# Patient Record
Sex: MALE | Race: WHITE | NOT HISPANIC OR LATINO | Employment: OTHER | URBAN - METROPOLITAN AREA
[De-identification: names, ages, dates, MRNs, and addresses within clinical notes are randomized per-mention and may not be internally consistent; named-entity substitution may affect disease eponyms.]

---

## 2019-12-16 ENCOUNTER — HOSPITAL ENCOUNTER (INPATIENT)
Facility: HOSPITAL | Age: 79
LOS: 25 days | Discharge: NURSING, REHAB OR LONG-TERM CARE | DRG: 003 | End: 2020-01-10
Attending: EMERGENCY MEDICINE | Admitting: PSYCHIATRY & NEUROLOGY
Payer: COMMERCIAL

## 2019-12-16 DIAGNOSIS — E11.49 TYPE 2 DIABETES MELLITUS WITH OTHER NEUROLOGIC COMPLICATION, WITHOUT LONG-TERM CURRENT USE OF INSULIN: ICD-10-CM

## 2019-12-16 DIAGNOSIS — R53.1 DECREASED STRENGTH, ENDURANCE, AND MOBILITY: ICD-10-CM

## 2019-12-16 DIAGNOSIS — R68.89 DECREASED STRENGTH, ENDURANCE, AND MOBILITY: ICD-10-CM

## 2019-12-16 DIAGNOSIS — I61.9: ICD-10-CM

## 2019-12-16 DIAGNOSIS — R90.89 MIDLINE SHIFT OF BRAIN: ICD-10-CM

## 2019-12-16 DIAGNOSIS — R13.12 OROPHARYNGEAL DYSPHAGIA: ICD-10-CM

## 2019-12-16 DIAGNOSIS — E78.2 MIXED HYPERLIPIDEMIA: ICD-10-CM

## 2019-12-16 DIAGNOSIS — I63.511 ACUTE ISCHEMIC RIGHT MCA STROKE: ICD-10-CM

## 2019-12-16 DIAGNOSIS — Z51.5 PALLIATIVE CARE ENCOUNTER: ICD-10-CM

## 2019-12-16 DIAGNOSIS — Z71.89 ADVANCED CARE PLANNING/COUNSELING DISCUSSION: ICD-10-CM

## 2019-12-16 DIAGNOSIS — I63.9 CEREBROVASCULAR ACCIDENT (CVA), UNSPECIFIED MECHANISM: Primary | ICD-10-CM

## 2019-12-16 DIAGNOSIS — D68.9: ICD-10-CM

## 2019-12-16 DIAGNOSIS — Z71.89 GOALS OF CARE, COUNSELING/DISCUSSION: ICD-10-CM

## 2019-12-16 DIAGNOSIS — G93.6 CYTOTOXIC BRAIN EDEMA: ICD-10-CM

## 2019-12-16 DIAGNOSIS — I10 ESSENTIAL HYPERTENSION: ICD-10-CM

## 2019-12-16 DIAGNOSIS — J96.01 ACUTE RESPIRATORY FAILURE WITH HYPOXIA: ICD-10-CM

## 2019-12-16 DIAGNOSIS — I63.411 EMBOLIC STROKE INVOLVING RIGHT MIDDLE CEREBRAL ARTERY: ICD-10-CM

## 2019-12-16 DIAGNOSIS — Z74.09 DECREASED STRENGTH, ENDURANCE, AND MOBILITY: ICD-10-CM

## 2019-12-16 DIAGNOSIS — G93.6 VASOGENIC BRAIN EDEMA: ICD-10-CM

## 2019-12-16 PROBLEM — E11.9 TYPE 2 DIABETES MELLITUS: Status: ACTIVE | Noted: 2019-12-16

## 2019-12-16 LAB
ANION GAP SERPL CALC-SCNC: 7 MMOL/L (ref 8–16)
BUN SERPL-MCNC: 24 MG/DL (ref 8–23)
CALCIUM SERPL-MCNC: 8.9 MG/DL (ref 8.7–10.5)
CHLORIDE SERPL-SCNC: 104 MMOL/L (ref 95–110)
CO2 SERPL-SCNC: 26 MMOL/L (ref 23–29)
CREAT SERPL-MCNC: 1 MG/DL (ref 0.5–1.4)
EST. GFR  (AFRICAN AMERICAN): >60 ML/MIN/1.73 M^2
EST. GFR  (NON AFRICAN AMERICAN): >60 ML/MIN/1.73 M^2
GLUCOSE SERPL-MCNC: 250 MG/DL (ref 70–110)
MAGNESIUM SERPL-MCNC: 1.8 MG/DL (ref 1.6–2.6)
PHOSPHATE SERPL-MCNC: 3.2 MG/DL (ref 2.7–4.5)
POCT GLUCOSE: 206 MG/DL (ref 70–110)
POCT GLUCOSE: 221 MG/DL (ref 70–110)
POCT GLUCOSE: 238 MG/DL (ref 70–110)
POTASSIUM SERPL-SCNC: 4.2 MMOL/L (ref 3.5–5.1)
SODIUM SERPL-SCNC: 137 MMOL/L (ref 136–145)

## 2019-12-16 PROCEDURE — 99223 PR INITIAL HOSPITAL CARE,LEVL III: ICD-10-PCS | Mod: ,,, | Performed by: PSYCHIATRY & NEUROLOGY

## 2019-12-16 PROCEDURE — 20000000 HC ICU ROOM

## 2019-12-16 PROCEDURE — 82962 GLUCOSE BLOOD TEST: CPT

## 2019-12-16 PROCEDURE — 83735 ASSAY OF MAGNESIUM: CPT

## 2019-12-16 PROCEDURE — 94761 N-INVAS EAR/PLS OXIMETRY MLT: CPT

## 2019-12-16 PROCEDURE — 27000221 HC OXYGEN, UP TO 24 HOURS

## 2019-12-16 PROCEDURE — 25500020 PHARM REV CODE 255: Performed by: EMERGENCY MEDICINE

## 2019-12-16 PROCEDURE — 80048 BASIC METABOLIC PNL TOTAL CA: CPT

## 2019-12-16 PROCEDURE — 96376 TX/PRO/DX INJ SAME DRUG ADON: CPT

## 2019-12-16 PROCEDURE — 85025 COMPLETE CBC W/AUTO DIFF WBC: CPT

## 2019-12-16 PROCEDURE — 99291 CRITICAL CARE FIRST HOUR: CPT | Mod: ,,, | Performed by: INTERNAL MEDICINE

## 2019-12-16 PROCEDURE — 96374 THER/PROPH/DIAG INJ IV PUSH: CPT

## 2019-12-16 PROCEDURE — 99285 EMERGENCY DEPT VISIT HI MDM: CPT | Mod: ,,, | Performed by: EMERGENCY MEDICINE

## 2019-12-16 PROCEDURE — 99900035 HC TECH TIME PER 15 MIN (STAT)

## 2019-12-16 PROCEDURE — 25000242 PHARM REV CODE 250 ALT 637 W/ HCPCS: Performed by: INTERNAL MEDICINE

## 2019-12-16 PROCEDURE — 63600175 PHARM REV CODE 636 W HCPCS: Performed by: RADIOLOGY

## 2019-12-16 PROCEDURE — 99223 1ST HOSP IP/OBS HIGH 75: CPT | Mod: ,,, | Performed by: PSYCHIATRY & NEUROLOGY

## 2019-12-16 PROCEDURE — 84100 ASSAY OF PHOSPHORUS: CPT

## 2019-12-16 PROCEDURE — 94640 AIRWAY INHALATION TREATMENT: CPT

## 2019-12-16 PROCEDURE — 96375 TX/PRO/DX INJ NEW DRUG ADDON: CPT

## 2019-12-16 PROCEDURE — 99285 PR EMERGENCY DEPT VISIT,LEVEL V: ICD-10-PCS | Mod: ,,, | Performed by: EMERGENCY MEDICINE

## 2019-12-16 PROCEDURE — 25000003 PHARM REV CODE 250: Performed by: RADIOLOGY

## 2019-12-16 PROCEDURE — 99291 PR CRITICAL CARE, E/M 30-74 MINUTES: ICD-10-PCS | Mod: ,,, | Performed by: INTERNAL MEDICINE

## 2019-12-16 PROCEDURE — 99285 EMERGENCY DEPT VISIT HI MDM: CPT | Mod: 25

## 2019-12-16 RX ORDER — SODIUM CHLORIDE 0.9 % (FLUSH) 0.9 %
10 SYRINGE (ML) INJECTION
Status: DISCONTINUED | OUTPATIENT
Start: 2019-12-16 | End: 2020-01-10 | Stop reason: HOSPADM

## 2019-12-16 RX ORDER — IODIXANOL 320 MG/ML
200 INJECTION, SOLUTION INTRAVASCULAR
Status: COMPLETED | OUTPATIENT
Start: 2019-12-16 | End: 2019-12-16

## 2019-12-16 RX ORDER — ASPIRIN 81 MG/1
81 TABLET ORAL DAILY
Status: DISCONTINUED | OUTPATIENT
Start: 2019-12-17 | End: 2019-12-17

## 2019-12-16 RX ORDER — MIDAZOLAM HYDROCHLORIDE 1 MG/ML
INJECTION INTRAMUSCULAR; INTRAVENOUS CODE/TRAUMA/SEDATION MEDICATION
Status: COMPLETED | OUTPATIENT
Start: 2019-12-16 | End: 2019-12-16

## 2019-12-16 RX ORDER — NICARDIPINE HYDROCHLORIDE 0.2 MG/ML
1 INJECTION INTRAVENOUS CONTINUOUS
Status: DISCONTINUED | OUTPATIENT
Start: 2019-12-16 | End: 2019-12-21

## 2019-12-16 RX ORDER — CLOPIDOGREL BISULFATE 75 MG/1
75 TABLET ORAL DAILY
Status: DISCONTINUED | OUTPATIENT
Start: 2019-12-17 | End: 2019-12-17

## 2019-12-16 RX ORDER — IPRATROPIUM BROMIDE AND ALBUTEROL SULFATE 2.5; .5 MG/3ML; MG/3ML
3 SOLUTION RESPIRATORY (INHALATION) EVERY 4 HOURS
Status: DISCONTINUED | OUTPATIENT
Start: 2019-12-16 | End: 2020-01-10

## 2019-12-16 RX ORDER — FENTANYL CITRATE 50 UG/ML
INJECTION, SOLUTION INTRAMUSCULAR; INTRAVENOUS CODE/TRAUMA/SEDATION MEDICATION
Status: COMPLETED | OUTPATIENT
Start: 2019-12-16 | End: 2019-12-16

## 2019-12-16 RX ORDER — ASPIRIN 300 MG/1
300 SUPPOSITORY RECTAL ONCE
Status: COMPLETED | OUTPATIENT
Start: 2019-12-16 | End: 2019-12-16

## 2019-12-16 RX ORDER — LABETALOL HCL 20 MG/4 ML
10 SYRINGE (ML) INTRAVENOUS EVERY 4 HOURS PRN
Status: DISCONTINUED | OUTPATIENT
Start: 2019-12-16 | End: 2019-12-17

## 2019-12-16 RX ORDER — SODIUM CHLORIDE 0.9 % (FLUSH) 0.9 %
10 SYRINGE (ML) INJECTION
Status: DISCONTINUED | OUTPATIENT
Start: 2019-12-16 | End: 2019-12-17

## 2019-12-16 RX ADMIN — ASPIRIN 300 MG: 300 SUPPOSITORY RECTAL at 07:12

## 2019-12-16 RX ADMIN — MIDAZOLAM HYDROCHLORIDE 1 MG: 1 INJECTION, SOLUTION INTRAMUSCULAR; INTRAVENOUS at 06:12

## 2019-12-16 RX ADMIN — FENTANYL CITRATE 50 MCG: 50 INJECTION, SOLUTION INTRAMUSCULAR; INTRAVENOUS at 06:12

## 2019-12-16 RX ADMIN — NICARDIPINE HYDROCHLORIDE 1 MG/HR: 0.2 INJECTION, SOLUTION INTRAVENOUS at 07:12

## 2019-12-16 RX ADMIN — IPRATROPIUM BROMIDE AND ALBUTEROL SULFATE 3 ML: .5; 3 SOLUTION RESPIRATORY (INHALATION) at 10:12

## 2019-12-16 RX ADMIN — IODIXANOL 80 ML: 320 INJECTION, SOLUTION INTRAVASCULAR at 07:12

## 2019-12-16 NOTE — H&P
Inpatient Radiology Pre-procedure Note    History of Present Illness:  Jerry Linder is a 79 y.o. male who presents as a transfer from OSH with a right MCA stroke syndrome.  Pt. Has outside CT reported as having ABBY stenosis at bifurcation with 70% stenosis and right MCA occlusion.  Admission H&P reviewed.  No past medical history on file.  No past surgical history on file.    Review of Systems:   As documented in primary team H&P    Home Meds:   Prior to Admission medications    Not on File     Scheduled Meds:   Continuous Infusions:   PRN Meds:  Anticoagulants/Antiplatelets: no anticoagulation    Allergies: Review of patient's allergies indicates:  Allergies not on file  Sedation Hx: have been multiple systemic reactions    Labs:  No results for input(s): INR in the last 168 hours.    Invalid input(s):  PT,  PTT  No results for input(s): WBC, HGB, HCT, MCV, PLT in the last 168 hours. No results for input(s): GLU, NA, K, CL, CO2, BUN, CREATININE, CALCIUM, MG, ALT, AST, ALBUMIN, BILITOT, BILIDIR in the last 168 hours.      Vitals:  Temp: 98.2 °F (36.8 °C) (12/16/19 1723)  Pulse: 110 (12/16/19 1723)  Resp: 20 (12/16/19 1723)  BP: (!) 140/72 (12/16/19 1723)  SpO2: 97 % (12/16/19 1723)     Physical Exam:  ASA: 2  Mallampati: 3    General: no acute distress  Mental Status: alert and oriented to person, place and time  HEENT: normocephalic, atraumatic  Chest: unlabored breathing  Heart: regular heart rate  Abdomen: nondistended  Neuro:  left hemiparesis, right gaze preference    Plan: Cerebral angiogram and possible stroke intervention in the right MCA, possibly the right carotid bifurcation  Sedation Plan: devorah Martínez MD  Attending Radiologist  Interventional Neuroradiology

## 2019-12-16 NOTE — ASSESSMENT & PLAN NOTE
79 y.o. yo male with unclear PMHx (HTN, DM, HLD?) who presented to [facility] with R MCA syndrome. LKN 1000. Telephone (no video) consult with Dr. Mosher; tPA not given, as outside treatment window. [place] with report of R ICA stenosis at bifurcation with 70% stenosis and right MCA occlusion on CTA; [facility]. Dr. Martínez reviewed stat CT Head images as acquired. Positive for LVO. Patient to IR for cerebral angio for possible thrombectomy. Admitted to Neuro ICU for close monitoring/higher level of care.       Antithrombotics for secondary stroke prevention: Antiplatelets: Aspirin: 81 mg daily  Statins for secondary stroke prevention and hyperlipidemia, if present:   Statins: Atorvastatin- 40 mg daily  Aggressive risk factor modification: Diet, Exercise  Rehab efforts: The patient has been evaluated by a stroke team provider and the therapy needs have been fully considered based off the presenting complaints and exam findings. The following therapy evaluations are needed: PT evaluate and treat, OT evaluate and treat, SLP evaluate and treat, PM&R evaluate for appropriate placement  Diagnostics ordered/pending: HgbA1C to assess blood glucose levels, Lipid Profile to assess cholesterol levels, MRI head without contrast to assess brain parenchyma, TTE to assess cardiac function/status , TSH to assess thyroid function  VTE prophylaxis: Heparin 5000 units SQ every 8 hours  place [facility]  BP parameters: Infarct: Post successful thrombectomy, SBP <140  Post unsuccessful thrombectomy, SBP <220

## 2019-12-16 NOTE — PLAN OF CARE
Patient transferred to IR room 190 for cerebral angiogram with thrombectomy. See sedation documentation.
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
## 2019-12-17 PROBLEM — Z74.09 DECREASED STRENGTH, ENDURANCE, AND MOBILITY: Status: ACTIVE | Noted: 2019-12-17

## 2019-12-17 PROBLEM — R68.89 DECREASED STRENGTH, ENDURANCE, AND MOBILITY: Status: ACTIVE | Noted: 2019-12-17

## 2019-12-17 PROBLEM — R53.1 DECREASED STRENGTH, ENDURANCE, AND MOBILITY: Status: ACTIVE | Noted: 2019-12-17

## 2019-12-17 LAB
ABO + RH BLD: NORMAL
ABO + RH BLD: NORMAL
ALBUMIN SERPL BCP-MCNC: 3.3 G/DL (ref 3.5–5.2)
ALLENS TEST: ABNORMAL
ALP SERPL-CCNC: 55 U/L (ref 55–135)
ALT SERPL W/O P-5'-P-CCNC: 45 U/L (ref 10–44)
ANION GAP SERPL CALC-SCNC: 9 MMOL/L (ref 8–16)
APTT BLDCRRT: 24 SEC (ref 21–32)
ASCENDING AORTA: 3.35 CM
AST SERPL-CCNC: 25 U/L (ref 10–40)
BASOPHILS # BLD AUTO: 0.01 K/UL (ref 0–0.2)
BASOPHILS # BLD AUTO: 0.04 K/UL (ref 0–0.2)
BASOPHILS NFR BLD: 0.1 % (ref 0–1.9)
BASOPHILS NFR BLD: 0.4 % (ref 0–1.9)
BILIRUB SERPL-MCNC: 0.7 MG/DL (ref 0.1–1)
BLD GP AB SCN CELLS X3 SERPL QL: NORMAL
BLD GP AB SCN CELLS X3 SERPL QL: NORMAL
BLOOD GROUP ANTIBODIES SERPL: NORMAL
BSA FOR ECHO PROCEDURE: 2.52 M2
BUN SERPL-MCNC: 25 MG/DL (ref 8–23)
CALCIUM SERPL-MCNC: 8.6 MG/DL (ref 8.7–10.5)
CHLORIDE SERPL-SCNC: 107 MMOL/L (ref 95–110)
CHOLEST SERPL-MCNC: 136 MG/DL (ref 120–199)
CHOLEST/HDLC SERPL: 4.1 {RATIO} (ref 2–5)
CO2 SERPL-SCNC: 23 MMOL/L (ref 23–29)
CREAT SERPL-MCNC: 0.9 MG/DL (ref 0.5–1.4)
CV ECHO LV RWT: 0.34 CM
DELSYS: ABNORMAL
DIFFERENTIAL METHOD: ABNORMAL
DIFFERENTIAL METHOD: ABNORMAL
DOP CALC LVOT AREA: 4.4 CM2
DOP CALC LVOT DIAMETER: 2.38 CM
DOP CALC LVOT PEAK VEL: 0.85 M/S
DOP CALC LVOT STROKE VOLUME: 68.92 CM3
DOP CALCLVOT PEAK VEL VTI: 15.5 CM
E WAVE DECELERATION TIME: 108.8 MSEC
E/A RATIO: 0.74
ECHO LV POSTERIOR WALL: 1.04 CM (ref 0.6–1.1)
EOSINOPHIL # BLD AUTO: 0.1 K/UL (ref 0–0.5)
EOSINOPHIL # BLD AUTO: 0.1 K/UL (ref 0–0.5)
EOSINOPHIL NFR BLD: 0.6 % (ref 0–8)
EOSINOPHIL NFR BLD: 0.6 % (ref 0–8)
ERYTHROCYTE [DISTWIDTH] IN BLOOD BY AUTOMATED COUNT: 13 % (ref 11.5–14.5)
ERYTHROCYTE [DISTWIDTH] IN BLOOD BY AUTOMATED COUNT: 13.2 % (ref 11.5–14.5)
ERYTHROCYTE [SEDIMENTATION RATE] IN BLOOD BY WESTERGREN METHOD: 26 MM/H
EST. GFR  (AFRICAN AMERICAN): >60 ML/MIN/1.73 M^2
EST. GFR  (NON AFRICAN AMERICAN): >60 ML/MIN/1.73 M^2
ESTIMATED AVG GLUCOSE: 174 MG/DL (ref 68–131)
FIO2: 36
FLOW: 4
FRACTIONAL SHORTENING: 46 % (ref 28–44)
GLUCOSE SERPL-MCNC: 220 MG/DL (ref 70–110)
HBA1C MFR BLD HPLC: 7.7 % (ref 4–5.6)
HCO3 UR-SCNC: 24 MMOL/L (ref 24–28)
HCT VFR BLD AUTO: 41.3 % (ref 40–54)
HCT VFR BLD AUTO: 41.4 % (ref 40–54)
HDLC SERPL-MCNC: 33 MG/DL (ref 40–75)
HDLC SERPL: 24.3 % (ref 20–50)
HGB BLD-MCNC: 13.8 G/DL (ref 14–18)
HGB BLD-MCNC: 14.1 G/DL (ref 14–18)
IMM GRANULOCYTES # BLD AUTO: 0.03 K/UL (ref 0–0.04)
IMM GRANULOCYTES # BLD AUTO: 0.05 K/UL (ref 0–0.04)
IMM GRANULOCYTES NFR BLD AUTO: 0.3 % (ref 0–0.5)
IMM GRANULOCYTES NFR BLD AUTO: 0.5 % (ref 0–0.5)
INR PPP: 1 (ref 0.8–1.2)
INTERVENTRICULAR SEPTUM: 0.97 CM (ref 0.6–1.1)
IVRT: 0.07 MSEC
LDLC SERPL CALC-MCNC: 67.6 MG/DL (ref 63–159)
LEFT ATRIUM SIZE: 5.07 CM
LEFT INTERNAL DIMENSION IN SYSTOLE: 3.27 CM (ref 2.1–4)
LEFT VENTRICLE DIASTOLIC VOLUME INDEX: 74.25 ML/M2
LEFT VENTRICLE DIASTOLIC VOLUME: 182.07 ML
LEFT VENTRICLE MASS INDEX: 102 G/M2
LEFT VENTRICLE SYSTOLIC VOLUME INDEX: 17.6 ML/M2
LEFT VENTRICLE SYSTOLIC VOLUME: 43.09 ML
LEFT VENTRICULAR INTERNAL DIMENSION IN DIASTOLE: 6.03 CM (ref 3.5–6)
LEFT VENTRICULAR MASS: 250.58 G
LYMPHOCYTES # BLD AUTO: 1.8 K/UL (ref 1–4.8)
LYMPHOCYTES # BLD AUTO: 1.9 K/UL (ref 1–4.8)
LYMPHOCYTES NFR BLD: 19.1 % (ref 18–48)
LYMPHOCYTES NFR BLD: 20.6 % (ref 18–48)
MAGNESIUM SERPL-MCNC: 1.9 MG/DL (ref 1.6–2.6)
MCH RBC QN AUTO: 32.7 PG (ref 27–31)
MCH RBC QN AUTO: 32.8 PG (ref 27–31)
MCHC RBC AUTO-ENTMCNC: 33.4 G/DL (ref 32–36)
MCHC RBC AUTO-ENTMCNC: 34.1 G/DL (ref 32–36)
MCV RBC AUTO: 96 FL (ref 82–98)
MCV RBC AUTO: 98 FL (ref 82–98)
MODE: ABNORMAL
MONOCYTES # BLD AUTO: 0.8 K/UL (ref 0.3–1)
MONOCYTES # BLD AUTO: 0.9 K/UL (ref 0.3–1)
MONOCYTES NFR BLD: 10.2 % (ref 4–15)
MONOCYTES NFR BLD: 8.1 % (ref 4–15)
MV PEAK A VEL: 1.16 M/S
MV PEAK E VEL: 0.86 M/S
NEUTROPHILS # BLD AUTO: 6.1 K/UL (ref 1.8–7.7)
NEUTROPHILS # BLD AUTO: 7 K/UL (ref 1.8–7.7)
NEUTROPHILS NFR BLD: 68.2 % (ref 38–73)
NEUTROPHILS NFR BLD: 71.3 % (ref 38–73)
NONHDLC SERPL-MCNC: 103 MG/DL
NRBC BLD-RTO: 0 /100 WBC
NRBC BLD-RTO: 0 /100 WBC
PCO2 BLDA: 34.7 MMHG (ref 35–45)
PH SMN: 7.45 [PH] (ref 7.35–7.45)
PHOSPHATE SERPL-MCNC: 4 MG/DL (ref 2.7–4.5)
PISA TR MAX VEL: 1.49 M/S
PLATELET # BLD AUTO: 187 K/UL (ref 150–350)
PLATELET # BLD AUTO: 198 K/UL (ref 150–350)
PMV BLD AUTO: 11 FL (ref 9.2–12.9)
PMV BLD AUTO: 11.4 FL (ref 9.2–12.9)
PO2 BLDA: 78 MMHG (ref 80–100)
POC BE: 0 MMOL/L
POC SATURATED O2: 96 % (ref 95–100)
POC TCO2: 25 MMOL/L (ref 23–27)
POCT GLUCOSE: 180 MG/DL (ref 70–110)
POCT GLUCOSE: 196 MG/DL (ref 70–110)
POCT GLUCOSE: 228 MG/DL (ref 70–110)
POTASSIUM SERPL-SCNC: 3.9 MMOL/L (ref 3.5–5.1)
PROT SERPL-MCNC: 6.4 G/DL (ref 6–8.4)
PROTHROMBIN TIME: 10.1 SEC (ref 9–12.5)
RBC # BLD AUTO: 4.22 M/UL (ref 4.6–6.2)
RBC # BLD AUTO: 4.3 M/UL (ref 4.6–6.2)
RIGHT VENTRICULAR END-DIASTOLIC DIMENSION: 3.16 CM
SAMPLE: ABNORMAL
SINUS: 3.33 CM
SITE: ABNORMAL
SODIUM SERPL-SCNC: 138 MMOL/L (ref 136–145)
SODIUM SERPL-SCNC: 139 MMOL/L (ref 136–145)
SP02: 97
STJ: 2.64 CM
TR MAX PG: 9 MMHG
TRICUSPID ANNULAR PLANE SYSTOLIC EXCURSION: 3.13 CM
TRIGL SERPL-MCNC: 177 MG/DL (ref 30–150)
WBC # BLD AUTO: 8.93 K/UL (ref 3.9–12.7)
WBC # BLD AUTO: 9.78 K/UL (ref 3.9–12.7)

## 2019-12-17 PROCEDURE — 27200188 HC TRANSDUCER, ART ADULT/PEDS

## 2019-12-17 PROCEDURE — 99233 SBSQ HOSP IP/OBS HIGH 50: CPT | Mod: ,,, | Performed by: PSYCHIATRY & NEUROLOGY

## 2019-12-17 PROCEDURE — 36620 ARTERIAL LINE: ICD-10-PCS | Mod: ,,, | Performed by: NURSE PRACTITIONER

## 2019-12-17 PROCEDURE — 63600175 PHARM REV CODE 636 W HCPCS: Performed by: INTERNAL MEDICINE

## 2019-12-17 PROCEDURE — 83036 HEMOGLOBIN GLYCOSYLATED A1C: CPT

## 2019-12-17 PROCEDURE — 36620 INSERTION CATHETER ARTERY: CPT

## 2019-12-17 PROCEDURE — 86870 RBC ANTIBODY IDENTIFICATION: CPT

## 2019-12-17 PROCEDURE — 36415 COLL VENOUS BLD VENIPUNCTURE: CPT

## 2019-12-17 PROCEDURE — 83735 ASSAY OF MAGNESIUM: CPT

## 2019-12-17 PROCEDURE — 99291 PR CRITICAL CARE, E/M 30-74 MINUTES: ICD-10-PCS | Mod: 25,,, | Performed by: NURSE PRACTITIONER

## 2019-12-17 PROCEDURE — 36600 WITHDRAWAL OF ARTERIAL BLOOD: CPT

## 2019-12-17 PROCEDURE — 27000221 HC OXYGEN, UP TO 24 HOURS

## 2019-12-17 PROCEDURE — 99291 CRITICAL CARE FIRST HOUR: CPT | Mod: 25,,, | Performed by: NURSE PRACTITIONER

## 2019-12-17 PROCEDURE — 92610 EVALUATE SWALLOWING FUNCTION: CPT

## 2019-12-17 PROCEDURE — 36620 INSERTION CATHETER ARTERY: CPT | Mod: ,,, | Performed by: NURSE PRACTITIONER

## 2019-12-17 PROCEDURE — 63600175 PHARM REV CODE 636 W HCPCS: Performed by: STUDENT IN AN ORGANIZED HEALTH CARE EDUCATION/TRAINING PROGRAM

## 2019-12-17 PROCEDURE — 43752 NASAL/OROGASTRIC W/TUBE PLMT: CPT

## 2019-12-17 PROCEDURE — 25500020 PHARM REV CODE 255: Performed by: ANESTHESIOLOGY

## 2019-12-17 PROCEDURE — 25000242 PHARM REV CODE 250 ALT 637 W/ HCPCS: Performed by: INTERNAL MEDICINE

## 2019-12-17 PROCEDURE — 85730 THROMBOPLASTIN TIME PARTIAL: CPT

## 2019-12-17 PROCEDURE — 94761 N-INVAS EAR/PLS OXIMETRY MLT: CPT

## 2019-12-17 PROCEDURE — 99900035 HC TECH TIME PER 15 MIN (STAT)

## 2019-12-17 PROCEDURE — 94640 AIRWAY INHALATION TREATMENT: CPT

## 2019-12-17 PROCEDURE — 92523 SPEECH SOUND LANG COMPREHEN: CPT

## 2019-12-17 PROCEDURE — 25000003 PHARM REV CODE 250: Performed by: STUDENT IN AN ORGANIZED HEALTH CARE EDUCATION/TRAINING PROGRAM

## 2019-12-17 PROCEDURE — 25000003 PHARM REV CODE 250: Performed by: RADIOLOGY

## 2019-12-17 PROCEDURE — 86901 BLOOD TYPING SEROLOGIC RH(D): CPT

## 2019-12-17 PROCEDURE — 86901 BLOOD TYPING SEROLOGIC RH(D): CPT | Mod: 91

## 2019-12-17 PROCEDURE — 80053 COMPREHEN METABOLIC PANEL: CPT

## 2019-12-17 PROCEDURE — 99900026 HC AIRWAY MAINTENANCE (STAT)

## 2019-12-17 PROCEDURE — A4217 STERILE WATER/SALINE, 500 ML: HCPCS | Performed by: NURSE PRACTITIONER

## 2019-12-17 PROCEDURE — 85025 COMPLETE CBC W/AUTO DIFF WBC: CPT

## 2019-12-17 PROCEDURE — 20000000 HC ICU ROOM

## 2019-12-17 PROCEDURE — 63600175 PHARM REV CODE 636 W HCPCS: Performed by: NURSE PRACTITIONER

## 2019-12-17 PROCEDURE — 80061 LIPID PANEL: CPT

## 2019-12-17 PROCEDURE — 84295 ASSAY OF SERUM SODIUM: CPT

## 2019-12-17 PROCEDURE — 25000003 PHARM REV CODE 250: Performed by: INTERNAL MEDICINE

## 2019-12-17 PROCEDURE — 99233 PR SUBSEQUENT HOSPITAL CARE,LEVL III: ICD-10-PCS | Mod: ,,, | Performed by: PSYCHIATRY & NEUROLOGY

## 2019-12-17 PROCEDURE — 85610 PROTHROMBIN TIME: CPT

## 2019-12-17 PROCEDURE — 63600175 PHARM REV CODE 636 W HCPCS: Performed by: ANESTHESIOLOGY

## 2019-12-17 PROCEDURE — 25000003 PHARM REV CODE 250: Performed by: NURSE PRACTITIONER

## 2019-12-17 PROCEDURE — 84100 ASSAY OF PHOSPHORUS: CPT

## 2019-12-17 PROCEDURE — A9585 GADOBUTROL INJECTION: HCPCS | Performed by: ANESTHESIOLOGY

## 2019-12-17 PROCEDURE — 82803 BLOOD GASES ANY COMBINATION: CPT

## 2019-12-17 RX ORDER — CLOPIDOGREL BISULFATE 75 MG/1
75 TABLET ORAL DAILY
Status: DISCONTINUED | OUTPATIENT
Start: 2019-12-18 | End: 2019-12-17

## 2019-12-17 RX ORDER — SODIUM CHLORIDE 9 MG/ML
INJECTION, SOLUTION INTRAVENOUS CONTINUOUS
Status: DISCONTINUED | OUTPATIENT
Start: 2019-12-17 | End: 2019-12-17

## 2019-12-17 RX ORDER — SODIUM,POTASSIUM PHOSPHATES 280-250MG
2 POWDER IN PACKET (EA) ORAL
Status: DISCONTINUED | OUTPATIENT
Start: 2019-12-17 | End: 2020-01-09

## 2019-12-17 RX ORDER — LANOLIN ALCOHOL/MO/W.PET/CERES
800 CREAM (GRAM) TOPICAL
Status: DISCONTINUED | OUTPATIENT
Start: 2019-12-17 | End: 2020-01-09

## 2019-12-17 RX ORDER — LABETALOL HCL 20 MG/4 ML
10 SYRINGE (ML) INTRAVENOUS EVERY 4 HOURS PRN
Status: DISCONTINUED | OUTPATIENT
Start: 2019-12-17 | End: 2019-12-26

## 2019-12-17 RX ORDER — GADOBUTROL 604.72 MG/ML
10 INJECTION INTRAVENOUS
Status: COMPLETED | OUTPATIENT
Start: 2019-12-17 | End: 2019-12-17

## 2019-12-17 RX ORDER — POTASSIUM CHLORIDE 20 MEQ/15ML
40 SOLUTION ORAL
Status: DISCONTINUED | OUTPATIENT
Start: 2019-12-17 | End: 2020-01-09

## 2019-12-17 RX ORDER — POTASSIUM CHLORIDE 20 MEQ/15ML
60 SOLUTION ORAL
Status: DISCONTINUED | OUTPATIENT
Start: 2019-12-17 | End: 2020-01-09

## 2019-12-17 RX ORDER — ATORVASTATIN CALCIUM 20 MG/1
80 TABLET, FILM COATED ORAL NIGHTLY
Status: DISCONTINUED | OUTPATIENT
Start: 2019-12-17 | End: 2019-12-23

## 2019-12-17 RX ORDER — PHENYLEPHRINE HCL IN 0.9% NACL 1 MG/10 ML
SYRINGE (ML) INTRAVENOUS
Status: DISPENSED
Start: 2019-12-17 | End: 2019-12-18

## 2019-12-17 RX ORDER — FUROSEMIDE 10 MG/ML
20 INJECTION INTRAMUSCULAR; INTRAVENOUS ONCE
Status: COMPLETED | OUTPATIENT
Start: 2019-12-17 | End: 2019-12-17

## 2019-12-17 RX ORDER — INSULIN ASPART 100 [IU]/ML
1-10 INJECTION, SOLUTION INTRAVENOUS; SUBCUTANEOUS
Status: DISCONTINUED | OUTPATIENT
Start: 2019-12-17 | End: 2019-12-18

## 2019-12-17 RX ORDER — ATORVASTATIN CALCIUM 20 MG/1
80 TABLET, FILM COATED ORAL NIGHTLY
Status: DISCONTINUED | OUTPATIENT
Start: 2019-12-17 | End: 2019-12-17

## 2019-12-17 RX ORDER — NAPROXEN SODIUM 220 MG/1
81 TABLET, FILM COATED ORAL DAILY
Status: DISCONTINUED | OUTPATIENT
Start: 2019-12-17 | End: 2019-12-17

## 2019-12-17 RX ORDER — GLUCAGON 1 MG
1 KIT INJECTION
Status: DISCONTINUED | OUTPATIENT
Start: 2019-12-17 | End: 2019-12-18

## 2019-12-17 RX ADMIN — SODIUM CHLORIDE: 234 INJECTION INTRAMUSCULAR; INTRAVENOUS; SUBCUTANEOUS at 06:12

## 2019-12-17 RX ADMIN — INSULIN ASPART 4 UNITS: 100 INJECTION, SOLUTION INTRAVENOUS; SUBCUTANEOUS at 06:12

## 2019-12-17 RX ADMIN — ASPIRIN 81 MG: 81 TABLET, COATED ORAL at 09:12

## 2019-12-17 RX ADMIN — SODIUM CHLORIDE: 0.9 INJECTION, SOLUTION INTRAVENOUS at 04:12

## 2019-12-17 RX ADMIN — IPRATROPIUM BROMIDE AND ALBUTEROL SULFATE 3 ML: .5; 3 SOLUTION RESPIRATORY (INHALATION) at 08:12

## 2019-12-17 RX ADMIN — IPRATROPIUM BROMIDE AND ALBUTEROL SULFATE 3 ML: .5; 3 SOLUTION RESPIRATORY (INHALATION) at 01:12

## 2019-12-17 RX ADMIN — FUROSEMIDE 20 MG: 10 INJECTION, SOLUTION INTRAMUSCULAR; INTRAVENOUS at 02:12

## 2019-12-17 RX ADMIN — GADOBUTROL 10 ML: 604.72 INJECTION INTRAVENOUS at 04:12

## 2019-12-17 RX ADMIN — HUMAN ALBUMIN MICROSPHERES AND PERFLUTREN 0.66 MG: 10; .22 INJECTION, SOLUTION INTRAVENOUS at 10:12

## 2019-12-17 RX ADMIN — ATORVASTATIN CALCIUM 80 MG: 20 TABLET, FILM COATED ORAL at 09:12

## 2019-12-17 RX ADMIN — IPRATROPIUM BROMIDE AND ALBUTEROL SULFATE 3 ML: .5; 3 SOLUTION RESPIRATORY (INHALATION) at 05:12

## 2019-12-17 RX ADMIN — CLOPIDOGREL BISULFATE 75 MG: 75 TABLET ORAL at 09:12

## 2019-12-17 RX ADMIN — NICARDIPINE HYDROCHLORIDE 7.5 MG/HR: 0.2 INJECTION, SOLUTION INTRAVENOUS at 09:12

## 2019-12-17 RX ADMIN — LABETALOL HCL IV SOLN PREFILLED SYRINGE 20 MG/4ML (5 MG/ML) 10 MG: 20/4 SOLUTION PREFILLED SYRINGE at 02:12

## 2019-12-17 RX ADMIN — IPRATROPIUM BROMIDE AND ALBUTEROL SULFATE 3 ML: .5; 3 SOLUTION RESPIRATORY (INHALATION) at 03:12

## 2019-12-17 RX ADMIN — NICARDIPINE HYDROCHLORIDE 2.5 MG/HR: 0.2 INJECTION, SOLUTION INTRAVENOUS at 07:12

## 2019-12-17 RX ADMIN — INSULIN ASPART 2 UNITS: 100 INJECTION, SOLUTION INTRAVENOUS; SUBCUTANEOUS at 11:12

## 2019-12-17 NOTE — PROGRESS NOTES
Transferred pt from IR to PACU bed 10 on portable monitor. VSS. Q 15 min site checks/VS. Flat until 2100. Updated report given to ZEE Ayers.

## 2019-12-17 NOTE — PROGRESS NOTES
Patient transferred to the MRI bed,, tele placed , O2 sensor, and BP cuff applied,, placed in MRI, tolerating well,, WCTM

## 2019-12-17 NOTE — PT/OT/SLP EVAL
"Speech Language Pathology Evaluation  Cognitive/Bedside Swallow    Patient Name:  Jerry Linder   MRN:  [id number]  Admitting Diagnosis: Embolic stroke involving right middle cerebral artery    Recommendations:                  General Recommendations:  Dysphagia therapy and Speech/language therapy  Diet recommendations:  NPO(except for meds crushed in pureed bolus), NPO   Aspiration Precautions: Strict aspiration precautions   General Precautions: Standard, aspiration, fall  Communication strategies:  none    History:     History reviewed. No pertinent past medical history.    Past Surgical History:   Procedure Laterality Date    CEREBRAL ANGIOGRAM N/A 12/16/2019    Procedure: ANGIOGRAM-CEREBRAL;  Surgeon: Jairo Martínez MD;  Location: [facility] OR [address] [place] [address];  Service: Radiology;  Laterality: N/A;       Social History: Patient lives with spouse.    Prior Intubation HX:  na    Modified Barium Swallow: na        Prior diet: regular with thin.      Subjective     "I am tired' per pt  Patient goals: did not state    Pain/Comfort:  · Pain Rating 1: 0/10  · Pain Rating Post-Intervention 1: 0/10    Objective:     Cognitive Status:    Pt. Cooperative and responsive though eyes closed most of session despite cues.  Left neglect noted.  He was oriented to time and place with good recall of remote temporal and general information.  Pt. Recalled up to 5  digits  On immediate recall tasks.  Responses to hypothetical verbal problem solving tasks were accurate and complete.  He compared and contrasted objects and generated multiple solutions to problems though he did require cues to maintain alertness       Receptive Language:   Comprehension:   Pt. Followed one step command and responded to simple yes/no questions with 100% accuracy    Pragmatics:    Poor eye contact    Expressive Language:  Verbal:    Verbal language skills were wfl to communicate basic wants and needs      Motor Speech:  Moderate dysarthria with speech intelligible in " conversation with careful listening.    Voice:   Adequate inensity with slightly wet vocal quality     Visual-Spatial:  tba    Reading:   tba     Written Expression:   tba    Oral Musculature Evaluation  · Oral Musculature: facial asymmetry present, left weakness  · Dentition: scattered dentition  · Secretion Management: left corner drooling  · Mucosal Quality: adequate  · Mandibular Strength and Mobility: WFL  · Oral Labial Strength and Mobility: impaired retraction  · Buccal Strength and Mobility: impaired  · Volitional Cough: wet, strong  · Volitional Swallow: delayed  · Voice Prior to PO Intake: adequate intensity, slightly wet    Bedside Swallow Eval:   Consistencies Assessed:  · Thin liquids 1 teaspoon  · Nectar thick liquids 3 teaspoons then 2 bolus controlled sips via cup  · Puree 1 teaspoon     Oral Phase:   · WFL    Pharyngeal Phase:   · coughing/choking noted on thin liquids with no coughing noted on nectar thick liquid and pureed.  Pt. Did require cues to maintain alertness        Assessment:     Jerry Linder is a 79 y.o. male with an SLP diagnosis of Dysphagia, Dysarthria and Cognitive-Linguistic Impairment.  He presents with decreased level of alertness.    Goals:   Multidisciplinary Problems     SLP Goals        Problem: SLP Goal    Goal Priority Disciplines Outcome   SLP Goal     SLP Ongoing, Progressing   Description:  Goals due 12/24  1.  Pt. Will participate in ongoing assessment of swallow at bedside  2.  Assess reading, writing and visual spatial skills  3.  Recall speech strategies with min cues  4.  Assess higher level cognitive skills to determine need fo rtherapy                    Plan:     · Patient to be seen:  4 x/week   · Plan of Care expires:  01/15/20  · Plan of Care reviewed with:  patient, spouse   · SLP Follow-Up:  Yes       Discharge recommendations:  Discharge Facility/Level of Care Needs: rehabilitation facility   Barriers to Discharge:  Level of Skilled Assistance Needed 24  hour    Time Tracking:     SLP Treatment Date:   12/17/19  Speech Start Time:  0810  Speech Stop Time:  0830     Speech Total Time (min):  20 min    Billable Minutes: Eval 12  and Eval Swallow and Oral Function 8    Kat Garrido MA, CCC-SLP  12/17/2019
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
## 2019-12-17 NOTE — PLAN OF CARE
Recommend that pt. Be npo except for meds crushed in pureed bolus  Problem: SLP Goal  Goal: SLP Goal  Description  Goals due 12/24  1.  Pt. Will participate in ongoing assessment of swallow at bedside  2.  Assess reading, writing and visual spatial skills  3.  Recall speech strategies with min cues  4.  Assess higher level cognitive skills to determine need fo rtherapy   Outcome: Ongoing, Progressing

## 2019-12-17 NOTE — H&P
[facility]  Neurocritical Care  History & Physical    Admit Date: 12/16/2019  Service Date: 12/17/2019  Length of Stay: 1    Subjective:     Chief Complaint: Embolic stroke involving right middle cerebral artery    History of Present Illness: Jerry Linder is a 79 y.o. male who  was admitted as a transfer from [facility] for Right MCA stroke syndrome. Patient had an urgent thrombectomy s/p with a right MCA stroke syndrome. PMH significant for HTN, T2DM and HLD. Patient stated that around 2:30 pm on 12/16/19 he was driving and felt weak. He drove home and was taken to the ER where he was evaluated for a stroke. He had outside CTA which was reported to have ABBY stenosis at bifurcation with 70% stenosis and right MCA occlusion. Patient was transferred to [facility] for possible intervention. Patient had a right femoral sheath placed and his Angio  revealed 90% ABBY stenosis and R M1 occlusion. The ABBY was treated by thrombectomy and carotid stenting with TICI 3 reperfusion.    Patient was admitted to the [facility] for higher level of care post thrombectomy.         No past medical history on file.  No past surgical history on file.   No current facility-administered medications on file prior to encounter.      No current outpatient medications on file prior to encounter.      Allergies: Patient has no known allergies.    No family history on file.  Social History     Tobacco Use    Smoking status: Not on file   Substance Use Topics    Alcohol use: Not on file    Drug use: Not on file       Admit Date: 12/16/2019  LOS: 1    CC: Embolic stroke involving right middle cerebral artery    Code Status: Full Code     SUBJECTIVE:     Review of Symptoms:   Constitutional: Denies fevers or chills.  Pulmonary: Denies shortness of breath or cough.  Cardiology: Denies chest pain or palpitations.  GI: Denies abdominal pain or constipation.  Neurologic: Denies new weakness, headaches, or paresthesias.     Medications:  Continuous  "Infusions:   niCARdipine 10 mg/hr (12/16/19 2235)     Scheduled Meds:   albuterol-ipratropium  3 mL Nebulization Q4H    aspirin  81 mg Oral Daily    atorvastatin  80 mg Oral QHS    clopidogrel  75 mg Oral Daily     PRN Meds:.labetalol, sodium chloride 0.9%, sodium chloride 0.9%    OBJECTIVE:   Vital Signs (Most Recent):   Temp: 98.8 °F (37.1 °C) (12/16/19 2300)  Pulse: 99 (12/17/19 0030)  Resp: (!) 23 (12/17/19 0030)  BP: (!) 119/50 (12/17/19 0030)  SpO2: 96 % (12/17/19 0030)    Vital Signs (24h Range):   Temp:  [98 °F (36.7 °C)-99 °F (37.2 °C)] 98.8 °F (37.1 °C)  Pulse:  [] 99  Resp:  [14-28] 23  SpO2:  [93 %-99 %] 96 %  BP: (117-181)/(50-86) 119/50    ICP/CPP (Last 24h):        I & O (Last 24h):     Intake/Output Summary (Last 24 hours) at 12/17/2019 0106  Last data filed at 12/16/2019 2202  Gross per 24 hour   Intake 59.44 ml   Output 835 ml   Net -775.56 ml     Physical Exam:  GA: Alert, comfortable, no acute distress.   HEENT: No scleral icterus or JVD.   Pulmonary: Bilateral crackles,  Cardiac: RRR S1 & S2 w/o rubs/murmurs/gallops.   Abdominal: Bowel sounds present x 4. No appreciable hepatosplenomegaly.  Skin: No jaundice, rashes, or visible lesions.  Neuro:  --GCS: E4 V5 M5  --Mental Status:  Alert  --CN II-XII grossly intact.   --Pupils 3mm, PERRL.   --Corneal reflex, gag, cough intact.  --LUE strength: no movement  --RUE strength: 5/5  --LLE strength: no movement  --RLE strength: 5/5  Left sided sensory loss    Vent Data: N/A       Lines/Drains/Airway:              Urethral Catheter 12/16/19 1732 (Active)   Site Assessment Clean;Intact 12/16/2019  9:00 PM   Collection Container Urimeter 12/16/2019  9:00 PM   Securement Method secured to top of thigh w/ adhesive device 12/16/2019  9:00 PM   Reason for Continuing Urinary Catheterization Post operative 12/16/2019  9:00 PM   CAUTI Prevention Bundle StatLock in place w 1" slack;Intact seal between catheter & drainage tubing;Green sheeting clip in " use;Drainage bag/urimeter off the floor;No dependent loops or kinks;Drainage bag/urimeter not overfilled (<2/3 full);Drainage bag/urimeter below bladder 12/16/2019  7:33 PM   Output (mL) 60 mL 12/16/2019 10:02 PM     Nutrition/Tube Feeds (if NPO state why): SHAJI evaluation  Labs:  ABG: No results for input(s): PH, PO2, PCO2, HCO3, POCSATURATED, BE in the last 24 hours.  BMP:  Recent Labs   Lab 12/16/19  2257      K 4.2      CO2 26   BUN 24*   CREATININE 1.0   *   MG 1.8   PHOS 3.2     LFT: No results found for: AST, ALT, GGT, ALKPHOS, BILITOT, ALBUMIN, PROT  CBC:   Lab Results   Component Value Date    WBC 9.78 12/16/2019    HGB 14.1 12/16/2019    HCT 41.4 12/16/2019    MCV 96 12/16/2019     12/16/2019     Microbiology x 7d:   Microbiology Results (last 7 days)     ** No results found for the last 168 hours. **        Imaging: CT head without contrast 12/16/19  FINDINGS:  Intracranial compartment:    Scattered periventricular white matter hypoattenuation, most consistent with chronic microvascular ischemic change.  No evidence of acute major vascular distribution infarct or hemorrhage.    Generalized cerebral volume loss with compensatory enlargement of ventricles and sulci without evidence of hydrocephalus.    No extra-axial blood or fluid collections.    Skull/extracranial contents (limited evaluation): No fracture. Mastoid air cells and paranasal sinuses are essentially clear.      Impression       No acute major vascular distribution infarct or hemorrhage.    Chronic microvascular ischemic changes and generalized cerebral volume loss.       I personally reviewed the above image.    ASSESSMENT/PLAN:     Active Hospital Problems    Diagnosis    *Embolic stroke involving right middle cerebral artery    Essential hypertension    Type 2 diabetes mellitus    Mixed hyperlipidemia    Cerebrovascular accident (CVA)      Neuro: Right MCA stroke syndrome, ABBY stenosis s/p Thrombectomy and  stent , TICI 3 reperfusion. Patient is Alert, awake, follows commands on the right, left sided weakness, left facial droop.  CVA  Atorvastatin 80mg  Continue Aspirin 81mg and plavix 75mg daily      Pulmonary: on 4L of oxygen      Cardiac: BP <140  On cardene  Follow ECHO      Renal:  Monitor input and output closely      ID:  Afebrile, no leukocytosis      Hem/Onc: Hgb 14.1,        Endocrine:  Hemoglobin A1C 7.7  Accuchecks mainly AC and HS      Fluids/Electrolytes/Nutrition/GI: monitor input and replace electrolytes accordingly    Lines: None  Art  CVC  ETT  Andrade  NG  PEG    Proph:  DVT:  Constipation:  Last output:  PUP:  VAP:      Uninterrupted Critical Care/Counseling Time (not including procedures): 40 minutes    Assessment/Plan:     Neuro  * Embolic stroke involving right middle cerebral artery   - 78 y/o male admitted for R MCA stroke syndrome POD 0 s/p Thrombectomy TICI 3 reperfusion   - keep SBP <140   - Neurochecks q1hr    - Follow Echo   - Follow Hemoglobin A1C   - Aspirin daily   - Plavix 75mg daily   - on cardene   - restart home Lisinopril      Cerebrovascular accident (CVA)  See embolic stroke above    Cardiac/Vascular  Mixed hyperlipidemia   - Continue Atorvastatin 80mg HS    Essential hypertension   - Continue home dose of Lisinopril    Endocrine  Type 2 diabetes mellitus   - Accuchecks AC and HS   - hemoglobin A1C 7.7          The patient is being Prophylaxed for:  Venous Thromboembolism with: Mechanical  Stress Ulcer with: Not Applicable   Ventilator Pneumonia with: not applicable    Activity Orders          None        Full Code    Edy Blum MD  Neurocritical Care  [facility]

## 2019-12-17 NOTE — PLAN OF CARE
R MCA s/p thrombectomy TICI 3  MRI brain without contrast  Evaluate edema and ischemic burden  DC IVFs  Likely administer lasix 20 mg IV s/p echo  sbp 100-140  DAPT with stenting  Echo pending  kub  Statin  ISS  SQH after MRI  NPO  NGT

## 2019-12-17 NOTE — NURSING
[facility]>IR>PACU>[id number]  Type of stroke/diagnosis:  R MCA      Thrombectomy start and end time 8977-0009 TICI 3 with stent placement  Current symptoms: LSW, facial droop, dysarthria, numbness present LS    Skin assessment done: Yes  Wounds noted: R groin  SHAJI Armband Applied: yes    NCC notified: MD ellie    [facility]

## 2019-12-17 NOTE — ASSESSMENT & PLAN NOTE
Continuous Cardiac Monitoring  Vital Signs Q1 hour  Systolic (24hrs), Av , Min:117 , Max:181   Cardene to Maintain SBP < 140  CXR  Lungs hypoaerated.  Increase in the pulmonary vascular interstitial and alveolar markings Findings most consistent with congestive failure   ECHO 65%  EKG : Atrial fibrillation with rapid ventricular response with premature ventricular or aberrantly conducted complexes

## 2019-12-17 NOTE — PROGRESS NOTES
[facility]  Vascular Neurology  Comprehensive Stroke Center  Progress Note    Assessment/Plan:     * Embolic stroke involving right middle cerebral artery  79 y.o. yo male with unclear PMHx (HTN, DM, HLD?) who presented to [facility] with R MCA syndrome. LKN 1000. Telephone (no video) consult with Dr. Mosher; tPA not given, as outside treatment window. [place] with report of R ICA stenosis at bifurcation with 70% stenosis and right MCA occlusion on CTA; pt was transferred to [facility] for possible intervention. Dr. Martínez reviewed stat CT Head images as acquired. Positive for LVO. Patient to IR for cerebral angio for possible thrombectomy. Admitted to Neuro ICU for close monitoring/higher level of care.       Antithrombotics for secondary stroke prevention: Antiplatelets: Aspirin: 81 mg daily  Statins for secondary stroke prevention and hyperlipidemia, if present:   Statins: Atorvastatin- 40 mg daily  Aggressive risk factor modification: Diet, Exercise  Rehab efforts: The patient has been evaluated by a stroke team provider and the therapy needs have been fully considered based off the presenting complaints and exam findings. The following therapy evaluations are needed: PT evaluate and treat, OT evaluate and treat, SLP evaluate and treat, PM&R evaluate for appropriate placement  Diagnostics ordered/pending: HgbA1C to assess blood glucose levels, Lipid Profile to assess cholesterol levels, MRI head without contrast to assess brain parenchyma  VTE prophylaxis: Heparin 5000 units SQ every 8 hours  place [facility]  BP parameters: Infarct: Post successful thrombectomy, SBP <140  Post unsuccessful thrombectomy, SBP <220    Mixed hyperlipidemia  -stroke risk factor    LDL pending  If LDL >70 recommend atorvastatin 40 mg     Type 2 diabetes mellitus  Stroke risk factor   on arrival  Hemoglobin A1C pending  Recommend tight glycemic control -180    Essential hypertension  Stroke risk  factor  SBP < 140 vs 220 based on thrombectomy result  Per primary team       No notes on file    STROKE DOCUMENTATION   Acute Stroke Times   Last Known Normal Date: 12/16/19  Last Known Normal Time: 1000  Stroke Team Called Date: 12/16/19  Stroke Team Called Time: 1725  Stroke Team Arrival Date: 12/16/19  Stroke Team Arrival Time: 1730  CT Interpretation Time: 1745  Decision to Treat Time for Alteplase: (N/A)  Decision to Treat Time for IR: 1750    NIH Scale:  1a. Level of Consciousness: 1-->Not alert, but arousable by minor stimulation to obey, answer, or respond  1b. LOC Questions: 0-->Answers both questions correctly  1c. LOC Commands: 0-->Performs both tasks correctly  2. Best Gaze: 0-->Normal  3. Visual: 1-->Partial hemianopia  4. Facial Palsy: 2-->Partial paralysis (total or near-total paralysis of lower face)  5a. Motor Arm, Left: 4-->No movement  5b. Motor Arm, Right: 0-->No drift, limb holds 90 (or 45) degrees for full 10 secs  6a. Motor Leg, Left: 3-->No effort against gravity, leg falls to bed immediately  6b. Motor Leg, Right: 0-->No drift, leg holds 30 degree position for full 5 secs  7. Limb Ataxia: 0-->Absent  8. Sensory: 1-->Mild-to-moderate sensory loss, patient feels pinprick is less sharp or is dull on the affected side, or there is a loss of superficial pain with pinprick, but patient is aware of being touched  9. Best Language: 0-->No aphasia, normal  10. Dysarthria: 1-->Mild-to-moderate dysarthria, patient slurs at least some words and, at worst, can be understood with some difficulty  11. Extinction and Inattention (formerly Neglect): 1-->Visual, tactile, auditory, spatial, or personal inattention or extinction to bilateral simultaneous stimulation in one of the sensory modalities  Total (NIH Stroke Scale): 14       Modified Monona Score: 0(unknown)  Chan Coma Scale:    ABCD2 Score:    [id number] Score:   HAS -BLED Score:   ICH Score:   Hunt & Reyes Classification:      Hemorrhagic change  of an Ischemic Stroke: Does this patient have an ischemic stroke with hemorrhagic changes? No     Neurologic Chief Complaint: R MCA syndrome     Subjective:     Interval History: Patient is seen for follow-up neurological assessment and treatment recommendations:     MRI Brain pending.       HPI, Past Medical, Family, and Social History remains the same as documented in the initial encounter.     Review of Systems   Constitutional: Negative for fever.   Eyes: Positive for visual disturbance.   Respiratory: Negative for shortness of breath and wheezing.    Cardiovascular: Negative for chest pain and palpitations.   Gastrointestinal: Negative for nausea and vomiting.   Endocrine: Negative for polyuria.   Musculoskeletal: Positive for gait problem.   Allergic/Immunologic: Negative for immunocompromised state.   Neurological: Positive for facial asymmetry, speech difficulty and weakness.   Psychiatric/Behavioral: Negative for confusion.     Scheduled Meds:   albuterol-ipratropium  3 mL Nebulization Q4H    aspirin  81 mg Per NG tube Daily    atorvastatin  80 mg Per NG tube QHS    [START ON 12/18/2019] clopidogrel  75 mg Per NG tube Daily    phenylephrine HCl in 0.9% NaCl         Continuous Infusions:   niCARdipine Stopped (12/17/19 0702)     PRN Meds:Dextrose 10% Bolus, glucagon (human recombinant), insulin aspart U-100, labetalol, magnesium oxide, magnesium oxide, potassium chloride 10%, potassium chloride 10%, potassium chloride 10%, potassium, sodium phosphates, potassium, sodium phosphates, potassium, sodium phosphates, sodium chloride 0.9%    Objective:     Vital Signs (Most Recent):  Temp: 99 °F (37.2 °C) (12/17/19 1515)  Pulse: 96 (12/17/19 1618)  Resp: (!) 30 (12/17/19 1600)  BP: (!) 135/48 (12/17/19 1618)  SpO2: 96 % (12/17/19 1618)  BP Location: Left arm    Vital Signs Range (Last 24H):  Temp:  [98 °F (36.7 °C)-99 °F (37.2 °C)]   Pulse:  []   Resp:  [14-38]   BP: (117-181)/(48-86)   SpO2:  [93 %-99  %]   BP Location: Left arm    Physical Exam   Constitutional: He appears well-developed and well-nourished.   HENT:   Head: Normocephalic and atraumatic.   Eyes: Conjunctivae are normal.   Right gaze- able to cross midline   Neck: Normal range of motion and full passive range of motion without pain.   Cardiovascular: Tachycardia present.   Pulmonary/Chest: Effort normal.   Abdominal: Soft. Normal appearance.   Musculoskeletal:   Left  Side plegia   Neurological: A cranial nerve deficit is present. He exhibits abnormal muscle tone.   Skin: Skin is warm and dry.   Psychiatric: His speech is slurred.       Neurological Exam:   LOC: drowsy  Attention Span: Good   Language: No aphasia  Articulation: Dysarthria  Orientation: Person, Place, Time   Visual Fields: Hemianopsia left  EOM (CN III, IV, VI): Gaze preference  left  Pupils (CN II, III): PERRL  Facial Movement (CN VII): Lower facial weakness on the Left  Motor: Arm left  Plegia 0/5  Leg left  Plegia 0/5  Arm right  Normal 5/5  Leg right Normal 5/5  Sensation: Tactile extinction to bilateral simultaneous stimulation     Laboratory:  CMP:   Recent Labs   Lab 12/17/19  0421   CALCIUM 8.6*   ALBUMIN 3.3*   PROT 6.4      K 3.9   CO2 23      BUN 25*   CREATININE 0.9   ALKPHOS 55   ALT 45*   AST 25   BILITOT 0.7     CBC:   Recent Labs   Lab 12/17/19  0751   WBC 8.93   RBC 4.22*   HGB 13.8*   HCT 41.3      MCV 98   MCH 32.7*   MCHC 33.4     Lipid Panel:   Recent Labs   Lab 12/17/19  0013   CHOL 136   LDLCALC 67.6   HDL 33*   TRIG 177*     Hgb A1C:   Recent Labs   Lab 12/17/19  0013   HGBA1C 7.7*     TSH: No results for input(s): TSH in the last 168 hours.    Diagnostic Results     Brain Imaging   CT head w/o contrast 12/16/2019     No acute major vascular distribution infarct or hemorrhage.    Vessel Imaging   Outside imaging reported as having ABBY stenosis at bifurcation with 70% stenosis and right MCA occlusion.    Cardiac Imaging   IRIS  · Normal left  ventricular systolic function. The estimated ejection fraction is 65%  · Indeterminate left ventricular diastolic function.  · Mild left ventricular enlargement.  · Normal right ventricular systolic function.  · Technically difficult study, poor endocardial visualization, contrast used.  · No wall motion abnormalities.  · Indeterminate central venous pressure. Estimated PA pressure is at least 9 mmHg.      David Mckenzie MD  Comprehensive Stroke Center  Department of Vascular Neurology   [facility]

## 2019-12-17 NOTE — NURSING TRANSFER
Nursing Transfer Note      12/16/2019     Transfer To: [facility] from PACU    Transfer via stretcher    Transfer with cardiac monitoring    Transported by Rn    Medicines sent: cardene gtt    Chart send with patient: Yes    Notified: spouse    Patient reassessed at: 12/16/19 21:30

## 2019-12-17 NOTE — PLAN OF CARE
CM met with patient and wife at bedside (patient appears lethargic unable to participate) discussed discharge planning. Per wife, patient lives with wife in mobile home with 4 steps to enter. Wife states patient was independent and used no DME prior to admit. Wife states will have assistance to care for patient upon discharge. Discharge planning booklet given, all questions answered.       12/17/19 1502   Discharge Assessment   Assessment Type Discharge Planning Assessment   Confirmed/corrected address and phone number on facesheet? Yes   Assessment information obtained from? Caregiver   Expected Length of Stay (days) 4   Communicated expected length of stay with patient/caregiver yes   Prior to hospitilization cognitive status: Alert/Oriented   Prior to hospitalization functional status: Independent   Current cognitive status: Unable to Assess   Current Functional Status: Completely Dependent   Facility Arrived From: [facility]   Lives With spouse   Able to Return to Prior Arrangements yes   Is patient able to care for self after discharge? Unable to determine at this time (comments)   Who are your caregiver(s) and their phone number(s)? Carolina Linder [phone]   Patient's perception of discharge disposition other (comments)  (uba)   Readmission Within the Last 30 Days no previous admission in last 30 days   Patient currently being followed by outpatient case management? No   Patient currently receives any other outside agency services? No   Equipment Currently Used at Home none   Part D Coverage [employer]   Do you have any problems affording any of your prescribed medications? No   Is the patient taking medications as prescribed? yes   Does the patient have transportation home? Yes   Transportation Anticipated family or friend will provide   Does the patient receive services at the [facility]? No   Discharge Plan A Skilled Nursing Facility   Discharge Plan B Rehab   DME Needed Upon Discharge   other (see comments)  (tbd)   Patient/Family in Agreement with Plan yes     Jonnie Summers MD      [facility] - [place], [place] - [address]  [address]  [place] [address]  Phone: [phone] Fax: [phone]    Extended Emergency Contact Information  Primary Emergency Contact: LIEN WREN  [place] Phone: [phone]  Relation: Spouse  Secondary Emergency Contact: Charito Wren  [place] Phone: [phone]  Relation: Daughter    Payor: GENERIC MEDICARE ADVANTAGE / Plan: MEDICARE ADVANTAGE HMO GENERIC / Product Type: Medicare Advantage /     Brigida Julio RN  12/17/2019  3:10 PM

## 2019-12-17 NOTE — ASSESSMENT & PLAN NOTE
- 80 y/o male admitted for R MCA stroke syndrome POD 1 s/p Thrombectomy TICI 3 reperfusion   - Neurochecks q1hr    - Continue cardene to maintain SBP < 140  Discontinue ASA/Plavix d/t heme transformation  F/U CTH 2/17 stable

## 2019-12-17 NOTE — NURSING
Patient identified by 2 identifiers on armband.   Denies previous reactions to blood transfusions and allergies reviewed. Bubble study procedure & echo contrast explained to pts wife, understanding verbalized. 18 g IV in place to Rt FA.  Bubble performed X 2, without Valsalva.   3cc Optison administered, IV flushed w/ 10cc NS pre & post contrast administration.  Echo images obtained.  Pt tolerated both procedures well.

## 2019-12-17 NOTE — ASSESSMENT & PLAN NOTE
79 y.o. yo male with unclear PMHx (HTN, DM, HLD?) who presented to [facility] with R MCA syndrome. LKN 1000. Telephone (no video) consult with Dr. Mosher; tPA not given, as outside treatment window. [place] with report of R ICA stenosis at bifurcation with 70% stenosis and right MCA occlusion on CTA; pt was transferred to [facility] for possible intervention. Dr. Martínez reviewed stat CT Head images as acquired. Positive for LVO. Patient to IR for cerebral angio for possible thrombectomy. Admitted to Neuro ICU for close monitoring/higher level of care.       Antithrombotics for secondary stroke prevention: Antiplatelets: Aspirin: 81 mg daily  Statins for secondary stroke prevention and hyperlipidemia, if present:   Statins: Atorvastatin- 40 mg daily  Aggressive risk factor modification: Diet, Exercise  Rehab efforts: The patient has been evaluated by a stroke team provider and the therapy needs have been fully considered based off the presenting complaints and exam findings. The following therapy evaluations are needed: PT evaluate and treat, OT evaluate and treat, SLP evaluate and treat, PM&R evaluate for appropriate placement  Diagnostics ordered/pending: HgbA1C to assess blood glucose levels, Lipid Profile to assess cholesterol levels, MRI head without contrast to assess brain parenchyma  VTE prophylaxis: Heparin 5000 units SQ every 8 hours  place [facility]  BP parameters: Infarct: Post successful thrombectomy, SBP <140  Post unsuccessful thrombectomy, SBP <220

## 2019-12-17 NOTE — NURSING
Transported pt to MRI via bed with portable tele monitor and 4L O2 by RN. VSS. Pt tolerating well. Will continue to monitor very closely.

## 2019-12-17 NOTE — PLAN OF CARE
"POC reviewed with Mr. Edwards and wife at bedside at 1600. Pt's wife verbalized understanding. Questions and concerns addressed. Lethargic most of shift, continues to follow commands and answering appropriately however very dysarthric. MRI of brain done. Echo completed. Off gtts. SBP maintained 140-160. 3L NC, O2 sat ~94%, remains tachypneic and labored using abdominal muscles. Pt continues on "intubation watch." Andrade catheter remains in place, UO ~45ml/h, lasix given. BM x1, clear, mucous in appearance, KUB done. Pt progressing toward goals. Will continue to monitor. See flowsheets for full assessment and VS info.              Problem: Adult Inpatient Plan of Care  Goal: Patient-Specific Goal (Individualization)  Description  Admit Date: 12/16/19    Admit Dx: R MCA s/p Thrombectomy w/TICI 3    No past medical history on file.    No past surgical history on file.    Individualization:   1.  Please keep extra blankets in room     Restraints: N/A        Outcome: Ongoing, Progressing  Flowsheets (Taken 12/17/2019 1612)  Individualized Care Needs: needs NGT in place for meds and nutrition  Anxieties, Fears or Concerns: concerns with follow-up mri scan  Patient-Specific Goals (Include Timeframe): will have no post-op complications for 24 hrs after procedure     "

## 2019-12-17 NOTE — HOSPITAL COURSE
12/17/19: Patient admitted to [facility] for higher level of care s/p thrombectomy and right carotid artery stenting with TICI 3 reperfusion  12/18/2019Recent MRI with heme transformation abg Q8, repeat scan stable, mannitol  12/19/2019 intubation repeat CTH  12/20: febrile-broaden abx coverage, SBT, add hydralazine 50 q8h, KUB and mag citrate  12/21: wean FiO2, d/c hydralazine, wean fentanyl  12/22/2019: Patient stable overnight. Lasix 20mg x 1 given today. Enteral water flushes started for hypernatremia.    12/23: SBT trial, started Plavix today, initiated Hydralazine 25 TID, lasix 40 mg X1, decrease statin to 40 mg daily (transaminitis -trending down), add psyllium, Na BID -goal 145-150, initiated sq heparin  12/24/2019: SBT failed, wean fio2 as tolerated, CXR responded to lasix, scheduled BID  12/25/2019 continue lasix. SBT today. Adjust insulin regimen  12/26/2019: Continue diuresis, SBT again today, wean PEEP and FiO2, wean nicardipine, hold statin for transaminits   12/27/2019: No significant events   12/28 No significant events over night.. Tolerating spon. Breathing trial, increased peep. No cuff leak, started decadron 8mg q8x3 doses and reassess in am for possible extubation. Insulin gtt started while on steroids, as BG running high. CPT added to resp. Treatments. Start TF.  12/29: extubated to room air following SBT  12/30: IS q 6, d/c whaley, d/c lasix, titrate insulin gtt, d/c hydralazine, KUB  12/31: cxr, trial of modafinil,  q 6, transition insulin  1/1: increase FWF, modafinil, insulin  1/2: intubated, titrate insulin, family discussion  1/3: CT head in AM, cxr, kub, increase insulin, family updated  1/4: NAEO  1/5: NAEO  01/06/2020 NAEO. Transition from propofol to precedex  01/07/2020 NAEO  01/08/2020 plan for trach and PEG  01/09/2020 vent weaning attempt  1/10/2020 transfer to [facility]

## 2019-12-17 NOTE — PLAN OF CARE
POC reviewed with pt at 0500. Pt and spouse verbalized understanding. Questions and concerns addressed. Pt on Jo QTT and NS. Labetalol given x1. Pt still has numbness on left side, but is starting to move left side spontaneously. Pt is unable to fully open eyes during neuro exam, having difficulty with oral secretions, coughing frequently. Speech slurred and difficult to understand at times. Pt unable to smile, lift eye brows or provide facial movements when asked on examination. Pt placed on bedpad x2 without passing stool. Pt progressing toward goals. Will continue to monitor. See flowsheets for full assessment and VS info

## 2019-12-17 NOTE — PROGRESS NOTES
Patients wife and son at bedside updated family on plan of care, Patient NAD will continue to monitor.

## 2019-12-17 NOTE — SUBJECTIVE & OBJECTIVE
No past medical history on file.  No past surgical history on file.   No current facility-administered medications on file prior to encounter.      No current outpatient medications on file prior to encounter.      Allergies: Patient has no known allergies.    No family history on file.  Social History     Tobacco Use    Smoking status: Not on file   Substance Use Topics    Alcohol use: Not on file    Drug use: Not on file       Admit Date: 12/16/2019  LOS: 1    CC: Embolic stroke involving right middle cerebral artery    Code Status: Full Code     SUBJECTIVE:     Review of Symptoms:   Constitutional: Denies fevers or chills.  Pulmonary: Denies shortness of breath or cough.  Cardiology: Denies chest pain or palpitations.  GI: Denies abdominal pain or constipation.  Neurologic: Denies new weakness, headaches, or paresthesias.     Medications:  Continuous Infusions:   niCARdipine 10 mg/hr (12/16/19 2235)     Scheduled Meds:   albuterol-ipratropium  3 mL Nebulization Q4H    aspirin  81 mg Oral Daily    atorvastatin  80 mg Oral QHS    clopidogrel  75 mg Oral Daily     PRN Meds:.labetalol, sodium chloride 0.9%, sodium chloride 0.9%    OBJECTIVE:   Vital Signs (Most Recent):   Temp: 98.8 °F (37.1 °C) (12/16/19 2300)  Pulse: 99 (12/17/19 0030)  Resp: (!) 23 (12/17/19 0030)  BP: (!) 119/50 (12/17/19 0030)  SpO2: 96 % (12/17/19 0030)    Vital Signs (24h Range):   Temp:  [98 °F (36.7 °C)-99 °F (37.2 °C)] 98.8 °F (37.1 °C)  Pulse:  [] 99  Resp:  [14-28] 23  SpO2:  [93 %-99 %] 96 %  BP: (117-181)/(50-86) 119/50    ICP/CPP (Last 24h):        I & O (Last 24h):     Intake/Output Summary (Last 24 hours) at 12/17/2019 0106  Last data filed at 12/16/2019 2202  Gross per 24 hour   Intake 59.44 ml   Output 835 ml   Net -775.56 ml     Physical Exam:  GA: Alert, comfortable, no acute distress.   HEENT: No scleral icterus or JVD.   Pulmonary: Bilateral crackles,  Cardiac: RRR S1 & S2 w/o rubs/murmurs/gallops.   Abdominal:  "Bowel sounds present x 4. No appreciable hepatosplenomegaly.  Skin: No jaundice, rashes, or visible lesions.  Neuro:  --GCS: E4 V5 M5  --Mental Status:  Alert  --CN II-XII grossly intact.   --Pupils 3mm, PERRL.   --Corneal reflex, gag, cough intact.  --LUE strength: no movement  --RUE strength: 5/5  --LLE strength: no movement  --RLE strength: 5/5  Left sided sensory loss    Vent Data: N/A       Lines/Drains/Airway:              Urethral Catheter 12/16/19 1732 (Active)   Site Assessment Clean;Intact 12/16/2019  9:00 PM   Collection Container Urimeter 12/16/2019  9:00 PM   Securement Method secured to top of thigh w/ adhesive device 12/16/2019  9:00 PM   Reason for Continuing Urinary Catheterization Post operative 12/16/2019  9:00 PM   CAUTI Prevention Bundle StatLock in place w 1" slack;Intact seal between catheter & drainage tubing;Green sheeting clip in use;Drainage bag/urimeter off the floor;No dependent loops or kinks;Drainage bag/urimeter not overfilled (<2/3 full);Drainage bag/urimeter below bladder 12/16/2019  7:33 PM   Output (mL) 60 mL 12/16/2019 10:02 PM     Nutrition/Tube Feeds (if NPO state why): SHAJI evaluation  Labs:  ABG: No results for input(s): PH, PO2, PCO2, HCO3, POCSATURATED, BE in the last 24 hours.  BMP:  Recent Labs   Lab 12/16/19  2257      K 4.2      CO2 26   BUN 24*   CREATININE 1.0   *   MG 1.8   PHOS 3.2     LFT: No results found for: AST, ALT, GGT, ALKPHOS, BILITOT, ALBUMIN, PROT  CBC:   Lab Results   Component Value Date    WBC 9.78 12/16/2019    HGB 14.1 12/16/2019    HCT 41.4 12/16/2019    MCV 96 12/16/2019     12/16/2019     Microbiology x 7d:   Microbiology Results (last 7 days)     ** No results found for the last 168 hours. **        Imaging: CT head without contrast 12/16/19  FINDINGS:  Intracranial compartment:    Scattered periventricular white matter hypoattenuation, most consistent with chronic microvascular ischemic change.  No evidence of acute " major vascular distribution infarct or hemorrhage.    Generalized cerebral volume loss with compensatory enlargement of ventricles and sulci without evidence of hydrocephalus.    No extra-axial blood or fluid collections.    Skull/extracranial contents (limited evaluation): No fracture. Mastoid air cells and paranasal sinuses are essentially clear.      Impression       No acute major vascular distribution infarct or hemorrhage.    Chronic microvascular ischemic changes and generalized cerebral volume loss.       I personally reviewed the above image.    ASSESSMENT/PLAN:     Active Hospital Problems    Diagnosis    *Embolic stroke involving right middle cerebral artery    Essential hypertension    Type 2 diabetes mellitus    Mixed hyperlipidemia    Cerebrovascular accident (CVA)      Neuro: Right MCA stroke syndrome, ABBY stenosis s/p Thrombectomy and stent , TICI 3 reperfusion. Patient is Alert, awake, follows commands on the right, left sided weakness, left facial droop.  CVA  Atorvastatin 80mg  Continue Aspirin 81mg and plavix 75mg daily      Pulmonary: on 4L of oxygen      Cardiac: BP <140  On cardene  Follow ECHO      Renal:  Monitor input and output closely      ID:  Afebrile, no leukocytosis      Hem/Onc: Hgb 14.1,        Endocrine:  No A1C on file follow Hemoglobin A1C      Fluids/Electrolytes/Nutrition/GI: monitor input and replace electrolytes accordingly    Lines: None  Art  CVC  ETT  Andrade  NG  PEG    Proph:  DVT:  Constipation:  Last output:  PUP:  VAP:      Uninterrupted Critical Care/Counseling Time (not including procedures): 40 minutes

## 2019-12-17 NOTE — SUBJECTIVE & OBJECTIVE
Neurologic Chief Complaint: R MCA syndrome     Subjective:     Interval History: Patient is seen for follow-up neurological assessment and treatment recommendations:     MRI Brain pending.       HPI, Past Medical, Family, and Social History remains the same as documented in the initial encounter.     Review of Systems   Constitutional: Negative for fever.   Eyes: Positive for visual disturbance.   Respiratory: Negative for shortness of breath and wheezing.    Cardiovascular: Negative for chest pain and palpitations.   Gastrointestinal: Negative for nausea and vomiting.   Endocrine: Negative for polyuria.   Musculoskeletal: Positive for gait problem.   Allergic/Immunologic: Negative for immunocompromised state.   Neurological: Positive for facial asymmetry, speech difficulty and weakness.   Psychiatric/Behavioral: Negative for confusion.     Scheduled Meds:   albuterol-ipratropium  3 mL Nebulization Q4H    aspirin  81 mg Per NG tube Daily    atorvastatin  80 mg Per NG tube QHS    [START ON 12/18/2019] clopidogrel  75 mg Per NG tube Daily    phenylephrine HCl in 0.9% NaCl         Continuous Infusions:   niCARdipine Stopped (12/17/19 0702)     PRN Meds:Dextrose 10% Bolus, glucagon (human recombinant), insulin aspart U-100, labetalol, magnesium oxide, magnesium oxide, potassium chloride 10%, potassium chloride 10%, potassium chloride 10%, potassium, sodium phosphates, potassium, sodium phosphates, potassium, sodium phosphates, sodium chloride 0.9%    Objective:     Vital Signs (Most Recent):  Temp: 99 °F (37.2 °C) (12/17/19 1515)  Pulse: 96 (12/17/19 1618)  Resp: (!) 30 (12/17/19 1600)  BP: (!) 135/48 (12/17/19 1618)  SpO2: 96 % (12/17/19 1618)  BP Location: Left arm    Vital Signs Range (Last 24H):  Temp:  [98 °F (36.7 °C)-99 °F (37.2 °C)]   Pulse:  []   Resp:  [14-38]   BP: (117-181)/(48-86)   SpO2:  [93 %-99 %]   BP Location: Left arm    Physical Exam   Constitutional: He appears well-developed and  well-nourished.   HENT:   Head: Normocephalic and atraumatic.   Eyes: Conjunctivae are normal.   Right gaze- able to cross midline   Neck: Normal range of motion and full passive range of motion without pain.   Cardiovascular: Tachycardia present.   Pulmonary/Chest: Effort normal.   Abdominal: Soft. Normal appearance.   Musculoskeletal:   Left  Side plegia   Neurological: A cranial nerve deficit is present. He exhibits abnormal muscle tone.   Skin: Skin is warm and dry.   Psychiatric: His speech is slurred.       Neurological Exam:   LOC: drowsy  Attention Span: Good   Language: No aphasia  Articulation: Dysarthria  Orientation: Person, Place, Time   Visual Fields: Hemianopsia left  EOM (CN III, IV, VI): Gaze preference  left  Pupils (CN II, III): PERRL  Facial Movement (CN VII): Lower facial weakness on the Left  Motor: Arm left  Plegia 0/5  Leg left  Plegia 0/5  Arm right  Normal 5/5  Leg right Normal 5/5  Sensation: Tactile extinction to bilateral simultaneous stimulation     Laboratory:  CMP:   Recent Labs   Lab 12/17/19  0421   CALCIUM 8.6*   ALBUMIN 3.3*   PROT 6.4      K 3.9   CO2 23      BUN 25*   CREATININE 0.9   ALKPHOS 55   ALT 45*   AST 25   BILITOT 0.7     CBC:   Recent Labs   Lab 12/17/19  0751   WBC 8.93   RBC 4.22*   HGB 13.8*   HCT 41.3      MCV 98   MCH 32.7*   MCHC 33.4     Lipid Panel:   Recent Labs   Lab 12/17/19  0013   CHOL 136   LDLCALC 67.6   HDL 33*   TRIG 177*     Hgb A1C:   Recent Labs   Lab 12/17/19  0013   HGBA1C 7.7*     TSH: No results for input(s): TSH in the last 168 hours.    Diagnostic Results     Brain Imaging   CT head w/o contrast 12/16/2019     No acute major vascular distribution infarct or hemorrhage.    Vessel Imaging   Outside imaging reported as having ABBY stenosis at bifurcation with 70% stenosis and right MCA occlusion.    Cardiac Imaging   IRIS  · Normal left ventricular systolic function. The estimated ejection fraction is 65%  · Indeterminate left  ventricular diastolic function.  · Mild left ventricular enlargement.  · Normal right ventricular systolic function.  · Technically difficult study, poor endocardial visualization, contrast used.  · No wall motion abnormalities.  · Indeterminate central venous pressure. Estimated PA pressure is at least 9 mmHg.

## 2019-12-17 NOTE — NURSING
Transported pt back to room 9079 from MRI. Pt tolerated well. VSS. Will continue to monitor very closely.

## 2019-12-17 NOTE — PROGRESS NOTES
Hemostasis achieved via right groin with use of angioseal closure device. Patient to remain flat for 2 hours. HOB may remain flat until 2100.

## 2019-12-17 NOTE — SUBJECTIVE & OBJECTIVE
No past medical history on file.  No past surgical history on file.  No family history on file.  Social History     Tobacco Use    Smoking status: Not on file   Substance Use Topics    Alcohol use: Not on file    Drug use: Not on file     Review of patient's allergies indicates:  Not on File    Medications: I have reviewed the current medication administration record.      (Not in a hospital admission)    Review of Systems   Constitutional: Negative for fever.   Eyes: Positive for visual disturbance.   Respiratory: Negative for shortness of breath and wheezing.    Cardiovascular: Negative for chest pain and palpitations.   Gastrointestinal: Negative for nausea and vomiting.   Endocrine: Negative for polyuria.   Musculoskeletal: Positive for gait problem.   Allergic/Immunologic: Negative for immunocompromised state.   Neurological: Positive for facial asymmetry, speech difficulty and weakness.   Psychiatric/Behavioral: Negative for confusion.     Objective:     Vital Signs (Most Recent):  Temp: 98.2 °F (36.8 °C) (12/16/19 1723)  Pulse: 103 (12/16/19 1802)  Resp: 18 (12/16/19 1802)  BP: (!) 178/86 (12/16/19 1802)  SpO2: 95 % (12/16/19 1802)    Vital Signs Range (Last 24H):  Temp:  [98.2 °F (36.8 °C)-98.3 °F (36.8 °C)]   Pulse:  []   Resp:  [18-22]   BP: (140-178)/(64-86)   SpO2:  [95 %-97 %]     Physical Exam   Constitutional: He appears well-developed and well-nourished.   HENT:   Head: Normocephalic and atraumatic.   Eyes: Conjunctivae are normal.   Right gaze- able to cross midline   Neck: Normal range of motion and full passive range of motion without pain.   Cardiovascular: Tachycardia present.   Pulmonary/Chest: Effort normal.   Abdominal: Soft. Normal appearance.   Musculoskeletal:   Left  Side plegia   Neurological: A cranial nerve deficit is present. He exhibits abnormal muscle tone.   Skin: Skin is warm and dry.   Psychiatric: His speech is slurred.       Neurological Exam:   LOC:  alert  Attention Span: Good   Language: No aphasia  Articulation: Dysarthria  Orientation: Person, Place, Time   Visual Fields: Hemianopsia left  EOM (CN III, IV, VI): Gaze preference  right  Pupils (CN II, III): PERRL  Facial Sensation (CN V): Normal  Facial Movement (CN VII): Lower facial weakness on the Left  Motor: Arm left  Plegia 0/5  Leg left  Plegia 0/5  Arm right  Normal 5/5  Leg right Normal 5/5  Cebellar: No evidence of appendicular or axial ataxia  Sensation: Tactile extinction to bilateral simultaneous stimulation   Tone: Flaccid  LUE  and LLE      Laboratory:  CMP: No results for input(s): GLUCOSE, CALCIUM, ALBUMIN, PROT, NA, K, CO2, CL, BUN, CREATININE, ALKPHOS, ALT, AST, BILITOT in the last 24 hours.  CBC: No results for input(s): WBC, RBC, HGB, HCT, PLT, MCV, MCH, MCHC in the last 168 hours.  Lipid Panel: No results for input(s): CHOL, LDLCALC, HDL, TRIG in the last 168 hours.  Coagulation: No results for input(s): PT, INR, APTT in the last 168 hours.  Hgb A1C: No results for input(s): HGBA1C in the last 168 hours.  TSH: No results for input(s): TSH in the last 168 hours.    Diagnostic Results:      Brain imaging:    CT head w/o contrast 12/16/2019    No acute major vascular distribution infarct or hemorrhage.    Vessel Imaging:    Outside imaging reported as having ABBY stenosis at bifurcation with 70% stenosis and right MCA occlusion.    Cardiac Evaluation:
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
## 2019-12-17 NOTE — PLAN OF CARE
12/17/19 1512   Post-Acute Status   Post-Acute Authorization Placement   Post-Acute Placement Status Awaiting Internal Medical Clearance   Discharge Delays None known at this time     Brigida Julio RN  12/17/2019  3:13 PM

## 2019-12-18 ENCOUNTER — ANESTHESIA (OUTPATIENT)
Dept: NEUROLOGY | Facility: HOSPITAL | Age: 79
DRG: 003 | End: 2019-12-18
Payer: COMMERCIAL

## 2019-12-18 ENCOUNTER — ANESTHESIA EVENT (OUTPATIENT)
Dept: NEUROLOGY | Facility: HOSPITAL | Age: 79
DRG: 003 | End: 2019-12-18
Payer: COMMERCIAL

## 2019-12-18 PROBLEM — E11.49 TYPE 2 DIABETES MELLITUS WITH NEUROLOGIC COMPLICATION: Status: ACTIVE | Noted: 2019-12-16

## 2019-12-18 PROBLEM — B99.9 FEVER DUE TO INFECTION: Status: ACTIVE | Noted: 2019-12-18

## 2019-12-18 PROBLEM — R90.89 MIDLINE SHIFT OF BRAIN: Status: ACTIVE | Noted: 2019-12-18

## 2019-12-18 PROBLEM — G93.6 VASOGENIC BRAIN EDEMA: Status: ACTIVE | Noted: 2019-12-18

## 2019-12-18 PROBLEM — G93.6 CYTOTOXIC BRAIN EDEMA: Status: ACTIVE | Noted: 2019-12-18

## 2019-12-18 PROBLEM — I61.9 ACUTE SPONT INTRAPARENCHYMAL HEMORRHAGE ASSOC W/ COAGULOPATHY: Status: ACTIVE | Noted: 2019-12-18

## 2019-12-18 PROBLEM — D68.9 ACUTE SPONT INTRAPARENCHYMAL HEMORRHAGE ASSOC W/ COAGULOPATHY: Status: ACTIVE | Noted: 2019-12-18

## 2019-12-18 LAB
ALBUMIN SERPL BCP-MCNC: 3.2 G/DL (ref 3.5–5.2)
ALLENS TEST: ABNORMAL
ALP SERPL-CCNC: 53 U/L (ref 55–135)
ALT SERPL W/O P-5'-P-CCNC: 36 U/L (ref 10–44)
ANION GAP SERPL CALC-SCNC: 6 MMOL/L (ref 8–16)
ANION GAP SERPL CALC-SCNC: 7 MMOL/L (ref 8–16)
ANION GAP SERPL CALC-SCNC: 9 MMOL/L (ref 8–16)
AST SERPL-CCNC: 22 U/L (ref 10–40)
BACTERIA #/AREA URNS AUTO: ABNORMAL /HPF
BASOPHILS # BLD AUTO: 0.02 K/UL (ref 0–0.2)
BASOPHILS NFR BLD: 0.2 % (ref 0–1.9)
BILIRUB SERPL-MCNC: 0.9 MG/DL (ref 0.1–1)
BILIRUB UR QL STRIP: NEGATIVE
BUN SERPL-MCNC: 24 MG/DL (ref 8–23)
BUN SERPL-MCNC: 24 MG/DL (ref 8–23)
BUN SERPL-MCNC: 26 MG/DL (ref 8–23)
CALCIUM SERPL-MCNC: 8.1 MG/DL (ref 8.7–10.5)
CALCIUM SERPL-MCNC: 8.2 MG/DL (ref 8.7–10.5)
CALCIUM SERPL-MCNC: 8.3 MG/DL (ref 8.7–10.5)
CHLORIDE SERPL-SCNC: 107 MMOL/L (ref 95–110)
CHLORIDE SERPL-SCNC: 111 MMOL/L (ref 95–110)
CHLORIDE SERPL-SCNC: 112 MMOL/L (ref 95–110)
CLARITY UR REFRACT.AUTO: ABNORMAL
CO2 SERPL-SCNC: 24 MMOL/L (ref 23–29)
CO2 SERPL-SCNC: 26 MMOL/L (ref 23–29)
CO2 SERPL-SCNC: 27 MMOL/L (ref 23–29)
COLOR UR AUTO: YELLOW
CREAT SERPL-MCNC: 0.8 MG/DL (ref 0.5–1.4)
CREAT SERPL-MCNC: 0.9 MG/DL (ref 0.5–1.4)
CREAT SERPL-MCNC: 0.9 MG/DL (ref 0.5–1.4)
DELSYS: ABNORMAL
DIFFERENTIAL METHOD: ABNORMAL
EOSINOPHIL # BLD AUTO: 0 K/UL (ref 0–0.5)
EOSINOPHIL NFR BLD: 0.1 % (ref 0–8)
ERYTHROCYTE [DISTWIDTH] IN BLOOD BY AUTOMATED COUNT: 13.4 % (ref 11.5–14.5)
ERYTHROCYTE [SEDIMENTATION RATE] IN BLOOD BY WESTERGREN METHOD: 16 MM/H
ERYTHROCYTE [SEDIMENTATION RATE] IN BLOOD BY WESTERGREN METHOD: 25 MM/H
EST. GFR  (AFRICAN AMERICAN): >60 ML/MIN/1.73 M^2
EST. GFR  (NON AFRICAN AMERICAN): >60 ML/MIN/1.73 M^2
FIO2: 100
FIO2: 36
FIO2: 45
FLOW: 10
FLOW: 10
FLOW: 4
FLOW: 5
GLUCOSE SERPL-MCNC: 231 MG/DL (ref 70–110)
GLUCOSE SERPL-MCNC: 245 MG/DL (ref 70–110)
GLUCOSE SERPL-MCNC: 286 MG/DL (ref 70–110)
GLUCOSE UR QL STRIP: ABNORMAL
HCO3 UR-SCNC: 23.7 MMOL/L (ref 24–28)
HCO3 UR-SCNC: 23.9 MMOL/L (ref 24–28)
HCO3 UR-SCNC: 24.6 MMOL/L (ref 24–28)
HCO3 UR-SCNC: 25.6 MMOL/L (ref 24–28)
HCO3 UR-SCNC: 26.6 MMOL/L (ref 24–28)
HCT VFR BLD AUTO: 38.1 % (ref 40–54)
HGB BLD-MCNC: 12.9 G/DL (ref 14–18)
HGB UR QL STRIP: ABNORMAL
HYALINE CASTS UR QL AUTO: 0 /LPF
IMM GRANULOCYTES # BLD AUTO: 0.02 K/UL (ref 0–0.04)
IMM GRANULOCYTES NFR BLD AUTO: 0.2 % (ref 0–0.5)
KETONES UR QL STRIP: NEGATIVE
LEUKOCYTE ESTERASE UR QL STRIP: ABNORMAL
LYMPHOCYTES # BLD AUTO: 1.8 K/UL (ref 1–4.8)
LYMPHOCYTES NFR BLD: 20.7 % (ref 18–48)
MAGNESIUM SERPL-MCNC: 1.9 MG/DL (ref 1.6–2.6)
MCH RBC QN AUTO: 32.6 PG (ref 27–31)
MCHC RBC AUTO-ENTMCNC: 33.9 G/DL (ref 32–36)
MCV RBC AUTO: 96 FL (ref 82–98)
MICROSCOPIC COMMENT: ABNORMAL
MIN VOL: 15.4
MODE: ABNORMAL
MONOCYTES # BLD AUTO: 1 K/UL (ref 0.3–1)
MONOCYTES NFR BLD: 12 % (ref 4–15)
NEUTROPHILS # BLD AUTO: 5.7 K/UL (ref 1.8–7.7)
NEUTROPHILS NFR BLD: 66.8 % (ref 38–73)
NITRITE UR QL STRIP: NEGATIVE
NRBC BLD-RTO: 0 /100 WBC
OSMOLALITY SERPL: 320 MOSM/KG (ref 280–300)
OSMOLALITY SERPL: 320 MOSM/KG (ref 280–300)
PCO2 BLDA: 31.5 MMHG (ref 35–45)
PCO2 BLDA: 34.4 MMHG (ref 35–45)
PCO2 BLDA: 34.9 MMHG (ref 35–45)
PCO2 BLDA: 36.5 MMHG (ref 35–45)
PCO2 BLDA: 39.6 MMHG (ref 35–45)
PEEP: 5
PH SMN: 7.42 [PH] (ref 7.35–7.45)
PH SMN: 7.44 [PH] (ref 7.35–7.45)
PH SMN: 7.46 [PH] (ref 7.35–7.45)
PH SMN: 7.47 [PH] (ref 7.35–7.45)
PH SMN: 7.49 [PH] (ref 7.35–7.45)
PH UR STRIP: 5 [PH] (ref 5–8)
PHOSPHATE SERPL-MCNC: 3.5 MG/DL (ref 2.7–4.5)
PIP: 18
PLATELET # BLD AUTO: 173 K/UL (ref 150–350)
PMV BLD AUTO: 10.5 FL (ref 9.2–12.9)
PO2 BLDA: 68 MMHG (ref 80–100)
PO2 BLDA: 75 MMHG (ref 80–100)
PO2 BLDA: 75 MMHG (ref 80–100)
PO2 BLDA: 81 MMHG (ref 80–100)
PO2 BLDA: 82 MMHG (ref 80–100)
POC BE: -1 MMOL/L
POC BE: 1 MMOL/L
POC BE: 3 MMOL/L
POC SATURATED O2: 94 % (ref 95–100)
POC SATURATED O2: 95 % (ref 95–100)
POC SATURATED O2: 96 % (ref 95–100)
POC SATURATED O2: 96 % (ref 95–100)
POC SATURATED O2: 97 % (ref 95–100)
POC TCO2: 25 MMOL/L (ref 23–27)
POC TCO2: 25 MMOL/L (ref 23–27)
POC TCO2: 26 MMOL/L (ref 23–27)
POC TCO2: 27 MMOL/L (ref 23–27)
POC TCO2: 28 MMOL/L (ref 23–27)
POCT GLUCOSE: 192 MG/DL (ref 70–110)
POCT GLUCOSE: 206 MG/DL (ref 70–110)
POCT GLUCOSE: 213 MG/DL (ref 70–110)
POCT GLUCOSE: 228 MG/DL (ref 70–110)
POCT GLUCOSE: 245 MG/DL (ref 70–110)
POCT GLUCOSE: 254 MG/DL (ref 70–110)
POTASSIUM SERPL-SCNC: 3.7 MMOL/L (ref 3.5–5.1)
POTASSIUM SERPL-SCNC: 3.9 MMOL/L (ref 3.5–5.1)
POTASSIUM SERPL-SCNC: 4 MMOL/L (ref 3.5–5.1)
PROT SERPL-MCNC: 6.6 G/DL (ref 6–8.4)
PROT UR QL STRIP: ABNORMAL
RBC # BLD AUTO: 3.96 M/UL (ref 4.6–6.2)
RBC #/AREA URNS AUTO: >100 /HPF (ref 0–4)
SAMPLE: ABNORMAL
SITE: ABNORMAL
SODIUM SERPL-SCNC: 140 MMOL/L (ref 136–145)
SODIUM SERPL-SCNC: 144 MMOL/L (ref 136–145)
SODIUM SERPL-SCNC: 145 MMOL/L (ref 136–145)
SP GR UR STRIP: 1.02 (ref 1–1.03)
SP02: 94
SP02: 96
SP02: 96
SP02: 97
SQUAMOUS #/AREA URNS AUTO: 0 /HPF
URN SPEC COLLECT METH UR: ABNORMAL
VT: 500
WBC # BLD AUTO: 8.52 K/UL (ref 3.9–12.7)
WBC #/AREA URNS AUTO: 5 /HPF (ref 0–5)
YEAST UR QL AUTO: ABNORMAL

## 2019-12-18 PROCEDURE — 63600175 PHARM REV CODE 636 W HCPCS: Performed by: PSYCHIATRY & NEUROLOGY

## 2019-12-18 PROCEDURE — 20000000 HC ICU ROOM

## 2019-12-18 PROCEDURE — 94668 MNPJ CHEST WALL SBSQ: CPT

## 2019-12-18 PROCEDURE — A4217 STERILE WATER/SALINE, 500 ML: HCPCS | Performed by: NURSE PRACTITIONER

## 2019-12-18 PROCEDURE — 84295 ASSAY OF SERUM SODIUM: CPT

## 2019-12-18 PROCEDURE — 31500 AIRWAY MANAGEMENT: ICD-10-PCS | Mod: ,,, | Performed by: ANESTHESIOLOGY

## 2019-12-18 PROCEDURE — 27200966 HC CLOSED SUCTION SYSTEM

## 2019-12-18 PROCEDURE — 99900026 HC AIRWAY MAINTENANCE (STAT)

## 2019-12-18 PROCEDURE — 25000242 PHARM REV CODE 250 ALT 637 W/ HCPCS: Performed by: INTERNAL MEDICINE

## 2019-12-18 PROCEDURE — 99233 PR SUBSEQUENT HOSPITAL CARE,LEVL III: ICD-10-PCS | Mod: ,,, | Performed by: PSYCHIATRY & NEUROLOGY

## 2019-12-18 PROCEDURE — 92507 TX SP LANG VOICE COMM INDIV: CPT

## 2019-12-18 PROCEDURE — 82803 BLOOD GASES ANY COMBINATION: CPT

## 2019-12-18 PROCEDURE — 94761 N-INVAS EAR/PLS OXIMETRY MLT: CPT

## 2019-12-18 PROCEDURE — 94640 AIRWAY INHALATION TREATMENT: CPT

## 2019-12-18 PROCEDURE — 87205 SMEAR GRAM STAIN: CPT

## 2019-12-18 PROCEDURE — 25000003 PHARM REV CODE 250: Performed by: NURSE PRACTITIONER

## 2019-12-18 PROCEDURE — 82800 BLOOD PH: CPT

## 2019-12-18 PROCEDURE — 25000003 PHARM REV CODE 250: Performed by: PSYCHIATRY & NEUROLOGY

## 2019-12-18 PROCEDURE — 31500 INSERT EMERGENCY AIRWAY: CPT | Mod: ,,, | Performed by: ANESTHESIOLOGY

## 2019-12-18 PROCEDURE — 85025 COMPLETE CBC W/AUTO DIFF WBC: CPT

## 2019-12-18 PROCEDURE — 63600175 PHARM REV CODE 636 W HCPCS: Performed by: STUDENT IN AN ORGANIZED HEALTH CARE EDUCATION/TRAINING PROGRAM

## 2019-12-18 PROCEDURE — 87070 CULTURE OTHR SPECIMN AEROBIC: CPT

## 2019-12-18 PROCEDURE — 81001 URINALYSIS AUTO W/SCOPE: CPT

## 2019-12-18 PROCEDURE — 37799 UNLISTED PX VASCULAR SURGERY: CPT

## 2019-12-18 PROCEDURE — 63600175 PHARM REV CODE 636 W HCPCS

## 2019-12-18 PROCEDURE — 63600175 PHARM REV CODE 636 W HCPCS: Performed by: NURSE PRACTITIONER

## 2019-12-18 PROCEDURE — 25000003 PHARM REV CODE 250: Performed by: STUDENT IN AN ORGANIZED HEALTH CARE EDUCATION/TRAINING PROGRAM

## 2019-12-18 PROCEDURE — 94667 MNPJ CHEST WALL 1ST: CPT

## 2019-12-18 PROCEDURE — 36620 INSERTION CATHETER ARTERY: CPT

## 2019-12-18 PROCEDURE — 31720 CLEARANCE OF AIRWAYS: CPT

## 2019-12-18 PROCEDURE — 99900035 HC TECH TIME PER 15 MIN (STAT)

## 2019-12-18 PROCEDURE — 94002 VENT MGMT INPAT INIT DAY: CPT

## 2019-12-18 PROCEDURE — 99233 SBSQ HOSP IP/OBS HIGH 50: CPT | Mod: ,,, | Performed by: PSYCHIATRY & NEUROLOGY

## 2019-12-18 PROCEDURE — 83735 ASSAY OF MAGNESIUM: CPT

## 2019-12-18 PROCEDURE — 87040 BLOOD CULTURE FOR BACTERIA: CPT | Mod: 59

## 2019-12-18 PROCEDURE — 84295 ASSAY OF SERUM SODIUM: CPT | Mod: 91

## 2019-12-18 PROCEDURE — 83930 ASSAY OF BLOOD OSMOLALITY: CPT

## 2019-12-18 PROCEDURE — 80053 COMPREHEN METABOLIC PANEL: CPT

## 2019-12-18 PROCEDURE — 80048 BASIC METABOLIC PNL TOTAL CA: CPT | Mod: 91

## 2019-12-18 PROCEDURE — 99291 CRITICAL CARE FIRST HOUR: CPT | Mod: ,,, | Performed by: NURSE PRACTITIONER

## 2019-12-18 PROCEDURE — 99291 PR CRITICAL CARE, E/M 30-74 MINUTES: ICD-10-PCS | Mod: ,,, | Performed by: NURSE PRACTITIONER

## 2019-12-18 PROCEDURE — 84100 ASSAY OF PHOSPHORUS: CPT

## 2019-12-18 PROCEDURE — 27000221 HC OXYGEN, UP TO 24 HOURS

## 2019-12-18 RX ORDER — FENTANYL CITRATE 50 UG/ML
INJECTION, SOLUTION INTRAMUSCULAR; INTRAVENOUS
Status: COMPLETED
Start: 2019-12-18 | End: 2019-12-18

## 2019-12-18 RX ORDER — ROCURONIUM BROMIDE 10 MG/ML
100 INJECTION, SOLUTION INTRAVENOUS ONCE
Status: COMPLETED | OUTPATIENT
Start: 2019-12-18 | End: 2019-12-18

## 2019-12-18 RX ORDER — HYDRALAZINE HYDROCHLORIDE 20 MG/ML
10 INJECTION INTRAMUSCULAR; INTRAVENOUS EVERY 6 HOURS PRN
Status: DISCONTINUED | OUTPATIENT
Start: 2019-12-18 | End: 2019-12-26

## 2019-12-18 RX ORDER — LABETALOL HCL 20 MG/4 ML
20 SYRINGE (ML) INTRAVENOUS ONCE
Status: COMPLETED | OUTPATIENT
Start: 2019-12-18 | End: 2019-12-18

## 2019-12-18 RX ORDER — INSULIN ASPART 100 [IU]/ML
1-10 INJECTION, SOLUTION INTRAVENOUS; SUBCUTANEOUS EVERY 4 HOURS PRN
Status: DISCONTINUED | OUTPATIENT
Start: 2019-12-18 | End: 2019-12-28

## 2019-12-18 RX ORDER — PROPOFOL 10 MG/ML
INJECTION, EMULSION INTRAVENOUS
Status: DISPENSED
Start: 2019-12-18 | End: 2019-12-19

## 2019-12-18 RX ORDER — MANNITOL 20 G/100ML
25 INJECTION, SOLUTION INTRAVENOUS ONCE
Status: COMPLETED | OUTPATIENT
Start: 2019-12-18 | End: 2019-12-18

## 2019-12-18 RX ORDER — ETOMIDATE 2 MG/ML
INJECTION INTRAVENOUS
Status: DISPENSED
Start: 2019-12-18 | End: 2019-12-19

## 2019-12-18 RX ORDER — PROPOFOL 10 MG/ML
50 VIAL (ML) INTRAVENOUS ONCE
Status: COMPLETED | OUTPATIENT
Start: 2019-12-18 | End: 2019-12-18

## 2019-12-18 RX ORDER — SUCCINYLCHOLINE CHLORIDE 20 MG/ML
INJECTION INTRAMUSCULAR; INTRAVENOUS
Status: DISCONTINUED
Start: 2019-12-18 | End: 2019-12-18

## 2019-12-18 RX ORDER — PROPOFOL 10 MG/ML
5 INJECTION, EMULSION INTRAVENOUS CONTINUOUS
Status: DISCONTINUED | OUTPATIENT
Start: 2019-12-18 | End: 2019-12-19

## 2019-12-18 RX ORDER — HYDRALAZINE HYDROCHLORIDE 20 MG/ML
INJECTION INTRAMUSCULAR; INTRAVENOUS
Status: COMPLETED
Start: 2019-12-18 | End: 2019-12-18

## 2019-12-18 RX ORDER — AMOXICILLIN 250 MG
1 CAPSULE ORAL 2 TIMES DAILY
Status: DISCONTINUED | OUTPATIENT
Start: 2019-12-18 | End: 2019-12-21

## 2019-12-18 RX ORDER — GLUCAGON 1 MG
1 KIT INJECTION
Status: DISCONTINUED | OUTPATIENT
Start: 2019-12-18 | End: 2019-12-18

## 2019-12-18 RX ORDER — ROCURONIUM BROMIDE 10 MG/ML
INJECTION, SOLUTION INTRAVENOUS
Status: DISPENSED
Start: 2019-12-18 | End: 2019-12-19

## 2019-12-18 RX ORDER — FENTANYL CITRATE 50 UG/ML
50 INJECTION, SOLUTION INTRAMUSCULAR; INTRAVENOUS ONCE
Status: DISCONTINUED | OUTPATIENT
Start: 2019-12-19 | End: 2019-12-18

## 2019-12-18 RX ORDER — ACETAMINOPHEN 500 MG
1000 TABLET ORAL ONCE
Status: COMPLETED | OUTPATIENT
Start: 2019-12-18 | End: 2019-12-18

## 2019-12-18 RX ORDER — INSULIN ASPART 100 [IU]/ML
1-10 INJECTION, SOLUTION INTRAVENOUS; SUBCUTANEOUS EVERY 6 HOURS PRN
Status: DISCONTINUED | OUTPATIENT
Start: 2019-12-18 | End: 2019-12-18

## 2019-12-18 RX ORDER — LEVETIRACETAM 100 MG/ML
500 SOLUTION ORAL 2 TIMES DAILY
Status: COMPLETED | OUTPATIENT
Start: 2019-12-18 | End: 2019-12-24

## 2019-12-18 RX ORDER — FENTANYL CITRATE 50 UG/ML
50 INJECTION, SOLUTION INTRAMUSCULAR; INTRAVENOUS ONCE
Status: COMPLETED | OUTPATIENT
Start: 2019-12-18 | End: 2019-12-18

## 2019-12-18 RX ORDER — ROCURONIUM BROMIDE 10 MG/ML
20 INJECTION, SOLUTION INTRAVENOUS ONCE
Status: DISCONTINUED | OUTPATIENT
Start: 2019-12-18 | End: 2019-12-18

## 2019-12-18 RX ORDER — ACETAMINOPHEN 325 MG/1
650 TABLET ORAL EVERY 6 HOURS PRN
Status: DISCONTINUED | OUTPATIENT
Start: 2019-12-18 | End: 2019-12-26

## 2019-12-18 RX ORDER — GLUCAGON 1 MG
1 KIT INJECTION
Status: DISCONTINUED | OUTPATIENT
Start: 2019-12-18 | End: 2019-12-28

## 2019-12-18 RX ORDER — ONDANSETRON 2 MG/ML
4 INJECTION INTRAMUSCULAR; INTRAVENOUS EVERY 6 HOURS PRN
Status: DISCONTINUED | OUTPATIENT
Start: 2019-12-18 | End: 2020-01-10 | Stop reason: HOSPADM

## 2019-12-18 RX ORDER — POLYETHYLENE GLYCOL 3350 17 G/17G
17 POWDER, FOR SOLUTION ORAL 2 TIMES DAILY
Status: DISCONTINUED | OUTPATIENT
Start: 2019-12-18 | End: 2019-12-21

## 2019-12-18 RX ADMIN — SENNOSIDES AND DOCUSATE SODIUM 1 TABLET: 8.6; 5 TABLET ORAL at 12:12

## 2019-12-18 RX ADMIN — LABETALOL HCL IV SOLN PREFILLED SYRINGE 20 MG/4ML (5 MG/ML) 10 MG: 20/4 SOLUTION PREFILLED SYRINGE at 09:12

## 2019-12-18 RX ADMIN — HYDRALAZINE HYDROCHLORIDE 10 MG: 20 INJECTION INTRAMUSCULAR; INTRAVENOUS at 08:12

## 2019-12-18 RX ADMIN — FENTANYL CITRATE 50 MCG: 50 INJECTION, SOLUTION INTRAMUSCULAR; INTRAVENOUS at 11:12

## 2019-12-18 RX ADMIN — LEVETIRACETAM 500 MG: 100 SOLUTION ORAL at 11:12

## 2019-12-18 RX ADMIN — IPRATROPIUM BROMIDE AND ALBUTEROL SULFATE 3 ML: .5; 3 SOLUTION RESPIRATORY (INHALATION) at 04:12

## 2019-12-18 RX ADMIN — ATORVASTATIN CALCIUM 80 MG: 20 TABLET, FILM COATED ORAL at 08:12

## 2019-12-18 RX ADMIN — INSULIN ASPART 4 UNITS: 100 INJECTION, SOLUTION INTRAVENOUS; SUBCUTANEOUS at 04:12

## 2019-12-18 RX ADMIN — NICARDIPINE HYDROCHLORIDE 15 MG/HR: 0.2 INJECTION, SOLUTION INTRAVENOUS at 04:12

## 2019-12-18 RX ADMIN — ROCURONIUM BROMIDE 100 MG: 10 INJECTION, SOLUTION INTRAVENOUS at 07:12

## 2019-12-18 RX ADMIN — NICARDIPINE HYDROCHLORIDE 12.5 MG/HR: 0.2 INJECTION, SOLUTION INTRAVENOUS at 02:12

## 2019-12-18 RX ADMIN — HYDRALAZINE HYDROCHLORIDE 10 MG: 20 INJECTION INTRAMUSCULAR; INTRAVENOUS at 06:12

## 2019-12-18 RX ADMIN — ACETAMINOPHEN 650 MG: 325 TABLET ORAL at 11:12

## 2019-12-18 RX ADMIN — ACETAMINOPHEN 1000 MG: 500 TABLET ORAL at 06:12

## 2019-12-18 RX ADMIN — AZITHROMYCIN MONOHYDRATE 500 MG: 500 INJECTION, POWDER, LYOPHILIZED, FOR SOLUTION INTRAVENOUS at 02:12

## 2019-12-18 RX ADMIN — INSULIN ASPART 4 UNITS: 100 INJECTION, SOLUTION INTRAVENOUS; SUBCUTANEOUS at 05:12

## 2019-12-18 RX ADMIN — IPRATROPIUM BROMIDE AND ALBUTEROL SULFATE 3 ML: .5; 3 SOLUTION RESPIRATORY (INHALATION) at 12:12

## 2019-12-18 RX ADMIN — PROPOFOL 5 MCG/KG/MIN: 10 INJECTION, EMULSION INTRAVENOUS at 07:12

## 2019-12-18 RX ADMIN — LABETALOL HCL IV SOLN PREFILLED SYRINGE 20 MG/4ML (5 MG/ML) 10 MG: 20/4 SOLUTION PREFILLED SYRINGE at 04:12

## 2019-12-18 RX ADMIN — FENTANYL CITRATE 50 MCG: 50 INJECTION INTRAMUSCULAR; INTRAVENOUS at 11:12

## 2019-12-18 RX ADMIN — IPRATROPIUM BROMIDE AND ALBUTEROL SULFATE 3 ML: .5; 3 SOLUTION RESPIRATORY (INHALATION) at 09:12

## 2019-12-18 RX ADMIN — INSULIN ASPART 4 UNITS: 100 INJECTION, SOLUTION INTRAVENOUS; SUBCUTANEOUS at 11:12

## 2019-12-18 RX ADMIN — LEVETIRACETAM 500 MG: 100 SOLUTION ORAL at 08:12

## 2019-12-18 RX ADMIN — IPRATROPIUM BROMIDE AND ALBUTEROL SULFATE 3 ML: .5; 3 SOLUTION RESPIRATORY (INHALATION) at 11:12

## 2019-12-18 RX ADMIN — INSULIN ASPART 2 UNITS: 100 INJECTION, SOLUTION INTRAVENOUS; SUBCUTANEOUS at 12:12

## 2019-12-18 RX ADMIN — POLYETHYLENE GLYCOL 3350 17 G: 17 POWDER, FOR SOLUTION ORAL at 12:12

## 2019-12-18 RX ADMIN — POLYETHYLENE GLYCOL 3350 17 G: 17 POWDER, FOR SOLUTION ORAL at 08:12

## 2019-12-18 RX ADMIN — SENNOSIDES AND DOCUSATE SODIUM 1 TABLET: 8.6; 5 TABLET ORAL at 08:12

## 2019-12-18 RX ADMIN — IPRATROPIUM BROMIDE AND ALBUTEROL SULFATE 3 ML: .5; 3 SOLUTION RESPIRATORY (INHALATION) at 08:12

## 2019-12-18 RX ADMIN — HYDRALAZINE HYDROCHLORIDE 10 MG: 20 INJECTION INTRAMUSCULAR; INTRAVENOUS at 05:12

## 2019-12-18 RX ADMIN — CEFTRIAXONE 2 G: 2 INJECTION, SOLUTION INTRAVENOUS at 12:12

## 2019-12-18 RX ADMIN — LABETALOL HCL IV SOLN PREFILLED SYRINGE 20 MG/4ML (5 MG/ML) 20 MG: 20/4 SOLUTION PREFILLED SYRINGE at 08:12

## 2019-12-18 RX ADMIN — NICARDIPINE HYDROCHLORIDE 15 MG/HR: 0.2 INJECTION, SOLUTION INTRAVENOUS at 09:12

## 2019-12-18 RX ADMIN — SODIUM CHLORIDE: 234 INJECTION INTRAMUSCULAR; INTRAVENOUS; SUBCUTANEOUS at 06:12

## 2019-12-18 RX ADMIN — SODIUM CHLORIDE: 234 INJECTION INTRAMUSCULAR; INTRAVENOUS; SUBCUTANEOUS at 07:12

## 2019-12-18 RX ADMIN — INSULIN ASPART 2 UNITS: 100 INJECTION, SOLUTION INTRAVENOUS; SUBCUTANEOUS at 08:12

## 2019-12-18 RX ADMIN — SODIUM CHLORIDE: 234 INJECTION INTRAMUSCULAR; INTRAVENOUS; SUBCUTANEOUS at 04:12

## 2019-12-18 RX ADMIN — PROPOFOL 50 MG: 10 INJECTION, EMULSION INTRAVENOUS at 07:12

## 2019-12-18 RX ADMIN — MANNITOL 25 G: 20 INJECTION, SOLUTION INTRAVENOUS at 02:12
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
## 2019-12-18 NOTE — ASSESSMENT & PLAN NOTE
79 y.o. yo male with unclear PMHx (HTN, DM, HLD?) who presented to [facility] with R MCA syndrome. LKN 1000. Telephone (no video) consult with Dr. Mosher; tPA not given, as outside treatment window. [place] with report of R ICA stenosis at bifurcation with 70% stenosis and right MCA occlusion on CTA; pt was transferred to [facility] for possible intervention. Dr. Martínez reviewed stat CT Head images as acquired. Positive for LVO. Admitted to Neuro ICU for close monitoring/higher level of care.  Now s/p thrombectomy.     MRI Brain from 12/17 shows large right MCA territory acute infarction with hemorraghic transformation. Holding ASA and plavix at this time.    Antithrombotics for secondary stroke prevention:  Holding ASA and plavix at this time   Statins for secondary stroke prevention and hyperlipidemia, if present:   Statins: Atorvastatin- 40 mg daily  Aggressive risk factor modification: Diet, Exercise  Rehab efforts: The patient has been evaluated by a stroke team provider and the therapy needs have been fully considered based off the presenting complaints and exam findings. The following therapy evaluations are needed: PT evaluate and treat, OT evaluate and treat, SLP evaluate and treat, PM&R evaluate for appropriate placement  Diagnostics ordered/pending: None   VTE prophylaxis: Heparin 5000 units SQ every 8 hours  place [facility]  BP parameters: Infarct: Post successful thrombectomy, SBP <140  Post unsuccessful thrombectomy, SBP <220

## 2019-12-18 NOTE — PROGRESS NOTES
Pt taken to CT with 2 RNs, ambu bag, portable cardiac monitor, O2. CT tolerated well. Pt returned to room at 2025. VSS, neuro exam remains intact. Per JACQUES Christie, NCC notified of pts return from CT.

## 2019-12-18 NOTE — PROGRESS NOTES
NCC notified of Na 140. Buffered sodium 3% ordered, waiting for pharmacy to send. PICC line consulted. Per JACQUES Christie, ok to run 3% peripherally until PICC is placed.

## 2019-12-18 NOTE — CONSULTS
MARYS at bedside to place PICC, however patient spike a fever 102 while at the bedside.  Cultures ordered by MD team.  Unable to place PICC at this time until cultures clear for 48 hours.  Please reconsult once cultures are clear.

## 2019-12-18 NOTE — PLAN OF CARE
POC reviewed with pt at 0400. Pt verbalized understanding. Questions and concerns addressed with pt and family. Pt taken to CT overnight, tolerated well. 2% infusion increased to 120 ml/hr. Mannitol given per order. L radial arterial line placed. Strict SBP goal < 140. Cardene gtt titrated to maintain SBP <140. Andrade remains in place. Pt progressing toward goals. Will continue to monitor. See flowsheets for full assessment and VS info

## 2019-12-18 NOTE — CONSULTS
[facility]  Neurosurgery  Consult Note    Consults  Subjective:     Chief Complaint/Reason for Admission: hemorrhagic conversion of stroke    History of Present Illness: Jerry Linder is a 79 y.o. male with unknown PMH with hemorrhagic conversion of R MCA stroke s/p thrombectomy with R ICA stenting. Neurosurgery consulted for hemicraniectomy evaluation.    No medications prior to admission.       Review of patient's allergies indicates:  No Known Allergies    History reviewed. No pertinent past medical history.  Past Surgical History:   Procedure Laterality Date    CEREBRAL ANGIOGRAM N/A 12/16/2019    Procedure: ANGIOGRAM-CEREBRAL;  Surgeon: Jairo Martínez MD;  [facility]: [facility] OR [address] [place] [address];  Service: Radiology;  Laterality: N/A;     Family History     None        Tobacco Use    Smoking status: Former Smoker     Types: Cigarettes   Substance and Sexual Activity    Alcohol use: Not on file    Drug use: Never    Sexual activity: Not on file     Review of Systems   Reason unable to perform ROS: AMS.     Objective:     Weight: 123.4 kg (272 lb)  Body mass index is 35.89 kg/m².  Vital Signs (Most Recent):  Temp: 99 °F (37.2 °C) (12/17/19 1515)  Pulse: 94 (12/17/19 1805)  Resp: (!) 30 (12/17/19 1805)  BP: 131/60 (12/17/19 1800)  SpO2: 97 % (12/17/19 1805) Vital Signs (24h Range):  Temp:  [98 °F (36.7 °C)-99 °F (37.2 °C)] 99 °F (37.2 °C)  Pulse:  [] 94  Resp:  [18-38] 30  SpO2:  [93 %-99 %] 97 %  BP: (117-164)/(48-75) 131/60     Date 12/17/19 0700 - 12/18/19 0659   Shift 6430-0816 2284-7870 8021-2643 24 Hour Total   INTAKE   I.V.(mL/kg) 316.7(2.6)   316.7(2.6)   Shift Total(mL/kg) 316.7(2.6)   316.7(2.6)   OUTPUT   Urine(mL/kg/hr) 425(0.4) 750  1175   Shift Total(mL/kg) 425(3.4) 750(6.1)  1175(9.5)   Weight (kg) 123.4 123.4 123.4 123.4                        NG/OG Tube 12/17/19 1100 nasogastric 14 Fr. Left nostril (Active)   Placement Check placement verified by x-ray;placement verified by  "aspirate characteristics;placement verified by distal tube length measurement 12/17/2019  3:02 PM   Advancement advanced manually 12/17/2019  3:02 PM   Tolerance no signs/symptoms of discomfort 12/17/2019  3:02 PM   Securement secured to nostril center w/ adhesive device 12/17/2019  3:02 PM   Clamp Status/Tolerance clamped;no abdominal discomfort;no abdominal distention;no emesis;no nausea;no residual;no restlessness 12/17/2019  3:02 PM   Insertion Site Appearance no redness, warmth, tenderness, skin breakdown, drainage 12/17/2019  3:02 PM   Drainage None 12/17/2019  3:02 PM            Urethral Catheter 12/16/19 1732 (Active)   Site Assessment Bleeding 12/17/2019  3:02 PM   Collection Container Urimeter 12/17/2019  3:02 PM   Securement Method secured to top of thigh w/ adhesive device 12/17/2019  3:02 PM   Catheter Care Performed yes 12/17/2019  7:02 AM   Reason for Continuing Urinary Catheterization Critically ill in ICU requiring intensive monitoring 12/17/2019  3:02 PM   CAUTI Prevention Bundle StatLock in place w 1" slack;Intact seal between catheter & drainage tubing;No dependent loops or kinks;Drainage bag/urimeter below bladder;Drainage bag/urimeter not overfilled (<2/3 full);Green sheeting clip in use;Drainage bag/urimeter off the floor 12/17/2019  3:02 PM   Output (mL) 75 mL 12/17/2019  6:02 PM       Physical Exam:    Eyes: Pupils are equal, round, and reactive to light.     Cardiovascular: Normal rate, regular rhythm and normal pulses.     Abdominal: Soft.     Musculoskeletal:   Appropriate tone, no evidence of spasm      Neurological:   Right gaze, left side plegia, right side moving spontaneously antigravity  PERRL         Significant Labs:  Recent Labs   Lab 12/16/19  2257 12/17/19  0421 12/17/19  0751 12/17/19  1803   * 220*  --   --     139  --  138   K 4.2 3.9  --   --     107  --   --    CO2 26 23  --   --    BUN 24* 25*  --   --    CREATININE 1.0 0.9  --   --    CALCIUM 8.9 8.6*  " --   --    MG 1.8  --  1.9  --      Recent Labs   Lab 12/16/19  2257 12/17/19  0751   WBC 9.78 8.93   HGB 14.1 13.8*   HCT 41.4 41.3    187     Recent Labs   Lab 12/17/19  0013   INR 1.0   APTT 24.0     Microbiology Results (last 7 days)     ** No results found for the last 168 hours. **        All pertinent labs from the last 24 hours have been reviewed.    Significant Diagnostics:  I have reviewed all pertinent imaging results/findings within the past 24 hours.    Assessment/Plan:     * Embolic stroke involving right middle cerebral artery   79 y.o. male with unknown PMH with hemorrhagic conversion of R MCA stroke s/p thrombectomy with R ICA stenting. Neurosurgery consulted for hemicraniectomy evaluation.    - All imaging evaluated. Pt with no impending herniation or significant compression at this time. Neurologically unchanged per primary team since immediately post-thrombectomy.  - Recommend maximum medical management  - No acute neurosurgical intervention at this time. Thank you for this consult. Neurosurgery to sign off. Please contact us with any additional questions.         Thank you for your consult. I will sign off. Please contact us if you have any additional questions.    Christiano Chavez MD  Neurosurgery  [facility]

## 2019-12-18 NOTE — ASSESSMENT & PLAN NOTE
79 y.o. male with unknown PMH with hemorrhagic conversion of R MCA stroke s/p thrombectomy with R ICA stenting. Neurosurgery consulted for hemicraniectomy evaluation.    - All imaging evaluated. Pt with no impending herniation or significant compression at this time. Neurologically unchanged per primary team since immediately post-thrombectomy.  - Recommend maximum medical management  - No acute neurosurgical intervention at this time. Thank you for this consult. Neurosurgery to sign off. Please contact us with any additional questions.

## 2019-12-18 NOTE — HPI
Jerry Linder is a 79 y.o. male with unknown PMH with hemorrhagic conversion of R MCA stroke s/p thrombectomy with R ICA stenting. Neurosurgery consulted for hemicraniectomy evaluation.

## 2019-12-18 NOTE — ASSESSMENT & PLAN NOTE
12/16 Patient s/p thrombectomy with R ICA stent placement. ASA and Plavix initiated  12/17 MRI notes: intraparenchymal hemorrhage within the right frontal and temporal lobes with associated vasogenic edema resulting in mass effect on the right lateral ventricle and approximately 5 mm right-to-left midline shift.  12/17 ASA Plavix discontinued  Vascular Stroke following  NSGY consulted  12/16 2% initiated to maintain Na 145-155  Na Q6 (disc. Grouped into BMP)  Mannitol x1  12/17 changed 2% to 3%  Q6 Na changed to BMP Q6, Serum Os

## 2019-12-18 NOTE — ASSESSMENT & PLAN NOTE
Possible community acquired infection  Wet Raspy cough  T Max 102.0  Tylenol  Cooling [place]  Pan culture  Start Azithromycin and Rocephin

## 2019-12-18 NOTE — PLAN OF CARE
Monitor. Recommend npo -ng tube in place.  No po attempts made due to venti mask/respiratory status  Problem: SLP Goal  Goal: SLP Goal  Description  Goals due 12/24  1.  Pt. Will participate in ongoing assessment of swallow at bedside  2.  Assess reading, writing and visual spatial skills  3.  Recall speech strategies with min cues  4.  Assess higher level cognitive skills to determine need fo rtherapy   Outcome: Ongoing, Progressing

## 2019-12-18 NOTE — PROGRESS NOTES
[facility]  Neurocritical Care  Progress Note    Admit Date: 12/16/2019  Service Date: 12/18/2019  Length of Stay: 2    Subjective:     Chief Complaint: Embolic stroke involving right middle cerebral artery    History of Present Illness: The patient is a 79 y.o. male who  was admitted as a transfer from OSH for Right MCA stroke syndrome. Patient had an urgent thrombectomy s/p  right MCA stroke syndrome. PMH significant for HTN, T2DM and HLD. Patient stated that around 2:30 pm on 12/16/19 he was driving and felt weak. He drove home and was taken to the ER where he was evaluated for a stroke. He had outside CTA which was reported to have R ICA stenosis at bifurcation with 70% stenosis and right MCA occlusion. Patient was transferred to [facility] for possible intervention. Patient had a right femoral sheath placed and his Angio  revealed 90% R ICA stenosis and R M1 occlusion. The R ICA was treated by thrombectomy and carotid stenting with TICI 3 reperfusion.   Patient was admitted to the [facility] for higher level of care post thrombectomy.         Hospital Course: 12/17/19: Patient admitted to [facility] for higher level of care s/p thrombectomy and right carotid artery stenting with TICI 3 reperfusion  12/18/2019Recent MRI with heme transformation abg Q8, repeat scan stable, mannitol    Interval History:  Follow up CT stable overnight following initial heme transformation. Neurologically responsive to commands, oriented person, place, time. Visibly more sleepy than prior, arousable to voice or if sleeping, light touch/stimulation. Mannitol administered overnight to assist with deceasing cerebral edema. 2% discontinued and 3% initiated. 2% unable to adequately achieve sodium goals in specified time frame. PICC team consulted for line placed due erick initiation of HTS. Most recent Na 140. BMP and serum os monitoring Q6 hours to maintain Na 145-155. Start keppra BID for seizre prophylaxis. Initiate seizure precautions.  Weak wet cough, crackles bilateral. Lasix administered yesterday with + response. Continue to monitor CXR, inhalation, IPV and Q6 ABG. Continue with intubation watch. SBP maintained < 140. Cardene gtt infusing. Blood sugar consistently greater than 200. Will trend BS Q4 instead of Q6. May consider insulin gtt if unable to maintain BS < 200. NPO for now. Aspiration Precautions. Continue critical care monitoring. The patient requires critical care stay due to high probability of life threatening deterioration in their condition.     Review of Systems   Constitutional: Positive for activity change.   HENT: Positive for trouble swallowing and voice change.    Eyes: Negative.    Respiratory: Positive for cough.    Cardiovascular: Negative.    Gastrointestinal: Negative.    Endocrine: Negative.    Genitourinary: Negative.    Musculoskeletal: Negative.    Skin: Negative.    Allergic/Immunologic: Negative.    Neurological: Positive for weakness.   Hematological: Negative.    Psychiatric/Behavioral: Positive for decreased concentration.       Objective:     Vitals:  Temp: 98.3 °F (36.8 °C)  Pulse: (!) 115  Rhythm: sinus tachycardia  BP: (!) 144/65  MAP (mmHg): 93  Resp: (!) 25  SpO2: 96 %  Oxygen Concentration (%): 44  O2 Device (Oxygen Therapy): Simple Face Mask    Temp  Min: 98.3 °F (36.8 °C)  Max: 99.3 °F (37.4 °C)  Pulse  Min: 81  Max: 115  BP  Min: 127/61  Max: 163/73  MAP (mmHg)  Min: 75  Max: 105  Resp  Min: 23  Max: 38  SpO2  Min: 92 %  Max: 98 %  Oxygen Concentration (%)  Min: 36  Max: 44    12/17 0701 - 12/18 0700  In: 1602.7 [I.V.:1602.7]  Out: 2125 [Urine:2125]   Unmeasured Output  Stool Occurrence: 1       Physical Exam   Constitutional: He appears well-developed and well-nourished.   HENT:   Head: Normocephalic and atraumatic.   Eyes: Pupils are equal, round, and reactive to light. EOM are normal.   Neck: Normal range of motion. No JVD present. No tracheal deviation present.   Cardiovascular: Normal rate,  regular rhythm and normal heart sounds.   Pulmonary/Chest:   Weak cough, coarse breath sounds   Nursing note and vitals reviewed.    Unable to test judgment, fund of knowledge, shoulder shrug, tongue protrusion, coordination, gait due to level of consciousness.    Medications:  Continuous  niCARdipine Last Rate: 7.5 mg/hr (12/18/19 1002)   buffered 3% sodium acetate 130meq, sodium chloride 130meq, sterile water for inj IV soln Last Rate: 50 mL/hr at 12/18/19 1002   Scheduled  albuterol-ipratropium 3 mL Q4H   atorvastatin 80 mg QHS   polyethylene glycol 17 g BID   senna-docusate 8.6-50 mg 1 tablet BID   PRN  acetaminophen 650 mg Q6H PRN   glucagon (human recombinant) 1 mg PRN   insulin aspart U-100 1-10 Units Q6H PRN   labetalol 10 mg Q4H PRN   magnesium oxide 800 mg PRN   magnesium oxide 800 mg PRN   ondansetron 4 mg Q6H PRN   potassium chloride 10% 40 mEq PRN   potassium chloride 10% 40 mEq PRN   potassium chloride 10% 60 mEq PRN   potassium, sodium phosphates 2 packet PRN   potassium, sodium phosphates 2 packet PRN   potassium, sodium phosphates 2 packet PRN   sodium chloride 0.9% 10 mL PRN     Today I personally reviewed pertinent medications, lines/drains/airways, imaging, laboratory results, notably:    Diet  No diet orders on file  No diet orders on file        Assessment/Plan:     Neuro  * Embolic stroke involving right middle cerebral artery   - 80 y/o male admitted for R MCA stroke syndrome POD 2 s/p Thrombectomy TICI 3 reperfusion   - Neurochecks q1hr    - Continue cardene to maintain SBP < 140  2/17 MRI noted: Large right MCA territory acute infarction with hemorrhagic transformation-associated vasogenic edema resulting in mass effect on the right lateral ventricle and mild (5 mm) right to left midline shift.   Multiple additional punctate foci of acute infarctions throughout the right posterior frontal lobe.    Discontinue ASA/Plavix d/t heme transformation  F/U CTH 2/17 stable    Cytotoxic brain  edema  2/2 R MCA  Neuro checks Q 1 hr  Maintain eunatremia  Maintain Euglyemia    Midline shift of brain  2/2 IPH as noted on MRI imaging: mass effect on the right lateral ventricle and approximately 5 mm right-to-left midline shift  Continue Neuro checks Q1 hr    Vasogenic brain edema  2/2 IPH as noted on MRI: intraparenchymal hemorrhage within the right frontal and temporal lobes with associated vasogenic edema resulting in mass effect on the right lateral ventricle  Neuro checks Q1 hr      Acute spont intraparenchymal hemorrhage assoc w/ coagulopathy   Patient s/p thrombectomy with R ICA stent placement. ASA and Plavix initiated   MRI notes: intraparenchymal hemorrhage within the right frontal and temporal lobes with associated vasogenic edema resulting in mass effect on the right lateral ventricle and approximately 5 mm right-to-left midline shift.   ASA Plavix discontinued  Vascular Stroke following  NSGY consulted   2% initiated to maintain Na 145-155  Na Q6 (disc. Grouped into BMP)  Mannitol x1   changed 2% to 3%  Q6 Na changed to BMP Q6, Serum Os  Keppra for seizure prophylaxis  Seizure Precautions    Cardiac/Vascular  Mixed hyperlipidemia   - Continue Atorvastatin 80mg HS  Monitor Lipid Panel    Essential hypertension  Continuous Cardiac Monitoring  Vital Signs Q1 hour  Systolic (24hrs), Av , Min:117 , Max:181   Cardene to Maintain SBP < 140  CXR  Lungs hypoaerated.  Increase in the pulmonary vascular interstitial and alveolar markings Findings most consistent with congestive failure   ECHO 65%  EKG : Atrial fibrillation with rapid ventricular response with premature ventricular or aberrantly conducted complexes        Endocrine  Type 2 diabetes mellitus with neurologic complication, without long-term current use of insulin  A1c 7.7  RISS   POCT Q4  Nutritional Consult for dietary /caloric needs  Hold TF for now considering Aspiration Risk  -see management for R  MCA/IPH       Other  Fever due to infection  Possible community acquired infection  Wet Raspy cough  T Max 102.0  Tylenol  Cooling [place]  Pan culture  Start Azithromycin and Rocephin    Decreased strength, endurance, and mobility  2/2 LS Weakness R/T R MCA  PT/OT        The patient is being Prophylaxed for:  Venous Thromboembolism with: Mechanical  Stress Ulcer with: None  Ventilator Pneumonia with: none    Activity Orders          None        Full Code   Critical Care 40 min  Critical secondary to Patient has a condition that poses threat to life and bodily function:      Critical care was time spent personally by me on the following activities: development of treatment plan with patient or surrogate and bedside caregivers, discussions with consultants, evaluation of patient's response to treatment, examination of patient, ordering and performing treatments and interventions, ordering and review of laboratory studies, ordering and review of radiographic studies, pulse oximetry, re-evaluation of patient's condition. This critical care time did not overlap with that of any other provider or involve time for any procedures.      Samy Galindo NP  Neurocritical Care  [facility]

## 2019-12-18 NOTE — SUBJECTIVE & OBJECTIVE
Interval History:  Follow up CT stable overnight following initial heme transformation. Neurologically responsive to commands, oriented person, place, time. Visibly more sleepy than prior, arousable to voice or if sleeping, light touch/stimulation. Mannitol administered overnight to assist with deceasing cerebral edema. 2% discontinued and 3% initiated. 2% unable to adequately achieve sodium goals in specified time frame. PICC team consulted for line placed due erick initiation of HTS. Most recent Na 140. BMP and serum os monitoring Q6 hours to maintain Na 145-155. Start keppra BID for seizre prophylaxis. Initiate seizure precautions. Weak wet cough, crackles bilateral. Lasix administered yesterday with + response. Continue to monitor CXR, inhalation, IPV and Q6 ABG. Continue with intubation watch. SBP maintained < 140. Cardene gtt infusing. Blood sugar consistently greater than 200. Will trend BS Q4 instead of Q6. May consider insulin gtt if unable to maintain BS < 200. NPO for now. Aspiration Precautions. Continue critical care monitoring. The patient requires critical care stay due to high probability of life threatening deterioration in their condition.     Review of Systems   Constitutional: Positive for activity change.   HENT: Positive for trouble swallowing and voice change.    Eyes: Negative.    Respiratory: Positive for cough.    Cardiovascular: Negative.    Gastrointestinal: Negative.    Endocrine: Negative.    Genitourinary: Negative.    Musculoskeletal: Negative.    Skin: Negative.    Allergic/Immunologic: Negative.    Neurological: Positive for weakness.   Hematological: Negative.    Psychiatric/Behavioral: Positive for decreased concentration.       Objective:     Vitals:  Temp: 98.3 °F (36.8 °C)  Pulse: (!) 115  Rhythm: sinus tachycardia  BP: (!) 144/65  MAP (mmHg): 93  Resp: (!) 25  SpO2: 96 %  Oxygen Concentration (%): 44  O2 Device (Oxygen Therapy): Simple Face Mask    Temp  Min: 98.3 °F (36.8 °C)   Max: 99.3 °F (37.4 °C)  Pulse  Min: 81  Max: 115  BP  Min: 127/61  Max: 163/73  MAP (mmHg)  Min: 75  Max: 105  Resp  Min: 23  Max: 38  SpO2  Min: 92 %  Max: 98 %  Oxygen Concentration (%)  Min: 36  Max: 44    12/17 0701 - 12/18 0700  In: 1602.7 [I.V.:1602.7]  Out: 2125 [Urine:2125]   Unmeasured Output  Stool Occurrence: 1       Physical Exam   Constitutional: He appears well-developed and well-nourished.   HENT:   Head: Normocephalic and atraumatic.   Eyes: Pupils are equal, round, and reactive to light. EOM are normal.   Neck: Normal range of motion. No JVD present. No tracheal deviation present.   Cardiovascular: Normal rate, regular rhythm and normal heart sounds.   Pulmonary/Chest:   Weak cough, coarse breath sounds   Nursing note and vitals reviewed.    Unable to test judgment, fund of knowledge, shoulder shrug, tongue protrusion, coordination, gait due to level of consciousness.    Medications:  Continuous  niCARdipine Last Rate: 7.5 mg/hr (12/18/19 1002)   buffered 3% sodium acetate 130meq, sodium chloride 130meq, sterile water for inj IV soln Last Rate: 50 mL/hr at 12/18/19 1002   Scheduled  albuterol-ipratropium 3 mL Q4H   atorvastatin 80 mg QHS   polyethylene glycol 17 g BID   senna-docusate 8.6-50 mg 1 tablet BID   PRN  acetaminophen 650 mg Q6H PRN   glucagon (human recombinant) 1 mg PRN   insulin aspart U-100 1-10 Units Q6H PRN   labetalol 10 mg Q4H PRN   magnesium oxide 800 mg PRN   magnesium oxide 800 mg PRN   ondansetron 4 mg Q6H PRN   potassium chloride 10% 40 mEq PRN   potassium chloride 10% 40 mEq PRN   potassium chloride 10% 60 mEq PRN   potassium, sodium phosphates 2 packet PRN   potassium, sodium phosphates 2 packet PRN   potassium, sodium phosphates 2 packet PRN   sodium chloride 0.9% 10 mL PRN     Today I personally reviewed pertinent medications, lines/drains/airways, imaging, laboratory results, notably:    Diet  No diet orders on file  No diet orders on file

## 2019-12-18 NOTE — SUBJECTIVE & OBJECTIVE
Neurologic Chief Complaint: R MCA syndrome     Subjective:     Interval History: Patient is seen for follow-up neurological assessment and treatment recommendations:     MRI Brain from 12/17 shows large right MCA territory acute infarction with hemorraghic transformation and holding ASA and plavix at this time. On 2% saline.       HPI, Past Medical, Family, and Social History remains the same as documented in the initial encounter.     Review of Systems   Constitutional: Negative for fever.   Eyes: Positive for visual disturbance.   Respiratory: Negative for shortness of breath and wheezing.    Cardiovascular: Negative for chest pain and palpitations.   Gastrointestinal: Negative for nausea and vomiting.   Endocrine: Negative for polyuria.   Musculoskeletal: Positive for gait problem.   Allergic/Immunologic: Negative for immunocompromised state.   Neurological: Positive for facial asymmetry, speech difficulty and weakness.   Psychiatric/Behavioral: Negative for confusion.     Scheduled Meds:   albuterol-ipratropium  3 mL Nebulization Q4H    atorvastatin  80 mg Per NG tube QHS    azithromycin  500 mg Intravenous Q24H    cefTRIAXone (ROCEPHIN) IVPB  2 g Intravenous Q24H    levetiracetam oral soln  500 mg Per NG tube BID    polyethylene glycol  17 g Per NG tube BID    senna-docusate 8.6-50 mg  1 tablet Per NG tube BID     Continuous Infusions:   niCARdipine 15 mg/hr (12/18/19 1502)    buffered 3% sodium acetate 130meq, sodium chloride 130meq, sterile water for inj IV soln 50 mL/hr at 12/18/19 1502     PRN Meds:acetaminophen, glucagon (human recombinant), insulin aspart U-100, labetalol, magnesium oxide, magnesium oxide, ondansetron, potassium chloride 10%, potassium chloride 10%, potassium chloride 10%, potassium, sodium phosphates, potassium, sodium phosphates, potassium, sodium phosphates, sodium chloride 0.9%    Objective:     Vital Signs (Most Recent):  Temp: 99 °F (37.2 °C) (12/18/19 1502)  Pulse: (!) 116  (12/18/19 1502)  Resp: (!) 32 (12/18/19 1502)  BP: 138/79 (12/18/19 1502)  SpO2: 96 % (12/18/19 1502)  BP Location: Left arm    Vital Signs Range (Last 24H):  Temp:  [98.3 °F (36.8 °C)-102 °F (38.9 °C)]   Pulse:  []   Resp:  [23-36]   BP: (127-164)/()   SpO2:  [92 %-98 %]   Arterial Line BP: (127-148)/(40-78)   BP Location: Left arm    Physical Exam   Constitutional: He appears well-developed and well-nourished.   HENT:   Head: Normocephalic and atraumatic.   Eyes: Conjunctivae are normal.   Right gaze- able to cross midline   Neck: Normal range of motion and full passive range of motion without pain.   Cardiovascular: Tachycardia present.   Pulmonary/Chest: Effort normal.   Abdominal: Soft. Normal appearance.   Musculoskeletal:   Left  Side plegia   Neurological: A cranial nerve deficit is present. He exhibits abnormal muscle tone.   Skin: Skin is warm and dry.   Psychiatric: His speech is slurred.       Neurological Exam:   LOC: drowsy  Attention Span: Good   Language: No aphasia  Articulation: Dysarthria  Orientation: Person, Place, Time   Visual Fields: Hemianopsia left  EOM (CN III, IV, VI): Gaze preference  left  Pupils (CN II, III): PERRL  Facial Movement (CN VII): Lower facial weakness on the Left  Motor: Arm left  Plegia 0/5  Leg left  Plegia 0/5  Arm right  Normal 5/5  Leg right Normal 5/5  Sensation: Tactile extinction to bilateral simultaneous stimulation     Laboratory:  CMP:   Recent Labs   Lab 12/18/19  0129  12/18/19  1125   CALCIUM 8.3*  --  8.2*   ALBUMIN 3.2*  --   --    PROT 6.6  --   --       < > 144   K 4.0  --  3.9   CO2 24  --  26     --  111*   BUN 26*  --  24*   CREATININE 0.9  --  0.9   ALKPHOS 53*  --   --    ALT 36  --   --    AST 22  --   --    BILITOT 0.9  --   --     < > = values in this interval not displayed.     CBC:   Recent Labs   Lab 12/18/19  0129   WBC 8.52   RBC 3.96*   HGB 12.9*   HCT 38.1*      MCV 96   MCH 32.6*   MCHC 33.9     Lipid Panel:    Recent Labs   Lab 12/17/19  0013   CHOL 136   LDLCALC 67.6   HDL 33*   TRIG 177*     Hgb A1C:   Recent Labs   Lab 12/17/19  0013   HGBA1C 7.7*     TSH: No results for input(s): TSH in the last 168 hours.    Diagnostic Results     Brain Imaging   CT head w/o contrast 12/16/2019     No acute major vascular distribution infarct or hemorrhage.    Vessel Imaging   Outside imaging reported as having ABBY stenosis at bifurcation with 70% stenosis and right MCA occlusion.    Cardiac Imaging   IRIS  · Normal left ventricular systolic function. The estimated ejection fraction is 65%  · Indeterminate left ventricular diastolic function.  · Mild left ventricular enlargement.  · Normal right ventricular systolic function.  · Technically difficult study, poor endocardial visualization, contrast used.  · No wall motion abnormalities.  · Indeterminate central venous pressure. Estimated PA pressure is at least 9 mmHg.

## 2019-12-18 NOTE — ASSESSMENT & PLAN NOTE
2/2 IPH as noted on MRI: intraparenchymal hemorrhage within the right frontal and temporal lobes with associated vasogenic edema resulting in mass effect on the right lateral ventricle  Neuro checks Q1 hr

## 2019-12-18 NOTE — PLAN OF CARE
R MCA with hemorrhagic conversion  sbp 100 -140 strict  Continue 3% with goal 145-155  Osmolar gaps q6  PICC pending  Continue pulm toilet - aggressive  abg's q6  Start BR  No neurosurgical intervention at this time  F/u VN recs - holding asa/plavix in the setting of hemorrhagic conversion  Increase ISS frequency  Intubation watch

## 2019-12-18 NOTE — SUBJECTIVE & OBJECTIVE
No medications prior to admission.       Review of patient's allergies indicates:  No Known Allergies    History reviewed. No pertinent past medical history.  Past Surgical History:   Procedure Laterality Date    CEREBRAL ANGIOGRAM N/A 12/16/2019    Procedure: ANGIOGRAM-CEREBRAL;  Surgeon: Jairo Martínez MD;  Location: [facility] OR [address] [place] [address];  Service: Radiology;  Laterality: N/A;     Family History     None        Tobacco Use    Smoking status: Former Smoker     Types: Cigarettes   Substance and Sexual Activity    Alcohol use: Not on file    Drug use: Never    Sexual activity: Not on file     Review of Systems   Reason unable to perform ROS: AMS.     Objective:     Weight: 123.4 kg (272 lb)  Body mass index is 35.89 kg/m².  Vital Signs (Most Recent):  Temp: 99 °F (37.2 °C) (12/17/19 1515)  Pulse: 94 (12/17/19 1805)  Resp: (!) 30 (12/17/19 1805)  BP: 131/60 (12/17/19 1800)  SpO2: 97 % (12/17/19 1805) Vital Signs (24h Range):  Temp:  [98 °F (36.7 °C)-99 °F (37.2 °C)] 99 °F (37.2 °C)  Pulse:  [] 94  Resp:  [18-38] 30  SpO2:  [93 %-99 %] 97 %  BP: (117-164)/(48-75) 131/60     Date 12/17/19 0700 - 12/18/19 0659   Shift 7834-5985 4508-6961 3908-6147 24 Hour Total   INTAKE   I.V.(mL/kg) 316.7(2.6)   316.7(2.6)   Shift Total(mL/kg) 316.7(2.6)   316.7(2.6)   OUTPUT   Urine(mL/kg/hr) 425(0.4) 750  1175   Shift Total(mL/kg) 425(3.4) 750(6.1)  1175(9.5)   Weight (kg) 123.4 123.4 123.4 123.4                        NG/OG Tube 12/17/19 1100 nasogastric 14 Fr. Left nostril (Active)   Placement Check placement verified by x-ray;placement verified by aspirate characteristics;placement verified by distal tube length measurement 12/17/2019  3:02 PM   Advancement advanced manually 12/17/2019  3:02 PM   Tolerance no signs/symptoms of discomfort 12/17/2019  3:02 PM   Securement secured to nostril center w/ adhesive device 12/17/2019  3:02 PM   Clamp Status/Tolerance clamped;no abdominal discomfort;no abdominal distention;no  "emesis;no nausea;no residual;no restlessness 12/17/2019  3:02 PM   Insertion Site Appearance no redness, warmth, tenderness, skin breakdown, drainage 12/17/2019  3:02 PM   Drainage None 12/17/2019  3:02 PM            Urethral Catheter 12/16/19 1732 (Active)   Site Assessment Bleeding 12/17/2019  3:02 PM   Collection Container Urimeter 12/17/2019  3:02 PM   Securement Method secured to top of thigh w/ adhesive device 12/17/2019  3:02 PM   Catheter Care Performed yes 12/17/2019  7:02 AM   Reason for Continuing Urinary Catheterization Critically ill in ICU requiring intensive monitoring 12/17/2019  3:02 PM   CAUTI Prevention Bundle StatLock in place w 1" slack;Intact seal between catheter & drainage tubing;No dependent loops or kinks;Drainage bag/urimeter below bladder;Drainage bag/urimeter not overfilled (<2/3 full);Green sheeting clip in use;Drainage bag/urimeter off the floor 12/17/2019  3:02 PM   Output (mL) 75 mL 12/17/2019  6:02 PM       Physical Exam:    Eyes: Pupils are equal, round, and reactive to light.     Cardiovascular: Normal rate, regular rhythm and normal pulses.     Abdominal: Soft.     Musculoskeletal:   Appropriate tone, no evidence of spasm      Neurological:   Right gaze, left side plegia, right side moving spontaneously antigravity  PERRL         Significant Labs:  Recent Labs   Lab 12/16/19 2257 12/17/19  0421 12/17/19  0751 12/17/19  1803   * 220*  --   --     139  --  138   K 4.2 3.9  --   --     107  --   --    CO2 26 23  --   --    BUN 24* 25*  --   --    CREATININE 1.0 0.9  --   --    CALCIUM 8.9 8.6*  --   --    MG 1.8  --  1.9  --      Recent Labs   Lab 12/16/19 2257 12/17/19  0751   WBC 9.78 8.93   HGB 14.1 13.8*   HCT 41.4 41.3    187     Recent Labs   Lab 12/17/19  0013   INR 1.0   APTT 24.0     Microbiology Results (last 7 days)     ** No results found for the last 168 hours. **        All pertinent labs from the last 24 hours have been " reviewed.    Significant Diagnostics:  I have reviewed all pertinent imaging results/findings within the past 24 hours.

## 2019-12-18 NOTE — PROGRESS NOTES
[facility]  Vascular Neurology  Comprehensive Stroke Center  Progress Note    Assessment/Plan:     * Embolic stroke involving right middle cerebral artery  79 y.o. yo male with unclear PMHx (HTN, DM, HLD?) who presented to [facility] with R MCA syndrome. LKN 1000. Telephone (no video) consult with Dr. Mosher; tPA not given, as outside treatment window. [place] with report of R ICA stenosis at bifurcation with 70% stenosis and right MCA occlusion on CTA; pt was transferred to [facility] for possible intervention. Dr. Martínez reviewed stat CT Head images as acquired. Positive for LVO. Admitted to Neuro ICU for close monitoring/higher level of care.  Now s/p thrombectomy.     MRI Brain from 12/17 shows large right MCA territory acute infarction with hemorraghic transformation. Holding ASA and plavix at this time.    Antithrombotics for secondary stroke prevention:  Holding ASA and plavix at this time   Statins for secondary stroke prevention and hyperlipidemia, if present:   Statins: Atorvastatin- 40 mg daily  Aggressive risk factor modification: Diet, Exercise  Rehab efforts: The patient has been evaluated by a stroke team provider and the therapy needs have been fully considered based off the presenting complaints and exam findings. The following therapy evaluations are needed: PT evaluate and treat, OT evaluate and treat, SLP evaluate and treat, PM&R evaluate for appropriate placement  Diagnostics ordered/pending: None   VTE prophylaxis: Heparin 5000 units SQ every 8 hours  place [facility]  BP parameters: Infarct: Post successful thrombectomy, SBP <140  Post unsuccessful thrombectomy, SBP <220    Mixed hyperlipidemia  -stroke risk factor    LDL pending  If LDL >70 recommend atorvastatin 40 mg     Type 2 diabetes mellitus with neurologic complication, without long-term current use of insulin  Stroke risk factor   on arrival  Hemoglobin A1C pending  Recommend tight glycemic control BG  140-180    Essential hypertension  Stroke risk factor  SBP < 140 vs 220 based on thrombectomy result  Per primary team         No notes on file    STROKE DOCUMENTATION   Acute Stroke Times   Last Known Normal Date: 12/16/19  Last Known Normal Time: 1000  Stroke Team Called Date: 12/16/19  Stroke Team Called Time: 1725  Stroke Team Arrival Date: 12/16/19  Stroke Team Arrival Time: 1730  CT Interpretation Time: 1745  Decision to Treat Time for Alteplase: (N/A)  Decision to Treat Time for IR: 1750    NIH Scale:  1a. Level of Consciousness: 1-->Not alert, but arousable by minor stimulation to obey, answer, or respond  1b. LOC Questions: 0-->Answers both questions correctly  1c. LOC Commands: 0-->Performs both tasks correctly  2. Best Gaze: 0-->Normal  3. Visual: 0-->No visual loss  4. Facial Palsy: 2-->Partial paralysis (total or near-total paralysis of lower face)  5a. Motor Arm, Left: 3-->No effort against gravity, limb falls  5b. Motor Arm, Right: 0-->No drift, limb holds 90 (or 45) degrees for full 10 secs  6a. Motor Leg, Left: 3-->No effort against gravity, leg falls to bed immediately  6b. Motor Leg, Right: 2-->Some effort against gravity, leg falls to bed by 5 secs, but has some effort against gravity  7. Limb Ataxia: 0-->Absent  8. Sensory: 1-->Mild-to-moderate sensory loss, patient feels pinprick is less sharp or is dull on the affected side, or there is a loss of superficial pain with pinprick, but patient is aware of being touched  9. Best Language: 0-->No aphasia, normal  10. Dysarthria: 1-->Mild-to-moderate dysarthria, patient slurs at least some words and, at worst, can be understood with some difficulty  11. Extinction and Inattention (formerly Neglect): 0-->No abnormality  Total (NIH Stroke Scale): 13       Modified Bhavesh Score: 0(unknown)  Chan Coma Scale:    ABCD2 Score:    [id number] Score:   HAS -BLED Score:   ICH Score:   Hunt & Reyes Classification:      Hemorrhagic change of an Ischemic  Stroke: Does this patient have an ischemic stroke with hemorrhagic changes? No     Neurologic Chief Complaint: R MCA syndrome     Subjective:     Interval History: Patient is seen for follow-up neurological assessment and treatment recommendations:     MRI Brain from 12/17 shows large right MCA territory acute infarction with hemorraghic transformation and holding ASA and plavix at this time. On 2% saline.       HPI, Past Medical, Family, and Social History remains the same as documented in the initial encounter.     Review of Systems   Constitutional: Negative for fever.   Eyes: Positive for visual disturbance.   Respiratory: Negative for shortness of breath and wheezing.    Cardiovascular: Negative for chest pain and palpitations.   Gastrointestinal: Negative for nausea and vomiting.   Endocrine: Negative for polyuria.   Musculoskeletal: Positive for gait problem.   Allergic/Immunologic: Negative for immunocompromised state.   Neurological: Positive for facial asymmetry, speech difficulty and weakness.   Psychiatric/Behavioral: Negative for confusion.     Scheduled Meds:   albuterol-ipratropium  3 mL Nebulization Q4H    atorvastatin  80 mg Per NG tube QHS    azithromycin  500 mg Intravenous Q24H    cefTRIAXone (ROCEPHIN) IVPB  2 g Intravenous Q24H    levetiracetam oral soln  500 mg Per NG tube BID    polyethylene glycol  17 g Per NG tube BID    senna-docusate 8.6-50 mg  1 tablet Per NG tube BID     Continuous Infusions:   niCARdipine 15 mg/hr (12/18/19 1502)    buffered 3% sodium acetate 130meq, sodium chloride 130meq, sterile water for inj IV soln 50 mL/hr at 12/18/19 1502     PRN Meds:acetaminophen, glucagon (human recombinant), insulin aspart U-100, labetalol, magnesium oxide, magnesium oxide, ondansetron, potassium chloride 10%, potassium chloride 10%, potassium chloride 10%, potassium, sodium phosphates, potassium, sodium phosphates, potassium, sodium phosphates, sodium chloride 0.9%    Objective:      Vital Signs (Most Recent):  Temp: 99 °F (37.2 °C) (12/18/19 1502)  Pulse: (!) 116 (12/18/19 1502)  Resp: (!) 32 (12/18/19 1502)  BP: 138/79 (12/18/19 1502)  SpO2: 96 % (12/18/19 1502)  BP Location: Left arm    Vital Signs Range (Last 24H):  Temp:  [98.3 °F (36.8 °C)-102 °F (38.9 °C)]   Pulse:  []   Resp:  [23-36]   BP: (127-164)/()   SpO2:  [92 %-98 %]   Arterial Line BP: (127-148)/(40-78)   BP Location: Left arm    Physical Exam   Constitutional: He appears well-developed and well-nourished.   HENT:   Head: Normocephalic and atraumatic.   Eyes: Conjunctivae are normal.   Right gaze- able to cross midline   Neck: Normal range of motion and full passive range of motion without pain.   Cardiovascular: Tachycardia present.   Pulmonary/Chest: Effort normal.   Abdominal: Soft. Normal appearance.   Musculoskeletal:   Left  Side plegia   Neurological: A cranial nerve deficit is present. He exhibits abnormal muscle tone.   Skin: Skin is warm and dry.   Psychiatric: His speech is slurred.       Neurological Exam:   LOC: drowsy  Attention Span: Good   Language: No aphasia  Articulation: Dysarthria  Orientation: Person, Place, Time   Visual Fields: Hemianopsia left  EOM (CN III, IV, VI): Gaze preference  left  Pupils (CN II, III): PERRL  Facial Movement (CN VII): Lower facial weakness on the Left  Motor: Arm left  Plegia 0/5  Leg left  Plegia 0/5  Arm right  Normal 5/5  Leg right Normal 5/5  Sensation: Tactile extinction to bilateral simultaneous stimulation     Laboratory:  CMP:   Recent Labs   Lab 12/18/19  0129  12/18/19  1125   CALCIUM 8.3*  --  8.2*   ALBUMIN 3.2*  --   --    PROT 6.6  --   --       < > 144   K 4.0  --  3.9   CO2 24  --  26     --  111*   BUN 26*  --  24*   CREATININE 0.9  --  0.9   ALKPHOS 53*  --   --    ALT 36  --   --    AST 22  --   --    BILITOT 0.9  --   --     < > = values in this interval not displayed.     CBC:   Recent Labs   Lab 12/18/19  0129   WBC 8.52   RBC 3.96*    HGB 12.9*   HCT 38.1*      MCV 96   MCH 32.6*   MCHC 33.9     Lipid Panel:   Recent Labs   Lab 12/17/19  0013   CHOL 136   LDLCALC 67.6   HDL 33*   TRIG 177*     Hgb A1C:   Recent Labs   Lab 12/17/19  0013   HGBA1C 7.7*     TSH: No results for input(s): TSH in the last 168 hours.    Diagnostic Results     Brain Imaging   CT head w/o contrast 12/16/2019     No acute major vascular distribution infarct or hemorrhage.    Vessel Imaging   Outside imaging reported as having ABBY stenosis at bifurcation with 70% stenosis and right MCA occlusion.    Cardiac Imaging   IRIS  · Normal left ventricular systolic function. The estimated ejection fraction is 65%  · Indeterminate left ventricular diastolic function.  · Mild left ventricular enlargement.  · Normal right ventricular systolic function.  · Technically difficult study, poor endocardial visualization, contrast used.  · No wall motion abnormalities.  · Indeterminate central venous pressure. Estimated PA pressure is at least 9 mmHg.      David Mckenzie MD  Comprehensive Stroke Center  Department of Vascular Neurology   [facility]

## 2019-12-18 NOTE — PT/OT/SLP PROGRESS
Speech Language Pathology Treatment    Patient Name:  Jerry Linder   MRN:  [id number]  Admitting Diagnosis: Embolic stroke involving right middle cerebral artery    Recommendations:                 General Recommendations:  Dysphagia therapy, Speech/language therapy and Cognitive-linguistic therapy  Diet recommendations:  NPO, Liquid Diet Level: NPO   Aspiration Precautions: Strict aspiration precautions   General Precautions: Standard, aspiration, fall  Communication strategies:  none    Subjective     Family asleep in room  Patient goals: did not state     Pain/Comfort:  · Pain Rating 1: 0/10  · Pain Rating Post-Intervention 1: 0/10    Objective:     Has the patient been evaluated by SLP for swallowing?   Yes  Keep patient NPO? Yes   Current Respiratory Status: Venti mask    Pt. Seen at bedside with venti mask in place.  Eyes were closed for entire session.  Pt. Did rouse when stimulated, responding verbally to questions despite eyes closed.  Verbalizations elicited were intelligible with wet vocal quality.  He was oriented to time, situation and place with good recall of yesterdays' visitors to hospital.  Pt. Produced fair- weak cough on command.  NG tube in place.  No po attempts were made due to decline in respiratory status and ng placement.  Education provided to pt re need for npo at this time.  Assessment:     Jerry Linder is a 79 y.o. male with an SLP diagnosis of Dysphagia, Dysarthria and Cognitive-Linguistic Impairment.      Goals:   Multidisciplinary Problems     SLP Goals        Problem: SLP Goal    Goal Priority Disciplines Outcome   SLP Goal     SLP Ongoing, Progressing   Description:  Goals due 12/24  1.  Pt. Will participate in ongoing assessment of swallow at bedside  2.  Assess reading, writing and visual spatial skills  3.  Recall speech strategies with min cues  4.  Assess higher level cognitive skills to determine need fo rtherapy                    Plan:     · Patient to be seen:  4 x/week   · Plan  of Care expires:  01/15/20  · Plan of Care reviewed with:  patient   · SLP Follow-Up:  Yes       Discharge recommendations:  rehabilitation facility   Barriers to Discharge:  Level of Skilled Assistance Needed 24 hour    Time Tracking:     SLP Treatment Date:   12/18/19  Speech Start Time:  0825  Speech Stop Time:  0833     Speech Total Time (min):  8 min    Billable Minutes: Speech Therapy Individual 8    Kat Garrido MA, CCC-SLP  12/18/2019

## 2019-12-18 NOTE — SIGNIFICANT EVENT
Follow-up MRI notes: Large right MCA territory acute infarction with hemorrhagic transformation.  Given history of recent thrombectomy, this may represent sequela of reperfusion injury.  2. Associated vasogenic edema resulting in mass effect on the right lateral ventricle and mild (5 mm) right to left midline shift.    3. Multiple additional punctate foci of acute infarctions throughout the right posterior frontal lobe.    Patient reassessed, found to be more alert, requiring light stimulation to arouse. Once aroused able to respond appropriately. Oriented x 4. Respiratory needs continue. Family updated at bedside concerning progression of care, alterations and adjustments in treatments.      Plan discussed with Attending to discontinue ASA and Plavix  Notify Vascular Neurology of current imaging  Consult Neurosurgery  Start 2% @ 60 to Maintain Na 145-155  Possible need for Mannitol  Continue SBP < 140  Continue Strict Blood Pressure control  Arterial line Monitoring  Maintain NPO  Maintain Glucose stability  ABG now and Q8    Critical Care 45 Minutes   Critical secondary to Patient has a condition that poses threat to life and bodily function:      Critical care was time spent personally by me on the following activities: development of treatment plan with patient or surrogate and bedside caregivers, discussions with consultants, evaluation of patient's response to treatment, examination of patient, ordering and performing treatments and interventions, ordering and review of laboratory studies, ordering and review of radiographic studies, pulse oximetry, re-evaluation of patient's condition. This critical care time did not overlap with that of any other provider or involve time for any procedures.

## 2019-12-18 NOTE — ASSESSMENT & PLAN NOTE
A1c 7.7  RISS   POCT Q4  Nutritional Consult for dietary /caloric needs  -see management for R MCA/IPH

## 2019-12-18 NOTE — ASSESSMENT & PLAN NOTE
2/2 IPH as noted on MRI imaging: mass effect on the right lateral ventricle and approximately 5 mm right-to-left midline shift  Continue Neuro checks Q1 hr

## 2019-12-19 PROBLEM — I82.612: Status: ACTIVE | Noted: 2019-12-19

## 2019-12-19 PROBLEM — J96.01 ACUTE RESPIRATORY FAILURE WITH HYPOXIA: Status: ACTIVE | Noted: 2019-12-19

## 2019-12-19 LAB
ALBUMIN SERPL BCP-MCNC: 3 G/DL (ref 3.5–5.2)
ALLENS TEST: ABNORMAL
ALP SERPL-CCNC: 52 U/L (ref 55–135)
ALT SERPL W/O P-5'-P-CCNC: 32 U/L (ref 10–44)
AMYLASE SERPL-CCNC: 397 U/L (ref 20–110)
ANION GAP SERPL CALC-SCNC: 6 MMOL/L (ref 8–16)
ANION GAP SERPL CALC-SCNC: 7 MMOL/L (ref 8–16)
ANION GAP SERPL CALC-SCNC: 9 MMOL/L (ref 8–16)
AST SERPL-CCNC: 24 U/L (ref 10–40)
BASOPHILS # BLD AUTO: 0.02 K/UL (ref 0–0.2)
BASOPHILS NFR BLD: 0.2 % (ref 0–1.9)
BILIRUB SERPL-MCNC: 0.9 MG/DL (ref 0.1–1)
BUN SERPL-MCNC: 27 MG/DL (ref 8–23)
BUN SERPL-MCNC: 28 MG/DL (ref 8–23)
BUN SERPL-MCNC: 29 MG/DL (ref 8–23)
CALCIUM SERPL-MCNC: 7.9 MG/DL (ref 8.7–10.5)
CALCIUM SERPL-MCNC: 8 MG/DL (ref 8.7–10.5)
CALCIUM SERPL-MCNC: 8.1 MG/DL (ref 8.7–10.5)
CALCIUM SERPL-MCNC: 8.1 MG/DL (ref 8.7–10.5)
CALCIUM SERPL-MCNC: 8.2 MG/DL (ref 8.7–10.5)
CHLORIDE SERPL-SCNC: 113 MMOL/L (ref 95–110)
CHLORIDE SERPL-SCNC: 113 MMOL/L (ref 95–110)
CHLORIDE SERPL-SCNC: 116 MMOL/L (ref 95–110)
CHLORIDE SERPL-SCNC: 117 MMOL/L (ref 95–110)
CHLORIDE SERPL-SCNC: 118 MMOL/L (ref 95–110)
CO2 SERPL-SCNC: 26 MMOL/L (ref 23–29)
CO2 SERPL-SCNC: 27 MMOL/L (ref 23–29)
CO2 SERPL-SCNC: 27 MMOL/L (ref 23–29)
CO2 SERPL-SCNC: 30 MMOL/L (ref 23–29)
CO2 SERPL-SCNC: 32 MMOL/L (ref 23–29)
CREAT SERPL-MCNC: 0.8 MG/DL (ref 0.5–1.4)
CREAT SERPL-MCNC: 0.8 MG/DL (ref 0.5–1.4)
CREAT SERPL-MCNC: 0.9 MG/DL (ref 0.5–1.4)
DELSYS: ABNORMAL
DIFFERENTIAL METHOD: ABNORMAL
EOSINOPHIL # BLD AUTO: 0 K/UL (ref 0–0.5)
EOSINOPHIL NFR BLD: 0.1 % (ref 0–8)
ERYTHROCYTE [DISTWIDTH] IN BLOOD BY AUTOMATED COUNT: 13.6 % (ref 11.5–14.5)
ERYTHROCYTE [SEDIMENTATION RATE] IN BLOOD BY WESTERGREN METHOD: 16 MM/H
EST. GFR  (AFRICAN AMERICAN): >60 ML/MIN/1.73 M^2
EST. GFR  (NON AFRICAN AMERICAN): >60 ML/MIN/1.73 M^2
FIO2: 100
FIO2: 100
FIO2: 70
FIO2: 80
GLUCOSE SERPL-MCNC: 199 MG/DL (ref 70–110)
GLUCOSE SERPL-MCNC: 206 MG/DL (ref 70–110)
GLUCOSE SERPL-MCNC: 233 MG/DL (ref 70–110)
GLUCOSE SERPL-MCNC: 260 MG/DL (ref 70–110)
GLUCOSE SERPL-MCNC: 284 MG/DL (ref 70–110)
HCO3 UR-SCNC: 27.3 MMOL/L (ref 24–28)
HCO3 UR-SCNC: 28.9 MMOL/L (ref 24–28)
HCO3 UR-SCNC: 32.1 MMOL/L (ref 24–28)
HCO3 UR-SCNC: 36.1 MMOL/L (ref 24–28)
HCT VFR BLD AUTO: 38.7 % (ref 40–54)
HGB BLD-MCNC: 12.8 G/DL (ref 14–18)
IMM GRANULOCYTES # BLD AUTO: 0.06 K/UL (ref 0–0.04)
IMM GRANULOCYTES NFR BLD AUTO: 0.6 % (ref 0–0.5)
IP: 10
IP: 10
IT: 1.1
IT: 1.1
LIPASE SERPL-CCNC: 10 U/L (ref 4–60)
LYMPHOCYTES # BLD AUTO: 1.4 K/UL (ref 1–4.8)
LYMPHOCYTES NFR BLD: 15 % (ref 18–48)
MAGNESIUM SERPL-MCNC: 2.1 MG/DL (ref 1.6–2.6)
MAP: 10
MCH RBC QN AUTO: 32.6 PG (ref 27–31)
MCHC RBC AUTO-ENTMCNC: 33.1 G/DL (ref 32–36)
MCV RBC AUTO: 99 FL (ref 82–98)
MIN VOL: 13
MIN VOL: 14.8
MODE: ABNORMAL
MONOCYTES # BLD AUTO: 1 K/UL (ref 0.3–1)
MONOCYTES NFR BLD: 10.8 % (ref 4–15)
NEUTROPHILS # BLD AUTO: 7 K/UL (ref 1.8–7.7)
NEUTROPHILS NFR BLD: 73.3 % (ref 38–73)
NRBC BLD-RTO: 0 /100 WBC
OSMOLALITY SERPL: 323 MOSM/KG (ref 280–300)
OSMOLALITY SERPL: 327 MOSM/KG (ref 280–300)
OSMOLALITY SERPL: 331 MOSM/KG (ref 280–300)
OSMOLALITY SERPL: 338 MOSM/KG (ref 280–300)
PCO2 BLDA: 43.1 MMHG (ref 35–45)
PCO2 BLDA: 46 MMHG (ref 35–45)
PCO2 BLDA: 49.9 MMHG (ref 35–45)
PCO2 BLDA: 50 MMHG (ref 35–45)
PEEP: 7
PH SMN: 7.41 [PH] (ref 7.35–7.45)
PH SMN: 7.41 [PH] (ref 7.35–7.45)
PH SMN: 7.42 [PH] (ref 7.35–7.45)
PH SMN: 7.47 [PH] (ref 7.35–7.45)
PHOSPHATE SERPL-MCNC: 3 MG/DL (ref 2.7–4.5)
PIP: 17
PIP: 17
PIP: 18
PLATELET # BLD AUTO: 159 K/UL (ref 150–350)
PMV BLD AUTO: 10.5 FL (ref 9.2–12.9)
PO2 BLDA: 114 MMHG (ref 80–100)
PO2 BLDA: 120 MMHG (ref 80–100)
PO2 BLDA: 96 MMHG (ref 80–100)
PO2 BLDA: 96 MMHG (ref 80–100)
POC BE: 12 MMOL/L
POC BE: 3 MMOL/L
POC BE: 4 MMOL/L
POC BE: 7 MMOL/L
POC SATURATED O2: 98 % (ref 95–100)
POC SATURATED O2: 99 % (ref 95–100)
POC TCO2: 29 MMOL/L (ref 23–27)
POC TCO2: 30 MMOL/L (ref 23–27)
POC TCO2: 34 MMOL/L (ref 23–27)
POC TCO2: 38 MMOL/L (ref 23–27)
POCT GLUCOSE: 170 MG/DL (ref 70–110)
POCT GLUCOSE: 173 MG/DL (ref 70–110)
POCT GLUCOSE: 197 MG/DL (ref 70–110)
POCT GLUCOSE: 200 MG/DL (ref 70–110)
POCT GLUCOSE: 219 MG/DL (ref 70–110)
POCT GLUCOSE: 221 MG/DL (ref 70–110)
POTASSIUM SERPL-SCNC: 3.6 MMOL/L (ref 3.5–5.1)
POTASSIUM SERPL-SCNC: 3.6 MMOL/L (ref 3.5–5.1)
POTASSIUM SERPL-SCNC: 3.7 MMOL/L (ref 3.5–5.1)
POTASSIUM SERPL-SCNC: 3.8 MMOL/L (ref 3.5–5.1)
POTASSIUM SERPL-SCNC: 4 MMOL/L (ref 3.5–5.1)
PROCALCITONIN SERPL IA-MCNC: 0.14 NG/ML
PROT SERPL-MCNC: 6.3 G/DL (ref 6–8.4)
RBC # BLD AUTO: 3.93 M/UL (ref 4.6–6.2)
SAMPLE: ABNORMAL
SITE: ABNORMAL
SODIUM SERPL-SCNC: 148 MMOL/L (ref 136–145)
SODIUM SERPL-SCNC: 149 MMOL/L (ref 136–145)
SODIUM SERPL-SCNC: 152 MMOL/L (ref 136–145)
SODIUM SERPL-SCNC: 155 MMOL/L (ref 136–145)
SODIUM SERPL-SCNC: 155 MMOL/L (ref 136–145)
SP02: 96
SP02: 98
SP02: 99
VT: 500
VT: 600
VT: 600
WBC # BLD AUTO: 9.54 K/UL (ref 3.9–12.7)

## 2019-12-19 PROCEDURE — 25000003 PHARM REV CODE 250: Performed by: STUDENT IN AN ORGANIZED HEALTH CARE EDUCATION/TRAINING PROGRAM

## 2019-12-19 PROCEDURE — 63600175 PHARM REV CODE 636 W HCPCS: Performed by: NURSE PRACTITIONER

## 2019-12-19 PROCEDURE — 80048 BASIC METABOLIC PNL TOTAL CA: CPT | Mod: 91

## 2019-12-19 PROCEDURE — 99291 PR CRITICAL CARE, E/M 30-74 MINUTES: ICD-10-PCS | Mod: 25,,, | Performed by: NURSE PRACTITIONER

## 2019-12-19 PROCEDURE — A4217 STERILE WATER/SALINE, 500 ML: HCPCS | Performed by: NURSE PRACTITIONER

## 2019-12-19 PROCEDURE — 63600175 PHARM REV CODE 636 W HCPCS: Performed by: PSYCHIATRY & NEUROLOGY

## 2019-12-19 PROCEDURE — 84100 ASSAY OF PHOSPHORUS: CPT

## 2019-12-19 PROCEDURE — 80048 BASIC METABOLIC PNL TOTAL CA: CPT

## 2019-12-19 PROCEDURE — 83930 ASSAY OF BLOOD OSMOLALITY: CPT

## 2019-12-19 PROCEDURE — 20000000 HC ICU ROOM

## 2019-12-19 PROCEDURE — 36556 PR INSERT NON-TUNNEL CV CATH 5+ YRS OLD: ICD-10-PCS | Mod: GC,,, | Performed by: PSYCHIATRY & NEUROLOGY

## 2019-12-19 PROCEDURE — 25000003 PHARM REV CODE 250: Performed by: NURSE PRACTITIONER

## 2019-12-19 PROCEDURE — 37799 UNLISTED PX VASCULAR SURGERY: CPT

## 2019-12-19 PROCEDURE — 85025 COMPLETE CBC W/AUTO DIFF WBC: CPT

## 2019-12-19 PROCEDURE — 99291 CRITICAL CARE FIRST HOUR: CPT | Mod: 25,,, | Performed by: NURSE PRACTITIONER

## 2019-12-19 PROCEDURE — 94668 MNPJ CHEST WALL SBSQ: CPT

## 2019-12-19 PROCEDURE — 27200966 HC CLOSED SUCTION SYSTEM

## 2019-12-19 PROCEDURE — 94761 N-INVAS EAR/PLS OXIMETRY MLT: CPT

## 2019-12-19 PROCEDURE — 82150 ASSAY OF AMYLASE: CPT

## 2019-12-19 PROCEDURE — 83930 ASSAY OF BLOOD OSMOLALITY: CPT | Mod: 91

## 2019-12-19 PROCEDURE — 94640 AIRWAY INHALATION TREATMENT: CPT

## 2019-12-19 PROCEDURE — 99233 SBSQ HOSP IP/OBS HIGH 50: CPT | Mod: ,,, | Performed by: PSYCHIATRY & NEUROLOGY

## 2019-12-19 PROCEDURE — 84145 PROCALCITONIN (PCT): CPT

## 2019-12-19 PROCEDURE — 99233 PR SUBSEQUENT HOSPITAL CARE,LEVL III: ICD-10-PCS | Mod: ,,, | Performed by: PSYCHIATRY & NEUROLOGY

## 2019-12-19 PROCEDURE — 25000003 PHARM REV CODE 250: Performed by: PSYCHIATRY & NEUROLOGY

## 2019-12-19 PROCEDURE — 99900035 HC TECH TIME PER 15 MIN (STAT)

## 2019-12-19 PROCEDURE — 99900026 HC AIRWAY MAINTENANCE (STAT)

## 2019-12-19 PROCEDURE — 82803 BLOOD GASES ANY COMBINATION: CPT

## 2019-12-19 PROCEDURE — A4217 STERILE WATER/SALINE, 500 ML: HCPCS | Performed by: STUDENT IN AN ORGANIZED HEALTH CARE EDUCATION/TRAINING PROGRAM

## 2019-12-19 PROCEDURE — 80053 COMPREHEN METABOLIC PANEL: CPT

## 2019-12-19 PROCEDURE — 36556 INSERT NON-TUNNEL CV CATH: CPT | Mod: GC,,, | Performed by: PSYCHIATRY & NEUROLOGY

## 2019-12-19 PROCEDURE — S0028 INJECTION, FAMOTIDINE, 20 MG: HCPCS | Performed by: NURSE PRACTITIONER

## 2019-12-19 PROCEDURE — 83735 ASSAY OF MAGNESIUM: CPT

## 2019-12-19 PROCEDURE — 82800 BLOOD PH: CPT

## 2019-12-19 PROCEDURE — 27000221 HC OXYGEN, UP TO 24 HOURS

## 2019-12-19 PROCEDURE — 25000242 PHARM REV CODE 250 ALT 637 W/ HCPCS: Performed by: INTERNAL MEDICINE

## 2019-12-19 PROCEDURE — 63600175 PHARM REV CODE 636 W HCPCS: Performed by: STUDENT IN AN ORGANIZED HEALTH CARE EDUCATION/TRAINING PROGRAM

## 2019-12-19 PROCEDURE — 83690 ASSAY OF LIPASE: CPT

## 2019-12-19 PROCEDURE — 94003 VENT MGMT INPAT SUBQ DAY: CPT

## 2019-12-19 RX ORDER — CHLORHEXIDINE GLUCONATE ORAL RINSE 1.2 MG/ML
15 SOLUTION DENTAL 2 TIMES DAILY
Status: DISCONTINUED | OUTPATIENT
Start: 2019-12-19 | End: 2019-12-30

## 2019-12-19 RX ORDER — AMLODIPINE BESYLATE 10 MG/1
10 TABLET ORAL DAILY
Status: DISCONTINUED | OUTPATIENT
Start: 2019-12-19 | End: 2019-12-30

## 2019-12-19 RX ORDER — FENTANYL CITRATE 50 UG/ML
INJECTION, SOLUTION INTRAMUSCULAR; INTRAVENOUS
Status: DISPENSED
Start: 2019-12-19 | End: 2019-12-19

## 2019-12-19 RX ORDER — FENTANYL CITRATE 50 UG/ML
50 INJECTION, SOLUTION INTRAMUSCULAR; INTRAVENOUS ONCE
Status: COMPLETED | OUTPATIENT
Start: 2019-12-19 | End: 2019-12-19

## 2019-12-19 RX ORDER — FAMOTIDINE 10 MG/ML
20 INJECTION INTRAVENOUS 2 TIMES DAILY
Status: DISCONTINUED | OUTPATIENT
Start: 2019-12-19 | End: 2019-12-21

## 2019-12-19 RX ORDER — FENTANYL CITRATE-0.9 % NACL/PF 10 MCG/ML
PLASTIC BAG, INJECTION (ML) INTRAVENOUS CONTINUOUS
Status: DISCONTINUED | OUTPATIENT
Start: 2019-12-19 | End: 2019-12-25

## 2019-12-19 RX ORDER — FENTANYL CITRATE 50 UG/ML
50 INJECTION, SOLUTION INTRAMUSCULAR; INTRAVENOUS
Status: DISCONTINUED | OUTPATIENT
Start: 2019-12-19 | End: 2020-01-10 | Stop reason: HOSPADM

## 2019-12-19 RX ADMIN — SENNOSIDES AND DOCUSATE SODIUM 1 TABLET: 8.6; 5 TABLET ORAL at 08:12

## 2019-12-19 RX ADMIN — FENTANYL CITRATE 50 MCG: 50 INJECTION INTRAMUSCULAR; INTRAVENOUS at 12:12

## 2019-12-19 RX ADMIN — AZITHROMYCIN MONOHYDRATE 500 MG: 500 INJECTION, POWDER, LYOPHILIZED, FOR SOLUTION INTRAVENOUS at 12:12

## 2019-12-19 RX ADMIN — SODIUM CHLORIDE: 234 INJECTION INTRAMUSCULAR; INTRAVENOUS; SUBCUTANEOUS at 12:12

## 2019-12-19 RX ADMIN — POLYETHYLENE GLYCOL 3350 17 G: 17 POWDER, FOR SOLUTION ORAL at 08:12

## 2019-12-19 RX ADMIN — POTASSIUM CHLORIDE 40 MEQ: 20 SOLUTION ORAL at 12:12

## 2019-12-19 RX ADMIN — POLYETHYLENE GLYCOL 3350 17 G: 17 POWDER, FOR SOLUTION ORAL at 09:12

## 2019-12-19 RX ADMIN — LEVETIRACETAM 500 MG: 100 SOLUTION ORAL at 09:12

## 2019-12-19 RX ADMIN — CEFTRIAXONE 2 G: 2 INJECTION, SOLUTION INTRAVENOUS at 12:12

## 2019-12-19 RX ADMIN — FENTANYL CITRATE 50 MCG: 50 INJECTION INTRAMUSCULAR; INTRAVENOUS at 02:12

## 2019-12-19 RX ADMIN — IPRATROPIUM BROMIDE AND ALBUTEROL SULFATE 3 ML: .5; 3 SOLUTION RESPIRATORY (INHALATION) at 08:12

## 2019-12-19 RX ADMIN — Medication 25 MCG/HR: at 04:12

## 2019-12-19 RX ADMIN — ACETAMINOPHEN 650 MG: 325 TABLET ORAL at 01:12

## 2019-12-19 RX ADMIN — IPRATROPIUM BROMIDE AND ALBUTEROL SULFATE 3 ML: .5; 3 SOLUTION RESPIRATORY (INHALATION) at 07:12

## 2019-12-19 RX ADMIN — ATORVASTATIN CALCIUM 80 MG: 20 TABLET, FILM COATED ORAL at 08:12

## 2019-12-19 RX ADMIN — FAMOTIDINE 20 MG: 10 INJECTION, SOLUTION INTRAVENOUS at 08:12

## 2019-12-19 RX ADMIN — LEVETIRACETAM 500 MG: 100 SOLUTION ORAL at 08:12

## 2019-12-19 RX ADMIN — IPRATROPIUM BROMIDE AND ALBUTEROL SULFATE 3 ML: .5; 3 SOLUTION RESPIRATORY (INHALATION) at 03:12

## 2019-12-19 RX ADMIN — INSULIN ASPART 2 UNITS: 100 INJECTION, SOLUTION INTRAVENOUS; SUBCUTANEOUS at 04:12

## 2019-12-19 RX ADMIN — INSULIN ASPART 4 UNITS: 100 INJECTION, SOLUTION INTRAVENOUS; SUBCUTANEOUS at 12:12

## 2019-12-19 RX ADMIN — INSULIN ASPART 2 UNITS: 100 INJECTION, SOLUTION INTRAVENOUS; SUBCUTANEOUS at 08:12

## 2019-12-19 RX ADMIN — CHLORHEXIDINE GLUCONATE 0.12% ORAL RINSE 15 ML: 1.2 LIQUID ORAL at 08:12

## 2019-12-19 RX ADMIN — ACETAMINOPHEN 650 MG: 325 TABLET ORAL at 08:12

## 2019-12-19 RX ADMIN — ACETAMINOPHEN 650 MG: 325 TABLET ORAL at 06:12

## 2019-12-19 RX ADMIN — SENNOSIDES AND DOCUSATE SODIUM 1 TABLET: 8.6; 5 TABLET ORAL at 09:12

## 2019-12-19 RX ADMIN — PROPOFOL 50 MCG/KG/MIN: 10 INJECTION, EMULSION INTRAVENOUS at 02:12

## 2019-12-19 RX ADMIN — AMLODIPINE BESYLATE 10 MG: 10 TABLET ORAL at 12:12

## 2019-12-19 RX ADMIN — SODIUM CHLORIDE: 234 INJECTION INTRAMUSCULAR; INTRAVENOUS; SUBCUTANEOUS at 01:12

## 2019-12-19 RX ADMIN — ACETAMINOPHEN 650 MG: 325 TABLET ORAL at 12:12

## 2019-12-19 RX ADMIN — IPRATROPIUM BROMIDE AND ALBUTEROL SULFATE 3 ML: .5; 3 SOLUTION RESPIRATORY (INHALATION) at 11:12

## 2019-12-19 RX ADMIN — INSULIN ASPART 2 UNITS: 100 INJECTION, SOLUTION INTRAVENOUS; SUBCUTANEOUS at 12:12

## 2019-12-19 RX ADMIN — POTASSIUM CHLORIDE 40 MEQ: 20 SOLUTION ORAL at 06:12

## 2019-12-19 RX ADMIN — IPRATROPIUM BROMIDE AND ALBUTEROL SULFATE 3 ML: .5; 3 SOLUTION RESPIRATORY (INHALATION) at 12:12

## 2019-12-19 RX ADMIN — SODIUM CHLORIDE: 234 INJECTION INTRAMUSCULAR; INTRAVENOUS; SUBCUTANEOUS at 04:12

## 2019-12-19 NOTE — PROGRESS NOTES
[facility]  Vascular Neurology  Comprehensive Stroke Center  Progress Note    Assessment/Plan:     * Embolic stroke involving right middle cerebral artery  79 y.o. yo male with unclear PMHx (HTN, DM, HLD?) who presented to [facility] with R MCA syndrome. LKN 1000. Telephone (no video) consult with Dr. Mosher; tPA not given, as outside treatment window. [place] with report of R ICA stenosis at bifurcation with 70% stenosis and right MCA occlusion on CTA; [place] [facility] [place]. Dr. Martínez reviewed stat CT Head images as acquired. Positive for LVO. Admitted to Neuro ICU for close monitoring/higher level of care.  Now s/p thrombectomy.     MRI Brain from 12/17 shows large right MCA territory acute infarction with hemorraghic transformation. Holding ASA and plavix at this time.  CT head on 12/18 with continued evolution of large right MCA territory infarct status post hemorrhagic conversion with regional mass effect. Intubated overnight for worsening neuro exam and hypoxia.     Antithrombotics for secondary stroke prevention:  Holding ASA and plavix at this time   Statins for secondary stroke prevention and hyperlipidemia, if present:   Statins: Atorvastatin- 40 mg daily  Aggressive risk factor modification: Diet, Exercise  Rehab efforts: The patient has been evaluated by a stroke team provider and the therapy needs have been fully considered based off the presenting complaints and exam findings. The following therapy evaluations are needed: PT evaluate and treat, OT evaluate and treat, SLP evaluate and treat, PM&R evaluate for appropriate placement  Diagnostics ordered/pending: None   VTE prophylaxis: Heparin 5000 units SQ every 8 hours  place SCDs  BP parameters: Infarct: Post successful thrombectomy, SBP <140  Post unsuccessful thrombectomy, SBP <220    Mixed hyperlipidemia  -stroke risk factor    LDL pending  If LDL >70 recommend atorvastatin 40 mg      Type 2 diabetes mellitus with neurologic complication, without long-term current use of insulin  Stroke risk factor   on arrival  Hemoglobin A1C pending  Recommend tight glycemic control -180    Essential hypertension  Stroke risk factor  SBP < 140 vs 220 based on thrombectomy result  Per primary team         No notes on file    STROKE DOCUMENTATION   Acute Stroke Times   Last Known Normal Date: 12/16/19  Last Known Normal Time: 1000  Stroke Team Called Date: 12/16/19  Stroke Team Called Time: 1725  Stroke Team Arrival Date: 12/16/19  Stroke Team Arrival Time: 1730  CT Interpretation Time: 1745  Decision to Treat Time for Alteplase: (N/A)  Decision to Treat Time for IR: 1750    NIH Scale:  1a. Level of Consciousness: 2-->Not alert, requires repeated stimulation to attend, or is obtunded and requires strong or painful stimulation to make movements (not stereotyped)  1b. LOC Questions: 2-->Answers neither question correctly  1c. LOC Commands: 1-->Performs one task correctly  2. Best Gaze: 0-->Normal  3. Visual: 0-->No visual loss  4. Facial Palsy: 2-->Partial paralysis (total or near-total paralysis of lower face)  5a. Motor Arm, Left: 3-->No effort against gravity, limb falls  5b. Motor Arm, Right: 1-->Drift, limb holds 90 (or 45) degrees, but drifts down before full 10 secs, does not hit bed or other support  6a. Motor Leg, Left: 3-->No effort against gravity, leg falls to bed immediately  6b. Motor Leg, Right: 2-->Some effort against gravity, leg falls to bed by 5 secs, but has some effort against gravity  7. Limb Ataxia: 0-->Absent  8. Sensory: 1-->Mild-to-moderate sensory loss, patient feels pinprick is less sharp or is dull on the affected side, or there is a loss of superficial pain with pinprick, but patient is aware of being touched  9. Best Language: 3-->Mute, global aphasia, no usable speech or auditory comprehension  10. Dysarthria: (UN) Intubated or other physical barrier  11. Extinction  and Inattention (formerly Neglect): 0-->No abnormality  Total (NIH Stroke Scale): 20       Modified Alexandria Score: 0(unknown)  Chan Coma Scale:    ABCD2 Score:    [id number] Score:   HAS -BLED Score:   ICH Score:   Hunt & Reyes Classification:      Hemorrhagic change of an Ischemic Stroke: Does this patient have an ischemic stroke with hemorrhagic changes? No     Neurologic Chief Complaint: R MCA syndrome     Subjective:     Interval History: Patient is seen for follow-up neurological assessment and treatment recommendations:     MRI Brain from 12/17 shows large right MCA territory acute infarction with hemorraghic transformation and holding ASA and plavix at this time. Was on 2% saline and transitioned to 3%. Intubated overnight for worsening neuro exam and hypoxia.       HPI, Past Medical, Family, and Social History remains the same as documented in the initial encounter.     Review of Systems   Constitutional: Negative for fever.   Eyes: Positive for visual disturbance.   Respiratory: Negative for shortness of breath and wheezing.    Cardiovascular: Negative for chest pain and palpitations.   Gastrointestinal: Negative for nausea and vomiting.   Endocrine: Negative for polyuria.   Musculoskeletal: Positive for gait problem.   Allergic/Immunologic: Negative for immunocompromised state.   Neurological: Positive for facial asymmetry, speech difficulty and weakness.   Psychiatric/Behavioral: Negative for confusion.     Scheduled Meds:   albuterol-ipratropium  3 mL Nebulization Q4H    amLODIPine  10 mg Per OG tube Daily    atorvastatin  80 mg Per NG tube QHS    azithromycin  500 mg Intravenous Q24H    cefTRIAXone (ROCEPHIN) IVPB  2 g Intravenous Q24H    chlorhexidine  15 mL Mouth/Throat BID    famotidine (PF)  20 mg Intravenous BID    levetiracetam oral soln  500 mg Per NG tube BID    polyethylene glycol  17 g Per NG tube BID    senna-docusate 8.6-50 mg  1 tablet Per NG tube BID     Continuous  Infusions:   fentanyl 5 mL/hr at 12/19/19 1402    niCARdipine 10 mg/hr (12/19/19 1402)    buffered 3% sodium acetate 130meq, sodium chloride 130meq, sterile water for inj IV soln 50 mL/hr at 12/19/19 1231     PRN Meds:acetaminophen, Dextrose 10% Bolus, Dextrose 10% Bolus, fentaNYL, glucagon (human recombinant), hydrALAZINE, insulin aspart U-100, labetalol, magnesium oxide, magnesium oxide, ondansetron, potassium chloride 10%, potassium chloride 10%, potassium chloride 10%, potassium, sodium phosphates, potassium, sodium phosphates, potassium, sodium phosphates, sodium chloride 0.9%    Objective:     Vital Signs (Most Recent):  Temp: 100.1 °F (37.8 °C) (12/19/19 1232)  Pulse: 99 (12/19/19 1402)  Resp: 16 (12/19/19 1402)  BP: (!) 149/67 (12/19/19 1402)  SpO2: 97 % (12/19/19 1402)  BP Location: Left arm    Vital Signs Range (Last 24H):  Temp:  [98.5 °F (36.9 °C)-102.7 °F (39.3 °C)]   Pulse:  []   Resp:  [7-58]   BP: (118-172)/()   SpO2:  [91 %-100 %]   Arterial Line BP: (114-191)/(43-68)   BP Location: Left arm    Physical Exam   Constitutional: He appears well-developed and well-nourished. He is intubated.   HENT:   Head: Normocephalic and atraumatic.   Eyes: Conjunctivae are normal.   Right gaze- able to cross midline   Neck: Normal range of motion and full passive range of motion without pain.   Cardiovascular: Tachycardia present.   Pulmonary/Chest: Effort normal. He is intubated.   Abdominal: Soft. Normal appearance.   Musculoskeletal:   Left  Side plegia   Neurological: A cranial nerve deficit is present. He exhibits abnormal muscle tone.   Skin: Skin is warm and dry.       Neurological Exam:   LOC: drowsy  Attention Span: poor  Language: Intubated  Articulation: Untestable due to intubation  Orientation: Untestable due to intubation  Visual Fields: Hemianopsia left  EOM (CN III, IV, VI): Gaze preference  left  Pupils (CN II, III): PERRL  Facial Movement (CN VII): Lower facial weakness on the  Left  Motor: Arm left  Plegia 0/5  Leg left  Plegia 0/5  Arm right  Paresis: 4/5  Leg right Paresis: 4/5  Sensation: Tactile extinction to bilateral simultaneous stimulation     Laboratory:  CMP:   Recent Labs   Lab 12/19/19  0109  12/19/19  1220   CALCIUM 7.9*   < > 8.0*   ALBUMIN 3.0*  --   --    PROT 6.3  --   --    *   < > 155*   K 3.6   < > 3.8   CO2 26   < > 30*   *   < > 118*   BUN 28*   < > 29*   CREATININE 0.9   < > 0.8   ALKPHOS 52*  --   --    ALT 32  --   --    AST 24  --   --    BILITOT 0.9  --   --     < > = values in this interval not displayed.     CBC:   Recent Labs   Lab 12/19/19  0109   WBC 9.54   RBC 3.93*   HGB 12.8*   HCT 38.7*      MCV 99*   MCH 32.6*   MCHC 33.1     Lipid Panel:   Recent Labs   Lab 12/17/19  0013   CHOL 136   LDLCALC 67.6   HDL 33*   TRIG 177*     Hgb A1C:   Recent Labs   Lab 12/17/19  0013   HGBA1C 7.7*     TSH: No results for input(s): TSH in the last 168 hours.    Diagnostic Results     Brain Imaging   CT head w/o contrast 12/16/2019     No acute major vascular distribution infarct or hemorrhage.    Vessel Imaging   Outside imaging reported as having ABBY stenosis at bifurcation with 70% stenosis and right MCA occlusion.    Cardiac Imaging   IRIS  · Normal left ventricular systolic function. The estimated ejection fraction is 65%  · Indeterminate left ventricular diastolic function.  · Mild left ventricular enlargement.  · Normal right ventricular systolic function.  · Technically difficult study, poor endocardial visualization, contrast used.  · No wall motion abnormalities.  · Indeterminate central venous pressure. Estimated PA pressure is at least 9 mmHg.      David Mckenzie MD  Comprehensive Stroke Center  Department of Vascular Neurology   [facility] [address]

## 2019-12-19 NOTE — PROGRESS NOTES
"[facility]  Adult Nutrition  Progress Note    SUMMARY       Recommendations  1. Continue TF order Glucerna 1.5 @ goal rate 55mL/hr.   - Provides 1980kcals, 109g protein and 1002mL free water.     2. If able to extubate and advance diet, recommend Diabetic diet with texture per SLP recommendations.     RD to monitor.    Goals: Pt to tolerate >85% EEN and EPN by RD follow up  Nutrition Goal Status: new  Communication of RD Recs: reviewed with RN    Reason for Assessment    Reason For Assessment: new tube feeding  Diagnosis: stroke/CVA  Relevant Medical History: HTN, T2DM, HLD  Interdisciplinary Rounds: attended  General Information Comments: Pt intubated 12/18. TF to be started today. Prior to intubation, pt NPO per SLP recommendations. Family at bedside providing nutrition hx. Weight stable per wife approx 270-275lb "for years." Pt with adequate po intake and appetite. No indications of malnutrition at this time.   Nutrition Discharge Planning: unable to determine at this time    Nutrition Risk Screen    Nutrition Risk Screen: dysphagia or difficulty swallowing    Nutrition/Diet History    Spiritual, Cultural Beliefs, Lutheran Practices, Values that Affect Care: no  Factors Affecting Nutritional Intake: NPO, on mechanical ventilation    Anthropometrics    Temp: 100.1 °F (37.8 °C)  Height Method: Measured  Height: 6' 1" (185.4 cm)  Height (inches): 73 in  Weight Method: Bed Scale  Weight: 123.4 kg (272 lb)  Weight (lb): 272 lb  Ideal Body Weight (IBW), Male: 184 lb  % Ideal Body Weight, Male (lb): 147.83 %  BMI (Calculated): 35.9  BMI Grade: 35 - 39.9 - obesity - grade II       Lab/Procedures/Meds    Pertinent Labs Reviewed: reviewed  Pertinent Labs Comments: HgbA1c 7.7, POCT Glu 170-221  Pertinent Medications Reviewed: reviewed  Pertinent Medications Comments: fentanyl, cardene, IVF    Estimated/Assessed Needs    Weight Used For Calorie Calculations: 123.4 kg (272 lb 0.8 oz)  Energy Calorie " Requirements (kcal): 2203  Energy Need Method: [facility]  Protein Requirements: 126-168g(1.5-2.0g/kg)  Weight Used For Protein Calculations: 83.6 kg (184 lb 4.9 oz)  Fluid Requirements (mL): 1mL/kcal or per MD     RDA Method (mL): 2203  CHO requirements: 275g CHO daily         Nutrition Prescription Ordered    Current Diet Order: NPO  Current Nutrition Support Formula Ordered: Glucerna 1.5  Current Nutrition Support Rate Ordered: 55 (ml)  Current Nutrition Support Frequency Ordered: mL/hr    Evaluation of Received Nutrient/Fluid Intake    Enteral Calories (kcal): 1980  Enteral Protein (gm): 109  Enteral (Free Water) Fluid (mL): 1002    % Kcal Needs: 90  % Protein Needs: 87    IV Fluid (mL): 1200  I/O: +I/O, good UOP, LBM 12/17    Energy Calories Required: meeting needs  Protein Required: meeting needs  Fluid Required: other (see comments)(per MD)    % Intake of Estimated Energy Needs: 75 - 100 %  % Meal Intake: NPO    Nutrition Risk    Level of Risk/Frequency of Follow-up: high(f/u 2x/week)     Assessment and Plan        Nutrition Problem  Inadequate oral intake    Related to (etiology):   intubation    Signs and Symptoms (as evidenced by):   Pt requiring alternative means of nutrition to meet >85% EEN and EPN.     Interventions(treatment strategy):  Collaboration of care with providers    Nutrition Diagnosis Status:   New         Monitor and Evaluation    Food and Nutrient Intake: energy intake, food and beverage intake, enteral nutrition intake  Food and Nutrient Adminstration: diet order, enteral and parenteral nutrition administration  Anthropometric Measurements: weight, weight change, body mass index  Biochemical Data, Medical Tests and Procedures: electrolyte and renal panel, gastrointestinal profile, glucose/endocrine profile, inflammatory profile, lipid profile  Nutrition-Focused Physical Findings: overall appearance       Nutrition Follow-Up    RD Follow-up?: Yes

## 2019-12-19 NOTE — ASSESSMENT & PLAN NOTE
- 78 y/o male admitted for R MCA stroke syndrome POD 1 s/p Thrombectomy TICI 3 reperfusion   - Neurochecks q1hr    - Continue cardene to maintain SBP < 140  Discontinue ASA/Plavix d/t heme transformation  F/U CTH 2/17 stable  2/18 CTH stable

## 2019-12-19 NOTE — NURSING
Pt very lethargic, sternal rub required to get response. Remains oriented but can barely talk and still follows commands. JACQUES Nielsen with [facility] notified. Order obtained for STAT ABG. Will continue to monitor very closely.

## 2019-12-19 NOTE — ASSESSMENT & PLAN NOTE
Continuous Cardiac Monitoring  Vital Signs Q1 hour  Systolic (24hrs), Av , Min:118 , Max:172   Cardene to Maintain SBP < 140  CXR  Lungs hypoaerated.  Increase in the pulmonary vascular interstitial and alveolar markings Findings most consistent with congestive failure   ECHO 65%  EKG : Atrial fibrillation with rapid ventricular response with premature ventricular or aberrantly conducted complexes

## 2019-12-19 NOTE — PLAN OF CARE
Nutrition assessment completed. Please see RD note for details.    Recommendations  1. Continue TF order Glucerna 1.5 @ goal rate 55mL/hr.   - Provides 1980kcals, 109g protein and 1002mL free water.     2. If able to extubate and advance diet, recommend Diabetic diet with texture per SLP recommendations.     RD to monitor.    Goals: Pt to tolerate >85% EEN and EPN by RD follow up  Nutrition Goal Status: new  Communication of RD Recs: reviewed with RN  Problem: Aspiration (Enteral Nutrition)  Goal: Absence of Aspiration Signs/Symptoms  Outcome: Ongoing, Progressing

## 2019-12-19 NOTE — ASSESSMENT & PLAN NOTE
A1c 7.7  RISS   POCT Q4  Nutritional Consult for dietary /caloric needs  Start TF  -see management for R MCA/IPH

## 2019-12-19 NOTE — PT/OT/SLP PROGRESS
Speech Language Pathology  Discharge    Jerry Linder  MRN: [id number]    Patient not seen today secondary to pt intubated at this time. Please re-consult when medically appropriate. No further ST warranted at this time.       Sheela Bunch CCC-SLP  12/19/2019

## 2019-12-19 NOTE — ASSESSMENT & PLAN NOTE
Possible community acquired infection  Wet [place] cough  T Max 102.0  Tylenol  Cooling [place]  Pan culture  Continue Azithromycin and Rocephin  US Extremities/ Soft Tissue of Neck  Amylase/Lipase

## 2019-12-19 NOTE — PLAN OF CARE
Intubated overnight for hypoxia  Will need CVC today  Decrease HTS to 40 cc/hr - goal -155  Venous ultrasound extremities with soft tissue of neck  Repeat CTH tomorrow morning  Continue abx  Amylase/lipase  Start TF

## 2019-12-19 NOTE — NURSING
T 102.7F via core bladder probe, 101.8 oral, cooling blanket on but pt shivering.JACQUES Nielsen notified. Order obtained for 1g Tylenol. Order carried out. Will continue to monitor very closely.

## 2019-12-19 NOTE — ASSESSMENT & PLAN NOTE
79 y.o. yo male with unclear PMHx (HTN, DM, HLD?) who presented to [facility] with R MCA syndrome. LKN 1000. Telephone (no video) consult with Dr. Mosher; tPA not given, as outside treatment window. [place] with report of R ICA stenosis at bifurcation with 70% stenosis and right MCA occlusion on CTA; pt was transferred to [facility] for possible intervention. Dr. Martínez reviewed stat CT Head images as acquired. Positive for LVO. Admitted to Neuro ICU for close monitoring/higher level of care.  Now s/p thrombectomy.     MRI Brain from 12/17 shows large right MCA territory acute infarction with hemorraghic transformation. Holding ASA and plavix at this time.  CT head on 12/18 with continued evolution of large right MCA territory infarct status post hemorrhagic conversion with regional mass effect. Intubated overnight for worsening neuro exam and hypoxia.     Antithrombotics for secondary stroke prevention:  Holding ASA and plavix at this time   Statins for secondary stroke prevention and hyperlipidemia, if present:   Statins: Atorvastatin- 40 mg daily  Aggressive risk factor modification: Diet, Exercise  Rehab efforts: The patient has been evaluated by a stroke team provider and the therapy needs have been fully considered based off the presenting complaints and exam findings. The following therapy evaluations are needed: PT evaluate and treat, OT evaluate and treat, SLP evaluate and treat, PM&R evaluate for appropriate placement  Diagnostics ordered/pending: None   VTE prophylaxis: Heparin 5000 units SQ every 8 hours  place SCDs  BP parameters: Infarct: Post successful thrombectomy, SBP <140  Post unsuccessful thrombectomy, SBP <220

## 2019-12-19 NOTE — ANESTHESIA PROCEDURE NOTES
Ad Hoc Intubation  Performed by: Farzaneh Ibarra MD  Authorized by: Farzaneh Ibarra MD     Indications:  Airway protection and hypoxemia  Diagnosis:  Hemorhaggic stroke  Patient Location:  ICU  Timeout:  12/18/2019 7:30 PM  Procedure Start Time:  12/18/2019 7:37 PM  Staff:     Anesthesiologist Present: Yes    Intubation:     Induction:  Intravenous    Intubated:  Postinduction    Mask Ventilation:  Easy mask    Attempts:  1    Attempted By:  Resident anesthesiologist    Method of Intubation:  Video laryngoscopy    Blade:  Robert 3    Laryngeal View Grade: Grade I - full view of chords      Difficult Airway Encountered?: Yes      Complications:  None    Airway Device:  Oral endotracheal tube    Airway Device Size:  8.0    Style/Cuff Inflation:  Cuffed    Inflation Amount (mL):  8    Tube secured:  26    Secured at:  The lips    Placement Verified By:  Colorimetric ETCO2 device    Complicating Factors:  None    Findings Post-Intubation:  BS equal bilateral  Notes:      Induced with 80cc propofol and 100mg rocuronium  NGT in situ prior to intubation, placed to wall suction prior to induction

## 2019-12-19 NOTE — CARE UPDATE
Pt was intubated with a V: Vent with O2 sats 100%.  Placed on vent with the following settings:  RR 16, , Peep 5. FIO2 100, ETT size 8 @ 26 cm measured at Center Lip, Will continue to monitor.

## 2019-12-19 NOTE — PLAN OF CARE
Patient intubated on vent.  Not medically ready for discharge.        12/19/19 5083   Discharge Reassessment   Assessment Type Discharge Planning Reassessment   Provided patient/caregiver education on the expected discharge date and the discharge plan No   Do you have any problems affording any of your prescribed medications? No   Discharge Plan A Skilled Nursing Facility   Discharge Plan B Rehab   DME Needed Upon Discharge  other (see comments)  (tbd)   Patient choice form signed by patient/caregiver N/A   Anticipated Discharge Disposition SNF   Can the patient answer the patient profile reliably? No, cognitively impaired   How does the patient rate their overall health at the present time?   (preet)   Describe the patient's ability to walk at the present time. Does not walk or unable to take any steps at all   How often would a person be available to care for the patient? Often   Number of comorbid conditions (as recorded on the chart) Four   During the past month, has the patient often been bothered by feeling down, depressed or hopeless?   (preet)   During the past month, has the patient often been bothered by little interest or pleasure in doing things?   (preet)   Post-Acute Status   Post-Acute Authorization Placement   Post-Acute Placement Status Awaiting Internal Medical Clearance   Discharge Delays (!) Change in Medical Condition  (Pt intubated )       Leigha Walker RN, CCRN-K, [facility]  Neuro-Critical Care   [phone]

## 2019-12-19 NOTE — PT/OT/SLP PROGRESS
Physical Therapy      Patient Name:  Jerry Linder   MRN:  [id number]    Patient not seen today secondary to Other (Comment)(intubated). Will follow-up pending extubation as medically appropriate. PT orders acknowledged.    Marion Mar, PT

## 2019-12-19 NOTE — NURSING
Manometry for Central Line Procedure     Manometry Performed: yes    Manometry performed by: Dr. Cline

## 2019-12-19 NOTE — ASSESSMENT & PLAN NOTE
Encounter for intubation d/t increased somnolence.   CXR daily  ABG Daily  Famotidine  chlorhexidine

## 2019-12-19 NOTE — SUBJECTIVE & OBJECTIVE
Interval History: Intubated over night after episodes of increasing lethargy and difficulty arousing. CTH remained stable. Most recent Na 155. BMP and serum os monitoring Q6 hours to maintain Na 145-155. Remains mechanically ventilated. Continue ABG Q6 to titrate FIO2 down from 100%. Use caution with in-line suction as to not create unnecessary trauma to the airway. Fentanyl for vent sedation. SBP maintained < 140. Cardene gtt infusing. Amlodipine initiated for improvement of SBP considering gtt at max titration.  TF initiated. Will continue to trend BS Q4 and assess insulin needs once patient tolerates feeding at goal rate. T max 102 overnight. Cooling blanket and tylenol for tempeture management. Continue Abx regimen as cultures result. PICC team decided against placement due to elevated temp. Will assess for CVC this afternoon. Continue critical care monitoring. The patient requires critical care stay due to high probability of life threatening deterioration in their condition.     Review of Systems   Unable to perform ROS: Intubated            Objective:     Vitals:  Temp: 100.1 °F (37.8 °C)  Pulse: 95  Rhythm: normal sinus rhythm  BP: (!) 140/64  MAP (mmHg): 92  Resp: 13  SpO2: 99 %  Oxygen Concentration (%): 80  O2 Device (Oxygen Therapy): ventilator  Vent Mode: A/C  Set Rate: 16 bmp  Vt Set: 500 mL  PEEP/CPAP: 7 cmH20  Peak Airway Pressure: 18 cmH2O  Mean Airway Pressure: 10 cmH20  Plateau Pressure: 0 cmH20    Temp  Min: 98.5 °F (36.9 °C)  Max: 102.7 °F (39.3 °C)  Pulse  Min: 85  Max: 118  BP  Min: 118/82  Max: 172/74  MAP (mmHg)  Min: 84  Max: 122  Resp  Min: 7  Max: 58  SpO2  Min: 91 %  Max: 100 %  Oxygen Concentration (%)  Min: 80  Max: 100    12/18 0701 - 12/19 0700  In: 2996.9 [I.V.:2536.9]  Out: 1680 [Urine:1680]   Unmeasured Output  Stool Occurrence: 0       Physical Exam   Constitutional: He appears well-developed and well-nourished.   HENT:   Head: Normocephalic and atraumatic.   Eyes: Pupils are  equal, round, and reactive to light. EOM are normal.   Neck: Normal range of motion. No JVD present. No tracheal deviation present.   Cardiovascular: Normal rate and regular rhythm.   Pulmonary/Chest: Effort normal and breath sounds normal.   Abdominal: Soft. Bowel sounds are normal. He exhibits no distension.   Neurological:   Mental Status:  follows commands     Intubated   Pupils 3 mm, PERRL.   +Corneal reflex, +gag, +cough   WD on LS  Spontaneously movement on R       Nursing note and vitals reviewed.    Unable to test orientation, language, memory, judgment, insight, fund of knowledge, shoulder shrug, tongue protrusion, coordination, gait due to level of consciousness.    Medications:  Continuous  fentanyl Last Rate: 5 mL/hr at 12/19/19 1202   niCARdipine Last Rate: 10 mg/hr (12/19/19 1202)   buffered 3% sodium acetate 130meq, sodium chloride 130meq, sterile water for inj IV soln Last Rate: 50 mL/hr at 12/19/19 1231   Scheduled  albuterol-ipratropium 3 mL Q4H   amLODIPine 10 mg Daily   atorvastatin 80 mg QHS   azithromycin 500 mg Q24H   cefTRIAXone (ROCEPHIN) IVPB 2 g Q24H   chlorhexidine 15 mL BID   famotidine (PF) 20 mg BID   levetiracetam oral soln 500 mg BID   polyethylene glycol 17 g BID   senna-docusate 8.6-50 mg 1 tablet BID   PRN  acetaminophen 650 mg Q6H PRN   Dextrose 10% Bolus 12.5 g PRN   Dextrose 10% Bolus 25 g PRN   fentaNYL 50 mcg Q2H PRN   glucagon (human recombinant) 1 mg PRN   hydrALAZINE 10 mg Q6H PRN   insulin aspart U-100 1-10 Units Q4H PRN   labetalol 10 mg Q4H PRN   magnesium oxide 800 mg PRN   magnesium oxide 800 mg PRN   ondansetron 4 mg Q6H PRN   potassium chloride 10% 40 mEq PRN   potassium chloride 10% 40 mEq PRN   potassium chloride 10% 60 mEq PRN   potassium, sodium phosphates 2 packet PRN   potassium, sodium phosphates 2 packet PRN   potassium, sodium phosphates 2 packet PRN   sodium chloride 0.9% 10 mL PRN     Today I personally reviewed pertinent medications,  lines/drains/airways, imaging, laboratory results, notably:    Diet  No diet orders on file  No diet orders on file

## 2019-12-19 NOTE — PROGRESS NOTES
[facility]  Neurocritical Care  Progress Note    Admit Date: 12/16/2019  Service Date: 12/19/2019  Length of Stay: 3    Subjective:     Chief Complaint: Embolic stroke involving right middle cerebral artery    History of Present Illness: The patient is a 79 y.o. male who  was admitted as a transfer from OSH for Right MCA stroke syndrome. Patient had an urgent thrombectomy s/p  right MCA stroke syndrome. PMH significant for HTN, T2DM and HLD. Patient stated that around 2:30 pm on 12/16/19 he was driving and felt weak. He drove home and was taken to the ER where he was evaluated for a stroke. He had outside CTA which was reported to have R ICA stenosis at bifurcation with 70% stenosis and right MCA occlusion. Patient was transferred to [facility] for possible intervention. Patient had a right femoral sheath placed and his Angio  revealed 90% R ICA stenosis and R M1 occlusion. The R ICA was treated by thrombectomy and carotid stenting with TICI 3 reperfusion.   Patient was admitted to the [facility] for higher level of care post thrombectomy.         Hospital Course: 12/17/19: Patient admitted to [facility] for higher level of care s/p thrombectomy and right carotid artery stenting with TICI 3 reperfusion  12/18/2019Recent MRI with heme transformation abg Q8, repeat scan stable, mannitol  12/19/2019 intubation repeat CTH    Interval History: Intubated over night after episodes of increasing lethargy and difficulty arousing. CTH remained stable. Most recent Na 155. BMP and serum os monitoring Q6 hours to maintain Na 145-155. Remains mechanically ventilated. Continue ABG Q6 to titrate FIO2 down from 100%. Use caution with in-line suction as to not create unnecessary trauma to the airway. Fentanyl for vent sedation. SBP maintained < 140. Cardene gtt infusing. Amlodipine initiated for improvement of SBP considering gtt at max titration.  TF initiated. Will continue to trend BS Q4 and assess insulin needs once patient  tolerates feeding at goal rate. T max 102 overnight. Cooling blanket and tylenol for tempeture management. Continue Abx regimen as cultures result. PICC team decided against placement due to elevated temp. Will assess for CVC this afternoon. Continue critical care monitoring. The patient requires critical care stay due to high probability of life threatening deterioration in their condition.     Review of Systems   Unable to perform ROS: Intubated            Objective:     Vitals:  Temp: 100.1 °F (37.8 °C)  Pulse: 95  Rhythm: normal sinus rhythm  BP: (!) 140/64  MAP (mmHg): 92  Resp: 13  SpO2: 99 %  Oxygen Concentration (%): 80  O2 Device (Oxygen Therapy): ventilator  Vent Mode: A/C  Set Rate: 16 bmp  Vt Set: 500 mL  PEEP/CPAP: 7 cmH20  Peak Airway Pressure: 18 cmH2O  Mean Airway Pressure: 10 cmH20  Plateau Pressure: 0 cmH20    Temp  Min: 98.5 °F (36.9 °C)  Max: 102.7 °F (39.3 °C)  Pulse  Min: 85  Max: 118  BP  Min: 118/82  Max: 172/74  MAP (mmHg)  Min: 84  Max: 122  Resp  Min: 7  Max: 58  SpO2  Min: 91 %  Max: 100 %  Oxygen Concentration (%)  Min: 80  Max: 100    12/18 0701 - 12/19 0700  In: 2996.9 [I.V.:2536.9]  Out: 1680 [Urine:1680]   Unmeasured Output  Stool Occurrence: 0       Physical Exam   Constitutional: He appears well-developed and well-nourished.   HENT:   Head: Normocephalic and atraumatic.   Eyes: Pupils are equal, round, and reactive to light. EOM are normal.   Neck: Normal range of motion. No JVD present. No tracheal deviation present.   Cardiovascular: Normal rate and regular rhythm.   Pulmonary/Chest: Effort normal and breath sounds normal.   Abdominal: Soft. Bowel sounds are normal. He exhibits no distension.   Neurological:   Mental Status:  follows commands     Intubated   Pupils 3 mm, PERRL.   +Corneal reflex, +gag, +cough   WD on LS  Spontaneously movement on R       Nursing note and vitals reviewed.    Unable to test orientation, language, memory, judgment, insight, fund of knowledge,  shoulder shrug, tongue protrusion, coordination, gait due to level of consciousness.    Medications:  Continuous  fentanyl Last Rate: 5 mL/hr at 12/19/19 1202   niCARdipine Last Rate: 10 mg/hr (12/19/19 1202)   buffered 3% sodium acetate 130meq, sodium chloride 130meq, sterile water for inj IV soln Last Rate: 50 mL/hr at 12/19/19 1231   Scheduled  albuterol-ipratropium 3 mL Q4H   amLODIPine 10 mg Daily   atorvastatin 80 mg QHS   azithromycin 500 mg Q24H   cefTRIAXone (ROCEPHIN) IVPB 2 g Q24H   chlorhexidine 15 mL BID   famotidine (PF) 20 mg BID   levetiracetam oral soln 500 mg BID   polyethylene glycol 17 g BID   senna-docusate 8.6-50 mg 1 tablet BID   PRN  acetaminophen 650 mg Q6H PRN   Dextrose 10% Bolus 12.5 g PRN   Dextrose 10% Bolus 25 g PRN   fentaNYL 50 mcg Q2H PRN   glucagon (human recombinant) 1 mg PRN   hydrALAZINE 10 mg Q6H PRN   insulin aspart U-100 1-10 Units Q4H PRN   labetalol 10 mg Q4H PRN   magnesium oxide 800 mg PRN   magnesium oxide 800 mg PRN   ondansetron 4 mg Q6H PRN   potassium chloride 10% 40 mEq PRN   potassium chloride 10% 40 mEq PRN   potassium chloride 10% 60 mEq PRN   potassium, sodium phosphates 2 packet PRN   potassium, sodium phosphates 2 packet PRN   potassium, sodium phosphates 2 packet PRN   sodium chloride 0.9% 10 mL PRN     Today I personally reviewed pertinent medications, lines/drains/airways, imaging, laboratory results, notably:    Diet  No diet orders on file  No diet orders on file        Assessment/Plan:     Neuro  * Embolic stroke involving right middle cerebral artery   - 80 y/o male admitted for R MCA stroke syndrome POD 1 s/p Thrombectomy TICI 3 reperfusion   - Neurochecks q1hr    - Continue cardene to maintain SBP < 140  Discontinue ASA/Plavix d/t heme transformation  F/U CTH 2/17 stable  2/18 CTH stable           Cytotoxic brain edema  2/2 R MCA  Neuro checks Q 1 hr  Maintain eunatremia  Maintain Euglyemia    Midline shift of brain  2/2 IPH as noted on MRI imaging:  mass effect on the right lateral ventricle and approximately 5 mm right-to-left midline shift  Continue Neuro checks Q1 hr    Acute spont intraparenchymal hemorrhage assoc w/ coagulopathy   Patient s/p thrombectomy with R ICA stent placement. ASA and Plavix initiated   MRI notes: intraparenchymal hemorrhage within the right frontal and temporal lobes with associated vasogenic edema resulting in mass effect on the right lateral ventricle and approximately 5 mm right-to-left midline shift.   ASA Plavix discontinued  Vascular Stroke following  NSGY consulted   2% initiated to maintain Na 145-155  Na Q6 (disc. Grouped into BMP)  Mannitol x1   changed 2% to 3%  Q6 Na changed to BMP Q6, Serum Os    Pulmonary  Acute respiratory failure with hypoxia  Encounter for intubation d/t increased somnolence.   CXR daily  ABG Daily  Famotidine  chlorhexidine     Cardiac/Vascular  Mixed hyperlipidemia   - Continue Atorvastatin 80mg HS  Monitor Lipid Panel    Essential hypertension  Continuous Cardiac Monitoring  Vital Signs Q1 hour  Systolic (24hrs), Av , Min:118 , Max:172   Cardene to Maintain SBP < 140  CXR  Lungs hypoaerated.  Increase in the pulmonary vascular interstitial and alveolar markings Findings most consistent with congestive failure   ECHO 65%  EKG : Atrial fibrillation with rapid ventricular response with premature ventricular or aberrantly conducted complexes            Endocrine  Type 2 diabetes mellitus with neurologic complication, without long-term current use of insulin  A1c 7.7  RISS   POCT Q4  Nutritional Consult for dietary /caloric needs  Start TF  -see management for R MCA/IPH             Other  Fever due to infection  Possible community acquired infection  Wet Raspy cough  T Max 102.0  Tylenol  Cooling Bandera  Pan culture  Continue Azithromycin and Rocephin  US Extremities/ Soft Tissue of Neck  Amylase/Lipase    Decreased strength, endurance, and mobility  2/2 LS  Weakness R/T R MCA  PT/OT          The patient is being Prophylaxed for:  Venous Thromboembolism with: Mechanical  Stress Ulcer with: H2B  Ventilator Pneumonia with: chlorhexidine oral care    Activity Orders          None        Full Code   Critical Care 38 minutes    Critical secondary to Patient has a condition that poses threat to life and bodily function:      Critical care was time spent personally by me on the following activities: development of treatment plan with patient or surrogate and bedside caregivers, discussions with consultants, evaluation of patient's response to treatment, examination of patient, ordering and performing treatments and interventions, ordering and review of laboratory studies, ordering and review of radiographic studies, pulse oximetry, re-evaluation of patient's condition. This critical care time did not overlap with that of any other provider or involve time for any procedures.      Samy Galindo NP  Neurocritical Care  [facility]

## 2019-12-19 NOTE — PLAN OF CARE
POC reviewed with Mr Linder and family at 1700. Pt's family verbalized understanding. Questions and concerns addressed. Pt very lethargic and somnolent, still oriented x4 and following commands. Concerns with respiratory status all throughout shift. Intubation watch, ABG q6, pulmonary toileting. Bloody secretions after NT suctioning per RT. Spiked fever, Tmax 102.7F, pan cultured and abx started. Cooling blanket applied, when on cooling mode, pt shivers frequently despite measures to prevent shivering, Gia, NP aware and ordered Tylenol 1g via NG route. Cardene gtt to maintain SBP <140, prn meds needed in addition as well. Central access not obtained via PICC due to fever. CVC planned tonight by NCC, consents obtained. UO 40-60ml/h. No BM today, senna-doc and miralax given. Will continue to monitor. See flowsheets for full assessment and VS info.

## 2019-12-19 NOTE — PLAN OF CARE
POC reviewed with pt at 0400. Pt unable to verbalize understanding d/t intubated. Questions and concerns addressed with family. Pt intubated at shift changed d/t worsening neuro exam, pt more lethargic than previous. Pt started on propofol gtt for sedation. Pt tachypnic ~30s and breath stacking requiring max Propofol dose 50 mcg/kg/min and PRN fentanyl 50 mcg q2h for sedation. Pt tolerating Fentanyl. Currently being weaned from Propofol gtt to Fentanyl gtt. Buffered 3% continued at 50 ml/hr. Cardene gtt titrated to maintain SBP < 140. Central line placement attempted  by JACQUES Meyer unsuccessful. Pt taken to CT. Pt progressing toward goals. Will continue to monitor. See flowsheets for full assessment and VS info

## 2019-12-19 NOTE — SUBJECTIVE & OBJECTIVE
Neurologic Chief Complaint: R MCA syndrome     Subjective:     Interval History: Patient is seen for follow-up neurological assessment and treatment recommendations:     MRI Brain from 12/17 shows large right MCA territory acute infarction with hemorraghic transformation and holding ASA and plavix at this time. Was on 2% saline and transitioned to 3%. Intubated overnight for worsening neuro exam and hypoxia.       HPI, Past Medical, Family, and Social History remains the same as documented in the initial encounter.     Review of Systems   Constitutional: Negative for fever.   Eyes: Positive for visual disturbance.   Respiratory: Negative for shortness of breath and wheezing.    Cardiovascular: Negative for chest pain and palpitations.   Gastrointestinal: Negative for nausea and vomiting.   Endocrine: Negative for polyuria.   Musculoskeletal: Positive for gait problem.   Allergic/Immunologic: Negative for immunocompromised state.   Neurological: Positive for facial asymmetry, speech difficulty and weakness.   Psychiatric/Behavioral: Negative for confusion.     Scheduled Meds:   albuterol-ipratropium  3 mL Nebulization Q4H    amLODIPine  10 mg Per OG tube Daily    atorvastatin  80 mg Per NG tube QHS    azithromycin  500 mg Intravenous Q24H    cefTRIAXone (ROCEPHIN) IVPB  2 g Intravenous Q24H    chlorhexidine  15 mL Mouth/Throat BID    famotidine (PF)  20 mg Intravenous BID    levetiracetam oral soln  500 mg Per NG tube BID    polyethylene glycol  17 g Per NG tube BID    senna-docusate 8.6-50 mg  1 tablet Per NG tube BID     Continuous Infusions:   fentanyl 5 mL/hr at 12/19/19 1402    niCARdipine 10 mg/hr (12/19/19 1402)    buffered 3% sodium acetate 130meq, sodium chloride 130meq, sterile water for inj IV soln 50 mL/hr at 12/19/19 1231     PRN Meds:acetaminophen, Dextrose 10% Bolus, Dextrose 10% Bolus, fentaNYL, glucagon (human recombinant), hydrALAZINE, insulin aspart U-100, labetalol, magnesium oxide,  magnesium oxide, ondansetron, potassium chloride 10%, potassium chloride 10%, potassium chloride 10%, potassium, sodium phosphates, potassium, sodium phosphates, potassium, sodium phosphates, sodium chloride 0.9%    Objective:     Vital Signs (Most Recent):  Temp: 100.1 °F (37.8 °C) (12/19/19 1232)  Pulse: 99 (12/19/19 1402)  Resp: 16 (12/19/19 1402)  BP: (!) 149/67 (12/19/19 1402)  SpO2: 97 % (12/19/19 1402)  BP Location: Left arm    Vital Signs Range (Last 24H):  Temp:  [98.5 °F (36.9 °C)-102.7 °F (39.3 °C)]   Pulse:  []   Resp:  [7-58]   BP: (118-172)/()   SpO2:  [91 %-100 %]   Arterial Line BP: (114-191)/(43-68)   BP Location: Left arm    Physical Exam   Constitutional: He appears well-developed and well-nourished. He is intubated.   HENT:   Head: Normocephalic and atraumatic.   Eyes: Conjunctivae are normal.   Right gaze- able to cross midline   Neck: Normal range of motion and full passive range of motion without pain.   Cardiovascular: Tachycardia present.   Pulmonary/Chest: Effort normal. He is intubated.   Abdominal: Soft. Normal appearance.   Musculoskeletal:   Left  Side plegia   Neurological: A cranial nerve deficit is present. He exhibits abnormal muscle tone.   Skin: Skin is warm and dry.       Neurological Exam:   LOC: drowsy  Attention Span: poor  Language: Intubated  Articulation: Untestable due to intubation  Orientation: Untestable due to intubation  Visual Fields: Hemianopsia left  EOM (CN III, IV, VI): Gaze preference  left  Pupils (CN II, III): PERRL  Facial Movement (CN VII): Lower facial weakness on the Left  Motor: Arm left  Plegia 0/5  Leg left  Plegia 0/5  Arm right  Paresis: 4/5  Leg right Paresis: 4/5  Sensation: Tactile extinction to bilateral simultaneous stimulation     Laboratory:  CMP:   Recent Labs   Lab 12/19/19  0109  12/19/19  1220   CALCIUM 7.9*   < > 8.0*   ALBUMIN 3.0*  --   --    PROT 6.3  --   --    *   < > 155*   K 3.6   < > 3.8   CO2 26   < > 30*   CL  113*   < > 118*   BUN 28*   < > 29*   CREATININE 0.9   < > 0.8   ALKPHOS 52*  --   --    ALT 32  --   --    AST 24  --   --    BILITOT 0.9  --   --     < > = values in this interval not displayed.     CBC:   Recent Labs   Lab 12/19/19  0109   WBC 9.54   RBC 3.93*   HGB 12.8*   HCT 38.7*      MCV 99*   MCH 32.6*   MCHC 33.1     Lipid Panel:   Recent Labs   Lab 12/17/19  0013   CHOL 136   LDLCALC 67.6   HDL 33*   TRIG 177*     Hgb A1C:   Recent Labs   Lab 12/17/19  0013   HGBA1C 7.7*     TSH: No results for input(s): TSH in the last 168 hours.    Diagnostic Results     Brain Imaging   CT head w/o contrast 12/16/2019     No acute major vascular distribution infarct or hemorrhage.    Vessel Imaging   Outside imaging reported as having ABBY stenosis at bifurcation with 70% stenosis and right MCA occlusion.    Cardiac Imaging   IRIS  · Normal left ventricular systolic function. The estimated ejection fraction is 65%  · Indeterminate left ventricular diastolic function.  · Mild left ventricular enlargement.  · Normal right ventricular systolic function.  · Technically difficult study, poor endocardial visualization, contrast used.  · No wall motion abnormalities.  · Indeterminate central venous pressure. Estimated PA pressure is at least 9 mmHg.

## 2019-12-19 NOTE — PT/OT/SLP PROGRESS
Occupational Therapy      Patient Name:  Jerry Linder   MRN:  [id number]    Patient not seen today secondary to pt intubated overnight due to hypoxia; not appropriate for therapy evaluation and will require new orders when appropriate.    BRUCE Velazquez  12/19/2019

## 2019-12-20 PROBLEM — J18.9 CAP (COMMUNITY ACQUIRED PNEUMONIA): Status: ACTIVE | Noted: 2019-12-20

## 2019-12-20 PROBLEM — K59.00 CONSTIPATION: Status: ACTIVE | Noted: 2019-12-20

## 2019-12-20 LAB
ALBUMIN SERPL BCP-MCNC: 2.7 G/DL (ref 3.5–5.2)
ALLENS TEST: ABNORMAL
ALP SERPL-CCNC: 65 U/L (ref 55–135)
ALT SERPL W/O P-5'-P-CCNC: 34 U/L (ref 10–44)
ANION GAP SERPL CALC-SCNC: 6 MMOL/L (ref 8–16)
ANION GAP SERPL CALC-SCNC: 8 MMOL/L (ref 8–16)
ANION GAP SERPL CALC-SCNC: 8 MMOL/L (ref 8–16)
AST SERPL-CCNC: 42 U/L (ref 10–40)
BASOPHILS # BLD AUTO: 0.03 K/UL (ref 0–0.2)
BASOPHILS NFR BLD: 0.3 % (ref 0–1.9)
BILIRUB SERPL-MCNC: 0.7 MG/DL (ref 0.1–1)
BUN SERPL-MCNC: 29 MG/DL (ref 8–23)
BUN SERPL-MCNC: 29 MG/DL (ref 8–23)
BUN SERPL-MCNC: 31 MG/DL (ref 8–23)
CALCIUM SERPL-MCNC: 7.8 MG/DL (ref 8.7–10.5)
CALCIUM SERPL-MCNC: 8.1 MG/DL (ref 8.7–10.5)
CALCIUM SERPL-MCNC: 8.2 MG/DL (ref 8.7–10.5)
CHLORIDE SERPL-SCNC: 117 MMOL/L (ref 95–110)
CHLORIDE SERPL-SCNC: 117 MMOL/L (ref 95–110)
CHLORIDE SERPL-SCNC: 118 MMOL/L (ref 95–110)
CO2 SERPL-SCNC: 31 MMOL/L (ref 23–29)
CO2 SERPL-SCNC: 32 MMOL/L (ref 23–29)
CO2 SERPL-SCNC: 32 MMOL/L (ref 23–29)
CREAT SERPL-MCNC: 0.8 MG/DL (ref 0.5–1.4)
CREAT SERPL-MCNC: 0.8 MG/DL (ref 0.5–1.4)
CREAT SERPL-MCNC: 0.9 MG/DL (ref 0.5–1.4)
DELSYS: ABNORMAL
DIFFERENTIAL METHOD: ABNORMAL
EOSINOPHIL # BLD AUTO: 0.1 K/UL (ref 0–0.5)
EOSINOPHIL NFR BLD: 1.3 % (ref 0–8)
ERYTHROCYTE [DISTWIDTH] IN BLOOD BY AUTOMATED COUNT: 13.8 % (ref 11.5–14.5)
ERYTHROCYTE [SEDIMENTATION RATE] IN BLOOD BY WESTERGREN METHOD: 16 MM/H
EST. GFR  (AFRICAN AMERICAN): >60 ML/MIN/1.73 M^2
EST. GFR  (NON AFRICAN AMERICAN): >60 ML/MIN/1.73 M^2
FIO2: 70
GLUCOSE SERPL-MCNC: 192 MG/DL (ref 70–110)
GLUCOSE SERPL-MCNC: 195 MG/DL (ref 70–110)
GLUCOSE SERPL-MCNC: 230 MG/DL (ref 70–110)
HCO3 UR-SCNC: 28.1 MMOL/L (ref 24–28)
HCO3 UR-SCNC: 32.7 MMOL/L (ref 24–28)
HCO3 UR-SCNC: 32.8 MMOL/L (ref 24–28)
HCO3 UR-SCNC: 33.8 MMOL/L (ref 24–28)
HCT VFR BLD AUTO: 39.8 % (ref 40–54)
HGB BLD-MCNC: 12 G/DL (ref 14–18)
IMM GRANULOCYTES # BLD AUTO: 0.04 K/UL (ref 0–0.04)
IMM GRANULOCYTES NFR BLD AUTO: 0.5 % (ref 0–0.5)
IP: 10
IT: 1.1
LYMPHOCYTES # BLD AUTO: 2.4 K/UL (ref 1–4.8)
LYMPHOCYTES NFR BLD: 27.3 % (ref 18–48)
MAGNESIUM SERPL-MCNC: 2.5 MG/DL (ref 1.6–2.6)
MAP: 10
MCH RBC QN AUTO: 31 PG (ref 27–31)
MCHC RBC AUTO-ENTMCNC: 30.2 G/DL (ref 32–36)
MCV RBC AUTO: 103 FL (ref 82–98)
MODE: ABNORMAL
MONOCYTES # BLD AUTO: 1 K/UL (ref 0.3–1)
MONOCYTES NFR BLD: 11.7 % (ref 4–15)
NEUTROPHILS # BLD AUTO: 5.1 K/UL (ref 1.8–7.7)
NEUTROPHILS NFR BLD: 58.9 % (ref 38–73)
NRBC BLD-RTO: 0 /100 WBC
OSMOLALITY SERPL: 331 MOSM/KG (ref 280–300)
OSMOLALITY SERPL: 336 MOSM/KG (ref 280–300)
OSMOLALITY SERPL: 337 MOSM/KG (ref 280–300)
OSMOLALITY SERPL: 340 MOSM/KG (ref 280–300)
PCO2 BLDA: 44.2 MMHG (ref 35–45)
PCO2 BLDA: 50.5 MMHG (ref 35–45)
PCO2 BLDA: 51.2 MMHG (ref 35–45)
PCO2 BLDA: 51.4 MMHG (ref 35–45)
PEEP: 7
PH SMN: 7.41 [PH] (ref 7.35–7.45)
PH SMN: 7.41 [PH] (ref 7.35–7.45)
PH SMN: 7.42 [PH] (ref 7.35–7.45)
PH SMN: 7.43 [PH] (ref 7.35–7.45)
PHOSPHATE SERPL-MCNC: 2.5 MG/DL (ref 2.7–4.5)
PIP: 17
PLATELET # BLD AUTO: 158 K/UL (ref 150–350)
PMV BLD AUTO: 10.6 FL (ref 9.2–12.9)
PO2 BLDA: 106 MMHG (ref 80–100)
PO2 BLDA: 109 MMHG (ref 80–100)
PO2 BLDA: 118 MMHG (ref 80–100)
PO2 BLDA: 132 MMHG (ref 80–100)
POC BE: 10 MMOL/L
POC BE: 4 MMOL/L
POC BE: 8 MMOL/L
POC BE: 8 MMOL/L
POC SATURATED O2: 98 % (ref 95–100)
POC SATURATED O2: 98 % (ref 95–100)
POC SATURATED O2: 99 % (ref 95–100)
POC SATURATED O2: 99 % (ref 95–100)
POC TCO2: 29 MMOL/L (ref 23–27)
POC TCO2: 34 MMOL/L (ref 23–27)
POC TCO2: 34 MMOL/L (ref 23–27)
POC TCO2: 35 MMOL/L (ref 23–27)
POCT GLUCOSE: 155 MG/DL (ref 70–110)
POCT GLUCOSE: 161 MG/DL (ref 70–110)
POCT GLUCOSE: 183 MG/DL (ref 70–110)
POCT GLUCOSE: 189 MG/DL (ref 70–110)
POCT GLUCOSE: 215 MG/DL (ref 70–110)
POCT GLUCOSE: 234 MG/DL (ref 70–110)
POTASSIUM SERPL-SCNC: 3.6 MMOL/L (ref 3.5–5.1)
POTASSIUM SERPL-SCNC: 3.6 MMOL/L (ref 3.5–5.1)
POTASSIUM SERPL-SCNC: 3.7 MMOL/L (ref 3.5–5.1)
PROT SERPL-MCNC: 6.2 G/DL (ref 6–8.4)
RBC # BLD AUTO: 3.87 M/UL (ref 4.6–6.2)
SAMPLE: ABNORMAL
SITE: ABNORMAL
SODIUM SERPL-SCNC: 155 MMOL/L (ref 136–145)
SODIUM SERPL-SCNC: 155 MMOL/L (ref 136–145)
SODIUM SERPL-SCNC: 156 MMOL/L (ref 136–145)
SODIUM SERPL-SCNC: 156 MMOL/L (ref 136–145)
SODIUM SERPL-SCNC: 157 MMOL/L (ref 136–145)
SODIUM SERPL-SCNC: 157 MMOL/L (ref 136–145)
SP02: 98
SP02: 98
SP02: 99
VT: 498
VT: 520
VT: 837
WBC # BLD AUTO: 8.64 K/UL (ref 3.9–12.7)

## 2019-12-20 PROCEDURE — 63600175 PHARM REV CODE 636 W HCPCS: Performed by: PSYCHIATRY & NEUROLOGY

## 2019-12-20 PROCEDURE — 82803 BLOOD GASES ANY COMBINATION: CPT

## 2019-12-20 PROCEDURE — 27200966 HC CLOSED SUCTION SYSTEM

## 2019-12-20 PROCEDURE — 25000003 PHARM REV CODE 250: Performed by: PSYCHIATRY & NEUROLOGY

## 2019-12-20 PROCEDURE — 99291 CRITICAL CARE FIRST HOUR: CPT | Mod: ,,, | Performed by: NURSE PRACTITIONER

## 2019-12-20 PROCEDURE — 94003 VENT MGMT INPAT SUBQ DAY: CPT

## 2019-12-20 PROCEDURE — 25000003 PHARM REV CODE 250: Performed by: STUDENT IN AN ORGANIZED HEALTH CARE EDUCATION/TRAINING PROGRAM

## 2019-12-20 PROCEDURE — 25000003 PHARM REV CODE 250: Performed by: NURSE PRACTITIONER

## 2019-12-20 PROCEDURE — 83930 ASSAY OF BLOOD OSMOLALITY: CPT | Mod: 91

## 2019-12-20 PROCEDURE — 80048 BASIC METABOLIC PNL TOTAL CA: CPT | Mod: 91

## 2019-12-20 PROCEDURE — 99291 PR CRITICAL CARE, E/M 30-74 MINUTES: ICD-10-PCS | Mod: ,,, | Performed by: NURSE PRACTITIONER

## 2019-12-20 PROCEDURE — 80048 BASIC METABOLIC PNL TOTAL CA: CPT

## 2019-12-20 PROCEDURE — 94761 N-INVAS EAR/PLS OXIMETRY MLT: CPT

## 2019-12-20 PROCEDURE — 27000221 HC OXYGEN, UP TO 24 HOURS

## 2019-12-20 PROCEDURE — 63600175 PHARM REV CODE 636 W HCPCS: Performed by: NURSE PRACTITIONER

## 2019-12-20 PROCEDURE — 99900035 HC TECH TIME PER 15 MIN (STAT)

## 2019-12-20 PROCEDURE — 83735 ASSAY OF MAGNESIUM: CPT

## 2019-12-20 PROCEDURE — 84100 ASSAY OF PHOSPHORUS: CPT

## 2019-12-20 PROCEDURE — 25000242 PHARM REV CODE 250 ALT 637 W/ HCPCS: Performed by: INTERNAL MEDICINE

## 2019-12-20 PROCEDURE — 99233 SBSQ HOSP IP/OBS HIGH 50: CPT | Mod: GC,,, | Performed by: PSYCHIATRY & NEUROLOGY

## 2019-12-20 PROCEDURE — 99233 PR SUBSEQUENT HOSPITAL CARE,LEVL III: ICD-10-PCS | Mod: GC,,, | Performed by: PSYCHIATRY & NEUROLOGY

## 2019-12-20 PROCEDURE — 83930 ASSAY OF BLOOD OSMOLALITY: CPT

## 2019-12-20 PROCEDURE — 37799 UNLISTED PX VASCULAR SURGERY: CPT

## 2019-12-20 PROCEDURE — 99900026 HC AIRWAY MAINTENANCE (STAT)

## 2019-12-20 PROCEDURE — 82800 BLOOD PH: CPT

## 2019-12-20 PROCEDURE — 94668 MNPJ CHEST WALL SBSQ: CPT

## 2019-12-20 PROCEDURE — 20000000 HC ICU ROOM

## 2019-12-20 PROCEDURE — 80053 COMPREHEN METABOLIC PANEL: CPT

## 2019-12-20 PROCEDURE — S0028 INJECTION, FAMOTIDINE, 20 MG: HCPCS | Performed by: NURSE PRACTITIONER

## 2019-12-20 PROCEDURE — 85025 COMPLETE CBC W/AUTO DIFF WBC: CPT

## 2019-12-20 PROCEDURE — 84295 ASSAY OF SERUM SODIUM: CPT | Mod: 91

## 2019-12-20 PROCEDURE — 94640 AIRWAY INHALATION TREATMENT: CPT

## 2019-12-20 RX ORDER — HYDRALAZINE HYDROCHLORIDE 25 MG/1
25 TABLET, FILM COATED ORAL EVERY 8 HOURS
Status: DISCONTINUED | OUTPATIENT
Start: 2019-12-20 | End: 2019-12-21

## 2019-12-20 RX ORDER — VANCOMYCIN 2 GRAM/500 ML IN 0.9 % SODIUM CHLORIDE INTRAVENOUS
2000 ONCE
Status: COMPLETED | OUTPATIENT
Start: 2019-12-20 | End: 2019-12-20

## 2019-12-20 RX ORDER — SYRING-NEEDL,DISP,INSUL,0.3 ML 29 G X1/2"
296 SYRINGE, EMPTY DISPOSABLE MISCELLANEOUS ONCE
Status: COMPLETED | OUTPATIENT
Start: 2019-12-20 | End: 2019-12-20

## 2019-12-20 RX ADMIN — SENNOSIDES AND DOCUSATE SODIUM 1 TABLET: 8.6; 5 TABLET ORAL at 09:12

## 2019-12-20 RX ADMIN — IPRATROPIUM BROMIDE AND ALBUTEROL SULFATE 3 ML: .5; 3 SOLUTION RESPIRATORY (INHALATION) at 03:12

## 2019-12-20 RX ADMIN — INSULIN ASPART 1 UNITS: 100 INJECTION, SOLUTION INTRAVENOUS; SUBCUTANEOUS at 12:12

## 2019-12-20 RX ADMIN — INSULIN ASPART 1 UNITS: 100 INJECTION, SOLUTION INTRAVENOUS; SUBCUTANEOUS at 09:12

## 2019-12-20 RX ADMIN — ATORVASTATIN CALCIUM 80 MG: 20 TABLET, FILM COATED ORAL at 09:12

## 2019-12-20 RX ADMIN — IPRATROPIUM BROMIDE AND ALBUTEROL SULFATE 3 ML: .5; 3 SOLUTION RESPIRATORY (INHALATION) at 11:12

## 2019-12-20 RX ADMIN — HYDRALAZINE HYDROCHLORIDE 25 MG: 25 TABLET, FILM COATED ORAL at 01:12

## 2019-12-20 RX ADMIN — INSULIN ASPART 4 UNITS: 100 INJECTION, SOLUTION INTRAVENOUS; SUBCUTANEOUS at 05:12

## 2019-12-20 RX ADMIN — LEVETIRACETAM 500 MG: 100 SOLUTION ORAL at 08:12

## 2019-12-20 RX ADMIN — PIPERACILLIN AND TAZOBACTAM 4.5 G: 4; .5 INJECTION, POWDER, FOR SOLUTION INTRAVENOUS at 08:12

## 2019-12-20 RX ADMIN — IPRATROPIUM BROMIDE AND ALBUTEROL SULFATE 3 ML: .5; 3 SOLUTION RESPIRATORY (INHALATION) at 07:12

## 2019-12-20 RX ADMIN — MAGNESIUM CITRATE 296 ML: 1.75 LIQUID ORAL at 05:12

## 2019-12-20 RX ADMIN — POLYETHYLENE GLYCOL 3350 17 G: 17 POWDER, FOR SOLUTION ORAL at 09:12

## 2019-12-20 RX ADMIN — LEVETIRACETAM 500 MG: 100 SOLUTION ORAL at 09:12

## 2019-12-20 RX ADMIN — POLYETHYLENE GLYCOL 3350 17 G: 17 POWDER, FOR SOLUTION ORAL at 08:12

## 2019-12-20 RX ADMIN — CHLORHEXIDINE GLUCONATE 0.12% ORAL RINSE 15 ML: 1.2 LIQUID ORAL at 09:12

## 2019-12-20 RX ADMIN — FAMOTIDINE 20 MG: 10 INJECTION, SOLUTION INTRAVENOUS at 09:12

## 2019-12-20 RX ADMIN — PIPERACILLIN AND TAZOBACTAM 4.5 G: 4; .5 INJECTION, POWDER, FOR SOLUTION INTRAVENOUS at 12:12

## 2019-12-20 RX ADMIN — CHLORHEXIDINE GLUCONATE 0.12% ORAL RINSE 15 ML: 1.2 LIQUID ORAL at 08:12

## 2019-12-20 RX ADMIN — AMLODIPINE BESYLATE 10 MG: 10 TABLET ORAL at 08:12

## 2019-12-20 RX ADMIN — FAMOTIDINE 20 MG: 10 INJECTION, SOLUTION INTRAVENOUS at 08:12

## 2019-12-20 RX ADMIN — SENNOSIDES AND DOCUSATE SODIUM 1 TABLET: 8.6; 5 TABLET ORAL at 08:12

## 2019-12-20 RX ADMIN — INSULIN ASPART 2 UNITS: 100 INJECTION, SOLUTION INTRAVENOUS; SUBCUTANEOUS at 12:12

## 2019-12-20 RX ADMIN — VANCOMYCIN HYDROCHLORIDE 2000 MG: 100 INJECTION, POWDER, LYOPHILIZED, FOR SOLUTION INTRAVENOUS at 01:12

## 2019-12-20 RX ADMIN — HYDRALAZINE HYDROCHLORIDE 25 MG: 25 TABLET, FILM COATED ORAL at 09:12

## 2019-12-20 NOTE — PLAN OF CARE
Neuro exam stable  Broaden abx due to persistent fever  NA goal 145-155  Spontaneous trial  Start hydralazine  kub - then mag citrate if no evidence of obstruction  Wean fentanyl as tolerated

## 2019-12-20 NOTE — PLAN OF CARE
POC reviewed with pt spouse and daughter in law at 0300. Family verbalized understanding. Questions and concerns addressed stroke education and restraint education provided. No acute events overnight, pt neuro exam unchanged through the night. PRN tylenol x 1 for fever 100.5 in conjunction with cooling blanket use. NCC team contacted for critical serum osmo @ 0600/0000 labs; midnight sodium 155, Shahnaz, NP ordered 3% rate decrease to 0. Pt progressing toward goals. Will continue to monitor. See flowsheets for full assessment and VS info

## 2019-12-20 NOTE — ASSESSMENT & PLAN NOTE
Continuous Cardiac Monitoring  Vital Signs Q1 hour  Systolic (24hrs), Av , Min:120 , Max:176   Cardene to Maintain SBP < 140  CXR  Lungs hypoaerated.  Increase in the pulmonary vascular interstitial and alveolar markings Findings most consistent with congestive failure   ECHO 65%  EKG : Atrial fibrillation with rapid ventricular response with premature ventricular or aberrantly conducted complexes  : add hydralazine

## 2019-12-20 NOTE — PROGRESS NOTES
[facility]  Vascular Neurology  Comprehensive Stroke Center  Progress Note    Assessment/Plan:     * Embolic stroke involving right middle cerebral artery  79 y.o. yo male with unclear PMHx (HTN, DM, HLD?) who presented to [facility] with R MCA syndrome. LKN 1000. Telephone (no video) consult with Dr. Mosher; tPA not given, as outside treatment window. [place] with report of R ICA stenosis at bifurcation with 70% stenosis and right MCA occlusion on CTA; pt was transferred to [facility] [place] for possible intervention. Dr. Martínez reviewed stat CT Head images as acquired. Positive for LVO. Admitted to Neuro ICU for close monitoring/higher level of care.  Now s/p thrombectomy.     MRI Brain from 12/17 shows large right MCA territory acute infarction with hemorraghic transformation. Holding ASA and plavix at this time.  CT head on 12/18 with continued evolution of large right MCA territory infarct status post hemorrhagic conversion with regional mass effect. Cotinues to be intubated and sedated.    Antithrombotics for secondary stroke prevention:  Holding ASA and plavix at this time   Statins for secondary stroke prevention and hyperlipidemia, if present:   Statins: Atorvastatin- 40 mg daily  Aggressive risk factor modification: Diet, Exercise  Rehab efforts: The patient has been evaluated by a stroke team provider and the therapy needs have been fully considered based off the presenting complaints and exam findings. The following therapy evaluations are needed: PT evaluate and treat, OT evaluate and treat, SLP evaluate and treat, PM&R evaluate for appropriate placement  Diagnostics ordered/pending: None   VTE prophylaxis: Heparin 5000 units SQ every 8 hours  place SCDs  BP parameters: Infarct: Post successful thrombectomy, SBP <140  Post unsuccessful thrombectomy, SBP <220    Mixed hyperlipidemia  -stroke risk factor    LDL pending  If LDL >70 recommend atorvastatin 40 mg     Type 2 diabetes  mellitus with neurologic complication, without long-term current use of insulin  Stroke risk factor   on arrival  Hemoglobin A1C pending  Recommend tight glycemic control -180    Essential hypertension  Stroke risk factor  SBP < 140 vs 220 based on thrombectomy result  Per primary team         No notes on file    STROKE DOCUMENTATION   Acute Stroke Times   Last Known Normal Date: 12/16/19  Last Known Normal Time: 1000  Stroke Team Called Date: 12/16/19  Stroke Team Called Time: 1725  Stroke Team Arrival Date: 12/16/19  Stroke Team Arrival Time: 1730  CT Interpretation Time: 1745  Decision to Treat Time for Alteplase: (N/A)  Decision to Treat Time for IR: 1750    NIH Scale:  1a. Level of Consciousness: 2-->Not alert, requires repeated stimulation to attend, or is obtunded and requires strong or painful stimulation to make movements (not stereotyped)  1b. LOC Questions: 2-->Answers neither question correctly  1c. LOC Commands: 1-->Performs one task correctly  2. Best Gaze: 0-->Normal  3. Visual: 0-->No visual loss  4. Facial Palsy: 2-->Partial paralysis (total or near-total paralysis of lower face)  5a. Motor Arm, Left: 3-->No effort against gravity, limb falls  5b. Motor Arm, Right: 1-->Drift, limb holds 90 (or 45) degrees, but drifts down before full 10 secs, does not hit bed or other support  6a. Motor Leg, Left: 3-->No effort against gravity, leg falls to bed immediately  6b. Motor Leg, Right: 2-->Some effort against gravity, leg falls to bed by 5 secs, but has some effort against gravity  7. Limb Ataxia: 0-->Absent  8. Sensory: 1-->Mild-to-moderate sensory loss, patient feels pinprick is less sharp or is dull on the affected side, or there is a loss of superficial pain with pinprick, but patient is aware of being touched  9. Best Language: 3-->Mute, global aphasia, no usable speech or auditory comprehension  10. Dysarthria: (UN) Intubated or other physical barrier  11. Extinction and Inattention  (formerly Neglect): 0-->No abnormality  Total (NIH Stroke Scale): 20       Modified Bhavesh Score: 0(unknown)  Chan Coma Scale:    ABCD2 Score:    [id number] Score:   HAS -BLED Score:   ICH Score:   Hunt & Reyes Classification:      Hemorrhagic change of an Ischemic Stroke: Does this patient have an ischemic stroke with hemorrhagic changes? No     Neurologic Chief Complaint: R MCA syndrome     Subjective:     Interval History: Patient is seen for follow-up neurological assessment and treatment recommendations:     MRI Brain from 12/17 shows large right MCA territory acute infarction with hemorraghic transformation and holding ASA and plavix at this time. Was on 2% saline and transitioned to 3%. Intubated overnight for worsening neuro exam and hypoxia.     12/20 Continues to be intubated, sedated, and abx broadened. Off HTS.     HPI, Past Medical, Family, and Social History remains the same as documented in the initial encounter.     Review of Systems   Constitutional: Negative for fever.   Eyes: Positive for visual disturbance.   Respiratory: Negative for shortness of breath and wheezing.    Cardiovascular: Negative for chest pain and palpitations.   Gastrointestinal: Negative for nausea and vomiting.   Endocrine: Negative for polyuria.   Musculoskeletal: Positive for gait problem.   Allergic/Immunologic: Negative for immunocompromised state.   Neurological: Positive for facial asymmetry, speech difficulty and weakness.   Psychiatric/Behavioral: Negative for confusion.     Scheduled Meds:   albuterol-ipratropium  3 mL Nebulization Q4H    amLODIPine  10 mg Per OG tube Daily    atorvastatin  80 mg Per NG tube QHS    chlorhexidine  15 mL Mouth/Throat BID    famotidine (PF)  20 mg Intravenous BID    hydrALAZINE  25 mg Per OG tube Q8H    levetiracetam oral soln  500 mg Per NG tube BID    piperacillin-tazobactam (ZOSYN) IVPB  4.5 g Intravenous Q8H    polyethylene glycol  17 g Per NG tube BID    senna-docusate  8.6-50 mg  1 tablet Per NG tube BID    vancomycin (VANCOCIN) IVPB  2,000 mg Intravenous Once     Continuous Infusions:   fentanyl 50 mcg/hr (12/20/19 1300)    niCARdipine 10 mg/hr (12/20/19 1300)     PRN Meds:acetaminophen, Dextrose 10% Bolus, Dextrose 10% Bolus, fentaNYL, glucagon (human recombinant), hydrALAZINE, insulin aspart U-100, labetalol, magnesium oxide, magnesium oxide, ondansetron, potassium chloride 10%, potassium chloride 10%, potassium chloride 10%, potassium, sodium phosphates, potassium, sodium phosphates, potassium, sodium phosphates, sodium chloride 0.9%, Pharmacy to dose Vancomycin consult **AND** vancomycin - pharmacy to dose    Objective:     Vital Signs (Most Recent):  Temp: 99.2 °F (37.3 °C) (12/20/19 1300)  Pulse: 96 (12/20/19 1308)  Resp: 16 (12/20/19 1308)  BP: 121/60 (12/20/19 1300)  SpO2: 99 % (12/20/19 1308)  BP Location: Left arm    Vital Signs Range (Last 24H):  Temp:  [98.9 °F (37.2 °C)-101.4 °F (38.6 °C)]   Pulse:  []   Resp:  [7-31]   BP: (121-176)/()   SpO2:  [92 %-99 %]   Arterial Line BP: ()/(43-84)   BP Location: Left arm    Physical Exam   Constitutional: He appears well-developed and well-nourished. He is intubated.   HENT:   Head: Normocephalic and atraumatic.   Eyes: Conjunctivae are normal.   Right gaze- able to cross midline   Neck: Normal range of motion and full passive range of motion without pain.   Cardiovascular: Tachycardia present.   Pulmonary/Chest: Effort normal. He is intubated.   Abdominal: Soft. Normal appearance.   Musculoskeletal:   Left  Side plegia   Neurological: A cranial nerve deficit is present. He exhibits abnormal muscle tone.   Skin: Skin is warm and dry.       Neurological Exam:   LOC: drowsy  Attention Span: poor  Language: Intubated  Articulation: Untestable due to intubation  Orientation: Untestable due to intubation  Visual Fields: Hemianopsia left  EOM (CN III, IV, VI): Gaze preference  left  Pupils (CN II, III):  PERRL  Facial Movement (CN VII): Lower facial weakness on the Left  Motor: Arm left  Plegia 0/5  Leg left  Plegia 0/5  Arm right  Paresis: 4/5  Leg right Paresis: 4/5  Sensation: Tactile extinction to bilateral simultaneous stimulation     Laboratory:  CMP:   Recent Labs   Lab 12/20/19  0015 12/20/19  0522 12/20/19  1212   CALCIUM 8.1* 8.2*  --    ALBUMIN 2.7*  --   --    PROT 6.2  --   --    * 157* 156*  156*   K 3.6 3.7  --    CO2 32* 31*  --    * 118*  --    BUN 29* 31*  --    CREATININE 0.8 0.9  --    ALKPHOS 65  --   --    ALT 34  --   --    AST 42*  --   --    BILITOT 0.7  --   --      CBC:   Recent Labs   Lab 12/20/19  0015   WBC 8.64   RBC 3.87*   HGB 12.0*   HCT 39.8*      *   MCH 31.0   MCHC 30.2*     Lipid Panel:   Recent Labs   Lab 12/17/19  0013   CHOL 136   LDLCALC 67.6   HDL 33*   TRIG 177*     Hgb A1C:   Recent Labs   Lab 12/17/19  0013   HGBA1C 7.7*     TSH: No results for input(s): TSH in the last 168 hours.    Diagnostic Results     Brain Imaging   CT head w/o contrast 12/16/2019     No acute major vascular distribution infarct or hemorrhage.    Vessel Imaging   Outside imaging reported as having ABBY stenosis at bifurcation with 70% stenosis and right MCA occlusion.    Cardiac Imaging   IRIS  · Normal left ventricular systolic function. The estimated ejection fraction is 65%  · Indeterminate left ventricular diastolic function.  · Mild left ventricular enlargement.  · Normal right ventricular systolic function.  · Technically difficult study, poor endocardial visualization, contrast used.  · No wall motion abnormalities.  · Indeterminate central venous pressure. Estimated PA pressure is at least 9 mmHg.      David Mckenzie MD  Comprehensive Stroke Center  Department of Vascular Neurology   [facility]

## 2019-12-20 NOTE — PLAN OF CARE
POC reviewed with pt and family at 1400. Pt's wife verbalized understanding. Questions and concerns addressed. No acute events today.  Fentanyl gtt titrated per order.  3% gtt now at 40ml/hr.  Cardene gtt titrated for SBP <140.  Ultrasounds complete today.  Central line placed.  Plan for CT in AM.  Tylenol given x1 and cooling blanket on.  Tmax 100.4.  Will continue to monitor. See flowsheets for full assessment and VS info.

## 2019-12-20 NOTE — ASSESSMENT & PLAN NOTE
- 80 y/o male admitted for R MCA stroke syndrome POD 1 s/p Thrombectomy TICI 3 reperfusion   - Neurochecks q1hr    - Continue cardene to maintain SBP < 140  Discontinue ASA/Plavix d/t heme transformation  F/U CTH 2/17 stable  2/18 CTH stable

## 2019-12-20 NOTE — SUBJECTIVE & OBJECTIVE
Review of Systems   Unable to obtain a complete ROS due to level of consciousness.  Objective:     Vitals:  Temp: 99.2 °F (37.3 °C)  Pulse: (!) 113  Rhythm: normal sinus rhythm  BP: 121/60  MAP (mmHg): 86  Resp: (!) 29  SpO2: 97 %  Oxygen Concentration (%): 70  O2 Device (Oxygen Therapy): ventilator  Vent Mode: SIMV  Set Rate: 16 bmp  Pressure Support: 10 cmH20  PEEP/CPAP: 7 cmH20  Peak Airway Pressure: 23 cmH2O  Mean Airway Pressure: 11 cmH20  Plateau Pressure: 0 cmH20    Temp  Min: 98.9 °F (37.2 °C)  Max: 101.4 °F (38.6 °C)  Pulse  Min: 93  Max: 119  BP  Min: 121/60  Max: 176/109  MAP (mmHg)  Min: 81  Max: 134  Resp  Min: 7  Max: 31  ETCO2 (mmHg)  Min: 25 mmHg  Max: 25 mmHg  SpO2  Min: 92 %  Max: 99 %  Oxygen Concentration (%)  Min: 70  Max: 70    12/19 0701 - 12/20 0700  In: 2544.4 [I.V.:2184.4]  Out: 1310 [Urine:1310]   Unmeasured Output  Stool Occurrence: 0       Physical Exam  GA:comfortable, no acute distress.   HEENT: No scleral icterus or JVD.   Pulmonary: Diminished to auscultation A/L.   Cardiac: RRR S1 & S2 w/o rubs/murmurs/gallops.   Abdominal: Bowel sounds present x 4. No appreciable hepatosplenomegaly.  Skin: No jaundice, rashes, or visible lesions.  Neuro:  --GCS: E3 VT1 M6  --Mental Status:  Opens eyes to stimulation, touch, follows simple commands on the R side, tracks  --Pupils 3mm, PERRL.   --Corneal reflex, gag, cough intact.  --LUE withdraws to pain  --RUE spont  --LLE withdraws  --RLE spont    Medications:  Continuous  fentanyl Last Rate: 50 mcg/hr (12/20/19 1300)   niCARdipine Last Rate: 10 mg/hr (12/20/19 1300)   Scheduled  albuterol-ipratropium 3 mL Q4H   amLODIPine 10 mg Daily   atorvastatin 80 mg QHS   chlorhexidine 15 mL BID   famotidine (PF) 20 mg BID   hydrALAZINE 25 mg Q8H   levetiracetam oral soln 500 mg BID   magnesium citrate 296 mL Once   piperacillin-tazobactam (ZOSYN) IVPB 4.5 g Q8H   polyethylene glycol 17 g BID   senna-docusate 8.6-50 mg 1 tablet BID   [START ON 12/21/2019]  vancomycin (VANCOCIN) IVPB 1,500 mg Q12H   PRN  acetaminophen 650 mg Q6H PRN   Dextrose 10% Bolus 12.5 g PRN   Dextrose 10% Bolus 25 g PRN   fentaNYL 50 mcg Q2H PRN   glucagon (human recombinant) 1 mg PRN   hydrALAZINE 10 mg Q6H PRN   insulin aspart U-100 1-10 Units Q4H PRN   labetalol 10 mg Q4H PRN   magnesium oxide 800 mg PRN   magnesium oxide 800 mg PRN   ondansetron 4 mg Q6H PRN   potassium chloride 10% 40 mEq PRN   potassium chloride 10% 40 mEq PRN   potassium chloride 10% 60 mEq PRN   potassium, sodium phosphates 2 packet PRN   potassium, sodium phosphates 2 packet PRN   potassium, sodium phosphates 2 packet PRN   sodium chloride 0.9% 10 mL PRN   vancomycin - pharmacy to dose  pharmacy to manage frequency     Today I personally reviewed pertinent medications, lines/drains/airways, imaging, laboratory results,     Diet  No diet orders on file

## 2019-12-20 NOTE — ASSESSMENT & PLAN NOTE
12/16 Patient s/p thrombectomy with R ICA stent placement. ASA and Plavix initiated  12/17 MRI notes: intraparenchymal hemorrhage within the right frontal and temporal lobes with associated vasogenic edema resulting in mass effect on the right lateral ventricle and approximately 5 mm right-to-left midline shift.  12/17 ASA Plavix discontinued  Vascular Stroke following  NSGY consulted  12/16 2% initiated to maintain Na 145-155  Na Q6 (disc. Grouped into BMP)  Mannitol x1  12/17 changed 2% to 3%  Q6 Na changed to BMP Q6, Serum Os  12/20: off HTS

## 2019-12-20 NOTE — SUBJECTIVE & OBJECTIVE
Neurologic Chief Complaint: R MCA syndrome     Subjective:     Interval History: Patient is seen for follow-up neurological assessment and treatment recommendations:     MRI Brain from 12/17 shows large right MCA territory acute infarction with hemorraghic transformation and holding ASA and plavix at this time. Was on 2% saline and transitioned to 3%. Intubated overnight for worsening neuro exam and hypoxia.     12/20 Continues to be intubated, sedated, and abx broadened. Off HTS.     HPI, Past Medical, Family, and Social History remains the same as documented in the initial encounter.     Review of Systems   Constitutional: Negative for fever.   Eyes: Positive for visual disturbance.   Respiratory: Negative for shortness of breath and wheezing.    Cardiovascular: Negative for chest pain and palpitations.   Gastrointestinal: Negative for nausea and vomiting.   Endocrine: Negative for polyuria.   Musculoskeletal: Positive for gait problem.   Allergic/Immunologic: Negative for immunocompromised state.   Neurological: Positive for facial asymmetry, speech difficulty and weakness.   Psychiatric/Behavioral: Negative for confusion.     Scheduled Meds:   albuterol-ipratropium  3 mL Nebulization Q4H    amLODIPine  10 mg Per OG tube Daily    atorvastatin  80 mg Per NG tube QHS    chlorhexidine  15 mL Mouth/Throat BID    famotidine (PF)  20 mg Intravenous BID    hydrALAZINE  25 mg Per OG tube Q8H    levetiracetam oral soln  500 mg Per NG tube BID    piperacillin-tazobactam (ZOSYN) IVPB  4.5 g Intravenous Q8H    polyethylene glycol  17 g Per NG tube BID    senna-docusate 8.6-50 mg  1 tablet Per NG tube BID    vancomycin (VANCOCIN) IVPB  2,000 mg Intravenous Once     Continuous Infusions:   fentanyl 50 mcg/hr (12/20/19 1300)    niCARdipine 10 mg/hr (12/20/19 1300)     PRN Meds:acetaminophen, Dextrose 10% Bolus, Dextrose 10% Bolus, fentaNYL, glucagon (human recombinant), hydrALAZINE, insulin aspart U-100, labetalol,  magnesium oxide, magnesium oxide, ondansetron, potassium chloride 10%, potassium chloride 10%, potassium chloride 10%, potassium, sodium phosphates, potassium, sodium phosphates, potassium, sodium phosphates, sodium chloride 0.9%, Pharmacy to dose Vancomycin consult **AND** vancomycin - pharmacy to dose    Objective:     Vital Signs (Most Recent):  Temp: 99.2 °F (37.3 °C) (12/20/19 1300)  Pulse: 96 (12/20/19 1308)  Resp: 16 (12/20/19 1308)  BP: 121/60 (12/20/19 1300)  SpO2: 99 % (12/20/19 1308)  BP Location: Left arm    Vital Signs Range (Last 24H):  Temp:  [98.9 °F (37.2 °C)-101.4 °F (38.6 °C)]   Pulse:  []   Resp:  [7-31]   BP: (121-176)/()   SpO2:  [92 %-99 %]   Arterial Line BP: ()/(43-84)   BP Location: Left arm    Physical Exam   Constitutional: He appears well-developed and well-nourished. He is intubated.   HENT:   Head: Normocephalic and atraumatic.   Eyes: Conjunctivae are normal.   Right gaze- able to cross midline   Neck: Normal range of motion and full passive range of motion without pain.   Cardiovascular: Tachycardia present.   Pulmonary/Chest: Effort normal. He is intubated.   Abdominal: Soft. Normal appearance.   Musculoskeletal:   Left  Side plegia   Neurological: A cranial nerve deficit is present. He exhibits abnormal muscle tone.   Skin: Skin is warm and dry.       Neurological Exam:   LOC: drowsy  Attention Span: poor  Language: Intubated  Articulation: Untestable due to intubation  Orientation: Untestable due to intubation  Visual Fields: Hemianopsia left  EOM (CN III, IV, VI): Gaze preference  left  Pupils (CN II, III): PERRL  Facial Movement (CN VII): Lower facial weakness on the Left  Motor: Arm left  Plegia 0/5  Leg left  Plegia 0/5  Arm right  Paresis: 4/5  Leg right Paresis: 4/5  Sensation: Tactile extinction to bilateral simultaneous stimulation     Laboratory:  CMP:   Recent Labs   Lab 12/20/19  0015 12/20/19  0522 12/20/19  1212   CALCIUM 8.1* 8.2*  --    ALBUMIN  2.7*  --   --    PROT 6.2  --   --    * 157* 156*  156*   K 3.6 3.7  --    CO2 32* 31*  --    * 118*  --    BUN 29* 31*  --    CREATININE 0.8 0.9  --    ALKPHOS 65  --   --    ALT 34  --   --    AST 42*  --   --    BILITOT 0.7  --   --      CBC:   Recent Labs   Lab 12/20/19  0015   WBC 8.64   RBC 3.87*   HGB 12.0*   HCT 39.8*      *   MCH 31.0   MCHC 30.2*     Lipid Panel:   Recent Labs   Lab 12/17/19  0013   CHOL 136   LDLCALC 67.6   HDL 33*   TRIG 177*     Hgb A1C:   Recent Labs   Lab 12/17/19  0013   HGBA1C 7.7*     TSH: No results for input(s): TSH in the last 168 hours.    Diagnostic Results     Brain Imaging   CT head w/o contrast 12/16/2019     No acute major vascular distribution infarct or hemorrhage.    Vessel Imaging   Outside imaging reported as having ABBY stenosis at bifurcation with 70% stenosis and right MCA occlusion.    Cardiac Imaging   IRIS  · Normal left ventricular systolic function. The estimated ejection fraction is 65%  · Indeterminate left ventricular diastolic function.  · Mild left ventricular enlargement.  · Normal right ventricular systolic function.  · Technically difficult study, poor endocardial visualization, contrast used.  · No wall motion abnormalities.  · Indeterminate central venous pressure. Estimated PA pressure is at least 9 mmHg.

## 2019-12-20 NOTE — ASSESSMENT & PLAN NOTE
79 y.o. yo male with unclear PMHx (HTN, DM, HLD?) who presented to [facility] with R MCA syndrome. LKN 1000. Telephone (no video) consult with Dr. Mosher; tPA not given, as outside treatment window. [place] with report of R ICA stenosis at bifurcation with 70% stenosis and right MCA occlusion on CTA; pt was [place] [facility] [place]. Dr. Martínez reviewed stat CT Head images as acquired. Positive for LVO. Admitted to Neuro ICU for close monitoring/higher level of care.  Now s/p thrombectomy.     MRI Brain from 12/17 shows large right MCA territory acute infarction with hemorraghic transformation. Holding ASA and plavix at this time.  CT head on 12/18 with continued evolution of large right MCA territory infarct status post hemorrhagic conversion with regional mass effect. Cotinues to be intubated and sedated.    Antithrombotics for secondary stroke prevention:  Holding ASA and plavix at this time   Statins for secondary stroke prevention and hyperlipidemia, if present:   Statins: Atorvastatin- 40 mg daily  Aggressive risk factor modification: Diet, Exercise  Rehab efforts: The patient has been evaluated by a stroke team provider and the therapy needs have been fully considered based off the presenting complaints and exam findings. The following therapy evaluations are needed: PT evaluate and treat, OT evaluate and treat, SLP evaluate and treat, PM&R evaluate for appropriate placement  Diagnostics ordered/pending: None   VTE prophylaxis: Heparin 5000 units SQ every 8 hours  place SCDs  BP parameters: Infarct: Post successful thrombectomy, SBP <140  Post unsuccessful thrombectomy, SBP <220

## 2019-12-20 NOTE — PROGRESS NOTES
Pharmacokinetic Initial Assessment: IV Vancomycin    Assessment/Plan:    Initiate intravenous vancomycin with loading dose of 2000 mg once  Maintenance dose 1500 mg Q12H  Goal trough 10-20 mcg/mL empirically  Draw vancomycin trough before third dose on 12/21 at 12:30    Pharmacy will continue to follow and monitor vancomycin. Please call with any questions regarding this assessment.     Thank you for the consult,   Danielle Piper, PharmD, [facility]  Neurocritical Care Pharmacist  [phone]           Patient brief summary:  Jerry Linder is a 79 y.o. male initiated on antimicrobial therapy with IV Vancomycin for treatment of suspected bacteremia    Drug Allergies:   Review of patient's allergies indicates:  No Known Allergies    Actual Body Weight:   123.4 kg    Renal Function:   Estimated Creatinine Clearance: 94.6 mL/min (based on SCr of 0.9 mg/dL).,     Dialysis Method (if applicable):  N/A    CBC (last 72 hours):  Recent Labs   Lab Result Units 12/18/19  0129 12/19/19  0109 12/20/19  0015   WBC K/uL 8.52 9.54 8.64   Hemoglobin g/dL 12.9* 12.8* 12.0*   Hematocrit % 38.1* 38.7* 39.8*   Platelets K/uL 173 159 158   Gran% % 66.8 73.3* 58.9   Lymph% % 20.7 15.0* 27.3   Mono% % 12.0 10.8 11.7   Eosinophil% % 0.1 0.1 1.3   Basophil% % 0.2 0.2 0.3   Differential Method  Automated Automated Automated       Metabolic Panel (last 72 hours):  Recent Labs   Lab Result Units 12/17/19  1803 12/18/19  0018 12/18/19  0129 12/18/19  0553 12/18/19  1125 12/18/19  1222 12/18/19  1719 12/19/19  0008 12/19/19  0109 12/19/19  0545 12/19/19  1220 12/19/19  1824 12/20/19  0010 12/20/19  0015 12/20/19  0522 12/20/19  1212   Sodium mmol/L 138 140 140 140 144  --  145 149* 148* 152* 155* 155* 155* 157* 157* 156*  156*   Potassium mmol/L  --   --  4.0  --  3.9  --  3.7 3.7 3.6 3.6 3.8 4.0 3.6 3.6 3.7  --    Chloride mmol/L  --   --  107  --  111*  --  112* 113* 113* 116* 118* 117* 117* 117* 118*  --    CO2 mmol/L  --   --  24  --  26  --  27 27 26 27  30* 32* 32* 32* 31*  --    Glucose mg/dL  --   --  231*  --  286*  --  245* 284* 260* 206* 233* 199* 192* 195* 230*  --    Glucose, UA   --   --   --   --   --  3+*  --   --   --   --   --   --   --   --   --   --    BUN, Bld mg/dL  --   --  26*  --  24*  --  24* 27* 28* 28* 29* 28* 29* 29* 31*  --    Creatinine mg/dL  --   --  0.9  --  0.9  --  0.8 0.9 0.9 0.9 0.8 0.8 0.8 0.8 0.9  --    Albumin g/dL  --   --  3.2*  --   --   --   --   --  3.0*  --   --   --   --  2.7*  --   --    Total Bilirubin mg/dL  --   --  0.9  --   --   --   --   --  0.9  --   --   --   --  0.7  --   --    Alkaline Phosphatase U/L  --   --  53*  --   --   --   --   --  52*  --   --   --   --  65  --   --    AST U/L  --   --  22  --   --   --   --   --  24  --   --   --   --  42*  --   --    ALT U/L  --   --  36  --   --   --   --   --  32  --   --   --   --  34  --   --    Magnesium mg/dL  --   --  1.9  --   --   --   --   --  2.1  --   --   --   --  2.5  --   --    Phosphorus mg/dL  --   --  3.5  --   --   --   --   --  3.0  --   --   --   --  2.5*  --   --        Drug levels (last 3 results):  No results for input(s): VANCOMYCINRA, VANCOMYCINPE, VANCOMYCINTR in the last 72 hours.    Microbiologic Results:  Microbiology Results (last 7 days)     Procedure Component Value Units Date/Time    Blood culture [[id number]] Collected:  12/18/19 1215    Order Status:  Completed Specimen:  Blood from Peripheral, Antecubital, Right Updated:  12/20/19 1412     Blood Culture, Routine No Growth to date      No Growth to date      No Growth to date    Blood culture [[id number]] Collected:  12/18/19 1222    Order Status:  Completed Specimen:  Blood from Peripheral, Hand, Right Updated:  12/20/19 1412     Blood Culture, Routine No Growth to date      No Growth to date      No Growth to date    Culture, Respiratory with Gram Stain [[id number]]  (Abnormal) Collected:  12/18/19 1400    Order Status:  Completed Specimen:  Respiratory from Sputum, Induced Updated:   12/20/19 1316     Respiratory Culture HAEMOPHILUS INFLUENZAE  Many  Beta Lactamase negative  Normal respiratory balaji also present       Gram Stain (Respiratory) <10 epithelial cells per low power field.     Gram Stain (Respiratory) Few WBC's     Gram Stain (Respiratory) Moderate Gram negative rods     Gram Stain (Respiratory) Few Gram positive rods     Gram Stain (Respiratory) Few Gram positive cocci

## 2019-12-21 LAB
ABO + RH BLD: NORMAL
ALBUMIN SERPL BCP-MCNC: 2.5 G/DL (ref 3.5–5.2)
ALLENS TEST: ABNORMAL
ALP SERPL-CCNC: 82 U/L (ref 55–135)
ALT SERPL W/O P-5'-P-CCNC: 46 U/L (ref 10–44)
ANION GAP SERPL CALC-SCNC: 7 MMOL/L (ref 8–16)
AST SERPL-CCNC: 51 U/L (ref 10–40)
BACTERIA SPEC AEROBE CULT: ABNORMAL
BACTERIA SPEC AEROBE CULT: ABNORMAL
BASOPHILS # BLD AUTO: 0.03 K/UL (ref 0–0.2)
BASOPHILS NFR BLD: 0.4 % (ref 0–1.9)
BILIRUB SERPL-MCNC: 0.8 MG/DL (ref 0.1–1)
BLD GP AB SCN CELLS X3 SERPL QL: NORMAL
BUN SERPL-MCNC: 38 MG/DL (ref 8–23)
CALCIUM SERPL-MCNC: 8 MG/DL (ref 8.7–10.5)
CHLORIDE SERPL-SCNC: 120 MMOL/L (ref 95–110)
CO2 SERPL-SCNC: 30 MMOL/L (ref 23–29)
CREAT SERPL-MCNC: 0.9 MG/DL (ref 0.5–1.4)
DELSYS: ABNORMAL
DIFFERENTIAL METHOD: ABNORMAL
EOSINOPHIL # BLD AUTO: 0.3 K/UL (ref 0–0.5)
EOSINOPHIL NFR BLD: 3 % (ref 0–8)
ERYTHROCYTE [DISTWIDTH] IN BLOOD BY AUTOMATED COUNT: 13.5 % (ref 11.5–14.5)
ERYTHROCYTE [SEDIMENTATION RATE] IN BLOOD BY WESTERGREN METHOD: 16 MM/H
EST. GFR  (AFRICAN AMERICAN): >60 ML/MIN/1.73 M^2
EST. GFR  (NON AFRICAN AMERICAN): >60 ML/MIN/1.73 M^2
FIO2: 60
GLUCOSE SERPL-MCNC: 239 MG/DL (ref 70–110)
GRAM STN SPEC: ABNORMAL
HCO3 UR-SCNC: 31.4 MMOL/L (ref 24–28)
HCT VFR BLD AUTO: 39.7 % (ref 40–54)
HGB BLD-MCNC: 12 G/DL (ref 14–18)
IMM GRANULOCYTES # BLD AUTO: 0.03 K/UL (ref 0–0.04)
IMM GRANULOCYTES NFR BLD AUTO: 0.4 % (ref 0–0.5)
LYMPHOCYTES # BLD AUTO: 2 K/UL (ref 1–4.8)
LYMPHOCYTES NFR BLD: 24.3 % (ref 18–48)
MAGNESIUM SERPL-MCNC: 2.9 MG/DL (ref 1.6–2.6)
MCH RBC QN AUTO: 31.3 PG (ref 27–31)
MCHC RBC AUTO-ENTMCNC: 30.2 G/DL (ref 32–36)
MCV RBC AUTO: 104 FL (ref 82–98)
MODE: ABNORMAL
MONOCYTES # BLD AUTO: 1.1 K/UL (ref 0.3–1)
MONOCYTES NFR BLD: 12.7 % (ref 4–15)
NEUTROPHILS # BLD AUTO: 4.9 K/UL (ref 1.8–7.7)
NEUTROPHILS NFR BLD: 59.2 % (ref 38–73)
NRBC BLD-RTO: 0 /100 WBC
PCO2 BLDA: 45.5 MMHG (ref 35–45)
PEEP: 7
PH SMN: 7.45 [PH] (ref 7.35–7.45)
PHOSPHATE SERPL-MCNC: 2.2 MG/DL (ref 2.7–4.5)
PLATELET # BLD AUTO: 180 K/UL (ref 150–350)
PMV BLD AUTO: 10.5 FL (ref 9.2–12.9)
PO2 BLDA: 77 MMHG (ref 80–100)
POC BE: 7 MMOL/L
POC SATURATED O2: 96 % (ref 95–100)
POC TCO2: 33 MMOL/L (ref 23–27)
POCT GLUCOSE: 109 MG/DL (ref 70–110)
POCT GLUCOSE: 185 MG/DL (ref 70–110)
POCT GLUCOSE: 196 MG/DL (ref 70–110)
POCT GLUCOSE: 201 MG/DL (ref 70–110)
POCT GLUCOSE: 208 MG/DL (ref 70–110)
POCT GLUCOSE: 212 MG/DL (ref 70–110)
POTASSIUM SERPL-SCNC: 3.7 MMOL/L (ref 3.5–5.1)
PROT SERPL-MCNC: 6.1 G/DL (ref 6–8.4)
RBC # BLD AUTO: 3.83 M/UL (ref 4.6–6.2)
SAMPLE: ABNORMAL
SITE: ABNORMAL
SODIUM SERPL-SCNC: 154 MMOL/L (ref 136–145)
SODIUM SERPL-SCNC: 156 MMOL/L (ref 136–145)
SODIUM SERPL-SCNC: 157 MMOL/L (ref 136–145)
SODIUM SERPL-SCNC: 157 MMOL/L (ref 136–145)
SODIUM SERPL-SCNC: 158 MMOL/L (ref 136–145)
VANCOMYCIN TROUGH SERPL-MCNC: 13.1 UG/ML (ref 10–22)
WBC # BLD AUTO: 8.34 K/UL (ref 3.9–12.7)

## 2019-12-21 PROCEDURE — S0028 INJECTION, FAMOTIDINE, 20 MG: HCPCS | Performed by: NURSE PRACTITIONER

## 2019-12-21 PROCEDURE — 25000003 PHARM REV CODE 250: Performed by: STUDENT IN AN ORGANIZED HEALTH CARE EDUCATION/TRAINING PROGRAM

## 2019-12-21 PROCEDURE — 99233 PR SUBSEQUENT HOSPITAL CARE,LEVL III: ICD-10-PCS | Mod: ,,, | Performed by: NURSE PRACTITIONER

## 2019-12-21 PROCEDURE — 84100 ASSAY OF PHOSPHORUS: CPT

## 2019-12-21 PROCEDURE — 86850 RBC ANTIBODY SCREEN: CPT

## 2019-12-21 PROCEDURE — 80053 COMPREHEN METABOLIC PANEL: CPT

## 2019-12-21 PROCEDURE — 99233 SBSQ HOSP IP/OBS HIGH 50: CPT | Mod: ,,, | Performed by: NURSE PRACTITIONER

## 2019-12-21 PROCEDURE — 25000003 PHARM REV CODE 250: Performed by: PSYCHIATRY & NEUROLOGY

## 2019-12-21 PROCEDURE — 94003 VENT MGMT INPAT SUBQ DAY: CPT

## 2019-12-21 PROCEDURE — 84295 ASSAY OF SERUM SODIUM: CPT

## 2019-12-21 PROCEDURE — 27000221 HC OXYGEN, UP TO 24 HOURS

## 2019-12-21 PROCEDURE — 83735 ASSAY OF MAGNESIUM: CPT

## 2019-12-21 PROCEDURE — 99900035 HC TECH TIME PER 15 MIN (STAT)

## 2019-12-21 PROCEDURE — 94761 N-INVAS EAR/PLS OXIMETRY MLT: CPT

## 2019-12-21 PROCEDURE — 63600175 PHARM REV CODE 636 W HCPCS: Performed by: NURSE PRACTITIONER

## 2019-12-21 PROCEDURE — 25000242 PHARM REV CODE 250 ALT 637 W/ HCPCS: Performed by: INTERNAL MEDICINE

## 2019-12-21 PROCEDURE — 99900026 HC AIRWAY MAINTENANCE (STAT)

## 2019-12-21 PROCEDURE — 85025 COMPLETE CBC W/AUTO DIFF WBC: CPT

## 2019-12-21 PROCEDURE — 63600175 PHARM REV CODE 636 W HCPCS: Performed by: PSYCHIATRY & NEUROLOGY

## 2019-12-21 PROCEDURE — 80202 ASSAY OF VANCOMYCIN: CPT

## 2019-12-21 PROCEDURE — 94640 AIRWAY INHALATION TREATMENT: CPT

## 2019-12-21 PROCEDURE — 25000003 PHARM REV CODE 250: Performed by: NURSE PRACTITIONER

## 2019-12-21 PROCEDURE — 82803 BLOOD GASES ANY COMBINATION: CPT

## 2019-12-21 PROCEDURE — 94668 MNPJ CHEST WALL SBSQ: CPT

## 2019-12-21 PROCEDURE — 20000000 HC ICU ROOM

## 2019-12-21 PROCEDURE — 37799 UNLISTED PX VASCULAR SURGERY: CPT

## 2019-12-21 RX ORDER — SODIUM CHLORIDE 9 MG/ML
INJECTION, SOLUTION INTRAVENOUS CONTINUOUS
Status: DISCONTINUED | OUTPATIENT
Start: 2019-12-21 | End: 2019-12-22

## 2019-12-21 RX ORDER — LABETALOL HCL 20 MG/4 ML
15 SYRINGE (ML) INTRAVENOUS ONCE
Status: DISCONTINUED | OUTPATIENT
Start: 2019-12-22 | End: 2019-12-21

## 2019-12-21 RX ORDER — LABETALOL HCL 20 MG/4 ML
15 SYRINGE (ML) INTRAVENOUS ONCE
Status: COMPLETED | OUTPATIENT
Start: 2019-12-21 | End: 2019-12-21

## 2019-12-21 RX ORDER — AMOXICILLIN AND CLAVULANATE POTASSIUM 875; 125 MG/1; MG/1
1 TABLET, FILM COATED ORAL EVERY 12 HOURS
Status: COMPLETED | OUTPATIENT
Start: 2019-12-21 | End: 2019-12-24

## 2019-12-21 RX ORDER — FAMOTIDINE 20 MG/1
20 TABLET, FILM COATED ORAL 2 TIMES DAILY
Status: DISCONTINUED | OUTPATIENT
Start: 2019-12-21 | End: 2019-12-30

## 2019-12-21 RX ADMIN — IPRATROPIUM BROMIDE AND ALBUTEROL SULFATE 3 ML: .5; 3 SOLUTION RESPIRATORY (INHALATION) at 11:12

## 2019-12-21 RX ADMIN — FAMOTIDINE 20 MG: 10 INJECTION, SOLUTION INTRAVENOUS at 08:12

## 2019-12-21 RX ADMIN — IPRATROPIUM BROMIDE AND ALBUTEROL SULFATE 3 ML: .5; 3 SOLUTION RESPIRATORY (INHALATION) at 03:12

## 2019-12-21 RX ADMIN — INSULIN ASPART 4 UNITS: 100 INJECTION, SOLUTION INTRAVENOUS; SUBCUTANEOUS at 12:12

## 2019-12-21 RX ADMIN — LEVETIRACETAM 500 MG: 100 SOLUTION ORAL at 08:12

## 2019-12-21 RX ADMIN — CHLORHEXIDINE GLUCONATE 0.12% ORAL RINSE 15 ML: 1.2 LIQUID ORAL at 08:12

## 2019-12-21 RX ADMIN — AMOXICILLIN AND CLAVULANATE POTASSIUM 1 TABLET: 875; 125 TABLET, FILM COATED ORAL at 09:12

## 2019-12-21 RX ADMIN — HYDRALAZINE HYDROCHLORIDE 10 MG: 20 INJECTION INTRAMUSCULAR; INTRAVENOUS at 07:12

## 2019-12-21 RX ADMIN — LABETALOL HCL IV SOLN PREFILLED SYRINGE 20 MG/4ML (5 MG/ML) 15 MG: 20/4 SOLUTION PREFILLED SYRINGE at 11:12

## 2019-12-21 RX ADMIN — INSULIN ASPART 2 UNITS: 100 INJECTION, SOLUTION INTRAVENOUS; SUBCUTANEOUS at 08:12

## 2019-12-21 RX ADMIN — ACETAMINOPHEN 650 MG: 325 TABLET ORAL at 03:12

## 2019-12-21 RX ADMIN — AMOXICILLIN AND CLAVULANATE POTASSIUM 1 TABLET: 875; 125 TABLET, FILM COATED ORAL at 12:12

## 2019-12-21 RX ADMIN — ACETAMINOPHEN 650 MG: 325 TABLET ORAL at 08:12

## 2019-12-21 RX ADMIN — INSULIN ASPART 1 UNITS: 100 INJECTION, SOLUTION INTRAVENOUS; SUBCUTANEOUS at 09:12

## 2019-12-21 RX ADMIN — ATORVASTATIN CALCIUM 80 MG: 20 TABLET, FILM COATED ORAL at 09:12

## 2019-12-21 RX ADMIN — POTASSIUM & SODIUM PHOSPHATES POWDER PACK 280-160-250 MG 2 PACKET: 280-160-250 PACK at 06:12

## 2019-12-21 RX ADMIN — LABETALOL HCL IV SOLN PREFILLED SYRINGE 20 MG/4ML (5 MG/ML) 10 MG: 20/4 SOLUTION PREFILLED SYRINGE at 09:12

## 2019-12-21 RX ADMIN — VANCOMYCIN HYDROCHLORIDE 1500 MG: 1.5 INJECTION, POWDER, LYOPHILIZED, FOR SOLUTION INTRAVENOUS at 12:12

## 2019-12-21 RX ADMIN — POTASSIUM & SODIUM PHOSPHATES POWDER PACK 280-160-250 MG 2 PACKET: 280-160-250 PACK at 12:12

## 2019-12-21 RX ADMIN — CHLORHEXIDINE GLUCONATE 0.12% ORAL RINSE 15 ML: 1.2 LIQUID ORAL at 09:12

## 2019-12-21 RX ADMIN — IPRATROPIUM BROMIDE AND ALBUTEROL SULFATE 3 ML: .5; 3 SOLUTION RESPIRATORY (INHALATION) at 07:12

## 2019-12-21 RX ADMIN — SODIUM CHLORIDE: 0.9 INJECTION, SOLUTION INTRAVENOUS at 10:12

## 2019-12-21 RX ADMIN — INSULIN ASPART 2 UNITS: 100 INJECTION, SOLUTION INTRAVENOUS; SUBCUTANEOUS at 05:12

## 2019-12-21 RX ADMIN — FAMOTIDINE 20 MG: 20 TABLET ORAL at 09:12

## 2019-12-21 RX ADMIN — PIPERACILLIN AND TAZOBACTAM 4.5 G: 4; .5 INJECTION, POWDER, FOR SOLUTION INTRAVENOUS at 04:12

## 2019-12-21 RX ADMIN — INSULIN ASPART 4 UNITS: 100 INJECTION, SOLUTION INTRAVENOUS; SUBCUTANEOUS at 06:12

## 2019-12-21 RX ADMIN — LEVETIRACETAM 500 MG: 100 SOLUTION ORAL at 09:12

## 2019-12-21 RX ADMIN — INSULIN ASPART 2 UNITS: 100 INJECTION, SOLUTION INTRAVENOUS; SUBCUTANEOUS at 12:12

## 2019-12-21 RX ADMIN — POTASSIUM CHLORIDE 40 MEQ: 20 SOLUTION ORAL at 06:12

## 2019-12-21 RX ADMIN — POTASSIUM & SODIUM PHOSPHATES POWDER PACK 280-160-250 MG 2 PACKET: 280-160-250 PACK at 08:12

## 2019-12-21 NOTE — ASSESSMENT & PLAN NOTE
12/16 Patient s/p thrombectomy with R ICA stent placement. ASA and Plavix initiated  12/17 MRI notes: intraparenchymal hemorrhage within the right frontal and temporal lobes with associated vasogenic edema resulting in mass effect on the right lateral ventricle and approximately 5 mm right-to-left midline shift.  12/17 ASA Plavix discontinued  Vascular Stroke following  NSGY consulted  12/16 2% initiated to maintain Na 145-155  Na Q6 (disc. Grouped into BMP)  Mannitol x1  12/17 changed 2% to 3%  Q6 Na changed to BMP Q6, Serum Os  12/20: off HTS  12/21: d/c hydralazine po and off HTS, na goal

## 2019-12-21 NOTE — PLAN OF CARE
POC reviewed with pt and family at 1400. Pt family verbalized understanding. Pt intubated. Questions and concerns addressed. No acute events today. Pt progressing toward goals. Will continue to monitor. See flowsheets for full assessment and VS info.

## 2019-12-21 NOTE — ASSESSMENT & PLAN NOTE
Possible community acquired infection  Wet Raspy cough  T Max 102.0  Tylenol  Cooling Okay  Pan culture  Continue Azithromycin and Rocephin  US Extremities/ Soft Tissue of Neck  Amylase/Lipase  12/21: de-eschalated abx today on augmentin

## 2019-12-21 NOTE — PLAN OF CARE
Neuro exam stable  Wean FiO2  hts off - allow to autoregulate  DC hydralazine  Bowel regimen effective  Wean fentanyl  Na goal 145-155

## 2019-12-21 NOTE — PROGRESS NOTES
[facility]  Neurocritical Care  Progress Note    Admit Date: 12/16/2019  Service Date: 12/21/2019  Length of Stay: 5    Subjective:     Chief Complaint: Embolic stroke involving right middle cerebral artery    History of Present Illness: The patient is a 79 y.o. male who  was admitted as a transfer from OSH for Right MCA stroke syndrome. Patient had an urgent thrombectomy s/p  right MCA stroke syndrome. PMH significant for HTN, T2DM and HLD. Patient stated that around 2:30 pm on 12/16/19 he was driving and felt weak. He drove home and was taken to the ER where he was evaluated for a stroke. He had outside CTA which was reported to have R ICA stenosis at bifurcation with 70% stenosis and right MCA occlusion. Patient was transferred to [facility] for possible intervention. Patient had a right femoral sheath placed and his Angio  revealed 90% R ICA stenosis and R M1 occlusion. The R ICA was treated by thrombectomy and carotid stenting with TICI 3 reperfusion.   Patient was admitted to the [facility] for higher level of care post thrombectomy.         Hospital Course: 12/17/19: Patient admitted to [facility] for higher level of care s/p thrombectomy and right carotid artery stenting with TICI 3 reperfusion  12/18/2019Recent MRI with heme transformation abg Q8, repeat scan stable, mannitol  12/19/2019 intubation repeat CTH  12/20: febrile-broaden abx coverage, SBT, add hydralazine 50 q8h, KUB and mag citrate  12/21: wean FiO2, d/c hydralazine, wean fentanyl        Review of Systems  Unable to obtain a complete ROS due to level of consciousness.  Objective:     Vitals:  Temp: 99.7 °F (37.6 °C)  Pulse: 96  Rhythm: sinus tachycardia  BP: 132/61  MAP (mmHg): 88  Resp: 16  SpO2: 96 %  Oxygen Concentration (%): 50  O2 Device (Oxygen Therapy): ventilator  Vent Mode: SIMV  Set Rate: 16 bmp  Pressure Support: 10 cmH20  PEEP/CPAP: 7 cmH20  Peak Airway Pressure: 22 cmH2O  Mean Airway Pressure: 12 cmH20  Plateau Pressure: 0  cmH20    Temp  Min: 98.8 °F (37.1 °C)  Max: 99.7 °F (37.6 °C)  Pulse  Min: 89  Max: 115  BP  Min: 111/56  Max: 137/63  MAP (mmHg)  Min: 77  Max: 107  Resp  Min: 13  Max: 29  SpO2  Min: 93 %  Max: 99 %  Oxygen Concentration (%)  Min: 50  Max: 70    12/20 0701 - 12/21 0700  In: 3583.6 [I.V.:1358.6]  Out: 990 [Urine:960]   Unmeasured Output  Stool Occurrence: 0       Physical Exam  GA: comfortable, no acute distress.   HEENT: No scleral icterus or JVD.   Pulmonary: Clear to auscultation A/L.   Cardiac: RRR S1 & S2 w/o rubs/murmurs/gallops.   Abdominal: Bowel sounds present x 4. No appreciable hepatosplenomegaly.  Skin: No jaundice, rashes, or visible lesions.  Neuro:  --GCS: E3T1 M6  --Mental Status:  Opens eyes to voice and stimulation, follows simple commands, nods appropriately to qts,   --CN II-XII grossly intact.   --Pupils 3mm, PERRL.   --Corneal reflex, gag, cough intact.  --LUE  No movement  --RUE spont  --LLE withdraws  --RLE spont    Medications:  Continuous  sodium chloride 0.9% Last Rate: 50 mL/hr at 12/21/19 1501   fentanyl Last Rate: 2.5 mL/hr at 12/21/19 1501   Scheduled  albuterol-ipratropium 3 mL Q4H   amLODIPine 10 mg Daily   amoxicillin-clavulanate 875-125mg 1 tablet Q12H   atorvastatin 80 mg QHS   chlorhexidine 15 mL BID   famotidine 20 mg BID   levetiracetam oral soln 500 mg BID   PRN  acetaminophen 650 mg Q6H PRN   Dextrose 10% Bolus 12.5 g PRN   Dextrose 10% Bolus 25 g PRN   fentaNYL 50 mcg Q2H PRN   glucagon (human recombinant) 1 mg PRN   hydrALAZINE 10 mg Q6H PRN   insulin aspart U-100 1-10 Units Q4H PRN   labetalol 10 mg Q4H PRN   magnesium oxide 800 mg PRN   magnesium oxide 800 mg PRN   ondansetron 4 mg Q6H PRN   potassium chloride 10% 40 mEq PRN   potassium chloride 10% 40 mEq PRN   potassium chloride 10% 60 mEq PRN   potassium, sodium phosphates 2 packet PRN   potassium, sodium phosphates 2 packet PRN   potassium, sodium phosphates 2 packet PRN   sodium chloride 0.9% 10 mL PRN     Today I  personally reviewed pertinent medications, lines/drains/airways, imaging, laboratory results,     Diet  No diet orders on file        Assessment/Plan:     Neuro  * Embolic stroke involving right middle cerebral artery   - 80 y/o male admitted for R MCA stroke syndrome POD 1 s/p Thrombectomy TICI 3 reperfusion   - Neurochecks q1hr    -  SBP < 140  Discontinue ASA/Plavix d/t hem transformation  F/U CTH 2/17 stable  2/18 CTH stable           Cytotoxic brain edema  2/2 R MCA  Neuro checks Q 1 hr  Maintain eunatremia  Maintain Euglyemia    Midline shift of brain  2/2 IPH as noted on MRI imaging: mass effect on the right lateral ventricle and approximately 5 mm right-to-left midline shift  Continue Neuro checks Q1 hr    Vasogenic brain edema  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 ASA and Plavix initiated  12/17 MRI notes: intraparenchymal hemorrhage within the right frontal and temporal lobes with associated vasogenic edema resulting in mass effect on the right lateral ventricle and approximately 5 mm right-to-left midline shift.  12/17 ASA Plavix discontinued  Vascular Stroke following  NSGY consulted  12/16 2% initiated to maintain Na 145-155  Na Q6 (disc. Grouped into BMP)  Mannitol x1  12/17 changed 2% to 3%  Q6 Na changed to BMP Q6, Serum Os  12/20: off HTS  12/21: d/c hydralazine po and off HTS, na goal    Cerebrovascular accident (CVA)  See embolic stroke above    Pulmonary  Acute respiratory failure with hypoxia  Encounter for intubation d/t increased somnolence.   CXR daily  ABG Daily  Famotidine  chlorhexidine     Cardiac/Vascular  Mixed hyperlipidemia   - Continue Atorvastatin 80mg HS      Essential  hypertension  Continuous Cardiac Monitoring  Vital Signs Q1 hour  Systolic (24hrs), Av , Min:111 , Max:137   Cardene to Maintain SBP < 140  CXR  Lungs hypoaerated.  Increase in the pulmonary vascular interstitial and alveolar markings Findings most consistent with congestive failure   ECHO 65%  EKG : Atrial fibrillation with rapid ventricular response with premature ventricular or aberrantly conducted complexes  : add hydralazine  : D/C hydralalzyne            Endocrine  Type 2 diabetes mellitus with neurologic complication, without long-term current use of insulin  A1c 7.7  RISS   POCT Q4  Nutritional Consult for dietary /caloric needs  Start TF  -see management for R MCA/IPH             GI  Constipation  --KUB  --mag citrate  : effective bowel regimen     Other  Fever due to infection  Possible community acquired infection  Wet Raspy cough  T Max 102.0  Tylenol  Cooling Powder[place]  Pan culture  Continue Azithromycin and Rocephin  US Extremities/ Soft Tissue of Neck  Amylase/Lipase  : de-eschalated abx today on augmentin     Decreased strength, endurance, and mobility  2/2 LS Weakness R/T R MCA  PT/OT          The patient is being Prophylaxed for:  Venous Thromboembolism with: Mechanical  Stress Ulcer with: H2B  Ventilator Pneumonia with: chlorhexidine oral care    Activity Orders          None        Full Code    Anna Simon NP  Neurocritical Care  [facility]

## 2019-12-21 NOTE — SUBJECTIVE & OBJECTIVE
Review of Systems  Unable to obtain a complete ROS due to level of consciousness.  Objective:     Vitals:  Temp: 99.7 °F (37.6 °C)  Pulse: 96  Rhythm: sinus tachycardia  BP: 132/61  MAP (mmHg): 88  Resp: 16  SpO2: 96 %  Oxygen Concentration (%): 50  O2 Device (Oxygen Therapy): ventilator  Vent Mode: SIMV  Set Rate: 16 bmp  Pressure Support: 10 cmH20  PEEP/CPAP: 7 cmH20  Peak Airway Pressure: 22 cmH2O  Mean Airway Pressure: 12 cmH20  Plateau Pressure: 0 cmH20    Temp  Min: 98.8 °F (37.1 °C)  Max: 99.7 °F (37.6 °C)  Pulse  Min: 89  Max: 115  BP  Min: 111/56  Max: 137/63  MAP (mmHg)  Min: 77  Max: 107  Resp  Min: 13  Max: 29  SpO2  Min: 93 %  Max: 99 %  Oxygen Concentration (%)  Min: 50  Max: 70    12/20 0701 - 12/21 0700  In: 3583.6 [I.V.:1358.6]  Out: 990 [Urine:960]   Unmeasured Output  Stool Occurrence: 0       Physical Exam  GA: comfortable, no acute distress.   HEENT: No scleral icterus or JVD.   Pulmonary: Clear to auscultation A/L.   Cardiac: RRR S1 & S2 w/o rubs/murmurs/gallops.   Abdominal: Bowel sounds present x 4. No appreciable hepatosplenomegaly.  Skin: No jaundice, rashes, or visible lesions.  Neuro:  --GCS: E3T1 M6  --Mental Status:  Opens eyes to voice and stimulation, follows simple commands, nods appropriately to qts,   --CN II-XII grossly intact.   --Pupils 3mm, PERRL.   --Corneal reflex, gag, cough intact.  --LUE  No movement  --RUE spont  --LLE withdraws  --RLE spont    Medications:  Continuous  sodium chloride 0.9% Last Rate: 50 mL/hr at 12/21/19 1501   fentanyl Last Rate: 2.5 mL/hr at 12/21/19 1501   Scheduled  albuterol-ipratropium 3 mL Q4H   amLODIPine 10 mg Daily   amoxicillin-clavulanate 875-125mg 1 tablet Q12H   atorvastatin 80 mg QHS   chlorhexidine 15 mL BID   famotidine 20 mg BID   levetiracetam oral soln 500 mg BID   PRN  acetaminophen 650 mg Q6H PRN   Dextrose 10% Bolus 12.5 g PRN   Dextrose 10% Bolus 25 g PRN   fentaNYL 50 mcg Q2H PRN   glucagon (human recombinant) 1 mg PRN    hydrALAZINE 10 mg Q6H PRN   insulin aspart U-100 1-10 Units Q4H PRN   labetalol 10 mg Q4H PRN   magnesium oxide 800 mg PRN   magnesium oxide 800 mg PRN   ondansetron 4 mg Q6H PRN   potassium chloride 10% 40 mEq PRN   potassium chloride 10% 40 mEq PRN   potassium chloride 10% 60 mEq PRN   potassium, sodium phosphates 2 packet PRN   potassium, sodium phosphates 2 packet PRN   potassium, sodium phosphates 2 packet PRN   sodium chloride 0.9% 10 mL PRN     Today I personally reviewed pertinent medications, lines/drains/airways, imaging, laboratory results,     Diet  No diet orders on file

## 2019-12-21 NOTE — PLAN OF CARE
POC reviewed with pt and spouse at 0500. Spouse verbalized understanding. Questions and concerns addressed. No acute events overnight. Pt had large liquid BM beginning at 0500. Flexi placed to manage output. Pt progressing toward goals. Will continue to monitor. See flowsheets for full assessment and VS info

## 2019-12-21 NOTE — ASSESSMENT & PLAN NOTE
- 78 y/o male admitted for R MCA stroke syndrome POD 1 s/p Thrombectomy TICI 3 reperfusion   - Neurochecks q1hr    -  SBP < 140  Discontinue ASA/Plavix d/t hem transformation  F/U CTH 2/17 stable  2/18 CTH stable

## 2019-12-21 NOTE — CONSULTS
Vancomycin therapy discontinued by provider based on culture results.  Pharmacy will sign off, please re-consult as needed.    Pily Douglass, PharmD, BCCCP  Neurocritical Care Clinical Pharmacist  Spectralink: [id number]

## 2019-12-21 NOTE — ASSESSMENT & PLAN NOTE
Continuous Cardiac Monitoring  Vital Signs Q1 hour  Systolic (24hrs), Av , Min:111 , Max:137   Cardene to Maintain SBP < 140  CXR  Lungs hypoaerated.  Increase in the pulmonary vascular interstitial and alveolar markings Findings most consistent with congestive failure   ECHO 65%  EKG : Atrial fibrillation with rapid ventricular response with premature ventricular or aberrantly conducted complexes  : add hydralazine  : D/C hydralalzyne

## 2019-12-22 LAB
ALBUMIN SERPL BCP-MCNC: 2.4 G/DL (ref 3.5–5.2)
ALLENS TEST: ABNORMAL
ALP SERPL-CCNC: 112 U/L (ref 55–135)
ALT SERPL W/O P-5'-P-CCNC: 70 U/L (ref 10–44)
ANION GAP SERPL CALC-SCNC: 6 MMOL/L (ref 8–16)
AST SERPL-CCNC: 76 U/L (ref 10–40)
BASOPHILS # BLD AUTO: 0.05 K/UL (ref 0–0.2)
BASOPHILS NFR BLD: 0.7 % (ref 0–1.9)
BILIRUB SERPL-MCNC: 0.7 MG/DL (ref 0.1–1)
BUN SERPL-MCNC: 33 MG/DL (ref 8–23)
CALCIUM SERPL-MCNC: 7.9 MG/DL (ref 8.7–10.5)
CHLORIDE SERPL-SCNC: 122 MMOL/L (ref 95–110)
CO2 SERPL-SCNC: 29 MMOL/L (ref 23–29)
CREAT SERPL-MCNC: 0.8 MG/DL (ref 0.5–1.4)
DELSYS: ABNORMAL
DIFFERENTIAL METHOD: ABNORMAL
EOSINOPHIL # BLD AUTO: 0.3 K/UL (ref 0–0.5)
EOSINOPHIL NFR BLD: 4.6 % (ref 0–8)
ERYTHROCYTE [DISTWIDTH] IN BLOOD BY AUTOMATED COUNT: 13.8 % (ref 11.5–14.5)
ERYTHROCYTE [SEDIMENTATION RATE] IN BLOOD BY WESTERGREN METHOD: 16 MM/H
EST. GFR  (AFRICAN AMERICAN): >60 ML/MIN/1.73 M^2
EST. GFR  (NON AFRICAN AMERICAN): >60 ML/MIN/1.73 M^2
FIO2: 50
GLUCOSE SERPL-MCNC: 226 MG/DL (ref 70–110)
HCO3 UR-SCNC: 31.8 MMOL/L (ref 24–28)
HCT VFR BLD AUTO: 38.4 % (ref 40–54)
HGB BLD-MCNC: 12.2 G/DL (ref 14–18)
IMM GRANULOCYTES # BLD AUTO: 0.06 K/UL (ref 0–0.04)
IMM GRANULOCYTES NFR BLD AUTO: 0.9 % (ref 0–0.5)
LYMPHOCYTES # BLD AUTO: 1.6 K/UL (ref 1–4.8)
LYMPHOCYTES NFR BLD: 22.9 % (ref 18–48)
MAGNESIUM SERPL-MCNC: 2.8 MG/DL (ref 1.6–2.6)
MCH RBC QN AUTO: 32.6 PG (ref 27–31)
MCHC RBC AUTO-ENTMCNC: 31.8 G/DL (ref 32–36)
MCV RBC AUTO: 103 FL (ref 82–98)
MODE: ABNORMAL
MONOCYTES # BLD AUTO: 0.8 K/UL (ref 0.3–1)
MONOCYTES NFR BLD: 11 % (ref 4–15)
NEUTROPHILS # BLD AUTO: 4.2 K/UL (ref 1.8–7.7)
NEUTROPHILS NFR BLD: 59.9 % (ref 38–73)
NRBC BLD-RTO: 0 /100 WBC
PCO2 BLDA: 39.6 MMHG (ref 35–45)
PEEP: 7
PH SMN: 7.51 [PH] (ref 7.35–7.45)
PHOSPHATE SERPL-MCNC: 3.1 MG/DL (ref 2.7–4.5)
PIP: 15
PLATELET # BLD AUTO: 163 K/UL (ref 150–350)
PMV BLD AUTO: 10.5 FL (ref 9.2–12.9)
PO2 BLDA: 68 MMHG (ref 80–100)
POC BE: 9 MMOL/L
POC SATURATED O2: 95 % (ref 95–100)
POC TCO2: 33 MMOL/L (ref 23–27)
POCT GLUCOSE: 168 MG/DL (ref 70–110)
POCT GLUCOSE: 183 MG/DL (ref 70–110)
POCT GLUCOSE: 185 MG/DL (ref 70–110)
POCT GLUCOSE: 192 MG/DL (ref 70–110)
POCT GLUCOSE: 206 MG/DL (ref 70–110)
POCT GLUCOSE: 207 MG/DL (ref 70–110)
POTASSIUM SERPL-SCNC: 4.1 MMOL/L (ref 3.5–5.1)
PROT SERPL-MCNC: 6.1 G/DL (ref 6–8.4)
PS: 10
RBC # BLD AUTO: 3.74 M/UL (ref 4.6–6.2)
SAMPLE: ABNORMAL
SITE: ABNORMAL
SODIUM SERPL-SCNC: 156 MMOL/L (ref 136–145)
SODIUM SERPL-SCNC: 157 MMOL/L (ref 136–145)
SP02: 96
WBC # BLD AUTO: 6.99 K/UL (ref 3.9–12.7)

## 2019-12-22 PROCEDURE — 63600175 PHARM REV CODE 636 W HCPCS: Performed by: NURSE PRACTITIONER

## 2019-12-22 PROCEDURE — 25000003 PHARM REV CODE 250: Performed by: PSYCHIATRY & NEUROLOGY

## 2019-12-22 PROCEDURE — 20000000 HC ICU ROOM

## 2019-12-22 PROCEDURE — 25000003 PHARM REV CODE 250: Performed by: STUDENT IN AN ORGANIZED HEALTH CARE EDUCATION/TRAINING PROGRAM

## 2019-12-22 PROCEDURE — 84295 ASSAY OF SERUM SODIUM: CPT | Mod: 91

## 2019-12-22 PROCEDURE — 80053 COMPREHEN METABOLIC PANEL: CPT

## 2019-12-22 PROCEDURE — 25000242 PHARM REV CODE 250 ALT 637 W/ HCPCS: Performed by: INTERNAL MEDICINE

## 2019-12-22 PROCEDURE — 83735 ASSAY OF MAGNESIUM: CPT

## 2019-12-22 PROCEDURE — 94003 VENT MGMT INPAT SUBQ DAY: CPT

## 2019-12-22 PROCEDURE — 99291 PR CRITICAL CARE, E/M 30-74 MINUTES: ICD-10-PCS | Mod: ,,, | Performed by: NURSE PRACTITIONER

## 2019-12-22 PROCEDURE — 25000003 PHARM REV CODE 250: Performed by: NURSE PRACTITIONER

## 2019-12-22 PROCEDURE — 94761 N-INVAS EAR/PLS OXIMETRY MLT: CPT

## 2019-12-22 PROCEDURE — 99291 CRITICAL CARE FIRST HOUR: CPT | Mod: ,,, | Performed by: NURSE PRACTITIONER

## 2019-12-22 PROCEDURE — 94668 MNPJ CHEST WALL SBSQ: CPT

## 2019-12-22 PROCEDURE — 99900026 HC AIRWAY MAINTENANCE (STAT)

## 2019-12-22 PROCEDURE — 84100 ASSAY OF PHOSPHORUS: CPT

## 2019-12-22 PROCEDURE — 85025 COMPLETE CBC W/AUTO DIFF WBC: CPT

## 2019-12-22 PROCEDURE — 94640 AIRWAY INHALATION TREATMENT: CPT

## 2019-12-22 PROCEDURE — 99900035 HC TECH TIME PER 15 MIN (STAT)

## 2019-12-22 PROCEDURE — 27000221 HC OXYGEN, UP TO 24 HOURS

## 2019-12-22 PROCEDURE — 37799 UNLISTED PX VASCULAR SURGERY: CPT

## 2019-12-22 RX ORDER — LABETALOL HCL 20 MG/4 ML
15 SYRINGE (ML) INTRAVENOUS ONCE
Status: COMPLETED | OUTPATIENT
Start: 2019-12-22 | End: 2019-12-22

## 2019-12-22 RX ORDER — CLOPIDOGREL BISULFATE 75 MG/1
75 TABLET ORAL DAILY
Status: DISCONTINUED | OUTPATIENT
Start: 2019-12-23 | End: 2019-12-30

## 2019-12-22 RX ORDER — FUROSEMIDE 10 MG/ML
20 INJECTION INTRAMUSCULAR; INTRAVENOUS ONCE
Status: COMPLETED | OUTPATIENT
Start: 2019-12-22 | End: 2019-12-22

## 2019-12-22 RX ORDER — NICARDIPINE HYDROCHLORIDE 0.2 MG/ML
2.5 INJECTION INTRAVENOUS CONTINUOUS
Status: DISCONTINUED | OUTPATIENT
Start: 2019-12-22 | End: 2019-12-26

## 2019-12-22 RX ADMIN — AMOXICILLIN AND CLAVULANATE POTASSIUM 1 TABLET: 875; 125 TABLET, FILM COATED ORAL at 09:12

## 2019-12-22 RX ADMIN — IPRATROPIUM BROMIDE AND ALBUTEROL SULFATE 3 ML: .5; 3 SOLUTION RESPIRATORY (INHALATION) at 07:12

## 2019-12-22 RX ADMIN — CHLORHEXIDINE GLUCONATE 0.12% ORAL RINSE 15 ML: 1.2 LIQUID ORAL at 09:12

## 2019-12-22 RX ADMIN — ACETAMINOPHEN 650 MG: 325 TABLET ORAL at 02:12

## 2019-12-22 RX ADMIN — HYDRALAZINE HYDROCHLORIDE 10 MG: 20 INJECTION INTRAMUSCULAR; INTRAVENOUS at 07:12

## 2019-12-22 RX ADMIN — LABETALOL HCL IV SOLN PREFILLED SYRINGE 20 MG/4ML (5 MG/ML) 15 MG: 20/4 SOLUTION PREFILLED SYRINGE at 05:12

## 2019-12-22 RX ADMIN — AMOXICILLIN AND CLAVULANATE POTASSIUM 1 TABLET: 875; 125 TABLET, FILM COATED ORAL at 08:12

## 2019-12-22 RX ADMIN — FUROSEMIDE 20 MG: 10 INJECTION, SOLUTION INTRAMUSCULAR; INTRAVENOUS at 12:12

## 2019-12-22 RX ADMIN — CHLORHEXIDINE GLUCONATE 0.12% ORAL RINSE 15 ML: 1.2 LIQUID ORAL at 08:12

## 2019-12-22 RX ADMIN — NICARDIPINE HYDROCHLORIDE 2.5 MG/HR: 0.2 INJECTION, SOLUTION INTRAVENOUS at 07:12

## 2019-12-22 RX ADMIN — LEVETIRACETAM 500 MG: 100 SOLUTION ORAL at 09:12

## 2019-12-22 RX ADMIN — IPRATROPIUM BROMIDE AND ALBUTEROL SULFATE 3 ML: .5; 3 SOLUTION RESPIRATORY (INHALATION) at 04:12

## 2019-12-22 RX ADMIN — LABETALOL HCL IV SOLN PREFILLED SYRINGE 20 MG/4ML (5 MG/ML) 10 MG: 20/4 SOLUTION PREFILLED SYRINGE at 01:12

## 2019-12-22 RX ADMIN — LEVETIRACETAM 500 MG: 100 SOLUTION ORAL at 08:12

## 2019-12-22 RX ADMIN — IPRATROPIUM BROMIDE AND ALBUTEROL SULFATE 3 ML: .5; 3 SOLUTION RESPIRATORY (INHALATION) at 11:12

## 2019-12-22 RX ADMIN — INSULIN ASPART 2 UNITS: 100 INJECTION, SOLUTION INTRAVENOUS; SUBCUTANEOUS at 09:12

## 2019-12-22 RX ADMIN — LABETALOL HCL IV SOLN PREFILLED SYRINGE 20 MG/4ML (5 MG/ML) 10 MG: 20/4 SOLUTION PREFILLED SYRINGE at 05:12

## 2019-12-22 RX ADMIN — INSULIN ASPART 1 UNITS: 100 INJECTION, SOLUTION INTRAVENOUS; SUBCUTANEOUS at 01:12

## 2019-12-22 RX ADMIN — ATORVASTATIN CALCIUM 80 MG: 20 TABLET, FILM COATED ORAL at 09:12

## 2019-12-22 RX ADMIN — INSULIN ASPART 4 UNITS: 100 INJECTION, SOLUTION INTRAVENOUS; SUBCUTANEOUS at 05:12

## 2019-12-22 RX ADMIN — INSULIN ASPART 2 UNITS: 100 INJECTION, SOLUTION INTRAVENOUS; SUBCUTANEOUS at 12:12

## 2019-12-22 RX ADMIN — FAMOTIDINE 20 MG: 20 TABLET ORAL at 09:12

## 2019-12-22 RX ADMIN — HYDRALAZINE HYDROCHLORIDE 10 MG: 20 INJECTION INTRAMUSCULAR; INTRAVENOUS at 03:12

## 2019-12-22 RX ADMIN — FAMOTIDINE 20 MG: 20 TABLET ORAL at 08:12

## 2019-12-22 RX ADMIN — AMLODIPINE BESYLATE 10 MG: 10 TABLET ORAL at 08:12

## 2019-12-22 RX ADMIN — INSULIN ASPART 4 UNITS: 100 INJECTION, SOLUTION INTRAVENOUS; SUBCUTANEOUS at 04:12

## 2019-12-22 NOTE — ASSESSMENT & PLAN NOTE
A1c 7.7  SSI   POCT Q4  Nutritional Consult for dietary /caloric needs  Continue TF  -see management for R MCA/IPH

## 2019-12-22 NOTE — PLAN OF CARE
POC reviewed with pt and family at 0430. Spouse verbalized understanding. Questions and concerns addressed. No acute events overnight. Pt required multiple PRN doses of labetalol and hydralazine to maintain SBP<140. Attempts to wean fentanyl unsuccessful through the night, resulted in constant cough/gag. AM ABGs showed O2 68 NCC team contacted O2% increased to 60% from 50%. Pt progressing toward goals, neuro exam remained consistent through the night. Will continue to monitor. See flowsheets for full assessment and VS info

## 2019-12-22 NOTE — PLAN OF CARE
VS and assessment per flow sheet, patient progressing towards goal as tolerated. Neuro status unchanged per baseline( see flowsheets). Medications given per MD orders. R wrist restraint continued. Trending Na, holding off on water bolus at this time. TF at goal and continued. Weaning Fio2 and peep per pt toleration. Plan of care reviewed with Jerry Linder and family, all concerns addressed. Emotional support provided. Will continue to monitor.

## 2019-12-22 NOTE — SUBJECTIVE & OBJECTIVE
Review of Systems: Unable to obtain a complete ROS due to level of consciousness.     Vitals:   Temp: 97.9 °F (36.6 °C)  Pulse: 84  Rhythm: sinus tachycardia  BP: 138/65  MAP (mmHg): 93  Resp: 19  SpO2: 95 %  Oxygen Concentration (%): 60  O2 Device (Oxygen Therapy): ventilator  Vent Mode: SIMV  Set Rate: 16 bmp  Pressure Support: 10 cmH20  PEEP/CPAP: 7 cmH20  Peak Airway Pressure: 22 cmH2O  Mean Airway Pressure: 11 cmH20  Plateau Pressure: 0 cmH20    Temp  Min: 97.9 °F (36.6 °C)  Max: 99.7 °F (37.6 °C)  Pulse  Min: 79  Max: 112  BP  Min: 119/60  Max: 170/57  MAP (mmHg)  Min: 84  Max: 101  Resp  Min: 12  Max: 81  SpO2  Min: 95 %  Max: 99 %  Oxygen Concentration (%)  Min: 50  Max: 60    12/21 0701 - 12/22 0700  In: 2490.4 [I.V.:1095.4]  Out: 2660 [Urine:1730]   Unmeasured Output  Stool Occurrence: 0     Examination:   Constitutional: Well-nourished and -developed. No apparent distress.   Eyes: Conjunctiva clear, anicteric. Lids no lesions.  Head/Ears/Nose/Mouth/Throat/Neck: Moist mucous membranes. External ears, nose atraumatic.   Cardiovascular: Regular rhythm. No murmurs. No leg edema.  Respiratory: Comfortable respirations. Clear to auscultation.  Gastrointestinal: No hernia. Soft, nondistended, nontender. + bowel sounds.    Neurologic:  -GCS E 3 V 1 M 6  -Opens eyes to voice. Follows simple commands. Nods appropriately.  -Cranial nerves: EOM intact, PERRL, no facial droop, +cough  -Motor: No movement to LUE, RUE/RLE spontaneous and antigravity, LLE withdraws to noxious stimuli  Unable to test orientation, language, memory, judgment, insight, fund of knowledge, hearing, shoulder shrug, coordination, due to level of consciousness.    Medications:   Continuous  fentanyl Last Rate: 1.3 mL/hr at 12/22/19 1100   Scheduled  albuterol-ipratropium 3 mL Q4H   amLODIPine 10 mg Daily   amoxicillin-clavulanate 875-125mg 1 tablet Q12H   atorvastatin 80 mg QHS   chlorhexidine 15 mL BID   famotidine 20 mg BID   furosemide 20 mg  Once   levetiracetam oral soln 500 mg BID   PRN  acetaminophen 650 mg Q6H PRN   Dextrose 10% Bolus 12.5 g PRN   Dextrose 10% Bolus 25 g PRN   fentaNYL 50 mcg Q2H PRN   glucagon (human recombinant) 1 mg PRN   hydrALAZINE 10 mg Q6H PRN   insulin aspart U-100 1-10 Units Q4H PRN   labetalol 10 mg Q4H PRN   magnesium oxide 800 mg PRN   magnesium oxide 800 mg PRN   ondansetron 4 mg Q6H PRN   potassium chloride 10% 40 mEq PRN   potassium chloride 10% 40 mEq PRN   potassium chloride 10% 60 mEq PRN   potassium, sodium phosphates 2 packet PRN   potassium, sodium phosphates 2 packet PRN   potassium, sodium phosphates 2 packet PRN   sodium chloride 0.9% 10 mL PRN      Today I independently reviewed pertinent medications, lines/drains/airways, imaging, cardiology results, laboratory results, notably:     ISTAT: Recent Labs   Lab 12/22/19  0437   PH 7.513*   PCO2 39.6   PO2 68*   POCSATURATED 95   HCO3 31.8*   BE 9   POCTCO2 33*   SAMPLE ARTERIAL      Chem: Recent Labs   Lab 12/22/19 0115 12/22/19  1119   *  157*   < > 156*   K 4.1  --   --    *  --   --    CO2 29  --   --    *  --   --    BUN 33*  --   --    CREATININE 0.8  --   --    ESTGFRAFRICA >60.0  --   --    EGFRNONAA >60.0  --   --    CALCIUM 7.9*  --   --    MG 2.8*  --   --    PHOS 3.1  --   --    ANIONGAP 6*  --   --    PROT 6.1  --   --    ALBUMIN 2.4*  --   --    BILITOT 0.7  --   --    ALKPHOS 112  --   --    AST 76*  --   --    ALT 70*  --   --     < > = values in this interval not displayed.     Heme: Recent Labs   Lab 12/22/19 0115   WBC 6.99   HGB 12.2*   HCT 38.4*        Endo:   Recent Labs   Lab 12/22/19  0142 12/22/19  0558 12/22/19  0950   POCTGLUCOSE 168* 207* 183*

## 2019-12-22 NOTE — NURSING
Pt transported to and from [facility] CT head via bed on portable telemetry, fent gtt, and portable Ventalilator with RNx2, and RT. VSS. Pt tolerated transfer well and returned to room 9079

## 2019-12-22 NOTE — PLAN OF CARE
No acute events throughout the day, VS and assessment per flow sheet, patient progressing towards goal as tolerated. Neuro status unchanged per baseline. Follow up CT head performed. Medications given per MD orders. Cooling blanket on and off to keep pt normothermic. TF at goal and continued. Weaning O2 per pt toleration, laisx administered x1 dose. Fent gtt continued. R wrist restraint in place. CVP monitoring initiated.Plan of care reviewed with Jerry Linder and spouse, all concerns addressed. Emotional support provided. Will continue to monitor.

## 2019-12-22 NOTE — ASSESSMENT & PLAN NOTE
Encounter for intubation d/t increased somnolence.   CXR daily  ABG Daily  Famotidine  Chlorhexidine    Vent Mode: SIMV  Oxygen Concentration (%):  [50-60] 60  Resp Rate Total:  [16 br/min-36 br/min] 16 br/min  PEEP/CPAP:  [7 cmH20] 7 cmH20  Pressure Support:  [10 cmH20] 10 cmH20  Mean Airway Pressure:  [11 kfX95-76 cmH20] 11 cmH20

## 2019-12-22 NOTE — PLAN OF CARE
Neuro exam stable  Weaning fentanyl  DC IVFs and start 200 cc FW q6, goal 148-150  CVP  Lasix 20mg IV  CTH to evaluate hemorrhage then likely start plavix

## 2019-12-22 NOTE — ASSESSMENT & PLAN NOTE
Continuous Cardiac Monitoring  Vital Signs Q1 hour  Cardene to Maintain SBP < 140  CXR 12/16 Lungs hypoaerated.  Increase in the pulmonary vascular interstitial and alveolar markings Findings most consistent with congestive failure   ECHO 65%  EKG 12/16: Atrial fibrillation with rapid ventricular response with premature ventricular or aberrantly conducted complexes  12/20: add hydralazine  12/21: D/C hydralazine  12/22: Stable, Cardene gtt off

## 2019-12-22 NOTE — ASSESSMENT & PLAN NOTE
Possible community acquired infection  Wet Raspy cough  T Max 102.0  Tylenol  Cooling [place]  Pan culture  Continue Azithromycin and Rocephin  US Extremities/ Soft Tissue of Neck  Amylase/Lipase  12/21: de-eschalated abx today on augmentin  12/22: Resolved, Continue augmentin

## 2019-12-22 NOTE — ASSESSMENT & PLAN NOTE
12/16 Patient s/p thrombectomy with R ICA stent placement. ASA and Plavix initiated  12/17 MRI notes: intraparenchymal hemorrhage within the right frontal and temporal lobes with associated vasogenic edema resulting in mass effect on the right lateral ventricle and approximately 5 mm right-to-left midline shift.  12/17 ASA Plavix discontinued  Vascular Stroke following  NSGY consulted  12/16 2% initiated to maintain Na 145-155  Na Q6 (disc. Grouped into BMP)  Mannitol x1  12/17 changed 2% to 3%  Q6 Na changed to BMP Q6, Serum Os  12/20: off HTS  12/21: d/c hydralazine po and off HTS, na goal  12/22: Lasix 20mg given, will monitor UOP

## 2019-12-22 NOTE — PROGRESS NOTES
[facility]  Neurocritical Care  Progress Note    Admit Date: 12/16/2019  Service Date: 12/22/2019  Length of Stay: 6    Subjective:     Chief Complaint: Embolic stroke involving right middle cerebral artery    History of Present Illness: The patient is a 79 y.o. male who  was admitted as a transfer from OSH for Right MCA stroke syndrome. Patient had an urgent thrombectomy s/p  right MCA stroke syndrome. PMH significant for HTN, T2DM and HLD. Patient stated that around 2:30 pm on 12/16/19 he was driving and felt weak. He drove home and was taken to the ER where he was evaluated for a stroke. He had outside CTA which was reported to have R ICA stenosis at bifurcation with 70% stenosis and right MCA occlusion. [place] [facility] Patient had a right femoral sheath placed and his Angio  revealed 90% R ICA stenosis and R M1 occlusion. The R ICA was treated by thrombectomy and carotid stenting with TICI 3 reperfusion.   Patient was admitted to the [facility] for higher level of care post thrombectomy.    Hospital Course: 12/17/19: Patient admitted to [facility] for higher level of care s/p thrombectomy and right carotid artery stenting with TICI 3 reperfusion  12/18/2019Recent MRI with heme transformation abg Q8, repeat scan stable, mannitol  12/19/2019 intubation repeat CTH  12/20: febrile-broaden abx coverage, SBT, add hydralazine 50 q8h, KUB and mag citrate  12/21: wean FiO2, d/c hydralazine, wean fentanyl  12/22/2019: Patient stable overnight. Lasix 20mg x 1 given today. Enteral water flushes started for hypernatremia. Will obtain CTH, if stable will restart Plavix.    Review of Systems: Unable to obtain a complete ROS due to level of consciousness.     Vitals:   Temp: 97.9 °F (36.6 °C)  Pulse: 84  Rhythm: sinus tachycardia  BP: 138/65  MAP (mmHg): 93  Resp: 19  SpO2: 95 %  Oxygen Concentration (%): 60  O2 Device (Oxygen Therapy): ventilator  Vent Mode: SIMV  Set Rate: 16  bmp  Pressure Support: 10 cmH20  PEEP/CPAP: 7 cmH20  Peak Airway Pressure: 22 cmH2O  Mean Airway Pressure: 11 cmH20  Plateau Pressure: 0 cmH20    Temp  Min: 97.9 °F (36.6 °C)  Max: 99.7 °F (37.6 °C)  Pulse  Min: 79  Max: 112  BP  Min: 119/60  Max: 170/57  MAP (mmHg)  Min: 84  Max: 101  Resp  Min: 12  Max: 81  SpO2  Min: 95 %  Max: 99 %  Oxygen Concentration (%)  Min: 50  Max: 60    12/21 0701 - 12/22 0700  In: 2490.4 [I.V.:1095.4]  Out: 2660 [Urine:1730]   Unmeasured Output  Stool Occurrence: 0     Examination:   Constitutional: Well-nourished and -developed. No apparent distress.   Eyes: Conjunctiva clear, anicteric. Lids no lesions.  Head/Ears/Nose/Mouth/Throat/Neck: Moist mucous membranes. External ears, nose atraumatic.   Cardiovascular: Regular rhythm. No murmurs. No leg edema.  Respiratory: Comfortable respirations. Clear to auscultation.  Gastrointestinal: No hernia. Soft, nondistended, nontender. + bowel sounds.    Neurologic:  -GCS E 3 V 1 M 6  -Opens eyes to voice. Follows simple commands. Nods appropriately.  -Cranial nerves: EOM intact, PERRL, no facial droop, +cough  -Motor: No movement to LUE, RUE/RLE spontaneous and antigravity, LLE withdraws to noxious stimuli  Unable to test orientation, language, memory, judgment, insight, fund of knowledge, hearing, shoulder shrug, coordination, due to level of consciousness.    Medications:   Continuous  fentanyl Last Rate: 1.3 mL/hr at 12/22/19 1100   Scheduled  albuterol-ipratropium 3 mL Q4H   amLODIPine 10 mg Daily   amoxicillin-clavulanate 875-125mg 1 tablet Q12H   atorvastatin 80 mg QHS   chlorhexidine 15 mL BID   famotidine 20 mg BID   furosemide 20 mg Once   levetiracetam oral soln 500 mg BID   PRN  acetaminophen 650 mg Q6H PRN   Dextrose 10% Bolus 12.5 g PRN   Dextrose 10% Bolus 25 g PRN   fentaNYL 50 mcg Q2H PRN   glucagon (human recombinant) 1 mg PRN   hydrALAZINE 10 mg Q6H PRN   insulin aspart U-100 1-10 Units Q4H PRN   labetalol 10 mg Q4H PRN    magnesium oxide 800 mg PRN   magnesium oxide 800 mg PRN   ondansetron 4 mg Q6H PRN   potassium chloride 10% 40 mEq PRN   potassium chloride 10% 40 mEq PRN   potassium chloride 10% 60 mEq PRN   potassium, sodium phosphates 2 packet PRN   potassium, sodium phosphates 2 packet PRN   potassium, sodium phosphates 2 packet PRN   sodium chloride 0.9% 10 mL PRN      Today I independently reviewed pertinent medications, lines/drains/airways, imaging, cardiology results, laboratory results, notably:     ISTAT: Recent Labs   Lab 12/22/19  0437   PH 7.513*   PCO2 39.6   PO2 68*   POCSATURATED 95   HCO3 31.8*   BE 9   POCTCO2 33*   SAMPLE ARTERIAL      Chem: Recent Labs   Lab 12/22/19  0115 12/22/19  1119   *  157*   < > 156*   K 4.1  --   --    *  --   --    CO2 29  --   --    *  --   --    BUN 33*  --   --    CREATININE 0.8  --   --    ESTGFRAFRICA >60.0  --   --    EGFRNONAA >60.0  --   --    CALCIUM 7.9*  --   --    MG 2.8*  --   --    PHOS 3.1  --   --    ANIONGAP 6*  --   --    PROT 6.1  --   --    ALBUMIN 2.4*  --   --    BILITOT 0.7  --   --    ALKPHOS 112  --   --    AST 76*  --   --    ALT 70*  --   --     < > = values in this interval not displayed.     Heme: Recent Labs   Lab 12/22/19 0115   WBC 6.99   HGB 12.2*   HCT 38.4*        Endo:   Recent Labs   Lab 12/22/19  0142 12/22/19  0558 12/22/19  0950   POCTGLUCOSE 168* 207* 183*          Assessment/Plan:     Neuro  * Embolic stroke involving right middle cerebral artery  - 78 y/o male admitted for R MCA stroke syndrome s/p Thrombectomy TICI 3 reperfusion  - Neurochecks q1hr  - Vascular Neurology following  - SBP Goal < 140  - PT/OT/SLP  - Mechanical SCDs  - Atorvastatin ordered  Discontinue ASA/Plavix d/t hem transformation  F/U CTH 12/17 stable  12/19 CTH stable    Cytotoxic brain edema  2/2 R MCA  Neuro checks Q 1 hr  Maintain eunatremia  Maintain Euglyemia    Midline shift of brain  2/2 IPH as noted on MRI imaging: mass effect on the  right lateral ventricle and approximately 5 mm right-to-left midline shift  Continue Neuro checks Q1 hr    Vasogenic brain edema  2/2 IPH as noted on MRI: intraparenchymal hemorrhage within the right frontal and temporal lobes with associated vasogenic edema resulting in mass effect on the right lateral ventricle  Neuro checks Q1 hr    Acute spont intraparenchymal hemorrhage assoc w/ coagulopathy  12/16 Patient s/p thrombectomy with R ICA stent placement. ASA and Plavix initiated  12/17 MRI notes: intraparenchymal hemorrhage within the right frontal and temporal lobes with associated vasogenic edema resulting in mass effect on the right lateral ventricle and approximately 5 mm right-to-left midline shift.  12/17 ASA Plavix discontinued  Vascular Stroke following  NSGY consulted  12/16 2% initiated to maintain Na 145-155  Na Q6 (disc. Grouped into BMP)  Mannitol x1  12/17 changed 2% to 3%  Q6 Na changed to BMP Q6, Serum Os  12/20: off HTS  12/21: d/c hydralazine po and off HTS, na goal  12/22: Stable, will obtain CTH to eval bleed, will restart Plavix if CTH stable    Pulmonary  Acute respiratory failure with hypoxia  Encounter for intubation d/t increased somnolence.   CXR daily  ABG Daily  Famotidine  Chlorhexidine    12/22 Lasix x 1 dose given, will monitor UOP    Vent Mode: SIMV  Oxygen Concentration (%):  [50-60] 60  Resp Rate Total:  [16 br/min-36 br/min] 16 br/min  PEEP/CPAP:  [7 cmH20] 7 cmH20  Pressure Support:  [10 cmH20] 10 cmH20  Mean Airway Pressure:  [11 bxY34-62 cmH20] 11 cmH20    Cardiac/Vascular  Mixed hyperlipidemia  - Continue Atorvastatin 80mg HS    Essential hypertension  Continuous Cardiac Monitoring  Vital Signs Q1 hour  Cardene to Maintain SBP < 140  CXR 12/16 Lungs hypoaerated.  Increase in the pulmonary vascular interstitial and alveolar markings Findings most consistent with congestive failure   ECHO 65%  EKG 12/16: Atrial fibrillation with rapid ventricular response with premature  ventricular or aberrantly conducted complexes  12/20: add hydralazine  12/21: D/C hydralazine  12/22: Stable, Cardene gtt off    Endocrine  Type 2 diabetes mellitus with neurologic complication, without long-term current use of insulin  A1c 7.7  SSI   POCT Q4  Nutritional Consult for dietary /caloric needs  Continue TF  -see management for R MCA/IPH     GI  Constipation  --KUB  --mag citrate  12/21: effective bowel regimen     Other  Fever due to infection  Possible community acquired infection  Wet Raspy cough  T Max 102.0  Tylenol  Cooling [place]  Pan culture  Continue Azithromycin and Rocephin  US Extremities/ Soft Tissue of Neck  Amylase/Lipase  12/21: de-eschalated abx today on augmentin  12/22: Resolved, Continue augmentin    Decreased strength, endurance, and mobility  2/2 LS Weakness R/T R MCA  PT/OT     The patient is being Prophylaxed for:  Venous Thromboembolism with: Mechanical  Stress Ulcer with: H2B  Ventilator Pneumonia with: chlorhexidine oral care    Activity Orders          None        Full Code     CC time spent: 37 minutes    Shahnaz Zhong NP  Neurocritical Care  [facility]

## 2019-12-22 NOTE — ASSESSMENT & PLAN NOTE
- 80 y/o male admitted for R MCA stroke syndrome s/p Thrombectomy TICI 3 reperfusion  - Neurochecks q1hr  - Vascular Neurology following  - SBP Goal < 140  - PT/OT/SLP  - Mechanical SCDs  - Atorvastatin ordered  Discontinue ASA/Plavix d/t hem transformation  F/U CTH 12/17 stable  12/19 CTH stable

## 2019-12-23 LAB
ALBUMIN SERPL BCP-MCNC: 2.2 G/DL (ref 3.5–5.2)
ALLENS TEST: ABNORMAL
ALP SERPL-CCNC: 104 U/L (ref 55–135)
ALT SERPL W/O P-5'-P-CCNC: 58 U/L (ref 10–44)
ANION GAP SERPL CALC-SCNC: 7 MMOL/L (ref 8–16)
AST SERPL-CCNC: 43 U/L (ref 10–40)
BACTERIA BLD CULT: NORMAL
BACTERIA BLD CULT: NORMAL
BASOPHILS # BLD AUTO: 0.04 K/UL (ref 0–0.2)
BASOPHILS NFR BLD: 0.5 % (ref 0–1.9)
BILIRUB SERPL-MCNC: 0.8 MG/DL (ref 0.1–1)
BUN SERPL-MCNC: 30 MG/DL (ref 8–23)
CALCIUM SERPL-MCNC: 8.4 MG/DL (ref 8.7–10.5)
CHLORIDE SERPL-SCNC: 120 MMOL/L (ref 95–110)
CO2 SERPL-SCNC: 27 MMOL/L (ref 23–29)
CREAT SERPL-MCNC: 0.8 MG/DL (ref 0.5–1.4)
DELSYS: ABNORMAL
DIFFERENTIAL METHOD: ABNORMAL
EOSINOPHIL # BLD AUTO: 0.4 K/UL (ref 0–0.5)
EOSINOPHIL NFR BLD: 4.6 % (ref 0–8)
ERYTHROCYTE [DISTWIDTH] IN BLOOD BY AUTOMATED COUNT: 13.6 % (ref 11.5–14.5)
ERYTHROCYTE [SEDIMENTATION RATE] IN BLOOD BY WESTERGREN METHOD: 16 MM/H
EST. GFR  (AFRICAN AMERICAN): >60 ML/MIN/1.73 M^2
EST. GFR  (NON AFRICAN AMERICAN): >60 ML/MIN/1.73 M^2
FIO2: 50
GLUCOSE SERPL-MCNC: 244 MG/DL (ref 70–110)
HCO3 UR-SCNC: 29.7 MMOL/L (ref 24–28)
HCT VFR BLD AUTO: 38.5 % (ref 40–54)
HGB BLD-MCNC: 11.8 G/DL (ref 14–18)
IMM GRANULOCYTES # BLD AUTO: 0.06 K/UL (ref 0–0.04)
IMM GRANULOCYTES NFR BLD AUTO: 0.7 % (ref 0–0.5)
LYMPHOCYTES # BLD AUTO: 2.2 K/UL (ref 1–4.8)
LYMPHOCYTES NFR BLD: 24.9 % (ref 18–48)
MAGNESIUM SERPL-MCNC: 2.6 MG/DL (ref 1.6–2.6)
MCH RBC QN AUTO: 31.7 PG (ref 27–31)
MCHC RBC AUTO-ENTMCNC: 30.6 G/DL (ref 32–36)
MCV RBC AUTO: 104 FL (ref 82–98)
MODE: ABNORMAL
MONOCYTES # BLD AUTO: 0.7 K/UL (ref 0.3–1)
MONOCYTES NFR BLD: 8.5 % (ref 4–15)
NEUTROPHILS # BLD AUTO: 5.3 K/UL (ref 1.8–7.7)
NEUTROPHILS NFR BLD: 60.8 % (ref 38–73)
NRBC BLD-RTO: 0 /100 WBC
PCO2 BLDA: 37.4 MMHG (ref 35–45)
PEEP: 9
PH SMN: 7.51 [PH] (ref 7.35–7.45)
PHOSPHATE SERPL-MCNC: 3.4 MG/DL (ref 2.7–4.5)
PIP: 15
PLATELET # BLD AUTO: 164 K/UL (ref 150–350)
PMV BLD AUTO: 10.9 FL (ref 9.2–12.9)
PO2 BLDA: 72 MMHG (ref 80–100)
POC BE: 7 MMOL/L
POC SATURATED O2: 96 % (ref 95–100)
POC TCO2: 31 MMOL/L (ref 23–27)
POCT GLUCOSE: 197 MG/DL (ref 70–110)
POCT GLUCOSE: 203 MG/DL (ref 70–110)
POCT GLUCOSE: 209 MG/DL (ref 70–110)
POCT GLUCOSE: 212 MG/DL (ref 70–110)
POCT GLUCOSE: 215 MG/DL (ref 70–110)
POCT GLUCOSE: 217 MG/DL (ref 70–110)
POCT GLUCOSE: 240 MG/DL (ref 70–110)
POTASSIUM SERPL-SCNC: 4 MMOL/L (ref 3.5–5.1)
PROT SERPL-MCNC: 5.8 G/DL (ref 6–8.4)
PS: 10
RBC # BLD AUTO: 3.72 M/UL (ref 4.6–6.2)
SAMPLE: ABNORMAL
SITE: ABNORMAL
SODIUM SERPL-SCNC: 145 MMOL/L (ref 136–145)
SODIUM SERPL-SCNC: 153 MMOL/L (ref 136–145)
SODIUM SERPL-SCNC: 154 MMOL/L (ref 136–145)
SODIUM SERPL-SCNC: 154 MMOL/L (ref 136–145)
SP02: 96
WBC # BLD AUTO: 8.66 K/UL (ref 3.9–12.7)

## 2019-12-23 PROCEDURE — 99291 PR CRITICAL CARE, E/M 30-74 MINUTES: ICD-10-PCS | Mod: ,,, | Performed by: NURSE PRACTITIONER

## 2019-12-23 PROCEDURE — 84100 ASSAY OF PHOSPHORUS: CPT

## 2019-12-23 PROCEDURE — 84295 ASSAY OF SERUM SODIUM: CPT | Mod: 91

## 2019-12-23 PROCEDURE — 85025 COMPLETE CBC W/AUTO DIFF WBC: CPT

## 2019-12-23 PROCEDURE — 25000242 PHARM REV CODE 250 ALT 637 W/ HCPCS: Performed by: INTERNAL MEDICINE

## 2019-12-23 PROCEDURE — 94761 N-INVAS EAR/PLS OXIMETRY MLT: CPT

## 2019-12-23 PROCEDURE — 37799 UNLISTED PX VASCULAR SURGERY: CPT

## 2019-12-23 PROCEDURE — 80053 COMPREHEN METABOLIC PANEL: CPT

## 2019-12-23 PROCEDURE — 99900035 HC TECH TIME PER 15 MIN (STAT)

## 2019-12-23 PROCEDURE — 20000000 HC ICU ROOM

## 2019-12-23 PROCEDURE — 82800 BLOOD PH: CPT

## 2019-12-23 PROCEDURE — 94668 MNPJ CHEST WALL SBSQ: CPT

## 2019-12-23 PROCEDURE — 25000003 PHARM REV CODE 250: Performed by: NURSE PRACTITIONER

## 2019-12-23 PROCEDURE — 99900026 HC AIRWAY MAINTENANCE (STAT)

## 2019-12-23 PROCEDURE — 94640 AIRWAY INHALATION TREATMENT: CPT

## 2019-12-23 PROCEDURE — 63600175 PHARM REV CODE 636 W HCPCS: Performed by: NURSE PRACTITIONER

## 2019-12-23 PROCEDURE — 82803 BLOOD GASES ANY COMBINATION: CPT

## 2019-12-23 PROCEDURE — 25000003 PHARM REV CODE 250: Performed by: PSYCHIATRY & NEUROLOGY

## 2019-12-23 PROCEDURE — 99291 CRITICAL CARE FIRST HOUR: CPT | Mod: ,,, | Performed by: NURSE PRACTITIONER

## 2019-12-23 PROCEDURE — 83735 ASSAY OF MAGNESIUM: CPT

## 2019-12-23 PROCEDURE — 94003 VENT MGMT INPAT SUBQ DAY: CPT

## 2019-12-23 PROCEDURE — 27000221 HC OXYGEN, UP TO 24 HOURS

## 2019-12-23 PROCEDURE — 27200966 HC CLOSED SUCTION SYSTEM

## 2019-12-23 RX ORDER — FUROSEMIDE 10 MG/ML
40 INJECTION INTRAMUSCULAR; INTRAVENOUS ONCE
Status: COMPLETED | OUTPATIENT
Start: 2019-12-23 | End: 2019-12-23

## 2019-12-23 RX ORDER — HYDRALAZINE HYDROCHLORIDE 25 MG/1
25 TABLET, FILM COATED ORAL EVERY 8 HOURS
Status: DISCONTINUED | OUTPATIENT
Start: 2019-12-23 | End: 2019-12-30

## 2019-12-23 RX ORDER — ATORVASTATIN CALCIUM 20 MG/1
40 TABLET, FILM COATED ORAL NIGHTLY
Status: DISCONTINUED | OUTPATIENT
Start: 2019-12-23 | End: 2019-12-26

## 2019-12-23 RX ORDER — HEPARIN SODIUM 5000 [USP'U]/ML
5000 INJECTION, SOLUTION INTRAVENOUS; SUBCUTANEOUS EVERY 8 HOURS
Status: DISCONTINUED | OUTPATIENT
Start: 2019-12-23 | End: 2020-01-10 | Stop reason: HOSPADM

## 2019-12-23 RX ADMIN — PSYLLIUM HUSK 1 PACKET: 3.4 POWDER ORAL at 02:12

## 2019-12-23 RX ADMIN — AMOXICILLIN AND CLAVULANATE POTASSIUM 1 TABLET: 875; 125 TABLET, FILM COATED ORAL at 08:12

## 2019-12-23 RX ADMIN — IPRATROPIUM BROMIDE AND ALBUTEROL SULFATE 3 ML: .5; 3 SOLUTION RESPIRATORY (INHALATION) at 07:12

## 2019-12-23 RX ADMIN — IPRATROPIUM BROMIDE AND ALBUTEROL SULFATE 3 ML: .5; 3 SOLUTION RESPIRATORY (INHALATION) at 12:12

## 2019-12-23 RX ADMIN — AMLODIPINE BESYLATE 10 MG: 10 TABLET ORAL at 08:12

## 2019-12-23 RX ADMIN — FAMOTIDINE 20 MG: 20 TABLET ORAL at 08:12

## 2019-12-23 RX ADMIN — CLOPIDOGREL BISULFATE 75 MG: 75 TABLET ORAL at 08:12

## 2019-12-23 RX ADMIN — INSULIN ASPART 4 UNITS: 100 INJECTION, SOLUTION INTRAVENOUS; SUBCUTANEOUS at 04:12

## 2019-12-23 RX ADMIN — IPRATROPIUM BROMIDE AND ALBUTEROL SULFATE 3 ML: .5; 3 SOLUTION RESPIRATORY (INHALATION) at 04:12

## 2019-12-23 RX ADMIN — INSULIN ASPART 3 UNITS: 100 INJECTION, SOLUTION INTRAVENOUS; SUBCUTANEOUS at 11:12

## 2019-12-23 RX ADMIN — INSULIN ASPART 4 UNITS: 100 INJECTION, SOLUTION INTRAVENOUS; SUBCUTANEOUS at 08:12

## 2019-12-23 RX ADMIN — IPRATROPIUM BROMIDE AND ALBUTEROL SULFATE 3 ML: .5; 3 SOLUTION RESPIRATORY (INHALATION) at 03:12

## 2019-12-23 RX ADMIN — HYDRALAZINE HYDROCHLORIDE 25 MG: 25 TABLET, FILM COATED ORAL at 09:12

## 2019-12-23 RX ADMIN — IPRATROPIUM BROMIDE AND ALBUTEROL SULFATE 3 ML: .5; 3 SOLUTION RESPIRATORY (INHALATION) at 11:12

## 2019-12-23 RX ADMIN — LEVETIRACETAM 500 MG: 100 SOLUTION ORAL at 08:12

## 2019-12-23 RX ADMIN — PSYLLIUM HUSK 1 PACKET: 3.4 POWDER ORAL at 08:12

## 2019-12-23 RX ADMIN — INSULIN ASPART 4 UNITS: 100 INJECTION, SOLUTION INTRAVENOUS; SUBCUTANEOUS at 12:12

## 2019-12-23 RX ADMIN — PSYLLIUM HUSK 1 PACKET: 3.4 POWDER ORAL at 10:12

## 2019-12-23 RX ADMIN — INSULIN ASPART 2 UNITS: 100 INJECTION, SOLUTION INTRAVENOUS; SUBCUTANEOUS at 05:12

## 2019-12-23 RX ADMIN — ACETAMINOPHEN 650 MG: 325 TABLET ORAL at 12:12

## 2019-12-23 RX ADMIN — CHLORHEXIDINE GLUCONATE 0.12% ORAL RINSE 15 ML: 1.2 LIQUID ORAL at 08:12

## 2019-12-23 RX ADMIN — HEPARIN SODIUM 5000 UNITS: 5000 INJECTION, SOLUTION INTRAVENOUS; SUBCUTANEOUS at 02:12

## 2019-12-23 RX ADMIN — ATORVASTATIN CALCIUM 40 MG: 20 TABLET, FILM COATED ORAL at 08:12

## 2019-12-23 RX ADMIN — INSULIN ASPART 2 UNITS: 100 INJECTION, SOLUTION INTRAVENOUS; SUBCUTANEOUS at 12:12

## 2019-12-23 RX ADMIN — HEPARIN SODIUM 5000 UNITS: 5000 INJECTION, SOLUTION INTRAVENOUS; SUBCUTANEOUS at 09:12

## 2019-12-23 RX ADMIN — HYDRALAZINE HYDROCHLORIDE 25 MG: 25 TABLET, FILM COATED ORAL at 04:12

## 2019-12-23 RX ADMIN — NICARDIPINE HYDROCHLORIDE 7.5 MG/HR: 0.2 INJECTION, SOLUTION INTRAVENOUS at 09:12

## 2019-12-23 RX ADMIN — HYDRALAZINE HYDROCHLORIDE 25 MG: 25 TABLET, FILM COATED ORAL at 10:12

## 2019-12-23 RX ADMIN — FUROSEMIDE 40 MG: 10 INJECTION, SOLUTION INTRAMUSCULAR; INTRAVENOUS at 10:12

## 2019-12-23 NOTE — SUBJECTIVE & OBJECTIVE
conditions    Review of Systems  Constitutional: Denies fevers, weight loss, chills, or weakness.  Eyes: Denies changes in vision.  ENT: Denies dysphagia, nasal discharge, ear pain or discharge.  Cardiovascular: Denies chest pain, palpitations, orthopnea, or claudication.  Respiratory: Denies shortness of breath, cough, hemoptysis, or wheezing.  GI: Denies nausea/vomitting, hematochezia, melena, abd pain, or changes in appetite.  : Denies dysuria, incontinence, or hematuria.  Musculoskeletal: Denies joint pain or myalgias.  Skin/breast: Denies rashes, lumps, lesions, or discharge.  Neurologic: Denies headache, dizziness, vertigo, or paresthesias.  Psychiatric: Denies changes in mood or hallucinations.  Endocrine: Denies polyuria, polydipsia, heat/cold intolerance.  Hematologic/Lymph: Denies lymphadenopathy, easy bruising or easy bleeding.  Allergic/Immunologic: Denies rash, rhinitis.   Objective:     Vitals:  Temp: 100 °F (37.8 °C)  Pulse: 88  Rhythm: sinus tachycardia  BP: (!) 157/69  MAP (mmHg): 99  CVP (mean): 0 mmHg  Resp: (!) 22  SpO2: 98 %  Oxygen Concentration (%): 50  O2 Device (Oxygen Therapy): ventilator  Vent Mode: SIMV  Set Rate: 16 bmp  Pressure Support: 10 cmH20  PEEP/CPAP: 9 cmH20  Peak Airway Pressure: 20 cmH2O  Mean Airway Pressure: 14 cmH20  Plateau Pressure: 0 cmH20    Temp  Min: 98.9 °F (37.2 °C)  Max: 100 °F (37.8 °C)  Pulse  Min: 79  Max: 104  BP  Min: 107/71  Max: 164/108  MAP (mmHg)  Min: 77  Max: 127  CVP (mean)  Min: -4 mmHg  Max: 10 mmHg  Resp  Min: 15  Max: 35  SpO2  Min: 93 %  Max: 100 %  Oxygen Concentration (%)  Min: 50  Max: 50    12/22 0701 - 12/23 0700  In: 3043.9 [I.V.:528.9]  Out: 2720 [Urine:2495]   Unmeasured Output  Stool Occurrence: 1       Physical Exam  GA:  comfortable, no acute distress.   HEENT: No scleral icterus or JVD.   Pulmonary: Diminished to auscultation A/L. No wheezing, crackles, or rhonchi.  Cardiac: RRR S1 & S2 w/o rubs/murmurs/gallops.   Abdominal: Bowel  sounds present x 4. No appreciable hepatosplenomegaly.  Skin: No jaundice, rashes, or visible lesions.    Neuro:  --GCS: E3 V1 M6  --Mental Status:  Opens eyes to voice, nods appropriately to qtsm follows commands  --Pupils 3mm, PERRL.   --Corneal reflex, gag, cough intact.  --LUE withdraws  --RUE spont  --LLE withdraws  --RLE spont    Medications:  Continuous  fentanyl Last Rate: 2.5 mL/hr at 12/23/19 1205   niCARdipine Last Rate: 10 mg/hr (12/23/19 1245)   Scheduled  albuterol-ipratropium 3 mL Q4H   amLODIPine 10 mg Daily   amoxicillin-clavulanate 875-125mg 1 tablet Q12H   atorvastatin 40 mg QHS   chlorhexidine 15 mL BID   clopidogrel 75 mg Daily   famotidine 20 mg BID   heparin (porcine) 5,000 Units Q8H   hydrALAZINE 25 mg Q8H   levetiracetam oral soln 500 mg BID   psyllium husk (aspartame) 1 packet TID   PRN  acetaminophen 650 mg Q6H PRN   Dextrose 10% Bolus 12.5 g PRN   Dextrose 10% Bolus 25 g PRN   fentaNYL 50 mcg Q2H PRN   glucagon (human recombinant) 1 mg PRN   hydrALAZINE 10 mg Q6H PRN   insulin aspart U-100 1-10 Units Q4H PRN   labetalol 10 mg Q4H PRN   magnesium oxide 800 mg PRN   magnesium oxide 800 mg PRN   ondansetron 4 mg Q6H PRN   potassium chloride 10% 40 mEq PRN   potassium chloride 10% 40 mEq PRN   potassium chloride 10% 60 mEq PRN   potassium, sodium phosphates 2 packet PRN   potassium, sodium phosphates 2 packet PRN   potassium, sodium phosphates 2 packet PRN   sodium chloride 0.9% 10 mL PRN     Today I personally reviewed pertinent medications, lines/drains/airways, imaging, laboratory results,    Diet  No diet orders on file

## 2019-12-23 NOTE — PROGRESS NOTES
"[facility]  Adult Nutrition  Progress Note    SUMMARY       Recommendations  1. Continue TF order Glucerna 1.5 @ goal rate 55mL/hr.   - Provides 1980kcals, 109g protein and 1002mL free water.     2. If able to extubate and advance diet, recommend Diabetic diet with texture per SLP recommendations.     RD to monitor.    Goals: Pt to tolerate >85% EEN and EPN by RD follow up  Nutrition Goal Status: goal met  Communication of RD Recs: reviewed with RN    Reason for Assessment    Reason For Assessment: RD follow-up  Diagnosis: stroke/CVA  Relevant Medical History: HTN, T2DM, HLD  Interdisciplinary Rounds: attended  General Information Comments: Pt remains intubated. TF at goal, tolerating without residuals. Pt intubated 12/18. Prior to intubation, pt NPO per SLP recommendations. Family at bedside providing nutrition hx. Weight stable per wife approx 270-275lb "for years." Pt with adequate po intake and appetite. No indications of malnutrition at this time.   Nutrition Discharge Planning: unable to determine at this time    Nutrition Risk Screen    Nutrition Risk Screen: dysphagia or difficulty swallowing    Nutrition/Diet History    Spiritual, Cultural Beliefs, Denominational Practices, Values that Affect Care: no  Factors Affecting Nutritional Intake: NPO, on mechanical ventilation    Anthropometrics    Temp: 98.9 °F (37.2 °C)  Height Method: Measured  Height: 6' 1" (185.4 cm)  Height (inches): 73 in  Weight Method: Bed Scale  Weight: 123.4 kg (272 lb)  Weight (lb): 272 lb  Ideal Body Weight (IBW), Male: 184 lb  % Ideal Body Weight, Male (lb): 147.83 %  BMI (Calculated): 35.9  BMI Grade: 35 - 39.9 - obesity - grade II       Lab/Procedures/Meds    Pertinent Labs Reviewed: reviewed  Pertinent Labs Comments: POCT Glu 197-215  Pertinent Medications Reviewed: reviewed  Pertinent Medications Comments: fentanyl, cardene, lasix    Estimated/Assessed Needs    Weight Used For Calorie Calculations: 123.4 kg (272 lb " 0.8 oz)  Energy Calorie Requirements (kcal): 2304  Energy Need Method: [place] (modified)  Protein Requirements: 126-168g(1.5-2.0g/kg)  Weight Used For Protein Calculations: 83.6 kg (184 lb 4.9 oz)  Fluid Requirements (mL): 1mL/kcal or per MD     RDA Method (mL): 2304         Nutrition Prescription Ordered    Current Diet Order: NPO  Nutrition Order Comments: TF at goal  Current Nutrition Support Formula Ordered: Glucerna 1.5  Current Nutrition Support Rate Ordered: 55 (ml)  Current Nutrition Support Frequency Ordered: mL/hr    Evaluation of Received Nutrient/Fluid Intake    Enteral Calories (kcal): 1980  Enteral Protein (gm): 109  Enteral (Free Water) Fluid (mL): 1002    % Kcal Needs: 86  % Protein Needs: 87    IV Fluid (mL): 1200  I/O: +4L since admit, good UOP, LBM 12/23    Energy Calories Required: meeting needs  Protein Required: meeting needs  Fluid Required: other (see comments)(per MD)    Comments: 0mL residuals 12/22  Tolerance: tolerating    % Intake of Estimated Energy Needs: 75 - 100 %  % Meal Intake: NPO    Nutrition Risk    Level of Risk/Frequency of Follow-up: low(f/u 1x/week)     Assessment and Plan    Nutrition Problem  Inadequate oral intake     Related to (etiology):   intubation     Signs and Symptoms (as evidenced by):   Pt requiring alternative means of nutrition to meet >85% EEN and EPN.      Interventions(treatment strategy):  Collaboration of care with providers     Nutrition Diagnosis Status:   Continues    Monitor and Evaluation    Food and Nutrient Intake: energy intake, food and beverage intake, enteral nutrition intake  Food and Nutrient Adminstration: diet order, enteral and parenteral nutrition administration  Anthropometric Measurements: weight, weight change, body mass index  Biochemical Data, Medical Tests and Procedures: electrolyte and renal panel, gastrointestinal profile, glucose/endocrine profile, inflammatory profile, lipid profile  Nutrition-Focused Physical Findings:  overall appearance        Nutrition Follow-Up    RD Follow-up?: Yes

## 2019-12-23 NOTE — ASSESSMENT & PLAN NOTE
12/16 Patient s/p thrombectomy with R ICA stent placement. ASA and Plavix initiated  12/17 MRI notes: intraparenchymal hemorrhage within the right frontal and temporal lobes with associated vasogenic edema resulting in mass effect on the right lateral ventricle and approximately 5 mm right-to-left midline shift.  12/17 ASA Plavix discontinued  Vascular Stroke following  NSGY consulted  12/16 2% initiated to maintain Na 145-155  Na Q6 (disc. Grouped into BMP)  Mannitol x1  12/17 changed 2% to 3%  Q6 Na changed to BMP Q6, Serum Os  12/20: off HTS  12/21: d/c hydralazine po and off HTS, na goal  12/22: Lasix 20mg given, will monitor UOP  12/23: Lasix 40 mg X1

## 2019-12-23 NOTE — ASSESSMENT & PLAN NOTE
Continuous Cardiac Monitoring  Vital Signs Q1 hour  Cardene to Maintain SBP < 140  CXR 12/16 Lungs hypoaerated.  Increase in the pulmonary vascular interstitial and alveolar markings Findings most consistent with congestive failure   ECHO 65%  EKG 12/16: Atrial fibrillation with rapid ventricular response with premature ventricular or aberrantly conducted complexes  12/20: add hydralazine  12/21: D/C hydralazine  12/22: Stable, Cardene gtt off  12/23: add hydralazine 25 q8h

## 2019-12-23 NOTE — ASSESSMENT & PLAN NOTE
Possible community acquired infection  Wet Raspy cough  T Max 102.0  Tylenol  Cooling [place]  Pan culture  Continue Azithromycin and Rocephin  US Extremities/ Soft Tissue of Neck  Amylase/Lipase  12/21: de-eschalated abx today on augmentin  12/23: Resolved, Continue augmentin

## 2019-12-23 NOTE — PT/OT/SLP PROGRESS
Physical Therapy      Patient Name:  Jerry Linder   MRN:  [id number]    Physical therapy HOLD secondary to: pt remains intubated. Will follow up once pt is extubated and appropriate for mobility assessment.     Swetha Weeks PT, DPT   12/23/2019

## 2019-12-23 NOTE — PROGRESS NOTES
[facility]  Neurocritical Care  Progress Note    Admit Date: 12/16/2019  Service Date: 12/23/2019  Length of Stay: 7    Subjective:     Chief Complaint: Embolic stroke involving right middle cerebral artery    History of Present Illness: The patient is a 79 y.o. male who  was admitted as a transfer from OSH for Right MCA stroke syndrome. Patient had an urgent thrombectomy s/p  right MCA stroke syndrome. PMH significant for HTN, T2DM and HLD. Patient stated that around 2:30 pm on 12/16/19 he was driving and felt weak. He drove home and was taken to the ER where he was evaluated for a stroke. He had outside CTA which was reported to have R ICA stenosis at bifurcation with 70% stenosis and right MCA occlusion. Patient was transferred to [facility] for possible intervention. Patient had a right femoral sheath placed and his Angio  revealed 90% R ICA stenosis and R M1 occlusion. The R ICA was treated by thrombectomy and carotid stenting with TICI 3 reperfusion.   Patient was admitted to the [facility] for higher level of care post thrombectomy.    Hospital Course: 12/17/19: Patient admitted to [facility] for higher level of care s/p thrombectomy and right carotid artery stenting with TICI 3 reperfusion  12/18/2019Recent MRI with heme transformation abg Q8, repeat scan stable, mannitol  12/19/2019 intubation repeat CTH  12/20: febrile-broaden abx coverage, SBT, add hydralazine 50 q8h, KUB and mag citrate  12/21: wean FiO2, d/c hydralazine, wean fentanyl  12/22/2019: Patient stable overnight. Lasix 20mg x 1 given today. Enteral water flushes started for hypernatremia.    12/23: SBT trial, started Plavix today, initiated Hydralazine 25 TID, lasix 40 mg X1, decrease statin to 40 mg daily (transaminitis -trending down), add psyllium, Na BID -goal 145-150, initiated sq heparin    conditions    Review of Systems  Constitutional: Denies fevers, weight loss, chills, or weakness.  Eyes: Denies changes in vision.  ENT: Denies  dysphagia, nasal discharge, ear pain or discharge.  Cardiovascular: Denies chest pain, palpitations, orthopnea, or claudication.  Respiratory: Denies shortness of breath, cough, hemoptysis, or wheezing.  GI: Denies nausea/vomitting, hematochezia, melena, abd pain, or changes in appetite.  : Denies dysuria, incontinence, or hematuria.  Musculoskeletal: Denies joint pain or myalgias.  Skin/breast: Denies rashes, lumps, lesions, or discharge.  Neurologic: Denies headache, dizziness, vertigo, or paresthesias.  Psychiatric: Denies changes in mood or hallucinations.  Endocrine: Denies polyuria, polydipsia, heat/cold intolerance.  Hematologic/Lymph: Denies lymphadenopathy, easy bruising or easy bleeding.  Allergic/Immunologic: Denies rash, rhinitis.   Objective:     Vitals:  Temp: 100 °F (37.8 °C)  Pulse: 88  Rhythm: sinus tachycardia  BP: (!) 157/69  MAP (mmHg): 99  CVP (mean): 0 mmHg  Resp: (!) 22  SpO2: 98 %  Oxygen Concentration (%): 50  O2 Device (Oxygen Therapy): ventilator  Vent Mode: SIMV  Set Rate: 16 bmp  Pressure Support: 10 cmH20  PEEP/CPAP: 9 cmH20  Peak Airway Pressure: 20 cmH2O  Mean Airway Pressure: 14 cmH20  Plateau Pressure: 0 cmH20    Temp  Min: 98.9 °F (37.2 °C)  Max: 100 °F (37.8 °C)  Pulse  Min: 79  Max: 104  BP  Min: 107/71  Max: 164/108  MAP (mmHg)  Min: 77  Max: 127  CVP (mean)  Min: -4 mmHg  Max: 10 mmHg  Resp  Min: 15  Max: 35  SpO2  Min: 93 %  Max: 100 %  Oxygen Concentration (%)  Min: 50  Max: 50    12/22 0701 - 12/23 0700  In: 3043.9 [I.V.:528.9]  Out: 2720 [Urine:2495]   Unmeasured Output  Stool Occurrence: 1       Physical Exam  GA:  comfortable, no acute distress.   HEENT: No scleral icterus or JVD.   Pulmonary: Diminished to auscultation A/L. No wheezing, crackles, or rhonchi.  Cardiac: RRR S1 & S2 w/o rubs/murmurs/gallops.   Abdominal: Bowel sounds present x 4. No appreciable hepatosplenomegaly.  Skin: No jaundice, rashes, or visible lesions.    Neuro:  --GCS: E3 V1 M6  --Mental Status:   Opens eyes to voice, nods appropriately to qtsm follows commands  --Pupils 3mm, PERRL.   --Corneal reflex, gag, cough intact.  --LUE withdraws  --RUE spont  --LLE withdraws  --RLE spont    Medications:  Continuous  fentanyl Last Rate: 2.5 mL/hr at 12/23/19 1205   niCARdipine Last Rate: 10 mg/hr (12/23/19 1245)   Scheduled  albuterol-ipratropium 3 mL Q4H   amLODIPine 10 mg Daily   amoxicillin-clavulanate 875-125mg 1 tablet Q12H   atorvastatin 40 mg QHS   chlorhexidine 15 mL BID   clopidogrel 75 mg Daily   famotidine 20 mg BID   heparin (porcine) 5,000 Units Q8H   hydrALAZINE 25 mg Q8H   levetiracetam oral soln 500 mg BID   psyllium husk (aspartame) 1 packet TID   PRN  acetaminophen 650 mg Q6H PRN   Dextrose 10% Bolus 12.5 g PRN   Dextrose 10% Bolus 25 g PRN   fentaNYL 50 mcg Q2H PRN   glucagon (human recombinant) 1 mg PRN   hydrALAZINE 10 mg Q6H PRN   insulin aspart U-100 1-10 Units Q4H PRN   labetalol 10 mg Q4H PRN   magnesium oxide 800 mg PRN   magnesium oxide 800 mg PRN   ondansetron 4 mg Q6H PRN   potassium chloride 10% 40 mEq PRN   potassium chloride 10% 40 mEq PRN   potassium chloride 10% 60 mEq PRN   potassium, sodium phosphates 2 packet PRN   potassium, sodium phosphates 2 packet PRN   potassium, sodium phosphates 2 packet PRN   sodium chloride 0.9% 10 mL PRN     Today I personally reviewed pertinent medications, lines/drains/airways, imaging, laboratory results,    Diet  No diet orders on file      Assessment/Plan:     Neuro  * Embolic stroke involving right middle cerebral artery  - 78 y/o male admitted for R MCA stroke syndrome s/p Thrombectomy TICI 3 reperfusion  - Neurochecks q1hr  - Vascular Neurology following  - SBP Goal < 140  - PT/OT/SLP  - Mechanical SCDs  - Atorvastatin ordered  Discontinue ASA/Plavix d/t hem transformation  F/U CTH 12/17 stable  12/19 CTH stable  12/23: started plavix           Cytotoxic brain edema  2/2 R MCA  Neuro checks Q 1 hr  Maintain eunatremia  Maintain  Euglyemia    Vasogenic brain edema  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 ASA and Plavix initiated  12/17 MRI notes: intraparenchymal hemorrhage within the right frontal and temporal lobes with associated vasogenic edema resulting in mass effect on the right lateral ventricle and approximately 5 mm right-to-left midline shift.  12/17 ASA Plavix discontinued  Vascular Stroke following  NSGY consulted  12/16 2% initiated to maintain Na 145-155  Na Q6 (disc. Grouped into BMP)  Mannitol x1  12/17 changed 2% to 3%  Q6 Na changed to BMP Q6, Serum Os  12/20: off HTS  12/21: d/c hydralazine po and off HTS, na goal  12/22: Lasix 20mg given, will monitor UOP  12/23: Lasix 40 mg X1    Cerebrovascular accident (CVA)  See embolic stroke above    Pulmonary  CAP (community acquired pneumonia)  -febrile  -broaden abx regimen to vancomycin and zosyn   -follow cx and senitivities      Acute respiratory failure with hypoxia  Encounter for intubation d/t increased somnolence.   CXR daily  ABG Daily  Famotidine  Chlorhexidine    Vent Mode: SIMV  Oxygen Concentration (%):  [50] 50  Resp Rate Total:  [16 br/min-35 br/min] 21 br/min  PEEP/CPAP:  [9 cmH20] 9 cmH20  Pressure Support:  [10 cmH20] 10 cmH20  Mean Airway Pressure:  [13 cmH20-15 cmH20] 14 cmH20   12/23: SBT trial today       Cardiac/Vascular  Mixed hyperlipidemia  - decreased statin to 40 mg -transaminitis -trending down       Essential hypertension  Continuous Cardiac Monitoring  Vital Signs Q1 hour  Cardene to Maintain SBP < 140  CXR 12/16 Lungs hypoaerated.  Increase in the pulmonary vascular interstitial and alveolar markings Findings most consistent with congestive failure   ECHO  65%  EKG 12/16: Atrial fibrillation with rapid ventricular response with premature ventricular or aberrantly conducted complexes  12/20: add hydralazine  12/21: D/C hydralazine  12/22: Stable, Cardene gtt off  12/23: add hydralazine 25 q8h            Endocrine  Type 2 diabetes mellitus with neurologic complication, without long-term current use of insulin  A1c 7.7  SSI   POCT Q4  Nutritional Consult for dietary /caloric needs  Continue TF  -see management for R MCA/IPH             GI  Constipation  --KUB  --mag citrate  12/21: effective bowel regimen   -12/23: added psyllium for 3 days    Other  Fever due to infection  Possible community acquired infection  Wet Ras[place] cough  T Max 102.0  Tylenol  Cooling [place]  Pan culture  Continue Azithromycin and Rocephin  US Extremities/ Soft Tissue of Neck  Amylase/Lipase  12/21: de-eschalated abx today on augmentin  12/23: Resolved, Continue augmentin    Decreased strength, endurance, and mobility  2/2 LS Weakness R/T R MCA  PT/OT          The patient is being Prophylaxed for:  Venous Thromboembolism with: Chemical  Stress Ulcer with: H2B  Ventilator Pneumonia with: chlorhexidine oral care    Activity Orders          None        Full Code    Anna Simon NP  Neurocritical Care  [facility]    Critical care time > 30 min

## 2019-12-23 NOTE — PROGRESS NOTES
Patient's chart was reviewed by a stroke team provider.  Patient intubated and sedated.  There is no new imaging to review.  Pending diagnostics to follow up on include: none.    Ok to start plavix for secondary stroke prevention. For other recommendations please see our previous note completed on: 12/20/19.      There are no new recommendations at this time. Will continue to follow. Discussed patient with staff. Please contact stroke team for any questions or concerns.           NU Oshea  [facility]   Department of Vascular Neurology   [facility]

## 2019-12-23 NOTE — PLAN OF CARE
POC reviewed with pt and family at 1400. Pt unable to verbalize understanding. Questions and concerns addressed with family. Vent settings changed from SIMV to spontaneous by NCC team. After about less than an hour of new vent settings, patient became tachypneic and restless. Vent settings changed back to SIMV, Fentanyl drip increased for comfort. Cardene drip titrated to maintained BP less than 140 mmHg. Lasix x 1 dose given- diuresis noted. Tolerating NGT feedings. Metamucil started. Pt progressing toward goals. Will continue to monitor. See flowsheets for full assessment and VS info.

## 2019-12-23 NOTE — ASSESSMENT & PLAN NOTE
- 78 y/o male admitted for R MCA stroke syndrome s/p Thrombectomy TICI 3 reperfusion  - Neurochecks q1hr  - Vascular Neurology following  - SBP Goal < 140  - PT/OT/SLP  - Mechanical SCDs  - Atorvastatin ordered  Discontinue ASA/Plavix d/t hem transformation  F/U CTH 12/17 stable  12/19 CTH stable  12/23: started plavix

## 2019-12-23 NOTE — ASSESSMENT & PLAN NOTE
Encounter for intubation d/t increased somnolence.   CXR daily  ABG Daily  Famotidine  Chlorhexidine    Vent Mode: SIMV  Oxygen Concentration (%):  [50] 50  Resp Rate Total:  [16 br/min-35 br/min] 21 br/min  PEEP/CPAP:  [9 cmH20] 9 cmH20  Pressure Support:  [10 cmH20] 10 cmH20  Mean Airway Pressure:  [13 cmH20-15 cmH20] 14 cmH20   12/23: SBT trial today

## 2019-12-23 NOTE — PLAN OF CARE
Neuro exam stable  CTH stable  Wean fentanyl  On plavix for secondary stroke prevention  Start hydralazine  Goal na 145-150  CVP  Lasix 40 mg IV x1  Complete course of abx  Cut atorvastatin in half due to transaminitis

## 2019-12-23 NOTE — PLAN OF CARE
Patient intubated on vent.  Not medically stable for discharge        12/23/19 1632   Discharge Reassessment   Assessment Type Discharge Planning Reassessment   Provided patient/caregiver education on the expected discharge date and the discharge plan No   Do you have any problems affording any of your prescribed medications? No   Discharge Plan A Long-term acute care facility (LTAC)   Discharge Plan B Skilled Nursing Facility   DME Needed Upon Discharge  none   Patient choice form signed by patient/caregiver N/A   Anticipated Discharge Disposition LTAC   Can the patient answer the patient profile reliably? No, cognitively impaired   How does the patient rate their overall health at the present time?   (preet)   Describe the patient's ability to walk at the present time. Does not walk or unable to take any steps at all   How often would a person be available to care for the patient? Often   Number of comorbid conditions (as recorded on the chart) Four   Post-Acute Status   Post-Acute Authorization Placement   Post-Acute Placement Status Awaiting Internal Medical Clearance   Discharge Delays None known at this time       Leigha Walker RN, CCRN-K, [place]  Neuro-Critical Care   [phone]

## 2019-12-23 NOTE — PLAN OF CARE
POC reviewed with pt and family at 0500. Family verbalized understanding. Questions and concerns addressed. No acute events overnight, NCC team contacted 1930 for elevated BP, pt started on Cardene to maintain SBP<140. Pt progressing toward goals. Will continue to monitor. See flowsheets for full assessment and VS info

## 2019-12-24 LAB
ALBUMIN SERPL BCP-MCNC: 2.3 G/DL (ref 3.5–5.2)
ALLENS TEST: ABNORMAL
ALP SERPL-CCNC: 110 U/L (ref 55–135)
ALT SERPL W/O P-5'-P-CCNC: 50 U/L (ref 10–44)
ANION GAP SERPL CALC-SCNC: 9 MMOL/L (ref 8–16)
AST SERPL-CCNC: 33 U/L (ref 10–40)
BASOPHILS # BLD AUTO: 0.03 K/UL (ref 0–0.2)
BASOPHILS NFR BLD: 0.4 % (ref 0–1.9)
BILIRUB SERPL-MCNC: 0.7 MG/DL (ref 0.1–1)
BUN SERPL-MCNC: 28 MG/DL (ref 8–23)
CALCIUM SERPL-MCNC: 8.5 MG/DL (ref 8.7–10.5)
CHLORIDE SERPL-SCNC: 114 MMOL/L (ref 95–110)
CO2 SERPL-SCNC: 27 MMOL/L (ref 23–29)
CREAT SERPL-MCNC: 0.8 MG/DL (ref 0.5–1.4)
DIFFERENTIAL METHOD: ABNORMAL
EOSINOPHIL # BLD AUTO: 0.4 K/UL (ref 0–0.5)
EOSINOPHIL NFR BLD: 4.7 % (ref 0–8)
ERYTHROCYTE [DISTWIDTH] IN BLOOD BY AUTOMATED COUNT: 12.6 % (ref 11.5–14.5)
EST. GFR  (AFRICAN AMERICAN): >60 ML/MIN/1.73 M^2
EST. GFR  (NON AFRICAN AMERICAN): >60 ML/MIN/1.73 M^2
GLUCOSE SERPL-MCNC: 238 MG/DL (ref 70–110)
HCO3 UR-SCNC: 32 MMOL/L (ref 24–28)
HCT VFR BLD AUTO: 37.1 % (ref 40–54)
HGB BLD-MCNC: 11.7 G/DL (ref 14–18)
IMM GRANULOCYTES # BLD AUTO: 0.06 K/UL (ref 0–0.04)
IMM GRANULOCYTES NFR BLD AUTO: 0.8 % (ref 0–0.5)
LYMPHOCYTES # BLD AUTO: 1.8 K/UL (ref 1–4.8)
LYMPHOCYTES NFR BLD: 23.6 % (ref 18–48)
MAGNESIUM SERPL-MCNC: 2.2 MG/DL (ref 1.6–2.6)
MCH RBC QN AUTO: 31.5 PG (ref 27–31)
MCHC RBC AUTO-ENTMCNC: 31.5 G/DL (ref 32–36)
MCV RBC AUTO: 100 FL (ref 82–98)
MONOCYTES # BLD AUTO: 0.8 K/UL (ref 0.3–1)
MONOCYTES NFR BLD: 10.7 % (ref 4–15)
NEUTROPHILS # BLD AUTO: 4.6 K/UL (ref 1.8–7.7)
NEUTROPHILS NFR BLD: 59.8 % (ref 38–73)
NRBC BLD-RTO: 0 /100 WBC
PCO2 BLDA: 45.8 MMHG (ref 35–45)
PH SMN: 7.45 [PH] (ref 7.35–7.45)
PHOSPHATE SERPL-MCNC: 4.3 MG/DL (ref 2.7–4.5)
PLATELET # BLD AUTO: 162 K/UL (ref 150–350)
PMV BLD AUTO: 11.1 FL (ref 9.2–12.9)
PO2 BLDA: 133 MMHG (ref 80–100)
POC BE: 8 MMOL/L
POC SATURATED O2: 99 % (ref 95–100)
POC TCO2: 33 MMOL/L (ref 23–27)
POCT GLUCOSE: 208 MG/DL (ref 70–110)
POCT GLUCOSE: 211 MG/DL (ref 70–110)
POCT GLUCOSE: 212 MG/DL (ref 70–110)
POCT GLUCOSE: 242 MG/DL (ref 70–110)
POCT GLUCOSE: 257 MG/DL (ref 70–110)
POTASSIUM SERPL-SCNC: 4 MMOL/L (ref 3.5–5.1)
PROT SERPL-MCNC: 6 G/DL (ref 6–8.4)
RBC # BLD AUTO: 3.71 M/UL (ref 4.6–6.2)
SAMPLE: ABNORMAL
SITE: ABNORMAL
SODIUM SERPL-SCNC: 145 MMOL/L (ref 136–145)
SODIUM SERPL-SCNC: 147 MMOL/L (ref 136–145)
SODIUM SERPL-SCNC: 150 MMOL/L (ref 136–145)
WBC # BLD AUTO: 7.67 K/UL (ref 3.9–12.7)

## 2019-12-24 PROCEDURE — 37799 UNLISTED PX VASCULAR SURGERY: CPT

## 2019-12-24 PROCEDURE — 25000242 PHARM REV CODE 250 ALT 637 W/ HCPCS: Performed by: INTERNAL MEDICINE

## 2019-12-24 PROCEDURE — 84295 ASSAY OF SERUM SODIUM: CPT

## 2019-12-24 PROCEDURE — 94761 N-INVAS EAR/PLS OXIMETRY MLT: CPT

## 2019-12-24 PROCEDURE — 99900035 HC TECH TIME PER 15 MIN (STAT)

## 2019-12-24 PROCEDURE — 82803 BLOOD GASES ANY COMBINATION: CPT

## 2019-12-24 PROCEDURE — 27200966 HC CLOSED SUCTION SYSTEM

## 2019-12-24 PROCEDURE — 25000003 PHARM REV CODE 250: Performed by: PSYCHIATRY & NEUROLOGY

## 2019-12-24 PROCEDURE — 94668 MNPJ CHEST WALL SBSQ: CPT

## 2019-12-24 PROCEDURE — 25000003 PHARM REV CODE 250: Performed by: NURSE PRACTITIONER

## 2019-12-24 PROCEDURE — 85025 COMPLETE CBC W/AUTO DIFF WBC: CPT

## 2019-12-24 PROCEDURE — 99291 PR CRITICAL CARE, E/M 30-74 MINUTES: ICD-10-PCS | Mod: GC,,, | Performed by: PSYCHIATRY & NEUROLOGY

## 2019-12-24 PROCEDURE — 84100 ASSAY OF PHOSPHORUS: CPT

## 2019-12-24 PROCEDURE — 63600175 PHARM REV CODE 636 W HCPCS: Performed by: PSYCHIATRY & NEUROLOGY

## 2019-12-24 PROCEDURE — 99291 CRITICAL CARE FIRST HOUR: CPT | Mod: GC,,, | Performed by: PSYCHIATRY & NEUROLOGY

## 2019-12-24 PROCEDURE — 94003 VENT MGMT INPAT SUBQ DAY: CPT

## 2019-12-24 PROCEDURE — 27000221 HC OXYGEN, UP TO 24 HOURS

## 2019-12-24 PROCEDURE — 84295 ASSAY OF SERUM SODIUM: CPT | Mod: 91

## 2019-12-24 PROCEDURE — 83735 ASSAY OF MAGNESIUM: CPT

## 2019-12-24 PROCEDURE — 20000000 HC ICU ROOM

## 2019-12-24 PROCEDURE — 94640 AIRWAY INHALATION TREATMENT: CPT

## 2019-12-24 PROCEDURE — 80053 COMPREHEN METABOLIC PANEL: CPT

## 2019-12-24 PROCEDURE — 99900026 HC AIRWAY MAINTENANCE (STAT)

## 2019-12-24 PROCEDURE — 63600175 PHARM REV CODE 636 W HCPCS: Performed by: NURSE PRACTITIONER

## 2019-12-24 RX ORDER — FUROSEMIDE 10 MG/ML
40 INJECTION INTRAMUSCULAR; INTRAVENOUS 2 TIMES DAILY
Status: DISCONTINUED | OUTPATIENT
Start: 2019-12-24 | End: 2019-12-24

## 2019-12-24 RX ORDER — FUROSEMIDE 10 MG/ML
40 INJECTION INTRAMUSCULAR; INTRAVENOUS 2 TIMES DAILY
Status: DISCONTINUED | OUTPATIENT
Start: 2019-12-24 | End: 2019-12-30

## 2019-12-24 RX ADMIN — INSULIN ASPART 4 UNITS: 100 INJECTION, SOLUTION INTRAVENOUS; SUBCUTANEOUS at 08:12

## 2019-12-24 RX ADMIN — IPRATROPIUM BROMIDE AND ALBUTEROL SULFATE 3 ML: .5; 3 SOLUTION RESPIRATORY (INHALATION) at 04:12

## 2019-12-24 RX ADMIN — IPRATROPIUM BROMIDE AND ALBUTEROL SULFATE 3 ML: .5; 3 SOLUTION RESPIRATORY (INHALATION) at 11:12

## 2019-12-24 RX ADMIN — ACETAMINOPHEN 650 MG: 325 TABLET ORAL at 11:12

## 2019-12-24 RX ADMIN — HYDRALAZINE HYDROCHLORIDE 25 MG: 25 TABLET, FILM COATED ORAL at 05:12

## 2019-12-24 RX ADMIN — INSULIN ASPART 2 UNITS: 100 INJECTION, SOLUTION INTRAVENOUS; SUBCUTANEOUS at 03:12

## 2019-12-24 RX ADMIN — IPRATROPIUM BROMIDE AND ALBUTEROL SULFATE 3 ML: .5; 3 SOLUTION RESPIRATORY (INHALATION) at 07:12

## 2019-12-24 RX ADMIN — HEPARIN SODIUM 5000 UNITS: 5000 INJECTION, SOLUTION INTRAVENOUS; SUBCUTANEOUS at 05:12

## 2019-12-24 RX ADMIN — LEVETIRACETAM 500 MG: 100 SOLUTION ORAL at 08:12

## 2019-12-24 RX ADMIN — FAMOTIDINE 20 MG: 20 TABLET ORAL at 08:12

## 2019-12-24 RX ADMIN — AMLODIPINE BESYLATE 10 MG: 10 TABLET ORAL at 08:12

## 2019-12-24 RX ADMIN — CLOPIDOGREL BISULFATE 75 MG: 75 TABLET ORAL at 08:12

## 2019-12-24 RX ADMIN — HYDRALAZINE HYDROCHLORIDE 25 MG: 25 TABLET, FILM COATED ORAL at 09:12

## 2019-12-24 RX ADMIN — FUROSEMIDE 40 MG: 10 INJECTION, SOLUTION INTRAVENOUS at 06:12

## 2019-12-24 RX ADMIN — CHLORHEXIDINE GLUCONATE 0.12% ORAL RINSE 15 ML: 1.2 LIQUID ORAL at 08:12

## 2019-12-24 RX ADMIN — AMOXICILLIN AND CLAVULANATE POTASSIUM 1 TABLET: 875; 125 TABLET, FILM COATED ORAL at 08:12

## 2019-12-24 RX ADMIN — HEPARIN SODIUM 5000 UNITS: 5000 INJECTION, SOLUTION INTRAVENOUS; SUBCUTANEOUS at 02:12

## 2019-12-24 RX ADMIN — FUROSEMIDE 40 MG: 10 INJECTION, SOLUTION INTRAVENOUS at 12:12

## 2019-12-24 RX ADMIN — INSULIN ASPART 2 UNITS: 100 INJECTION, SOLUTION INTRAVENOUS; SUBCUTANEOUS at 11:12

## 2019-12-24 RX ADMIN — IPRATROPIUM BROMIDE AND ALBUTEROL SULFATE 3 ML: .5; 3 SOLUTION RESPIRATORY (INHALATION) at 03:12

## 2019-12-24 RX ADMIN — ACETAMINOPHEN 650 MG: 325 TABLET ORAL at 12:12

## 2019-12-24 RX ADMIN — Medication: at 06:12

## 2019-12-24 RX ADMIN — ACETAMINOPHEN 650 MG: 325 TABLET ORAL at 08:12

## 2019-12-24 RX ADMIN — HEPARIN SODIUM 5000 UNITS: 5000 INJECTION, SOLUTION INTRAVENOUS; SUBCUTANEOUS at 09:12

## 2019-12-24 RX ADMIN — ATORVASTATIN CALCIUM 40 MG: 20 TABLET, FILM COATED ORAL at 08:12

## 2019-12-24 NOTE — ASSESSMENT & PLAN NOTE
- 2/2 IPH as noted on MRI: intraparenchymal hemorrhage within the right frontal and temporal lobes with associated vasogenic edema resulting in mass effect on the right lateral ventricle  - Neuro checks Q1 hr

## 2019-12-24 NOTE — ASSESSMENT & PLAN NOTE
- 80 y/o male admitted for R MCA stroke syndrome s/p Thrombectomy TICI 3 reperfusion  - Neurochecks q1hr  - Vascular Neurology following  - SBP Goal < 140  - PT/OT/SLP  - Mechanical SCDs  - Atorvastatin  - Plavix

## 2019-12-24 NOTE — PLAN OF CARE
CXR responded well to lasix  Lasix 40 mg IV BID while monitoring CVP  Wean FiO2 as tolerated  ETT day 6  Increase FW to q4  Na goal 140-150  Change A-line tomorrow

## 2019-12-24 NOTE — SUBJECTIVE & OBJECTIVE
Interval History: SBT failed overnight, placed back on SIMV and remains intubated. Will attempt to wean fio2 as tolerated while monitoring CVP. CXR with some congestion yesterday, improved with IV lasix, will continue scheduled lasix 40 IV BID. Na goal 140-150, increase free water bolus to q4 from q6.    Review of Systems    Constitutional: Denies fevers, weight loss, chills, or weakness.  Eyes: Denies changes in vision.  ENT: Denies dysphagia, nasal discharge, ear pain or discharge.  Cardiovascular: Denies chest pain, palpitations, orthopnea, or claudication.  Respiratory: Denies shortness of breath, cough, hemoptysis, or wheezing.  GI: Denies nausea/vomitting, hematochezia, melena, abd pain, or changes in appetite.  : Denies dysuria, incontinence, or hematuria.  Musculoskeletal: Denies joint pain or myalgias.  Skin/breast: Denies rashes, lumps, lesions, or discharge.  Neurologic: Denies headache, dizziness, vertigo, or paresthesias.  Psychiatric: Denies changes in mood or hallucinations.  Endocrine: Denies polyuria, polydipsia, heat/cold intolerance.  Hematologic/Lymph: Denies lymphadenopathy, easy bruising or easy bleeding.  Allergic/Immunologic: Denies rash, rhinitis.   Objective:     Vitals:  Temp: 98.8 °F (37.1 °C)  Pulse: 87  Rhythm: normal sinus rhythm  BP: (!) 123/59  MAP (mmHg): 85  CVP (mean): 7 mmHg  Resp: 16  SpO2: 97 %  Oxygen Concentration (%): 50  O2 Device (Oxygen Therapy): ventilator  Vent Mode: SIMV  Set Rate: 16 bmp  Pressure Support: 10 cmH20  PEEP/CPAP: 9 cmH20  Peak Airway Pressure: 24 cmH2O  Mean Airway Pressure: 13 cmH20  Plateau Pressure: 0 cmH20    Temp  Min: 98.4 °F (36.9 °C)  Max: 100 °F (37.8 °C)  Pulse  Min: 80  Max: 104  BP  Min: 107/71  Max: 164/108  MAP (mmHg)  Min: 75  Max: 127  CVP (mean)  Min: -4 mmHg  Max: 9 mmHg  Resp  Min: 8  Max: 35  SpO2  Min: 93 %  Max: 100 %  Oxygen Concentration (%)  Min: 50  Max: 50    12/23 0701 - 12/24 0700  In: 4459.9 [I.V.:456.9]  Out: 3876  [Urine:2810]   Unmeasured Output  Stool Occurrence: 1       Physical Exam    GA:  comfortable, no acute distress.   HEENT: No scleral icterus or JVD.   Pulmonary: Diminished to auscultation A/L. No wheezing, crackles, or rhonchi.  Cardiac: RRR S1 & S2 w/o rubs/murmurs/gallops.   Abdominal: Bowel sounds present x 4. No appreciable hepatosplenomegaly.  Skin: No jaundice, rashes, or visible lesions.  Neuro:  --GCS: E3 V1 M6  --Mental Status:  Opens eyes to voice, nods appropriately to qtsm follows commands  --Pupils 3mm, PERRL.   --Corneal reflex, gag, cough intact.  --LUE withdraws  --RUE spont  --LLE withdraws  --RLE spont    Medications:  Continuous    fentanyl Last Rate: 2.5 mL/hr at 12/24/19 1000   niCARdipine Last Rate: Stopped (12/24/19 0005)   Scheduled    albuterol-ipratropium 3 mL Q4H   amLODIPine 10 mg Daily   amoxicillin-clavulanate 875-125mg 1 tablet Q12H   atorvastatin 40 mg QHS   chlorhexidine 15 mL BID   clopidogrel 75 mg Daily   famotidine 20 mg BID   furosemide 40 mg BID   heparin (porcine) 5,000 Units Q8H   hydrALAZINE 25 mg Q8H   levetiracetam oral soln 500 mg BID   PRN    acetaminophen 650 mg Q6H PRN   Dextrose 10% Bolus 12.5 g PRN   Dextrose 10% Bolus 25 g PRN   fentaNYL 50 mcg Q2H PRN   glucagon (human recombinant) 1 mg PRN   hydrALAZINE 10 mg Q6H PRN   insulin aspart U-100 1-10 Units Q4H PRN   labetalol 10 mg Q4H PRN   magnesium oxide 800 mg PRN   magnesium oxide 800 mg PRN   ondansetron 4 mg Q6H PRN   potassium chloride 10% 40 mEq PRN   potassium chloride 10% 40 mEq PRN   potassium chloride 10% 60 mEq PRN   potassium, sodium phosphates 2 packet PRN   potassium, sodium phosphates 2 packet PRN   potassium, sodium phosphates 2 packet PRN   sodium chloride 0.9% 10 mL PRN     Today I personally reviewed pertinent medications, lines/drains/airways, imaging, laboratory results,    Diet  No diet orders on file

## 2019-12-24 NOTE — ASSESSMENT & PLAN NOTE
- 2/2 IPH as noted on MRI imaging: mass effect on the right lateral ventricle and approximately 5 mm right-to-left midline shift  - Continue Neuro checks Q1 hr

## 2019-12-24 NOTE — ASSESSMENT & PLAN NOTE
- Continuous Cardiac Monitoring  - Vital Signs Q1 hour  - Maintain SBP < 140  - Echo 65%  - EKG 12/16: Atrial fibrillation with rapid ventricular response with premature ventricular or aberrantly conducted complexes  - Hydralazine 25 q8h

## 2019-12-24 NOTE — PLAN OF CARE
POC reviewed with pt and family at 0330. Pt family verbalized understanding. Pt unable to verbalizze understanding due to ETT/ sedation. Neuro exam remains the same. Fent at 25 mcg/kg/hr. Cardene gtt off.  Full bath given. Enteral water flushes restarted after Na of 150. Collecting Na BID. Questions and concerns addressed with family. No acute events overnight. Pt progressing toward goals. Will continue to monitor. See flowsheets for full assessment and VS info

## 2019-12-24 NOTE — PROGRESS NOTES
Patient's chart was reviewed by a stroke team provider.  Patient intubated and sedated. Plavix started on 12/23. Failed SBT overnight.  There is no new imaging to review.  Pending diagnostics to follow up on include: None.  For other recommendations please see our previous note completed on: 12/20/19.    There are no new recommendations at this time. Will continue to follow. Discussed patient with staff. Please contact stroke team for any questions or concerns.           Sánchez Rai MD  PGY-III, Neurology  Pager No [phone]        .

## 2019-12-24 NOTE — ASSESSMENT & PLAN NOTE
- Encounter for intubation d/t increased somnolence.   - CXR daily, 12/22 bilateral pattern of pulmonary vascular prominence as well as interstitial and alveolar infiltrate  -- s/p improvement with Lasix, now scheduled 40 IV BID  - ABG Daily  - Famotidine  - Chlorhexidine    Vent Mode: SIMV  Oxygen Concentration (%):  [50] 50  Resp Rate Total:  [16 br/min-35 br/min] 16 br/min  PEEP/CPAP:  [9 cmH20] 9 cmH20  Pressure Support:  [10 cmH20] 10 cmH20  Mean Airway Pressure:  [13 cmH20-15 cmH20] 13 cmH20   12/23: SBT trial today

## 2019-12-24 NOTE — PROGRESS NOTES
[facility]  Neurocritical Care  Progress Note    Admit Date: 12/16/2019  Service Date: 12/24/2019  Length of Stay: 8    Subjective:     Chief Complaint: Embolic stroke involving right middle cerebral artery    History of Present Illness: The patient is a 79 y.o. male who  was admitted as a transfer from OSH for Right MCA stroke syndrome. Patient had an urgent thrombectomy s/p  right MCA stroke syndrome. PMH significant for HTN, T2DM and HLD. Patient stated that around 2:30 pm on 12/16/19 he was driving and felt weak. He drove home and was taken to the ER where he was evaluated for a stroke. He had outside CTA which was reported to have R ICA stenosis at bifurcation with 70% stenosis and right MCA occlusion. Patient was transferred to [facility] for possible intervention. Patient had a right femoral sheath placed and his Angio  revealed 90% R ICA stenosis and R M1 occlusion. The R ICA was treated by thrombectomy and carotid stenting with TICI 3 reperfusion.   Patient was admitted to the [facility] for higher level of care post thrombectomy.    Hospital Course: 12/17/19: Patient admitted to [facility] for higher level of care s/p thrombectomy and right carotid artery stenting with TICI 3 reperfusion  12/18/2019Recent MRI with heme transformation abg Q8, repeat scan stable, mannitol  12/19/2019 intubation repeat CTH  12/20: febrile-broaden abx coverage, SBT, add hydralazine 50 q8h, KUB and mag citrate  12/21: wean FiO2, d/c hydralazine, wean fentanyl  12/22/2019: Patient stable overnight. Lasix 20mg x 1 given today. Enteral water flushes started for hypernatremia.    12/23: SBT trial, started Plavix today, initiated Hydralazine 25 TID, lasix 40 mg X1, decrease statin to 40 mg daily (transaminitis -trending down), add psyllium, Na BID -goal 145-150, initiated sq heparin  12/24/2019: SBT failed, wean fio2 as tolerated, CXR responded to lasix, scheduled BID        Interval History: SBT failed overnight, placed back on SIMV  and remains intubated. Will attempt to wean fio2 as tolerated while monitoring CVP. CXR with some congestion yesterday, improved with IV lasix, will continue scheduled lasix 40 IV BID. Na goal 140-150, increase free water bolus to q4 from q6.    Review of Systems    Constitutional: Denies fevers, weight loss, chills, or weakness.  Eyes: Denies changes in vision.  ENT: Denies dysphagia, nasal discharge, ear pain or discharge.  Cardiovascular: Denies chest pain, palpitations, orthopnea, or claudication.  Respiratory: Denies shortness of breath, cough, hemoptysis, or wheezing.  GI: Denies nausea/vomitting, hematochezia, melena, abd pain, or changes in appetite.  : Denies dysuria, incontinence, or hematuria.  Musculoskeletal: Denies joint pain or myalgias.  Skin/breast: Denies rashes, lumps, lesions, or discharge.  Neurologic: Denies headache, dizziness, vertigo, or paresthesias.  Psychiatric: Denies changes in mood or hallucinations.  Endocrine: Denies polyuria, polydipsia, heat/cold intolerance.  Hematologic/Lymph: Denies lymphadenopathy, easy bruising or easy bleeding.  Allergic/Immunologic: Denies rash, rhinitis.   Objective:     Vitals:  Temp: 98.8 °F (37.1 °C)  Pulse: 87  Rhythm: normal sinus rhythm  BP: (!) 123/59  MAP (mmHg): 85  CVP (mean): 7 mmHg  Resp: 16  SpO2: 97 %  Oxygen Concentration (%): 50  O2 Device (Oxygen Therapy): ventilator  Vent Mode: SIMV  Set Rate: 16 bmp  Pressure Support: 10 cmH20  PEEP/CPAP: 9 cmH20  Peak Airway Pressure: 24 cmH2O  Mean Airway Pressure: 13 cmH20  Plateau Pressure: 0 cmH20    Temp  Min: 98.4 °F (36.9 °C)  Max: 100 °F (37.8 °C)  Pulse  Min: 80  Max: 104  BP  Min: 107/71  Max: 164/108  MAP (mmHg)  Min: 75  Max: 127  CVP (mean)  Min: -4 mmHg  Max: 9 mmHg  Resp  Min: 8  Max: 35  SpO2  Min: 93 %  Max: 100 %  Oxygen Concentration (%)  Min: 50  Max: 50    12/23 0701 - 12/24 0700  In: 2416.9 [I.V.:456.9]  Out: 3210 [Urine:2810]   Unmeasured Output  Stool Occurrence: 1        Physical Exam    GA:  comfortable, no acute distress.   HEENT: No scleral icterus or JVD.   Pulmonary: Diminished to auscultation A/L. No wheezing, crackles, or rhonchi.  Cardiac: RRR S1 & S2 w/o rubs/murmurs/gallops.   Abdominal: Bowel sounds present x 4. No appreciable hepatosplenomegaly.  Skin: No jaundice, rashes, or visible lesions.  Neuro:  --GCS: E3 V1 M6  --Mental Status:  Opens eyes to voice, nods appropriately to qtsm follows commands  --Pupils 3mm, PERRL.   --Corneal reflex, gag, cough intact.  --LUE withdraws  --RUE spont  --LLE withdraws  --RLE spont    Medications:  Continuous    fentanyl Last Rate: 2.5 mL/hr at 12/24/19 1000   niCARdipine Last Rate: Stopped (12/24/19 0005)   Scheduled    albuterol-ipratropium 3 mL Q4H   amLODIPine 10 mg Daily   amoxicillin-clavulanate 875-125mg 1 tablet Q12H   atorvastatin 40 mg QHS   chlorhexidine 15 mL BID   clopidogrel 75 mg Daily   famotidine 20 mg BID   furosemide 40 mg BID   heparin (porcine) 5,000 Units Q8H   hydrALAZINE 25 mg Q8H   levetiracetam oral soln 500 mg BID   PRN    acetaminophen 650 mg Q6H PRN   Dextrose 10% Bolus 12.5 g PRN   Dextrose 10% Bolus 25 g PRN   fentaNYL 50 mcg Q2H PRN   glucagon (human recombinant) 1 mg PRN   hydrALAZINE 10 mg Q6H PRN   insulin aspart U-100 1-10 Units Q4H PRN   labetalol 10 mg Q4H PRN   magnesium oxide 800 mg PRN   magnesium oxide 800 mg PRN   ondansetron 4 mg Q6H PRN   potassium chloride 10% 40 mEq PRN   potassium chloride 10% 40 mEq PRN   potassium chloride 10% 60 mEq PRN   potassium, sodium phosphates 2 packet PRN   potassium, sodium phosphates 2 packet PRN   potassium, sodium phosphates 2 packet PRN   sodium chloride 0.9% 10 mL PRN     Today I personally reviewed pertinent medications, lines/drains/airways, imaging, laboratory results,    Diet  No diet orders on file      Assessment/Plan:     Neuro  * Embolic stroke involving right middle cerebral artery  - 78 y/o male admitted for R MCA stroke syndrome s/p  Thrombectomy TICI 3 reperfusion  - Neurochecks q1hr  - Vascular Neurology following  - SBP Goal < 140  - PT/OT/SLP  - Mechanical SCDs  - Atorvastatin  - Plavix    Cytotoxic brain edema  - 2/2 R MCA   - Neuro checks Q 1 hr  - Maintain eunatremia  - Maintain Euglyemia    Midline shift of brain  - 2/2 IPH as noted on MRI imaging: mass effect on the right lateral ventricle and approximately 5 mm right-to-left midline shift  - Continue Neuro checks Q1 hr     Vasogenic brain edema  - 2/2 IPH as noted on MRI: intraparenchymal hemorrhage within the right frontal and temporal lobes with associated vasogenic edema resulting in mass effect on the right lateral ventricle  - Neuro checks Q1 hr      Acute spont intraparenchymal hemorrhage assoc w/ coagulopathy  12/16 Patient s/p thrombectomy with R ICA stent placement. ASA and Plavix initiated  12/17 MRI notes: intraparenchymal hemorrhage within the right frontal and temporal lobes with associated vasogenic edema resulting in mass effect on the right lateral ventricle and approximately 5 mm right-to-left midline shift.  12/17 ASA Plavix discontinued  Vascular Stroke following  NSGY consulted  12/16 2% initiated to maintain Na 145-155  Na Q6 (disc. Grouped into BMP)  Mannitol x1  12/17 changed 2% to 3%  Q6 Na changed to BMP Q6, Serum Os  12/20: off HTS  12/21: d/c hydralazine po and off HTS, na goal  12/22: Lasix 20mg given, will monitor UOP  12/23: Lasix 40 mg X1    Cerebrovascular accident (CVA)  - See embolic stroke above     Pulmonary  Acute respiratory failure with hypoxia  - Encounter for intubation d/t increased somnolence.   - CXR daily, 12/22 bilateral pattern of pulmonary vascular prominence as well as interstitial and alveolar infiltrate  -- s/p improvement with Lasix, now scheduled 40 IV BID  - ABG Daily  - Famotidine  - Chlorhexidine    Vent Mode: SIMV  Oxygen Concentration (%):  [50] 50  Resp Rate Total:  [16 br/min-35 br/min] 16 br/min  PEEP/CPAP:  [9 cmH20] 9  cmH20  Pressure Support:  [10 cmH20] 10 cmH20  Mean Airway Pressure:  [13 cmH20-15 cmH20] 13 cmH20   12/23: SBT trial today       Cardiac/Vascular  Mixed hyperlipidemia  - Decreased statin to 40 mg due to transaminitis       Essential hypertension  - Continuous Cardiac Monitoring  - Vital Signs Q1 hour  - Maintain SBP < 140  - Echo 65%  - EKG 12/16: Atrial fibrillation with rapid ventricular response with premature ventricular or aberrantly conducted complexes  - Hydralazine 25 q8h            Hematology  Acute venous embolism and thrombosis of basilic vein, left  - Noted on LE US    Endocrine  Type 2 diabetes mellitus with neurologic complication, without long-term current use of insulin  - A1c 7.7   - SSI   - POCT Q4  - Continue TF  - See management for R MCA/IPH             GI  Constipation  - Psyllium for 3 days  - Continue BR    Other  Fever due to infection  - Resolved, Continue augmentin  - Possible community acquired infection  - Wet Raspy cough  - T Max 102.0  - US Extremities/ Soft Tissue of Neck shows thrombosed left basilic vein (superficial vein).    Decreased strength, endurance, and mobility  - 2/2 LS Weakness R/T R MCA  - PT/OT           The patient is being Prophylaxed for:  Venous Thromboembolism with: Chemical  Stress Ulcer with: H2B  Ventilator Pneumonia with: chlorhexidine oral care    Activity Orders          None        Full Code    Reji Pool MD  Neurocritical Care  [facility]

## 2019-12-24 NOTE — ASSESSMENT & PLAN NOTE
- Resolved, Continue augmentin  - Possible community acquired infection  - Wet Raspy cough  - T Max 102.0  - US Extremities/ Soft Tissue of Neck shows thrombosed left basilic vein (superficial vein).

## 2019-12-24 NOTE — PLAN OF CARE
POC reviewed with pt and family at 1400.   Family verbalized understanding.   Questions and concerns addressed.   See flowsheets for full assessment and VS info.     Lasix given.  TF @ goal.  Fentanyl gtt.

## 2019-12-25 LAB
ALBUMIN SERPL BCP-MCNC: 2.4 G/DL (ref 3.5–5.2)
ALLENS TEST: ABNORMAL
ALP SERPL-CCNC: 138 U/L (ref 55–135)
ALT SERPL W/O P-5'-P-CCNC: 57 U/L (ref 10–44)
ANION GAP SERPL CALC-SCNC: 9 MMOL/L (ref 8–16)
AST SERPL-CCNC: 49 U/L (ref 10–40)
BASOPHILS # BLD AUTO: 0.04 K/UL (ref 0–0.2)
BASOPHILS NFR BLD: 0.5 % (ref 0–1.9)
BILIRUB SERPL-MCNC: 0.6 MG/DL (ref 0.1–1)
BUN SERPL-MCNC: 28 MG/DL (ref 8–23)
CALCIUM SERPL-MCNC: 8.7 MG/DL (ref 8.7–10.5)
CHLORIDE SERPL-SCNC: 106 MMOL/L (ref 95–110)
CO2 SERPL-SCNC: 29 MMOL/L (ref 23–29)
CREAT SERPL-MCNC: 0.8 MG/DL (ref 0.5–1.4)
DELSYS: ABNORMAL
DIFFERENTIAL METHOD: ABNORMAL
EOSINOPHIL # BLD AUTO: 0.4 K/UL (ref 0–0.5)
EOSINOPHIL NFR BLD: 5 % (ref 0–8)
ERYTHROCYTE [DISTWIDTH] IN BLOOD BY AUTOMATED COUNT: 12.3 % (ref 11.5–14.5)
ERYTHROCYTE [SEDIMENTATION RATE] IN BLOOD BY WESTERGREN METHOD: 16 MM/H
EST. GFR  (AFRICAN AMERICAN): >60 ML/MIN/1.73 M^2
EST. GFR  (NON AFRICAN AMERICAN): >60 ML/MIN/1.73 M^2
FIO2: 50
GLUCOSE SERPL-MCNC: 245 MG/DL (ref 70–110)
HCO3 UR-SCNC: 33 MMOL/L (ref 24–28)
HCT VFR BLD AUTO: 38.6 % (ref 40–54)
HGB BLD-MCNC: 12.2 G/DL (ref 14–18)
IMM GRANULOCYTES # BLD AUTO: 0.08 K/UL (ref 0–0.04)
IMM GRANULOCYTES NFR BLD AUTO: 1.1 % (ref 0–0.5)
LYMPHOCYTES # BLD AUTO: 1.8 K/UL (ref 1–4.8)
LYMPHOCYTES NFR BLD: 23.3 % (ref 18–48)
MAGNESIUM SERPL-MCNC: 2.2 MG/DL (ref 1.6–2.6)
MCH RBC QN AUTO: 31.5 PG (ref 27–31)
MCHC RBC AUTO-ENTMCNC: 31.6 G/DL (ref 32–36)
MCV RBC AUTO: 100 FL (ref 82–98)
MODE: ABNORMAL
MONOCYTES # BLD AUTO: 0.8 K/UL (ref 0.3–1)
MONOCYTES NFR BLD: 10.7 % (ref 4–15)
NEUTROPHILS # BLD AUTO: 4.5 K/UL (ref 1.8–7.7)
NEUTROPHILS NFR BLD: 59.4 % (ref 38–73)
NRBC BLD-RTO: 0 /100 WBC
PCO2 BLDA: 48.3 MMHG (ref 35–45)
PEEP: 9
PH SMN: 7.44 [PH] (ref 7.35–7.45)
PHOSPHATE SERPL-MCNC: 3.5 MG/DL (ref 2.7–4.5)
PIP: 15
PLATELET # BLD AUTO: 168 K/UL (ref 150–350)
PMV BLD AUTO: 11.6 FL (ref 9.2–12.9)
PO2 BLDA: 86 MMHG (ref 80–100)
POC BE: 9 MMOL/L
POC SATURATED O2: 97 % (ref 95–100)
POC TCO2: 34 MMOL/L (ref 23–27)
POCT GLUCOSE: 218 MG/DL (ref 70–110)
POCT GLUCOSE: 244 MG/DL (ref 70–110)
POTASSIUM SERPL-SCNC: 4.1 MMOL/L (ref 3.5–5.1)
PROT SERPL-MCNC: 6.4 G/DL (ref 6–8.4)
PS: 10
RBC # BLD AUTO: 3.87 M/UL (ref 4.6–6.2)
SAMPLE: ABNORMAL
SITE: ABNORMAL
SODIUM SERPL-SCNC: 142 MMOL/L (ref 136–145)
SODIUM SERPL-SCNC: 143 MMOL/L (ref 136–145)
SODIUM SERPL-SCNC: 144 MMOL/L (ref 136–145)
SP02: 97
WBC # BLD AUTO: 7.54 K/UL (ref 3.9–12.7)

## 2019-12-25 PROCEDURE — 85025 COMPLETE CBC W/AUTO DIFF WBC: CPT

## 2019-12-25 PROCEDURE — 99900035 HC TECH TIME PER 15 MIN (STAT)

## 2019-12-25 PROCEDURE — 99233 SBSQ HOSP IP/OBS HIGH 50: CPT | Mod: GC,,, | Performed by: PSYCHIATRY & NEUROLOGY

## 2019-12-25 PROCEDURE — 63600175 PHARM REV CODE 636 W HCPCS: Performed by: NURSE PRACTITIONER

## 2019-12-25 PROCEDURE — 94003 VENT MGMT INPAT SUBQ DAY: CPT

## 2019-12-25 PROCEDURE — 27200966 HC CLOSED SUCTION SYSTEM

## 2019-12-25 PROCEDURE — 84295 ASSAY OF SERUM SODIUM: CPT | Mod: 91

## 2019-12-25 PROCEDURE — 82803 BLOOD GASES ANY COMBINATION: CPT

## 2019-12-25 PROCEDURE — 25000242 PHARM REV CODE 250 ALT 637 W/ HCPCS: Performed by: INTERNAL MEDICINE

## 2019-12-25 PROCEDURE — 94668 MNPJ CHEST WALL SBSQ: CPT

## 2019-12-25 PROCEDURE — 27000221 HC OXYGEN, UP TO 24 HOURS

## 2019-12-25 PROCEDURE — C9399 UNCLASSIFIED DRUGS OR BIOLOG: HCPCS | Performed by: NURSE PRACTITIONER

## 2019-12-25 PROCEDURE — 20000000 HC ICU ROOM

## 2019-12-25 PROCEDURE — 99233 PR SUBSEQUENT HOSPITAL CARE,LEVL III: ICD-10-PCS | Mod: GC,,, | Performed by: PSYCHIATRY & NEUROLOGY

## 2019-12-25 PROCEDURE — 84100 ASSAY OF PHOSPHORUS: CPT

## 2019-12-25 PROCEDURE — 94761 N-INVAS EAR/PLS OXIMETRY MLT: CPT

## 2019-12-25 PROCEDURE — 25000003 PHARM REV CODE 250: Performed by: PSYCHIATRY & NEUROLOGY

## 2019-12-25 PROCEDURE — 25000003 PHARM REV CODE 250: Performed by: NURSE PRACTITIONER

## 2019-12-25 PROCEDURE — 63600175 PHARM REV CODE 636 W HCPCS: Performed by: PSYCHIATRY & NEUROLOGY

## 2019-12-25 PROCEDURE — 80053 COMPREHEN METABOLIC PANEL: CPT

## 2019-12-25 PROCEDURE — 37799 UNLISTED PX VASCULAR SURGERY: CPT

## 2019-12-25 PROCEDURE — 94640 AIRWAY INHALATION TREATMENT: CPT

## 2019-12-25 PROCEDURE — 83735 ASSAY OF MAGNESIUM: CPT

## 2019-12-25 PROCEDURE — 99900026 HC AIRWAY MAINTENANCE (STAT)

## 2019-12-25 RX ADMIN — CHLORHEXIDINE GLUCONATE 0.12% ORAL RINSE 15 ML: 1.2 LIQUID ORAL at 08:12

## 2019-12-25 RX ADMIN — HYDRALAZINE HYDROCHLORIDE 25 MG: 25 TABLET, FILM COATED ORAL at 09:12

## 2019-12-25 RX ADMIN — INSULIN ASPART 2 UNITS: 100 INJECTION, SOLUTION INTRAVENOUS; SUBCUTANEOUS at 04:12

## 2019-12-25 RX ADMIN — FUROSEMIDE 40 MG: 10 INJECTION, SOLUTION INTRAVENOUS at 06:12

## 2019-12-25 RX ADMIN — INSULIN ASPART 4 UNITS: 100 INJECTION, SOLUTION INTRAVENOUS; SUBCUTANEOUS at 08:12

## 2019-12-25 RX ADMIN — INSULIN ASPART 4 UNITS: 100 INJECTION, SOLUTION INTRAVENOUS; SUBCUTANEOUS at 05:12

## 2019-12-25 RX ADMIN — NICARDIPINE HYDROCHLORIDE 15 MG/HR: 0.2 INJECTION, SOLUTION INTRAVENOUS at 09:12

## 2019-12-25 RX ADMIN — AMLODIPINE BESYLATE 10 MG: 10 TABLET ORAL at 08:12

## 2019-12-25 RX ADMIN — IPRATROPIUM BROMIDE AND ALBUTEROL SULFATE 3 ML: .5; 3 SOLUTION RESPIRATORY (INHALATION) at 03:12

## 2019-12-25 RX ADMIN — HEPARIN SODIUM 5000 UNITS: 5000 INJECTION, SOLUTION INTRAVENOUS; SUBCUTANEOUS at 09:12

## 2019-12-25 RX ADMIN — CLOPIDOGREL BISULFATE 75 MG: 75 TABLET ORAL at 08:12

## 2019-12-25 RX ADMIN — IPRATROPIUM BROMIDE AND ALBUTEROL SULFATE 3 ML: .5; 3 SOLUTION RESPIRATORY (INHALATION) at 07:12

## 2019-12-25 RX ADMIN — IPRATROPIUM BROMIDE AND ALBUTEROL SULFATE 3 ML: .5; 3 SOLUTION RESPIRATORY (INHALATION) at 11:12

## 2019-12-25 RX ADMIN — FAMOTIDINE 20 MG: 20 TABLET ORAL at 08:12

## 2019-12-25 RX ADMIN — FUROSEMIDE 40 MG: 10 INJECTION, SOLUTION INTRAVENOUS at 08:12

## 2019-12-25 RX ADMIN — ACETAMINOPHEN 650 MG: 325 TABLET ORAL at 06:12

## 2019-12-25 RX ADMIN — INSULIN DETEMIR 4 UNITS: 100 INJECTION, SOLUTION SUBCUTANEOUS at 12:12

## 2019-12-25 RX ADMIN — ATORVASTATIN CALCIUM 40 MG: 20 TABLET, FILM COATED ORAL at 08:12

## 2019-12-25 RX ADMIN — HEPARIN SODIUM 5000 UNITS: 5000 INJECTION, SOLUTION INTRAVENOUS; SUBCUTANEOUS at 05:12

## 2019-12-25 RX ADMIN — HYDRALAZINE HYDROCHLORIDE 25 MG: 25 TABLET, FILM COATED ORAL at 02:12

## 2019-12-25 RX ADMIN — FENTANYL CITRATE 50 MCG: 50 INJECTION INTRAMUSCULAR; INTRAVENOUS at 09:12

## 2019-12-25 RX ADMIN — HEPARIN SODIUM 5000 UNITS: 5000 INJECTION, SOLUTION INTRAVENOUS; SUBCUTANEOUS at 02:12

## 2019-12-25 RX ADMIN — HYDRALAZINE HYDROCHLORIDE 25 MG: 25 TABLET, FILM COATED ORAL at 05:12

## 2019-12-25 NOTE — PLAN OF CARE
POC reviewed with pt and family at 0500. Pt family verbalized understanding. Pt unable to verbalize understanding due to ETT/sedation. Questions and concerns addressed with family. No acute events overnight. Cardene gtt off. Pt bathed and flexi removed. T-max 99.8 CXR complete. Pt progressing toward goals. Will continue to monitor. See flowsheets for full assessment and VS info      Problem: Functional Ability Impaired (Stroke, Ischemic/Transient Ischemic Attack)  Goal: Optimal Functional Ability  Outcome: Ongoing, Progressing  Intervention: Optimize Functional Ability  Flowsheets (Taken 12/25/2019 0351)  Self-Care Promotion: safe use of adaptive equipment encouraged  Activity Management: bedrest maintained per order

## 2019-12-25 NOTE — PROGRESS NOTES
[facility]  Neurocritical Care  Progress Note    Admit Date: 12/16/2019  Service Date: 12/25/2019  Length of Stay: 9    Subjective:     Chief Complaint: Embolic stroke involving right middle cerebral artery    History of Present Illness: The patient is a 79 y.o. male who  was admitted as a transfer from OSH for Right MCA stroke syndrome. Patient had an urgent thrombectomy s/p  right MCA stroke syndrome. PMH significant for HTN, T2DM and HLD. Patient stated that around 2:30 pm on 12/16/19 he was driving and felt weak. He drove home and was taken to the ER where he was evaluated for a stroke. He had outside CTA which was reported to have R ICA stenosis at bifurcation with 70% stenosis and right MCA occlusion. Patient was transferred to [facility] for possible intervention. Patient had a right femoral sheath placed and his Angio  revealed 90% R ICA stenosis and R M1 occlusion. The R ICA was treated by thrombectomy and carotid stenting with TICI 3 reperfusion.   Patient was admitted to the [facility] for higher level of care post thrombectomy.    Hospital Course: 12/17/19: Patient admitted to [facility] for higher level of care s/p thrombectomy and right carotid artery stenting with TICI 3 reperfusion  12/18/2019Recent MRI with heme transformation abg Q8, repeat scan stable, mannitol  12/19/2019 intubation repeat CTH  12/20: febrile-broaden abx coverage, SBT, add hydralazine 50 q8h, KUB and mag citrate  12/21: wean FiO2, d/c hydralazine, wean fentanyl  12/22/2019: Patient stable overnight. Lasix 20mg x 1 given today. Enteral water flushes started for hypernatremia.    12/23: SBT trial, started Plavix today, initiated Hydralazine 25 TID, lasix 40 mg X1, decrease statin to 40 mg daily (transaminitis -trending down), add psyllium, Na BID -goal 145-150, initiated sq heparin  12/24/2019: SBT failed, wean fio2 as tolerated, CXR responded to lasix, scheduled BID  12/25/2019 continue lasix. SBT today. Adjust insulin  regimen      Review of Symptoms: intubated, sedated cannot participate.   Constitutional: Denies fevers, weight loss, chills, or weakness.   Eyes: Denies changes in vision.   ENT: Denies dysphagia, nasal discharge, ear pain or discharge.   Cardiovascular: Denies chest pain, palpitations, orthopnea, or claudication.   Respiratory: Denies shortness of breath, cough, hemoptysis, or wheezing.   GI: Denies nausea/vomitting, hematochezia, melena, abd pain, or changes in appetite.   : Denies dysuria, incontinence, or hematuria.   Musculoskeletal: Denies joint pain or myalgias.   Skin/breast: Denies rashes, lumps, lesions, or discharge.   Neurologic: Denies headache, dizziness, vertigo, or paresthesias.   Psychiatric: Denies changes in mood or hallucinations.   Endocrine: Denies polyuria, polydipsia, heat/cold intolerance.   Hematologic/Lymph: Denies lymphadenopathy, easy bruising or easy bleeding.   Allergic/Immunologic: Denies rash, rhinitis      Medications:  Continuous Infusions:   fentanyl 2.5 mL/hr at 12/25/19 1100    niCARdipine Stopped (12/24/19 2105)     Scheduled Meds:   albuterol-ipratropium  3 mL Nebulization Q4H    amLODIPine  10 mg Per OG tube Daily    atorvastatin  40 mg Per NG tube QHS    chlorhexidine  15 mL Mouth/Throat BID    clopidogrel  75 mg Per OG tube Daily    famotidine  20 mg Per OG tube BID    furosemide  40 mg Intravenous BID    heparin (porcine)  5,000 Units Subcutaneous Q8H    hydrALAZINE  25 mg Per OG tube Q8H    insulin detemir U-100  4 Units Subcutaneous QHS     PRN Meds:.acetaminophen, Dextrose 10% Bolus, Dextrose 10% Bolus, fentaNYL, glucagon (human recombinant), hydrALAZINE, insulin aspart U-100, labetalol, magnesium oxide, magnesium oxide, ondansetron, potassium chloride 10%, potassium chloride 10%, potassium chloride 10%, potassium, sodium phosphates, potassium, sodium phosphates, potassium, sodium phosphates, sodium chloride 0.9%    OBJECTIVE:   Vital Signs (Most Recent):    Temp: 100.2 °F (37.9 °C) (12/25/19 1100)  Pulse: 86 (12/25/19 1100)  Resp: 16 (12/25/19 1100)  BP: 134/63 (12/25/19 1100)  SpO2: 97 % (12/25/19 1100)    Vital Signs (24h Range):   Temp:  [99.2 °F (37.3 °C)-100.2 °F (37.9 °C)] 100.2 °F (37.9 °C)  Pulse:  [77-95] 86  Resp:  [16-19] 16  SpO2:  [96 %-100 %] 97 %  BP: (115-147)/(57-80) 134/63  Arterial Line BP: (117-179)/(46-65) 125/55    ICP/CPP (Last 24h):        I & O (Last 24h):     Intake/Output Summary (Last 24 hours) at 12/25/2019 1133  Last data filed at 12/25/2019 1100  Gross per 24 hour   Intake 2517.71 ml   Output 4140 ml   Net -1622.29 ml     Physical Exam:  GA: sedated, comfortable, no acute distress.   HEENT: No scleral icterus or JVD. Neck supple  Pulmonary: Air entry equal to auscultation A/P/L. Wheezing no, crackles +  Cardiac: RRR, S1 & S2 w/o rubs/murmurs/gallops.   Abdominal: soft, non-tender, bowel sounds present x 4. No appreciable hepatosplenomegaly.  Skin: No jaundice, rashes, or visible lesions.  Neuro:  --Mental Status:  Somnolent, awakens with verbal stimulation. Eye opening to voice, tracks and follows commands  --Pupils 3.5 mm, PERRL.   --Corneal reflex not done in this arousable patient, gag, cough intact.  left hemiparesis  MSK:  No edema in UE and LE    Vent Data:   Vent Mode: SIMV  Oxygen Concentration (%):  [50] 50  Resp Rate Total:  [15 br/min-21 br/min] 16 br/min  PEEP/CPAP:  [9 cmH20] 9 cmH20  Pressure Support:  [10 cmH20] 10 cmH20  Mean Airway Pressure:  [13 ihB25-83 cmH20] 13 cmH20    Lines/Drains/Airway:      Airway Anesthesia 12/18/19 (Active)   Secured at 25 cm 12/25/2019  7:22 AM   Measured At Teeth 12/25/2019  7:22 AM   Secured Location Center 12/25/2019  7:22 AM   Secured by Commercial tube lara 12/25/2019  7:22 AM   Bite Block center;secure and patent 12/25/2019  7:22 AM   Site Condition Cool;Dry 12/25/2019  7:22 AM   Status Intact;Secured;Patent 12/25/2019  7:22 AM   Site Assessment Clean;Dry 12/25/2019  7:22 AM   Cuff  Pressure 28 cm H2O 12/25/2019  7:22 AM           Percutaneous Central Line Insertion/Assessment - triple lumen  12/19/19 1657 left subclavian (Active)   Dressing biopatch in place;dressing dry and intact 12/25/2019 11:00 AM   Securement secured w/ sutures 12/25/2019 11:00 AM   Distal Patency/Care flushed w/o difficulty 12/25/2019 11:00 AM   Medial Patency/Care flushed w/o difficulty 12/25/2019 11:00 AM   Proximal Patency/Care flushed w/o difficulty 12/25/2019 11:00 AM   Waveform normal 12/25/2019 11:00 AM   Line Interventions line leveled/zeroed 12/25/2019 11:00 AM   Dressing Change Due 12/26/19 12/25/2019 11:00 AM   Daily Line Review Performed 12/25/2019 11:00 AM           Arterial Line 12/17/19 1930 Left Radial (Active)   Site Assessment Clean;Dry;Intact;No redness;No swelling 12/25/2019 11:00 AM   Line Status Pulsatile blood flow 12/25/2019 11:00 AM   Art Line Waveform Dampened;Square wave test performed 12/25/2019 11:00 AM   Arterial Line Interventions Zeroed and calibrated;Leveled;Connections checked and tightened;Line pulled back 12/25/2019 11:00 AM   Color/Movement/Sensation Capillary refill less than 3 sec 12/25/2019 11:00 AM   Dressing Type Transparent 12/25/2019 11:00 AM   Dressing Status Biopatch in place;Clean;Dry;Intact 12/25/2019 11:00 AM   Dressing Intervention Dressing reinforced 12/25/2019 11:00 AM   Dressing Change Due 12/28/19 12/25/2019 11:00 AM           NG/OG Tube 12/17/19 1100 nasogastric 14 Fr. Left nostril (Active)   Placement Check placement verified by distal tube length measurement 12/25/2019 11:00 AM   Advancement advanced manually 12/18/2019  3:02 PM   Tolerance no signs/symptoms of discomfort 12/25/2019 11:00 AM   Securement secured to nostril center w/ adhesive device 12/25/2019 11:00 AM   Clamp Status/Tolerance unclamped 12/25/2019 11:00 AM   Suction Setting/Drainage Method dependent drainage 12/23/2019  3:02 AM   Insertion Site Appearance no redness, warmth, tenderness, skin  "breakdown, drainage 12/25/2019 11:00 AM   Drainage None 12/25/2019  3:05 AM   Flush/Irrigation flushed w/;water;no resistance met 12/25/2019 11:00 AM   Feeding Method continuous 12/25/2019 11:00 AM   Feeding Action feeding continued 12/25/2019 11:00 AM   Current Rate (mL/hr) 55 mL/hr 12/24/2019  7:05 PM   Goal Rate (mL/hr) 55 mL/hr 12/24/2019  7:05 PM   Intake (mL) 60 mL 12/24/2019  8:00 AM   Water Bolus (mL) 400 mL 12/25/2019 11:00 AM   Rate Formula Tube Feeding (mL/hr) 55 mL/hr 12/24/2019  7:05 PM   Formula Name Glucerna 12/25/2019  3:05 AM   Intake (mL) - Formula Tube Feeding 55 12/25/2019 11:00 AM   Residual Amount (ml) 0 ml 12/24/2019  7:05 PM            Urethral Catheter 12/16/19 3316 (Active)   Site Assessment Clean;Intact 12/25/2019 11:00 AM   Collection Container Urimeter 12/25/2019 11:00 AM   Securement Method secured to top of thigh w/ adhesive device 12/25/2019 11:00 AM   Catheter Care Performed yes 12/25/2019 11:00 AM   Reason for Continuing Urinary Catheterization Critically ill in ICU requiring intensive monitoring 12/25/2019 11:00 AM   CAUTI Prevention Bundle StatLock in place w 1" slack;Intact seal between catheter & drainage tubing;Green sheeting clip in use;No dependent loops or kinks;Drainage bag/urimeter not overfilled (<2/3 full);Drainage bag/urimeter below bladder;Drainage bag/urimeter off the floor 12/25/2019  7:01 AM   Output (mL) 20 mL 12/25/2019  7:01 AM     Nutrition/Tube Feeds (if NPO state why): tf     Labs:  ABG:   Recent Labs   Lab 12/25/19 0318   PH 7.443   PO2 86   PCO2 48.3*   HCO3 33.0*   POCSATURATED 97   BE 9     BMP:  Recent Labs   Lab 12/25/19 0252 12/25/19 0824    143   K 4.1  --      --    CO2 29  --    BUN 28*  --    CREATININE 0.8  --    *  --    MG 2.2  --    PHOS 3.5  --      LFT:   Lab Results   Component Value Date    AST 49 (H) 12/25/2019    ALT 57 (H) 12/25/2019    ALKPHOS 138 (H) 12/25/2019    BILITOT 0.6 12/25/2019    ALBUMIN 2.4 (L) " 12/25/2019    PROT 6.4 12/25/2019     CBC:   Lab Results   Component Value Date    WBC 7.54 12/25/2019    HGB 12.2 (L) 12/25/2019    HCT 38.6 (L) 12/25/2019     (H) 12/25/2019     12/25/2019     Microbiology x 7d:   Microbiology Results (last 7 days)     Procedure Component Value Units Date/Time    Blood culture [[id number]] Collected:  12/18/19 1222    Order Status:  Completed Specimen:  Blood from Peripheral, Hand, Right Updated:  12/23/19 1412     Blood Culture, Routine No growth after 5 days.    Blood culture [[id number]] Collected:  12/18/19 1215    Order Status:  Completed Specimen:  Blood from Peripheral, Antecubital, Right Updated:  12/23/19 1412     Blood Culture, Routine No growth after 5 days.    Culture, Respiratory with Gram Stain [[id number]]  (Abnormal) Collected:  12/18/19 1400    Order Status:  Completed Specimen:  Respiratory from Sputum, Induced Updated:  12/21/19 1043     Respiratory Culture No S aureus or Pseudomonas isolated.      HAEMOPHILUS INFLUENZAE  Many  Beta Lactamase negative  Normal respiratory balaji also present       Gram Stain (Respiratory) <10 epithelial cells per low power field.     Gram Stain (Respiratory) Few WBC's     Gram Stain (Respiratory) Moderate Gram negative rods     Gram Stain (Respiratory) Few Gram positive rods     Gram Stain (Respiratory) Few Gram positive cocci        Imaging:  CXR 12/25 - tubes in place. Pulmonary congestion. Bibasilar atelectasis  I personally reviewed the above image.  Today I independently reviewed pertinent medications, lines/drains/airways, imaging, cardiology results, laboratory results, microbiology results, notably:   ASSESSMENT/PLAN:     Active Hospital Problems    Diagnosis    *Embolic stroke involving right middle cerebral artery    Constipation    Acute respiratory failure with hypoxia    Acute venous embolism and thrombosis of basilic vein, left    Acute spont intraparenchymal hemorrhage assoc w/ coagulopathy     Vasogenic brain edema    Midline shift of brain    Cytotoxic brain edema    Fever due to infection    Acute ischemic right MCA stroke    Decreased strength, endurance, and mobility    Essential hypertension    Type 2 diabetes mellitus with neurologic complication, without long-term current use of insulin    Mixed hyperlipidemia    Cerebrovascular accident (CVA)      Neuro:   Ischemic stroke with hemorrhagic conversion  Cont plavix. May need asa in not too distant future due to intracranial stent.   D/c fentanyl to encourage arousal.    Pulmonary:   SBT today   CXR/ABG daily.     Cardiac:   SBP <160 mmhg    Renal:    Making urine  BU/Cr > 20  Continue lasix    ID:   Low grade fever, normal wbc    Hem/Onc:   Stable hh and plt count    Endocrine:    Hyperglycemia - add detemir 4 units  Continue sliding scale    Fluids/Electrolytes/Nutrition/GI:   Tf  Replete lytes as needed  Lines:  Art d/c  CVC +  ETT +  Andrade +  NG +  PEG NA    Proph:  DVT:scd/heparin  Constipation:  Last output:bowel regimen as needed  PUP:pepcid  VAP:peridex      Uninterrupted Critical Care/Counseling Time (not including procedures):: III    Deep Shirley MD  Neurocritical care attending    Full Code    Deep Shirley MD  Neurocritical Care  [facility]

## 2019-12-25 NOTE — CARE UPDATE
Patient placed on spontaneous mode @ 1135. Placed patient back on a rate due to apnea episodes.  Patient is set on documented settings. Will continue to monitor.

## 2019-12-26 PROBLEM — I63.231: Status: ACTIVE | Noted: 2019-12-26

## 2019-12-26 PROBLEM — R74.01 TRANSAMINITIS: Status: ACTIVE | Noted: 2019-12-26

## 2019-12-26 LAB
ALBUMIN SERPL BCP-MCNC: 2.6 G/DL (ref 3.5–5.2)
ALLENS TEST: ABNORMAL
ALLENS TEST: ABNORMAL
ALP SERPL-CCNC: 235 U/L (ref 55–135)
ALT SERPL W/O P-5'-P-CCNC: 126 U/L (ref 10–44)
ANION GAP SERPL CALC-SCNC: 10 MMOL/L (ref 8–16)
AST SERPL-CCNC: 138 U/L (ref 10–40)
BASOPHILS # BLD AUTO: 0.05 K/UL (ref 0–0.2)
BASOPHILS NFR BLD: 0.6 % (ref 0–1.9)
BILIRUB SERPL-MCNC: 0.8 MG/DL (ref 0.1–1)
BUN SERPL-MCNC: 30 MG/DL (ref 8–23)
CALCIUM SERPL-MCNC: 9.1 MG/DL (ref 8.7–10.5)
CHLORIDE SERPL-SCNC: 104 MMOL/L (ref 95–110)
CO2 SERPL-SCNC: 31 MMOL/L (ref 23–29)
CREAT SERPL-MCNC: 0.9 MG/DL (ref 0.5–1.4)
DELSYS: ABNORMAL
DELSYS: ABNORMAL
DIFFERENTIAL METHOD: ABNORMAL
EOSINOPHIL # BLD AUTO: 0.3 K/UL (ref 0–0.5)
EOSINOPHIL NFR BLD: 3.1 % (ref 0–8)
ERYTHROCYTE [DISTWIDTH] IN BLOOD BY AUTOMATED COUNT: 12 % (ref 11.5–14.5)
ERYTHROCYTE [SEDIMENTATION RATE] IN BLOOD BY WESTERGREN METHOD: 16 MM/H
EST. GFR  (AFRICAN AMERICAN): >60 ML/MIN/1.73 M^2
EST. GFR  (NON AFRICAN AMERICAN): >60 ML/MIN/1.73 M^2
FIO2: 40
FIO2: 50
GLUCOSE SERPL-MCNC: 236 MG/DL (ref 70–110)
HCO3 UR-SCNC: 35.3 MMOL/L (ref 24–28)
HCO3 UR-SCNC: 35.3 MMOL/L (ref 24–28)
HCT VFR BLD AUTO: 40.1 % (ref 40–54)
HGB BLD-MCNC: 12.6 G/DL (ref 14–18)
IMM GRANULOCYTES # BLD AUTO: 0.11 K/UL (ref 0–0.04)
IMM GRANULOCYTES NFR BLD AUTO: 1.3 % (ref 0–0.5)
LYMPHOCYTES # BLD AUTO: 2 K/UL (ref 1–4.8)
LYMPHOCYTES NFR BLD: 23.6 % (ref 18–48)
MAGNESIUM SERPL-MCNC: 2.3 MG/DL (ref 1.6–2.6)
MCH RBC QN AUTO: 31.2 PG (ref 27–31)
MCHC RBC AUTO-ENTMCNC: 31.4 G/DL (ref 32–36)
MCV RBC AUTO: 99 FL (ref 82–98)
MIN VOL: 8.61
MODE: ABNORMAL
MODE: ABNORMAL
MONOCYTES # BLD AUTO: 1 K/UL (ref 0.3–1)
MONOCYTES NFR BLD: 11.5 % (ref 4–15)
NEUTROPHILS # BLD AUTO: 5.1 K/UL (ref 1.8–7.7)
NEUTROPHILS NFR BLD: 59.9 % (ref 38–73)
NRBC BLD-RTO: 0 /100 WBC
PCO2 BLDA: 46.6 MMHG (ref 35–45)
PCO2 BLDA: 51.7 MMHG (ref 35–45)
PEEP: 7
PEEP: 9
PH SMN: 7.44 [PH] (ref 7.35–7.45)
PH SMN: 7.49 [PH] (ref 7.35–7.45)
PHOSPHATE SERPL-MCNC: 2.9 MG/DL (ref 2.7–4.5)
PIP: 19
PLATELET # BLD AUTO: 175 K/UL (ref 150–350)
PMV BLD AUTO: 11.8 FL (ref 9.2–12.9)
PO2 BLDA: 201 MMHG (ref 80–100)
PO2 BLDA: 86 MMHG (ref 80–100)
POC BE: 11 MMOL/L
POC BE: 12 MMOL/L
POC SATURATED O2: 100 % (ref 95–100)
POC SATURATED O2: 97 % (ref 95–100)
POC TCO2: 37 MMOL/L (ref 23–27)
POC TCO2: 37 MMOL/L (ref 23–27)
POCT GLUCOSE: 234 MG/DL (ref 70–110)
POCT GLUCOSE: 238 MG/DL (ref 70–110)
POCT GLUCOSE: 240 MG/DL (ref 70–110)
POCT GLUCOSE: 259 MG/DL (ref 70–110)
POCT GLUCOSE: 261 MG/DL (ref 70–110)
POCT GLUCOSE: 264 MG/DL (ref 70–110)
POCT GLUCOSE: 277 MG/DL (ref 70–110)
POTASSIUM SERPL-SCNC: 4 MMOL/L (ref 3.5–5.1)
PROT SERPL-MCNC: 6.8 G/DL (ref 6–8.4)
PS: 10
RBC # BLD AUTO: 4.04 M/UL (ref 4.6–6.2)
SAMPLE: ABNORMAL
SAMPLE: ABNORMAL
SITE: ABNORMAL
SITE: ABNORMAL
SODIUM SERPL-SCNC: 141 MMOL/L (ref 136–145)
SODIUM SERPL-SCNC: 143 MMOL/L (ref 136–145)
SODIUM SERPL-SCNC: 145 MMOL/L (ref 136–145)
SP02: 98
SP02: 98
SPONT RATE: 16
WBC # BLD AUTO: 8.44 K/UL (ref 3.9–12.7)

## 2019-12-26 PROCEDURE — 99900035 HC TECH TIME PER 15 MIN (STAT)

## 2019-12-26 PROCEDURE — 63600175 PHARM REV CODE 636 W HCPCS: Performed by: NURSE PRACTITIONER

## 2019-12-26 PROCEDURE — 51798 US URINE CAPACITY MEASURE: CPT

## 2019-12-26 PROCEDURE — 94668 MNPJ CHEST WALL SBSQ: CPT

## 2019-12-26 PROCEDURE — 51701 INSERT BLADDER CATHETER: CPT

## 2019-12-26 PROCEDURE — 82803 BLOOD GASES ANY COMBINATION: CPT

## 2019-12-26 PROCEDURE — 94640 AIRWAY INHALATION TREATMENT: CPT

## 2019-12-26 PROCEDURE — 25000003 PHARM REV CODE 250: Performed by: PHYSICIAN ASSISTANT

## 2019-12-26 PROCEDURE — 25000003 PHARM REV CODE 250: Performed by: PSYCHIATRY & NEUROLOGY

## 2019-12-26 PROCEDURE — 99233 SBSQ HOSP IP/OBS HIGH 50: CPT | Mod: ,,, | Performed by: PHYSICIAN ASSISTANT

## 2019-12-26 PROCEDURE — C9399 UNCLASSIFIED DRUGS OR BIOLOG: HCPCS | Performed by: NURSE PRACTITIONER

## 2019-12-26 PROCEDURE — 27000221 HC OXYGEN, UP TO 24 HOURS

## 2019-12-26 PROCEDURE — 94003 VENT MGMT INPAT SUBQ DAY: CPT

## 2019-12-26 PROCEDURE — 36600 WITHDRAWAL OF ARTERIAL BLOOD: CPT

## 2019-12-26 PROCEDURE — 25000003 PHARM REV CODE 250: Performed by: NURSE PRACTITIONER

## 2019-12-26 PROCEDURE — 20000000 HC ICU ROOM

## 2019-12-26 PROCEDURE — 84295 ASSAY OF SERUM SODIUM: CPT | Mod: 91

## 2019-12-26 PROCEDURE — 99900026 HC AIRWAY MAINTENANCE (STAT)

## 2019-12-26 PROCEDURE — 63600175 PHARM REV CODE 636 W HCPCS: Performed by: PSYCHIATRY & NEUROLOGY

## 2019-12-26 PROCEDURE — 83735 ASSAY OF MAGNESIUM: CPT

## 2019-12-26 PROCEDURE — 80053 COMPREHEN METABOLIC PANEL: CPT

## 2019-12-26 PROCEDURE — 99233 PR SUBSEQUENT HOSPITAL CARE,LEVL III: ICD-10-PCS | Mod: ,,, | Performed by: PHYSICIAN ASSISTANT

## 2019-12-26 PROCEDURE — 94761 N-INVAS EAR/PLS OXIMETRY MLT: CPT

## 2019-12-26 PROCEDURE — 85025 COMPLETE CBC W/AUTO DIFF WBC: CPT

## 2019-12-26 PROCEDURE — 84100 ASSAY OF PHOSPHORUS: CPT

## 2019-12-26 PROCEDURE — 25000242 PHARM REV CODE 250 ALT 637 W/ HCPCS: Performed by: INTERNAL MEDICINE

## 2019-12-26 RX ORDER — AMOXICILLIN 250 MG
1 CAPSULE ORAL 2 TIMES DAILY
Status: DISCONTINUED | OUTPATIENT
Start: 2019-12-26 | End: 2020-01-09

## 2019-12-26 RX ORDER — ACETAMINOPHEN 325 MG/1
650 TABLET ORAL EVERY 6 HOURS PRN
Status: DISCONTINUED | OUTPATIENT
Start: 2019-12-26 | End: 2020-01-09

## 2019-12-26 RX ORDER — HYDRALAZINE HYDROCHLORIDE 20 MG/ML
10 INJECTION INTRAMUSCULAR; INTRAVENOUS EVERY 6 HOURS PRN
Status: DISCONTINUED | OUTPATIENT
Start: 2019-12-26 | End: 2020-01-10 | Stop reason: HOSPADM

## 2019-12-26 RX ORDER — LABETALOL HCL 20 MG/4 ML
10 SYRINGE (ML) INTRAVENOUS EVERY 4 HOURS PRN
Status: DISCONTINUED | OUTPATIENT
Start: 2019-12-26 | End: 2020-01-10 | Stop reason: HOSPADM

## 2019-12-26 RX ADMIN — FAMOTIDINE 20 MG: 20 TABLET ORAL at 09:12

## 2019-12-26 RX ADMIN — HYDRALAZINE HYDROCHLORIDE 10 MG: 20 INJECTION INTRAMUSCULAR; INTRAVENOUS at 12:12

## 2019-12-26 RX ADMIN — AMLODIPINE BESYLATE 10 MG: 10 TABLET ORAL at 09:12

## 2019-12-26 RX ADMIN — IPRATROPIUM BROMIDE AND ALBUTEROL SULFATE 3 ML: .5; 3 SOLUTION RESPIRATORY (INHALATION) at 03:12

## 2019-12-26 RX ADMIN — CLOPIDOGREL BISULFATE 75 MG: 75 TABLET ORAL at 09:12

## 2019-12-26 RX ADMIN — SENNOSIDES AND DOCUSATE SODIUM 1 TABLET: 8.6; 5 TABLET ORAL at 12:12

## 2019-12-26 RX ADMIN — HEPARIN SODIUM 5000 UNITS: 5000 INJECTION, SOLUTION INTRAVENOUS; SUBCUTANEOUS at 05:12

## 2019-12-26 RX ADMIN — INSULIN ASPART 2 UNITS: 100 INJECTION, SOLUTION INTRAVENOUS; SUBCUTANEOUS at 03:12

## 2019-12-26 RX ADMIN — CHLORHEXIDINE GLUCONATE 0.12% ORAL RINSE 15 ML: 1.2 LIQUID ORAL at 09:12

## 2019-12-26 RX ADMIN — IPRATROPIUM BROMIDE AND ALBUTEROL SULFATE 3 ML: .5; 3 SOLUTION RESPIRATORY (INHALATION) at 11:12

## 2019-12-26 RX ADMIN — FUROSEMIDE 40 MG: 10 INJECTION, SOLUTION INTRAVENOUS at 06:12

## 2019-12-26 RX ADMIN — IPRATROPIUM BROMIDE AND ALBUTEROL SULFATE 3 ML: .5; 3 SOLUTION RESPIRATORY (INHALATION) at 07:12

## 2019-12-26 RX ADMIN — INSULIN ASPART 2 UNITS: 100 INJECTION, SOLUTION INTRAVENOUS; SUBCUTANEOUS at 12:12

## 2019-12-26 RX ADMIN — HEPARIN SODIUM 5000 UNITS: 5000 INJECTION, SOLUTION INTRAVENOUS; SUBCUTANEOUS at 09:12

## 2019-12-26 RX ADMIN — HYDRALAZINE HYDROCHLORIDE 25 MG: 25 TABLET, FILM COATED ORAL at 02:12

## 2019-12-26 RX ADMIN — INSULIN ASPART 6 UNITS: 100 INJECTION, SOLUTION INTRAVENOUS; SUBCUTANEOUS at 04:12

## 2019-12-26 RX ADMIN — INSULIN DETEMIR 4 UNITS: 100 INJECTION, SOLUTION SUBCUTANEOUS at 08:12

## 2019-12-26 RX ADMIN — HYDRALAZINE HYDROCHLORIDE 25 MG: 25 TABLET, FILM COATED ORAL at 09:12

## 2019-12-26 RX ADMIN — ACETAMINOPHEN 650 MG: 325 TABLET ORAL at 12:12

## 2019-12-26 RX ADMIN — NICARDIPINE HYDROCHLORIDE 10 MG/HR: 0.2 INJECTION, SOLUTION INTRAVENOUS at 05:12

## 2019-12-26 RX ADMIN — ACETAMINOPHEN 650 MG: 325 TABLET ORAL at 06:12

## 2019-12-26 RX ADMIN — HEPARIN SODIUM 5000 UNITS: 5000 INJECTION, SOLUTION INTRAVENOUS; SUBCUTANEOUS at 02:12

## 2019-12-26 RX ADMIN — HYDRALAZINE HYDROCHLORIDE 25 MG: 25 TABLET, FILM COATED ORAL at 05:12

## 2019-12-26 RX ADMIN — FENTANYL CITRATE 50 MCG: 50 INJECTION INTRAMUSCULAR; INTRAVENOUS at 01:12

## 2019-12-26 RX ADMIN — INSULIN ASPART 6 UNITS: 100 INJECTION, SOLUTION INTRAVENOUS; SUBCUTANEOUS at 09:12

## 2019-12-26 RX ADMIN — IPRATROPIUM BROMIDE AND ALBUTEROL SULFATE 3 ML: .5; 3 SOLUTION RESPIRATORY (INHALATION) at 08:12

## 2019-12-26 RX ADMIN — INSULIN ASPART 3 UNITS: 100 INJECTION, SOLUTION INTRAVENOUS; SUBCUTANEOUS at 08:12

## 2019-12-26 RX ADMIN — FUROSEMIDE 40 MG: 10 INJECTION, SOLUTION INTRAVENOUS at 09:12

## 2019-12-26 RX ADMIN — INSULIN ASPART 6 UNITS: 100 INJECTION, SOLUTION INTRAVENOUS; SUBCUTANEOUS at 12:12

## 2019-12-26 NOTE — ASSESSMENT & PLAN NOTE
- Initially intubated for decreased mental status   - CXR daily, 12/22 bilateral pattern of pulmonary vascular prominence as well as interstitial and alveolar infiltrate  - s/p improvement with Lasix, now scheduled 40 IV BID- will continue   - ABG Daily  - Famotidine  - Chlorhexidine  -Daily SBT     Vent Mode: Spont  Oxygen Concentration (%):  [40-50] 40  Resp Rate Total:  [15 br/min-30 br/min] 19 br/min  PEEP/CPAP:  [7 cmH20-9 cmH20] 7 cmH20  Pressure Support:  [10 cmH20] 10 cmH20  Mean Airway Pressure:  [10 cmH20-15 cmH20] 11 cmH20

## 2019-12-26 NOTE — PLAN OF CARE
POC reviewed with pt and family at 0400. Pt family verbalized understanding. Questions and concerns addressed with family. No acute events overnight. On cardene gtt for SBP < 140. PRN fent given for agitation. T-max 100.1, tylenol given. Full bath given. Will continue to monitor. See flowsheets for full assessment and VS info      Problem: Cerebral Tissue Perfusion Risk (Stroke, Ischemic/Transient Ischemic Attack)  Goal: Optimal Cerebral Tissue Perfusion  Outcome: Ongoing, Progressing

## 2019-12-26 NOTE — ASSESSMENT & PLAN NOTE
- Continuous Cardiac Monitoring  - Maintain SBP < 160  - Echo 65%  - Hydralazine 25 q8h  - Amlodipine 10 mg daily  - Furosemide 40 mg BID  - Wean nicardipine

## 2019-12-26 NOTE — PT/OT/SLP PROGRESS
Physical Therapy      Patient Name:  Jerry Linder   MRN:  [id number]    Patient not seen today secondary to Other (Comment)(intubated). Discharging PT orders, please re-consult pending extubation when patient is appropriate for out of bed mobility.     Marion Mar, PT

## 2019-12-26 NOTE — PLAN OF CARE
Patient on vent.  Not medically ready for discharge.        12/26/19 1501   Discharge Reassessment   Assessment Type Discharge Planning Reassessment   Provided patient/caregiver education on the expected discharge date and the discharge plan No   Do you have any problems affording any of your prescribed medications? No   Discharge Plan A Long-term acute care facility (LTAC)   Discharge Plan B Skilled Nursing Facility   DME Needed Upon Discharge  none   Patient choice form signed by patient/caregiver N/A   Anticipated Discharge Disposition LTAC   Can the patient answer the patient profile reliably? No, cognitively impaired   How does the patient rate their overall health at the present time?   ([place])   Describe the patient's ability to walk at the present time. Does not walk or unable to take any steps at all   How often would a person be available to care for the patient? Often   Number of comorbid conditions (as recorded on the chart) Four   Post-Acute Status   Post-Acute Authorization Placement   Post-Acute Placement Status Awaiting Internal Medical Clearance   Discharge Delays None known at this time       Leigha Walker RN, CCRN-K, [facility]  Neuro-Critical Care   [phone]

## 2019-12-26 NOTE — PROGRESS NOTES
[facility]  Neurocritical Care  Progress Note    Admit Date: 12/16/2019  Service Date: 12/26/2019  Length of Stay: 10    Subjective:     Chief Complaint: Embolic stroke involving right middle cerebral artery    History of Present Illness: The patient is a 79 y.o. male who  was admitted as a transfer from OSH for Right MCA stroke syndrome. Patient had an urgent thrombectomy s/p  right MCA stroke syndrome. PMH significant for HTN, T2DM and HLD. Patient stated that around 2:30 pm on 12/16/19 he was driving and felt weak. He drove home and was taken to the ER where he was evaluated for a stroke. He had outside CTA which was reported to have R ICA stenosis at bifurcation with 70% stenosis and right MCA occlusion. Patient was transferred to [facility] for possible intervention. Patient had a right femoral sheath placed and his Angio  revealed 90% R ICA stenosis and R M1 occlusion. The R ICA was treated by thrombectomy and carotid stenting with TICI 3 reperfusion.   Patient was admitted to the [facility] for higher level of care post thrombectomy.    Hospital Course: 12/17/19: Patient admitted to [facility] for higher level of care s/p thrombectomy and right carotid artery stenting with TICI 3 reperfusion  12/18/2019Recent MRI with heme transformation abg Q8, repeat scan stable, mannitol  12/19/2019 intubation repeat CTH  12/20: febrile-broaden abx coverage, SBT, add hydralazine 50 q8h, KUB and mag citrate  12/21: wean FiO2, d/c hydralazine, wean fentanyl  12/22/2019: Patient stable overnight. Lasix 20mg x 1 given today. Enteral water flushes started for hypernatremia.    12/23: SBT trial, started Plavix today, initiated Hydralazine 25 TID, lasix 40 mg X1, decrease statin to 40 mg daily (transaminitis -trending down), add psyllium, Na BID -goal 145-150, initiated sq heparin  12/24/2019: SBT failed, wean fio2 as tolerated, CXR responded to lasix, scheduled BID  12/25/2019 continue lasix. SBT today. Adjust insulin  regimen  12/26/2019: Continue diuresis, SBT again today, wean PEEP and FiO2, wean nicardipine, hold statin for transaminits     Interval History: Continue diuresis, SBT again today, wean PEEP and FiO2, wean nicardipine, hold statin for transaminits and increase threshold of temperature for acetaminophen administration.     Review of Systems: Unable to obtain a complete ROS due to level of consciousness.     Vitals:   Temp: 100 °F (37.8 °C)  Pulse: 101  Rhythm: normal sinus rhythm  BP: 134/60  MAP (mmHg): 87  CVP (mean): 9 mmHg  Resp: 20  SpO2: 95 %  Oxygen Concentration (%): 40  O2 Device (Oxygen Therapy): ventilator  Vent Mode: Spont  Set Rate: 16 bmp  Pressure Support: 10 cmH20  PEEP/CPAP: 7 cmH20  Peak Airway Pressure: 17 cmH2O  Mean Airway Pressure: 11 cmH20  Plateau Pressure: 16 cmH20    Temp  Min: 98.8 °F (37.1 °C)  Max: 100.1 °F (37.8 °C)  Pulse  Min: 91  Max: 108  BP  Min: 111/56  Max: 155/74  MAP (mmHg)  Min: 73  Max: 107  CVP (mean)  Min: 0 mmHg  Max: 9 mmHg  Resp  Min: 14  Max: 28  SpO2  Min: 95 %  Max: 100 %  Oxygen Concentration (%)  Min: 40  Max: 50    12/25 0701 - 12/26 0700  In: 2798.6 [I.V.:448.6]  Out: 2960 [Urine:2960]   Unmeasured Output  Stool Occurrence: 0     Examination:   Constitutional: Chronically ill appearance. No apparent distress.   Eyes: Conjunctiva injected, anicteric. Lids no lesions.  Head/Ears/Nose/Mouth/Throat/Neck: Moist mucous membranes. External ears, nose atraumatic.   Cardiovascular: Regular rhythm. No murmurs. No leg edema.  Respiratory: Mechanically ventilated. Comfortable respirations. Clear to auscultation.  Gastrointestinal: Belly soft to touch    Neurologic:  -GCS [id number]  -Wakes up briskly to voice and remains awake throughout examination. Nods appropriately to name and location orientation questions.   -PERRL, EOMI, +cough   -Follows commands on R upper and lower extremities. No movement on L to commands, w/d from painful stimuli   Unable to test orientation,  language, memory, judgment, coordination, gait due to level of consciousness.    Medications:   Continuous Scheduled  albuterol-ipratropium 3 mL Q4H   amLODIPine 10 mg Daily   chlorhexidine 15 mL BID   clopidogrel 75 mg Daily   famotidine 20 mg BID   furosemide 40 mg BID   heparin (porcine) 5,000 Units Q8H   hydrALAZINE 25 mg Q8H   insulin detemir U-100 4 Units QHS   senna-docusate 8.6-50 mg 1 tablet BID   PRN  acetaminophen 650 mg Q6H PRN   Dextrose 10% Bolus 12.5 g PRN   Dextrose 10% Bolus 25 g PRN   fentaNYL 50 mcg Q2H PRN   glucagon (human recombinant) 1 mg PRN   hydrALAZINE 10 mg Q6H PRN   insulin aspart U-100 1-10 Units Q4H PRN   labetalol 10 mg Q4H PRN   magnesium oxide 800 mg PRN   magnesium oxide 800 mg PRN   ondansetron 4 mg Q6H PRN   potassium chloride 10% 40 mEq PRN   potassium chloride 10% 40 mEq PRN   potassium chloride 10% 60 mEq PRN   potassium, sodium phosphates 2 packet PRN   potassium, sodium phosphates 2 packet PRN   potassium, sodium phosphates 2 packet PRN   sodium chloride 0.9% 10 mL PRN      Today I independently reviewed pertinent medications, lines/drains/airways, imaging, laboratory results, notably:     ISTAT: Recent Labs   Lab 12/26/19  1422   PH 7.488*   PCO2 46.6*   PO2 86   POCSATURATED 97   HCO3 35.3*   BE 12   POCTCO2 37*   SAMPLE ARTERIAL      Chem: Recent Labs   Lab 12/26/19 0130 12/26/19 0928    141   K 4.0  --      --    CO2 31*  --    *  --    BUN 30*  --    CREATININE 0.9  --    ESTGFRAFRICA >60.0  --    EGFRNONAA >60.0  --    CALCIUM 9.1  --    MG 2.3  --    PHOS 2.9  --    ANIONGAP 10  --    PROT 6.8  --    ALBUMIN 2.6*  --    BILITOT 0.8  --    ALKPHOS 235*  --    *  --    *  --      Heme: Recent Labs   Lab 12/26/19  0130   WBC 8.44   HGB 12.6*   HCT 40.1        Endo:   Recent Labs   Lab 12/26/19  0928 12/26/19  1222 12/26/19  1628   POCTGLUCOSE 259* 277* 264*          Assessment/Plan:     Neuro  * Embolic stroke involving right  middle cerebral artery  80 y/o male admitted for R MCA stroke syndrome s/p thrombectomy TICI 3 reperfusion and ABBY stenting, now s/p hemorrhagic transformation   - Was on DAPT following stenting, discontinued for hemorrhagic transformation, now on clopidogrel 75 mg daily   - Follow up imaging has been stable, neuro exam remains stable   - Neurochecks q1hr  - Vascular Neurology following  - HTS course complete   - SBP Goal < 160, wean nicardipine as able   - PT/OT/SLP  - Mechanical SCDs  - Atorvastatin  - Daily SBT to assess for extubation readiness      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, right  Severe R ICA stenosis noted on IR angiogram   - Stent placed  - Clopidogrel daily    Cytotoxic brain edema  - 2/2 R MCA   - Neuro checks Q 1 hr  - Maintain eunatremia  - Maintain Euglyemia    Pulmonary  Acute respiratory failure with hypoxia  - Initially intubated for decreased mental status   - CXR daily, 12/22 bilateral pattern of pulmonary vascular prominence as well as interstitial and alveolar infiltrate  - s/p improvement with Lasix, now scheduled 40 IV BID- will continue   - ABG Daily  - Famotidine  - Chlorhexidine  -Daily SBT     Vent Mode: Spont  Oxygen Concentration (%):  [40-50] 40  Resp Rate Total:  [15 br/min-30 br/min] 19 br/min  PEEP/CPAP:  [7 cmH20-9 cmH20] 7 cmH20  Pressure Support:  [10 cmH20] 10 cmH20  Mean Airway Pressure:  [10 cmH20-15 cmH20] 11 cmH20         Cardiac/Vascular  Mixed hyperlipidemia  Hold statin 2/2 transaminitis     Essential hypertension  - Continuous Cardiac Monitoring  - Maintain SBP < 160  - Echo 65%  - Hydralazine 25 q8h  - Amlodipine 10 mg daily  - Furosemide 40 mg BID  - Wean nicardipine    Endocrine  Type 2 diabetes mellitus with neurologic complication, without long-term current use of insulin  - A1c 7.7   - SSI   - POCT Q4  - Continue TF  - See management for R MCA/IPH             GI  Transaminitis  Daily CMP  - Hold statin  - Increase threshold for  acetaminophen administration to fever of 101        The patient is being Prophylaxed for:  Venous Thromboembolism with: Mechanical or Chemical  Stress Ulcer with: H2B  Ventilator Pneumonia with: chlorhexidine oral care    Activity Orders          None        Full Code    Trena Reyes PA-C  Neurocritical Care  [facility]

## 2019-12-26 NOTE — ASSESSMENT & PLAN NOTE
80 y/o male admitted for R MCA stroke syndrome s/p thrombectomy TICI 3 reperfusion and ABBY stenting, now s/p hemorrhagic transformation   - Was on DAPT following stenting, discontinued for hemorrhagic transformation, now on clopidogrel 75 mg daily   - Follow up imaging has been stable, neuro exam remains stable   - Neurochecks q1hr  - Vascular Neurology following  - HTS course complete   - SBP Goal < 160, wean nicardipine as able   - PT/OT/SLP  - Mechanical SCDs  - Atorvastatin  - Daily SBT to assess for extubation readiness

## 2019-12-27 LAB
ALBUMIN SERPL BCP-MCNC: 2.7 G/DL (ref 3.5–5.2)
ALLENS TEST: ABNORMAL
ALP SERPL-CCNC: 244 U/L (ref 55–135)
ALT SERPL W/O P-5'-P-CCNC: 135 U/L (ref 10–44)
ANION GAP SERPL CALC-SCNC: 10 MMOL/L (ref 8–16)
AST SERPL-CCNC: 99 U/L (ref 10–40)
BASOPHILS # BLD AUTO: 0.05 K/UL (ref 0–0.2)
BASOPHILS NFR BLD: 0.5 % (ref 0–1.9)
BILIRUB SERPL-MCNC: 0.6 MG/DL (ref 0.1–1)
BUN SERPL-MCNC: 31 MG/DL (ref 8–23)
CALCIUM SERPL-MCNC: 9.4 MG/DL (ref 8.7–10.5)
CHLORIDE SERPL-SCNC: 101 MMOL/L (ref 95–110)
CO2 SERPL-SCNC: 30 MMOL/L (ref 23–29)
CREAT SERPL-MCNC: 0.9 MG/DL (ref 0.5–1.4)
DELSYS: ABNORMAL
DIFFERENTIAL METHOD: ABNORMAL
EOSINOPHIL # BLD AUTO: 0.1 K/UL (ref 0–0.5)
EOSINOPHIL NFR BLD: 1.4 % (ref 0–8)
ERYTHROCYTE [DISTWIDTH] IN BLOOD BY AUTOMATED COUNT: 12.2 % (ref 11.5–14.5)
ERYTHROCYTE [SEDIMENTATION RATE] IN BLOOD BY WESTERGREN METHOD: 6 MM/H
EST. GFR  (AFRICAN AMERICAN): >60 ML/MIN/1.73 M^2
EST. GFR  (NON AFRICAN AMERICAN): >60 ML/MIN/1.73 M^2
FIO2: 40
GLUCOSE SERPL-MCNC: 284 MG/DL (ref 70–110)
HCO3 UR-SCNC: 33.2 MMOL/L (ref 24–28)
HCT VFR BLD AUTO: 40.5 % (ref 40–54)
HGB BLD-MCNC: 13 G/DL (ref 14–18)
IMM GRANULOCYTES # BLD AUTO: 0.11 K/UL (ref 0–0.04)
IMM GRANULOCYTES NFR BLD AUTO: 1.1 % (ref 0–0.5)
INR PPP: 0.9 (ref 0.8–1.2)
LYMPHOCYTES # BLD AUTO: 2 K/UL (ref 1–4.8)
LYMPHOCYTES NFR BLD: 20.3 % (ref 18–48)
MAGNESIUM SERPL-MCNC: 2.4 MG/DL (ref 1.6–2.6)
MCH RBC QN AUTO: 31.6 PG (ref 27–31)
MCHC RBC AUTO-ENTMCNC: 32.1 G/DL (ref 32–36)
MCV RBC AUTO: 99 FL (ref 82–98)
MODE: ABNORMAL
MONOCYTES # BLD AUTO: 1.1 K/UL (ref 0.3–1)
MONOCYTES NFR BLD: 11.4 % (ref 4–15)
NEUTROPHILS # BLD AUTO: 6.3 K/UL (ref 1.8–7.7)
NEUTROPHILS NFR BLD: 65.3 % (ref 38–73)
NRBC BLD-RTO: 0 /100 WBC
PCO2 BLDA: 46.1 MMHG (ref 35–45)
PEEP: 7
PH SMN: 7.46 [PH] (ref 7.35–7.45)
PHOSPHATE SERPL-MCNC: 3.3 MG/DL (ref 2.7–4.5)
PIP: 15
PLATELET # BLD AUTO: 176 K/UL (ref 150–350)
PMV BLD AUTO: 12.3 FL (ref 9.2–12.9)
PO2 BLDA: 74 MMHG (ref 80–100)
POC BE: 9 MMOL/L
POC SATURATED O2: 95 % (ref 95–100)
POC TCO2: 35 MMOL/L (ref 23–27)
POCT GLUCOSE: 235 MG/DL (ref 70–110)
POCT GLUCOSE: 244 MG/DL (ref 70–110)
POCT GLUCOSE: 249 MG/DL (ref 70–110)
POCT GLUCOSE: 266 MG/DL (ref 70–110)
POCT GLUCOSE: 266 MG/DL (ref 70–110)
POCT GLUCOSE: 286 MG/DL (ref 70–110)
POTASSIUM SERPL-SCNC: 4 MMOL/L (ref 3.5–5.1)
PROT SERPL-MCNC: 7.2 G/DL (ref 6–8.4)
PROTHROMBIN TIME: 9.8 SEC (ref 9–12.5)
PS: 10
RBC # BLD AUTO: 4.11 M/UL (ref 4.6–6.2)
SAMPLE: ABNORMAL
SITE: ABNORMAL
SODIUM SERPL-SCNC: 141 MMOL/L (ref 136–145)
SODIUM SERPL-SCNC: 142 MMOL/L (ref 136–145)
SODIUM SERPL-SCNC: 143 MMOL/L (ref 136–145)
WBC # BLD AUTO: 9.72 K/UL (ref 3.9–12.7)

## 2019-12-27 PROCEDURE — 25000242 PHARM REV CODE 250 ALT 637 W/ HCPCS: Performed by: INTERNAL MEDICINE

## 2019-12-27 PROCEDURE — 94668 MNPJ CHEST WALL SBSQ: CPT

## 2019-12-27 PROCEDURE — 84295 ASSAY OF SERUM SODIUM: CPT | Mod: 91

## 2019-12-27 PROCEDURE — 80053 COMPREHEN METABOLIC PANEL: CPT

## 2019-12-27 PROCEDURE — 63600175 PHARM REV CODE 636 W HCPCS: Performed by: NURSE PRACTITIONER

## 2019-12-27 PROCEDURE — 84100 ASSAY OF PHOSPHORUS: CPT

## 2019-12-27 PROCEDURE — 94761 N-INVAS EAR/PLS OXIMETRY MLT: CPT

## 2019-12-27 PROCEDURE — 99233 PR SUBSEQUENT HOSPITAL CARE,LEVL III: ICD-10-PCS | Mod: ,,, | Performed by: PHYSICIAN ASSISTANT

## 2019-12-27 PROCEDURE — 63600175 PHARM REV CODE 636 W HCPCS: Performed by: PSYCHIATRY & NEUROLOGY

## 2019-12-27 PROCEDURE — 85610 PROTHROMBIN TIME: CPT

## 2019-12-27 PROCEDURE — 99900035 HC TECH TIME PER 15 MIN (STAT)

## 2019-12-27 PROCEDURE — 85025 COMPLETE CBC W/AUTO DIFF WBC: CPT

## 2019-12-27 PROCEDURE — 25000003 PHARM REV CODE 250: Performed by: PSYCHIATRY & NEUROLOGY

## 2019-12-27 PROCEDURE — 94640 AIRWAY INHALATION TREATMENT: CPT

## 2019-12-27 PROCEDURE — 51702 INSERT TEMP BLADDER CATH: CPT

## 2019-12-27 PROCEDURE — 36600 WITHDRAWAL OF ARTERIAL BLOOD: CPT

## 2019-12-27 PROCEDURE — 25000003 PHARM REV CODE 250: Performed by: NURSE PRACTITIONER

## 2019-12-27 PROCEDURE — 83735 ASSAY OF MAGNESIUM: CPT

## 2019-12-27 PROCEDURE — 99900026 HC AIRWAY MAINTENANCE (STAT)

## 2019-12-27 PROCEDURE — 20000000 HC ICU ROOM

## 2019-12-27 PROCEDURE — 82803 BLOOD GASES ANY COMBINATION: CPT

## 2019-12-27 PROCEDURE — 99233 SBSQ HOSP IP/OBS HIGH 50: CPT | Mod: ,,, | Performed by: PHYSICIAN ASSISTANT

## 2019-12-27 PROCEDURE — 94003 VENT MGMT INPAT SUBQ DAY: CPT

## 2019-12-27 PROCEDURE — 82800 BLOOD PH: CPT

## 2019-12-27 PROCEDURE — 27000221 HC OXYGEN, UP TO 24 HOURS

## 2019-12-27 RX ADMIN — INSULIN ASPART 2 UNITS: 100 INJECTION, SOLUTION INTRAVENOUS; SUBCUTANEOUS at 12:12

## 2019-12-27 RX ADMIN — IPRATROPIUM BROMIDE AND ALBUTEROL SULFATE 3 ML: .5; 3 SOLUTION RESPIRATORY (INHALATION) at 04:12

## 2019-12-27 RX ADMIN — HEPARIN SODIUM 5000 UNITS: 5000 INJECTION, SOLUTION INTRAVENOUS; SUBCUTANEOUS at 09:12

## 2019-12-27 RX ADMIN — AMLODIPINE BESYLATE 10 MG: 10 TABLET ORAL at 08:12

## 2019-12-27 RX ADMIN — CLOPIDOGREL BISULFATE 75 MG: 75 TABLET ORAL at 08:12

## 2019-12-27 RX ADMIN — IPRATROPIUM BROMIDE AND ALBUTEROL SULFATE 3 ML: .5; 3 SOLUTION RESPIRATORY (INHALATION) at 11:12

## 2019-12-27 RX ADMIN — HYDRALAZINE HYDROCHLORIDE 25 MG: 25 TABLET, FILM COATED ORAL at 05:12

## 2019-12-27 RX ADMIN — IPRATROPIUM BROMIDE AND ALBUTEROL SULFATE 3 ML: .5; 3 SOLUTION RESPIRATORY (INHALATION) at 07:12

## 2019-12-27 RX ADMIN — HYDRALAZINE HYDROCHLORIDE 25 MG: 25 TABLET, FILM COATED ORAL at 09:12

## 2019-12-27 RX ADMIN — INSULIN ASPART 6 UNITS: 100 INJECTION, SOLUTION INTRAVENOUS; SUBCUTANEOUS at 12:12

## 2019-12-27 RX ADMIN — HYDRALAZINE HYDROCHLORIDE 25 MG: 25 TABLET, FILM COATED ORAL at 01:12

## 2019-12-27 RX ADMIN — INSULIN DETEMIR 4 UNITS: 100 INJECTION, SOLUTION SUBCUTANEOUS at 09:12

## 2019-12-27 RX ADMIN — FAMOTIDINE 20 MG: 20 TABLET ORAL at 08:12

## 2019-12-27 RX ADMIN — IPRATROPIUM BROMIDE AND ALBUTEROL SULFATE 3 ML: .5; 3 SOLUTION RESPIRATORY (INHALATION) at 08:12

## 2019-12-27 RX ADMIN — HEPARIN SODIUM 5000 UNITS: 5000 INJECTION, SOLUTION INTRAVENOUS; SUBCUTANEOUS at 05:12

## 2019-12-27 RX ADMIN — FUROSEMIDE 40 MG: 10 INJECTION, SOLUTION INTRAVENOUS at 10:12

## 2019-12-27 RX ADMIN — INSULIN ASPART 4 UNITS: 100 INJECTION, SOLUTION INTRAVENOUS; SUBCUTANEOUS at 05:12

## 2019-12-27 RX ADMIN — HEPARIN SODIUM 5000 UNITS: 5000 INJECTION, SOLUTION INTRAVENOUS; SUBCUTANEOUS at 01:12

## 2019-12-27 RX ADMIN — INSULIN ASPART 6 UNITS: 100 INJECTION, SOLUTION INTRAVENOUS; SUBCUTANEOUS at 08:12

## 2019-12-27 RX ADMIN — CHLORHEXIDINE GLUCONATE 0.12% ORAL RINSE 15 ML: 1.2 LIQUID ORAL at 08:12

## 2019-12-27 RX ADMIN — IPRATROPIUM BROMIDE AND ALBUTEROL SULFATE 3 ML: .5; 3 SOLUTION RESPIRATORY (INHALATION) at 03:12

## 2019-12-27 RX ADMIN — INSULIN ASPART 2 UNITS: 100 INJECTION, SOLUTION INTRAVENOUS; SUBCUTANEOUS at 09:12

## 2019-12-27 RX ADMIN — FAMOTIDINE 20 MG: 20 TABLET ORAL at 09:12

## 2019-12-27 RX ADMIN — FUROSEMIDE 40 MG: 10 INJECTION, SOLUTION INTRAVENOUS at 06:12

## 2019-12-27 RX ADMIN — INSULIN ASPART 3 UNITS: 100 INJECTION, SOLUTION INTRAVENOUS; SUBCUTANEOUS at 04:12

## 2019-12-27 RX ADMIN — CHLORHEXIDINE GLUCONATE 0.12% ORAL RINSE 15 ML: 1.2 LIQUID ORAL at 09:12

## 2019-12-27 NOTE — PLAN OF CARE
POC reviewed with pt and family at 1400. Pt's spouse verbalized understanding. Questions and concerns addressed. No acute events today. Pt's TLC and whaley removed.  Straight cath x1.  Pt on spontaneous on the vent since 1000.  Pt on and off cooling blanket.  Tube feeds to be turned off at 0300 for potential extubation tomorrow.  Pt progressing toward goals. Will continue to monitor. See flowsheets for full assessment and VS info.

## 2019-12-27 NOTE — PLAN OF CARE
POC reviewed with pt at 0400. Pt unable to verbalize understanding. Questions and concerns addressed with pts family.  Bath given. BMx2.  Pt placed back on rate due to apneic episodes. Pt Temp max 100.2. On and off cooling blanket.  Straight cath for rentention.  No acute events overnight. Pt progressing toward goals. Will continue to monitor. See flowsheets for full assessment and VS info

## 2019-12-28 LAB
ALBUMIN SERPL BCP-MCNC: 2.5 G/DL (ref 3.5–5.2)
ALLENS TEST: ABNORMAL
ALP SERPL-CCNC: 197 U/L (ref 55–135)
ALT SERPL W/O P-5'-P-CCNC: 104 U/L (ref 10–44)
ANION GAP SERPL CALC-SCNC: 11 MMOL/L (ref 8–16)
AST SERPL-CCNC: 61 U/L (ref 10–40)
BASOPHILS # BLD AUTO: 0.03 K/UL (ref 0–0.2)
BASOPHILS NFR BLD: 0.4 % (ref 0–1.9)
BILIRUB SERPL-MCNC: 0.5 MG/DL (ref 0.1–1)
BUN SERPL-MCNC: 35 MG/DL (ref 8–23)
CALCIUM SERPL-MCNC: 8.8 MG/DL (ref 8.7–10.5)
CHLORIDE SERPL-SCNC: 102 MMOL/L (ref 95–110)
CO2 SERPL-SCNC: 31 MMOL/L (ref 23–29)
CREAT SERPL-MCNC: 1 MG/DL (ref 0.5–1.4)
DELSYS: ABNORMAL
DIFFERENTIAL METHOD: ABNORMAL
EOSINOPHIL # BLD AUTO: 0.2 K/UL (ref 0–0.5)
EOSINOPHIL NFR BLD: 2.2 % (ref 0–8)
ERYTHROCYTE [DISTWIDTH] IN BLOOD BY AUTOMATED COUNT: 12.5 % (ref 11.5–14.5)
ERYTHROCYTE [SEDIMENTATION RATE] IN BLOOD BY WESTERGREN METHOD: 24 MM/H
EST. GFR  (AFRICAN AMERICAN): >60 ML/MIN/1.73 M^2
EST. GFR  (NON AFRICAN AMERICAN): >60 ML/MIN/1.73 M^2
FIO2: 40
GLUCOSE SERPL-MCNC: 293 MG/DL (ref 70–110)
HCO3 UR-SCNC: 35.6 MMOL/L (ref 24–28)
HCT VFR BLD AUTO: 36.9 % (ref 40–54)
HGB BLD-MCNC: 12 G/DL (ref 14–18)
IMM GRANULOCYTES # BLD AUTO: 0.08 K/UL (ref 0–0.04)
IMM GRANULOCYTES NFR BLD AUTO: 1 % (ref 0–0.5)
LYMPHOCYTES # BLD AUTO: 1.8 K/UL (ref 1–4.8)
LYMPHOCYTES NFR BLD: 23.4 % (ref 18–48)
MAGNESIUM SERPL-MCNC: 2.4 MG/DL (ref 1.6–2.6)
MCH RBC QN AUTO: 32.3 PG (ref 27–31)
MCHC RBC AUTO-ENTMCNC: 32.5 G/DL (ref 32–36)
MCV RBC AUTO: 99 FL (ref 82–98)
MODE: ABNORMAL
MONOCYTES # BLD AUTO: 1 K/UL (ref 0.3–1)
MONOCYTES NFR BLD: 12.5 % (ref 4–15)
NEUTROPHILS # BLD AUTO: 4.7 K/UL (ref 1.8–7.7)
NEUTROPHILS NFR BLD: 60.5 % (ref 38–73)
NRBC BLD-RTO: 0 /100 WBC
PCO2 BLDA: 47 MMHG (ref 35–45)
PH SMN: 7.49 [PH] (ref 7.35–7.45)
PHOSPHATE SERPL-MCNC: 3.8 MG/DL (ref 2.7–4.5)
PLATELET # BLD AUTO: 193 K/UL (ref 150–350)
PMV BLD AUTO: 12 FL (ref 9.2–12.9)
PO2 BLDA: 86 MMHG (ref 80–100)
POC BE: 12 MMOL/L
POC SATURATED O2: 97 % (ref 95–100)
POC TCO2: 37 MMOL/L (ref 23–27)
POCT GLUCOSE: 140 MG/DL (ref 70–110)
POCT GLUCOSE: 149 MG/DL (ref 70–110)
POCT GLUCOSE: 180 MG/DL (ref 70–110)
POCT GLUCOSE: 187 MG/DL (ref 70–110)
POCT GLUCOSE: 226 MG/DL (ref 70–110)
POCT GLUCOSE: 245 MG/DL (ref 70–110)
POCT GLUCOSE: 246 MG/DL (ref 70–110)
POCT GLUCOSE: 260 MG/DL (ref 70–110)
POCT GLUCOSE: 261 MG/DL (ref 70–110)
POCT GLUCOSE: 263 MG/DL (ref 70–110)
POCT GLUCOSE: 268 MG/DL (ref 70–110)
POCT GLUCOSE: 277 MG/DL (ref 70–110)
POCT GLUCOSE: 287 MG/DL (ref 70–110)
POCT GLUCOSE: 292 MG/DL (ref 70–110)
POCT GLUCOSE: 302 MG/DL (ref 70–110)
POCT GLUCOSE: 318 MG/DL (ref 70–110)
POTASSIUM SERPL-SCNC: 4 MMOL/L (ref 3.5–5.1)
PROT SERPL-MCNC: 6.6 G/DL (ref 6–8.4)
RBC # BLD AUTO: 3.72 M/UL (ref 4.6–6.2)
SAMPLE: ABNORMAL
SITE: ABNORMAL
SODIUM SERPL-SCNC: 144 MMOL/L (ref 136–145)
SP02: 99
WBC # BLD AUTO: 7.83 K/UL (ref 3.9–12.7)

## 2019-12-28 PROCEDURE — 27200966 HC CLOSED SUCTION SYSTEM

## 2019-12-28 PROCEDURE — 80053 COMPREHEN METABOLIC PANEL: CPT

## 2019-12-28 PROCEDURE — 99233 PR SUBSEQUENT HOSPITAL CARE,LEVL III: ICD-10-PCS | Mod: ,,, | Performed by: NURSE PRACTITIONER

## 2019-12-28 PROCEDURE — 99900026 HC AIRWAY MAINTENANCE (STAT)

## 2019-12-28 PROCEDURE — 27100171 HC OXYGEN HIGH FLOW UP TO 24 HOURS

## 2019-12-28 PROCEDURE — 94003 VENT MGMT INPAT SUBQ DAY: CPT

## 2019-12-28 PROCEDURE — 99233 PR SUBSEQUENT HOSPITAL CARE,LEVL III: ICD-10-PCS | Mod: ,,, | Performed by: PSYCHIATRY & NEUROLOGY

## 2019-12-28 PROCEDURE — 63600175 PHARM REV CODE 636 W HCPCS: Performed by: NURSE PRACTITIONER

## 2019-12-28 PROCEDURE — 20000000 HC ICU ROOM

## 2019-12-28 PROCEDURE — 25000003 PHARM REV CODE 250: Performed by: NURSE PRACTITIONER

## 2019-12-28 PROCEDURE — 99900035 HC TECH TIME PER 15 MIN (STAT)

## 2019-12-28 PROCEDURE — 82803 BLOOD GASES ANY COMBINATION: CPT

## 2019-12-28 PROCEDURE — 27000221 HC OXYGEN, UP TO 24 HOURS

## 2019-12-28 PROCEDURE — 83735 ASSAY OF MAGNESIUM: CPT

## 2019-12-28 PROCEDURE — 63600175 PHARM REV CODE 636 W HCPCS: Performed by: PSYCHIATRY & NEUROLOGY

## 2019-12-28 PROCEDURE — 25000003 PHARM REV CODE 250: Performed by: PSYCHIATRY & NEUROLOGY

## 2019-12-28 PROCEDURE — 25000242 PHARM REV CODE 250 ALT 637 W/ HCPCS: Performed by: INTERNAL MEDICINE

## 2019-12-28 PROCEDURE — 94640 AIRWAY INHALATION TREATMENT: CPT

## 2019-12-28 PROCEDURE — 85025 COMPLETE CBC W/AUTO DIFF WBC: CPT

## 2019-12-28 PROCEDURE — 99233 SBSQ HOSP IP/OBS HIGH 50: CPT | Mod: ,,, | Performed by: PSYCHIATRY & NEUROLOGY

## 2019-12-28 PROCEDURE — 99233 SBSQ HOSP IP/OBS HIGH 50: CPT | Mod: ,,, | Performed by: NURSE PRACTITIONER

## 2019-12-28 PROCEDURE — 36600 WITHDRAWAL OF ARTERIAL BLOOD: CPT

## 2019-12-28 PROCEDURE — 84100 ASSAY OF PHOSPHORUS: CPT

## 2019-12-28 PROCEDURE — 94761 N-INVAS EAR/PLS OXIMETRY MLT: CPT

## 2019-12-28 PROCEDURE — 94668 MNPJ CHEST WALL SBSQ: CPT

## 2019-12-28 PROCEDURE — 27100092 HC HIGH FLOW DELIVERY CANNULA

## 2019-12-28 PROCEDURE — 25000003 PHARM REV CODE 250: Performed by: PHYSICIAN ASSISTANT

## 2019-12-28 RX ORDER — DEXAMETHASONE SODIUM PHOSPHATE 4 MG/ML
8 INJECTION, SOLUTION INTRA-ARTICULAR; INTRALESIONAL; INTRAMUSCULAR; INTRAVENOUS; SOFT TISSUE EVERY 8 HOURS
Status: COMPLETED | OUTPATIENT
Start: 2019-12-28 | End: 2019-12-28

## 2019-12-28 RX ORDER — INSULIN ASPART 100 [IU]/ML
4 INJECTION, SOLUTION INTRAVENOUS; SUBCUTANEOUS EVERY 4 HOURS
Status: DISCONTINUED | OUTPATIENT
Start: 2019-12-28 | End: 2019-12-28

## 2019-12-28 RX ADMIN — HYDRALAZINE HYDROCHLORIDE 25 MG: 25 TABLET, FILM COATED ORAL at 09:12

## 2019-12-28 RX ADMIN — CHLORHEXIDINE GLUCONATE 0.12% ORAL RINSE 15 ML: 1.2 LIQUID ORAL at 09:12

## 2019-12-28 RX ADMIN — IPRATROPIUM BROMIDE AND ALBUTEROL SULFATE 3 ML: .5; 3 SOLUTION RESPIRATORY (INHALATION) at 07:12

## 2019-12-28 RX ADMIN — INSULIN ASPART 3 UNITS: 100 INJECTION, SOLUTION INTRAVENOUS; SUBCUTANEOUS at 12:12

## 2019-12-28 RX ADMIN — IPRATROPIUM BROMIDE AND ALBUTEROL SULFATE 3 ML: .5; 3 SOLUTION RESPIRATORY (INHALATION) at 12:12

## 2019-12-28 RX ADMIN — CLOPIDOGREL BISULFATE 75 MG: 75 TABLET ORAL at 09:12

## 2019-12-28 RX ADMIN — IPRATROPIUM BROMIDE AND ALBUTEROL SULFATE 3 ML: .5; 3 SOLUTION RESPIRATORY (INHALATION) at 11:12

## 2019-12-28 RX ADMIN — HEPARIN SODIUM 5000 UNITS: 5000 INJECTION, SOLUTION INTRAVENOUS; SUBCUTANEOUS at 05:12

## 2019-12-28 RX ADMIN — FAMOTIDINE 20 MG: 20 TABLET ORAL at 09:12

## 2019-12-28 RX ADMIN — SENNOSIDES AND DOCUSATE SODIUM 1 TABLET: 8.6; 5 TABLET ORAL at 09:12

## 2019-12-28 RX ADMIN — SODIUM CHLORIDE 2 UNITS/HR: 9 INJECTION, SOLUTION INTRAVENOUS at 09:12

## 2019-12-28 RX ADMIN — IPRATROPIUM BROMIDE AND ALBUTEROL SULFATE 3 ML: .5; 3 SOLUTION RESPIRATORY (INHALATION) at 03:12

## 2019-12-28 RX ADMIN — AMLODIPINE BESYLATE 10 MG: 10 TABLET ORAL at 09:12

## 2019-12-28 RX ADMIN — FUROSEMIDE 40 MG: 10 INJECTION, SOLUTION INTRAVENOUS at 06:12

## 2019-12-28 RX ADMIN — DEXAMETHASONE SODIUM PHOSPHATE 8 MG: 4 INJECTION, SOLUTION INTRAMUSCULAR; INTRAVENOUS at 09:12

## 2019-12-28 RX ADMIN — HEPARIN SODIUM 5000 UNITS: 5000 INJECTION, SOLUTION INTRAVENOUS; SUBCUTANEOUS at 09:12

## 2019-12-28 RX ADMIN — FUROSEMIDE 40 MG: 10 INJECTION, SOLUTION INTRAVENOUS at 09:12

## 2019-12-28 RX ADMIN — HEPARIN SODIUM 5000 UNITS: 5000 INJECTION, SOLUTION INTRAVENOUS; SUBCUTANEOUS at 02:12

## 2019-12-28 RX ADMIN — ACETAMINOPHEN 650 MG: 325 TABLET ORAL at 07:12

## 2019-12-28 RX ADMIN — DEXAMETHASONE SODIUM PHOSPHATE 8 MG: 4 INJECTION, SOLUTION INTRAMUSCULAR; INTRAVENOUS at 02:12

## 2019-12-28 RX ADMIN — HYDRALAZINE HYDROCHLORIDE 25 MG: 25 TABLET, FILM COATED ORAL at 05:12

## 2019-12-28 RX ADMIN — INSULIN ASPART 3 UNITS: 100 INJECTION, SOLUTION INTRAVENOUS; SUBCUTANEOUS at 04:12

## 2019-12-28 RX ADMIN — HYDRALAZINE HYDROCHLORIDE 25 MG: 25 TABLET, FILM COATED ORAL at 02:12

## 2019-12-28 NOTE — PROGRESS NOTES
[facility]  Neurocritical Care  Progress Note    Admit Date: 12/16/2019  Service Date: 12/28/2019  Length of Stay: 12    Subjective:     Chief Complaint: Embolic stroke involving right middle cerebral artery    History of Present Illness: The patient is a 79 y.o. male who  was admitted as a transfer from OSH for Right MCA stroke syndrome. Patient had an urgent thrombectomy s/p  right MCA stroke syndrome. PMH significant for HTN, T2DM and HLD. Patient stated that around 2:30 pm on 12/16/19 he was driving and felt weak. He drove home and was taken to the ER where he was evaluated for a stroke. He had outside CTA which was reported to have R ICA stenosis at bifurcation with 70% stenosis and right MCA occlusion. Patient was transferred to [facility] for possible intervention. Patient had a right femoral sheath placed and his Angio  revealed 90% R ICA stenosis and R M1 occlusion. The R ICA was treated by thrombectomy and carotid stenting with TICI 3 reperfusion.   Patient was admitted to the [facility] for higher level of care post thrombectomy.    Hospital Course: 12/17/19: Patient admitted to [facility] for higher level of care s/p thrombectomy and right carotid artery stenting with TICI 3 reperfusion  12/18/2019Recent MRI with heme transformation abg Q8, repeat scan stable, mannitol  12/19/2019 intubation repeat CTH  12/20: febrile-broaden abx coverage, SBT, add hydralazine 50 q8h, KUB and mag citrate  12/21: wean FiO2, d/c hydralazine, wean fentanyl  12/22/2019: Patient stable overnight. Lasix 20mg x 1 given today. Enteral water flushes started for hypernatremia.    12/23: SBT trial, started Plavix today, initiated Hydralazine 25 TID, lasix 40 mg X1, decrease statin to 40 mg daily (transaminitis -trending down), add psyllium, Na BID -goal 145-150, initiated sq heparin  12/24/2019: SBT failed, wean fio2 as tolerated, CXR responded to lasix, scheduled BID  12/25/2019 continue lasix. SBT today. Adjust insulin  regimen  12/26/2019: Continue diuresis, SBT again today, wean PEEP and FiO2, wean nicardipine, hold statin for transaminits   12/27/2019: No significant events   12/28 No significant events over night.. Tolerating spon. Breathing trial, increased peep. No cuff leak, started decadron 8mg q8x3 doses and reassess in am for possible extubation. Insulin gtt started while on steroids, as BG running high. CPT added to resp. Treatments. Start TF.    Interval History: No significant events over night.. Tolerating spon. Breathing trial, increased peep. No cuff leak, started decadron 8mg q8x3 doses and reassess in am for possible extubation. Insulin gtt started while on steroids, as BG running high. CPT added to resp. Treatments. Start TF.        Review of Systems:  Unable to obtain a complete ROS due to level of consciousness. And intubation    Vitals:   Temp: (!) 68.9 °F (20.5 °C)  Pulse: 93  Rhythm: normal sinus rhythm  BP: 136/65  MAP (mmHg): 93  Resp: (!) 28  SpO2: 95 %  Oxygen Concentration (%): 40  O2 Device (Oxygen Therapy): ventilator  Vent Mode: Spont  Pressure Support: 10 cmH20  PEEP/CPAP: 7.5 cmH20  Peak Airway Pressure: 18 cmH2O  Mean Airway Pressure: 11 cmH20  Plateau Pressure: 16 cmH20    Temp  Min: 68.9 °F (20.5 °C)  Max: 100.6 °F (38.1 °C)  Pulse  Min: 86  Max: 100  BP  Min: 118/59  Max: 147/67  MAP (mmHg)  Min: 83  Max: 96  Resp  Min: 1  Max: 29  SpO2  Min: 94 %  Max: 99 %  Oxygen Concentration (%)  Min: 40  Max: 40    12/27 0701 - 12/28 0700  In: 935   Out: 2455 [Urine:2455]   Unmeasured Output  Urine Occurrence: 1  Stool Occurrence: 0  Pad Count: 1     Examination:   Constitutional: Well-nourished and -developed. No apparent distress.   Eyes: Conjunctiva clear, anicteric. Lids no lesions.  Head/Ears/Nose/Mouth/Throat/Neck: Moist mucous membranes. External ears, nose atraumatic.   Cardiovascular: Regular rhythm. No murmurs. No leg edema.  Respiratory: Mech. Ventilation.. bilat coarse breath  sounds  Gastrointestinal: No hernia. Soft, nondistended, nontender. + bowel sounds.    Neurologic:  -GCS [id number]  - Alert. intubated. Eyes open spon. And to voice. Follows commands.  -Cranial nerves PERRL 4+. + cough, gag, corneals  -Motor LUE/LLE withdraws to noxious stimuli RUE/RLE follows commands and moves spon  -Sensation bilat intact  Unable to test orientation, language, memory, judgment, insight, fund of knowledge, coordination, gait due to level of consciousness.    Medications:   Continuous  insulin (HUMAN R) infusion (adults) Last Rate: 9.1 Units/hr (12/28/19 1505)   Scheduled  albuterol-ipratropium 3 mL Q4H   amLODIPine 10 mg Daily   chlorhexidine 15 mL BID   clopidogrel 75 mg Daily   dexamethasone 8 mg Q8H   famotidine 20 mg BID   furosemide 40 mg BID   heparin (porcine) 5,000 Units Q8H   hydrALAZINE 25 mg Q8H   senna-docusate 8.6-50 mg 1 tablet BID   PRN  acetaminophen 650 mg Q6H PRN   Dextrose 10% Bolus 12.5 g PRN   Dextrose 10% Bolus 25 g PRN   Dextrose 10% Bolus 25 g PRN   Dextrose 10% Bolus 12.5 g PRN   fentaNYL 50 mcg Q2H PRN   hydrALAZINE 10 mg Q6H PRN   labetalol 10 mg Q4H PRN   magnesium oxide 800 mg PRN   magnesium oxide 800 mg PRN   ondansetron 4 mg Q6H PRN   potassium chloride 10% 40 mEq PRN   potassium chloride 10% 40 mEq PRN   potassium chloride 10% 60 mEq PRN   potassium, sodium phosphates 2 packet PRN   potassium, sodium phosphates 2 packet PRN   potassium, sodium phosphates 2 packet PRN   sodium chloride 0.9% 10 mL PRN      Today I independently reviewed pertinent medications, lines/drains/airways, imaging, laboratory results, microbiology results, notably:     ISTAT: Recent Labs   Lab 12/28/19  0339   PH 7.487*   PCO2 47.0*   PO2 86   POCSATURATED 97   HCO3 35.6*   BE 12   POCTCO2 37*   SAMPLE ARTERIAL      Chem: Recent Labs   Lab 12/28/19  0135      K 4.0      CO2 31*   *   BUN 35*   CREATININE 1.0   ESTGFRAFRICA >60.0   EGFRNONAA >60.0   CALCIUM 8.8   MG 2.4   PHOS  3.8   ANIONGAP 11   PROT 6.6   ALBUMIN 2.5*   BILITOT 0.5   ALKPHOS 197*   AST 61*   *     Heme: Recent Labs   Lab 12/28/19  0135   WBC 7.83   HGB 12.0*   HCT 36.9*        Endo:   Recent Labs   Lab 12/28/19  1301 12/28/19  1417 12/28/19  1513   POCTGLUCOSE 318* 302* 292*      Assessment/Plan:     Neuro  * Embolic stroke involving right middle cerebral artery  78 y/o male admitted for R MCA stroke syndrome s/p thrombectomy TICI 3 reperfusion and ABBY stenting, now s/p hemorrhagic transformation   - Was on DAPT following stenting, discontinued for hemorrhagic transformation   - currently on clopidogrel 75 mg daily  - Follow up imaging has been stable, neuro exam remains stable   - Neurochecks q1hr  - Vascular Neurology following  - SBP Goal < 160  - PT/OT/SLP  - Mechanical SCDs      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, right  Severe R ICA stenosis noted on IR angiogram   - Stent placed  - Clopidogrel daily    Cytotoxic brain edema  - 2/2 R MCA  stroke  - Neuro checks Q 1 hr  - Maintain eunatremia  - Maintain Euglyemia  Monitor serial imaging    Pulmonary  Acute respiratory failure with hypoxia  - Initially intubated for decreased mental status   - s/p improvement with Lasix, continue 40 IV BID  - ABG Daily  - Famotidine ppx  - Chlorhexidine ppx  -Daily SBT   duonebs q4h; CPT q6h  12/28 No cuff leak, started decadron 8mg q8h x 3doses. Will reassess in am    Vent Mode: Spont  Oxygen Concentration (%):  [40] 40  Resp Rate Total:  [19 br/min-31 br/min] 29 br/min  PEEP/CPAP:  [5 cmH20-7.5 cmH20] 7.5 cmH20  Pressure Support:  [10 cmH20] 10 cmH20  Mean Airway Pressure:  [8.2 ggR49-35 cmH20] 11 cmH20         Cardiac/Vascular  Mixed hyperlipidemia  Hold statin 2/2 transaminitis     Essential hypertension  - Continuous Cardiac Monitoring  - Maintain SBP < 160  - Echo 65%  - Hydralazine 25 q8h  - Amlodipine 10 mg daily  - Furosemide 40 mg BID  Prn hydralazine, labetalol    Endocrine  Type 2  diabetes mellitus with neurologic complication, without long-term current use of insulin  - A1c 7.7   -moderate SSI   - POCT Q4  TF restarted, not extubating today, no cuff leak. Steroid started. Will reassess tomorrow  Insulin gtt started while on  Steroids.    GI  Transaminitis  Daily CMP  - Hold statin  - Increase threshold for acetaminophen administration to fever of 101    Other  Decreased strength, endurance, and mobility  - 2/2 LS Weakness R/T R MCA  - PT/OT           The patient is being Prophylaxed for:  Venous Thromboembolism with: Mechanical or Chemical  Stress Ulcer with: H2B  Ventilator Pneumonia with: chlorhexidine oral care    Activity Orders          None        Full Code     I have spent 35 min with this patient, with over 50% of this time spent coordinating care and speaking with the family    Nina Gaspar NP  Neurocritical Care  [facility]

## 2019-12-28 NOTE — PLAN OF CARE
POC reviewed with pt and family at 1400. Pt's spouse verbalized understanding. Questions and concerns addressed. No acute events today. Tube feeds restarted.  Andrade placed.  Pt had large BM.  Pt remains on spontaneous on the vent throughout shift.  Pt progressing toward goals. Will continue to monitor. See flowsheets for full assessment and VS info.

## 2019-12-28 NOTE — ASSESSMENT & PLAN NOTE
- Continuous Cardiac Monitoring  - Maintain SBP < 160  - Echo 65%  - Hydralazine 25 q8h  - Amlodipine 10 mg daily  - Furosemide 40 mg BID  Prn hydralazine, labetalol

## 2019-12-28 NOTE — ASSESSMENT & PLAN NOTE
78 y/o male admitted for R MCA stroke syndrome s/p thrombectomy TICI 3 reperfusion and ABBY stenting, now s/p hemorrhagic transformation   - Was on DAPT following stenting, discontinued for hemorrhagic transformation, now on clopidogrel 75 mg daily   - Follow up imaging has been stable, neuro exam remains stable   - Neurochecks q1hr  - Vascular Neurology following  - HTS course complete   - SBP Goal < 160  - PT/OT/SLP  - Mechanical SCDs  - Atorvastatin  - Daily SBT to assess for extubation readiness

## 2019-12-28 NOTE — PLAN OF CARE
POC reviewed with pt at 0500. Pt unable to verbalize understanding. Questions and concerns addressed with pts spouse and family. Pt on spontaneous vent setting throughout the night.  No acute events overnight. Pt progressing toward goals. Will continue to monitor. See flowsheets for full assessment and VS info

## 2019-12-28 NOTE — ASSESSMENT & PLAN NOTE
- Continuous Cardiac Monitoring  - Maintain SBP < 160  - Echo 65%  - Hydralazine 25 q8h  - Amlodipine 10 mg daily  - Furosemide 40 mg BID

## 2019-12-28 NOTE — ASSESSMENT & PLAN NOTE
- Initially intubated for decreased mental status   - s/p improvement with Lasix, continue 40 IV BID  - ABG Daily  - Famotidine ppx  - Chlorhexidine ppx  -Daily SBT   duonebs q4h; CPT q6h  12/28 No cuff leak, started decadron 8mg q8h x 3doses. Will reassess in am    Vent Mode: Spont  Oxygen Concentration (%):  [40] 40  Resp Rate Total:  [19 br/min-31 br/min] 29 br/min  PEEP/CPAP:  [5 cmH20-7.5 cmH20] 7.5 cmH20  Pressure Support:  [10 cmH20] 10 cmH20  Mean Airway Pressure:  [8.2 hkV43-29 cmH20] 11 cmH20

## 2019-12-28 NOTE — ASSESSMENT & PLAN NOTE
- A1c 7.7   - SSI   - POCT Q4  - TF held today for possible extubation, did not begin scheduled insulin as inconsistent caloric intake

## 2019-12-28 NOTE — ASSESSMENT & PLAN NOTE
- A1c 7.7   -moderate SSI   - POCT Q4  TF restarted, not extubating today, no cuff leak. Steroid started. Will reassess tomorrow  Insulin gtt started while on  Steroids.

## 2019-12-28 NOTE — ASSESSMENT & PLAN NOTE
- 2/2 R MCA  stroke  - Neuro checks Q 1 hr  - Maintain eunatremia  - Maintain Euglyemia  Monitor serial imaging

## 2019-12-28 NOTE — ASSESSMENT & PLAN NOTE
- Initially intubated for decreased mental status   - s/p improvement with Lasix, now scheduled 40 IV BID- will continue   - ABG Daily  - Famotidine  - Chlorhexidine  -Daily SBT     Vent Mode: Spont  Oxygen Concentration (%):  [40] 40  Resp Rate Total:  [9.8 br/min-28 br/min] 22 br/min  PEEP/CPAP:  [5 cmH20-7 cmH20] 5 cmH20  Pressure Support:  [10 cmH20] 10 cmH20  Mean Airway Pressure:  [6.4 jwW00-34 cmH20] 9.1 cmH20

## 2019-12-28 NOTE — PROGRESS NOTES
[facility]  Neurocritical Care  Progress Note    Admit Date: 12/16/2019  Service Date: 12/27/2019  Length of Stay: 11    Subjective:     Chief Complaint: Embolic stroke involving right middle cerebral artery    History of Present Illness: The patient is a 79 y.o. male who  was admitted as a transfer from OSH for Right MCA stroke syndrome. Patient had an urgent thrombectomy s/p  right MCA stroke syndrome. PMH significant for HTN, T2DM and HLD. Patient stated that around 2:30 pm on 12/16/19 he was driving and felt weak. He drove home and was taken to the ER where he was evaluated for a stroke. He had outside CTA which was reported to have R ICA stenosis at bifurcation with 70% stenosis and right MCA occlusion. Patient was transferred to [facility] for possible intervention. Patient had a right femoral sheath placed and his Angio  revealed 90% R ICA stenosis and R M1 occlusion. The R ICA was treated by thrombectomy and carotid stenting with TICI 3 reperfusion.   Patient was admitted to the [facility] for higher level of care post thrombectomy.    Hospital Course: 12/17/19: Patient admitted to [facility] for higher level of care s/p thrombectomy and right carotid artery stenting with TICI 3 reperfusion  12/18/2019Recent MRI with heme transformation abg Q8, repeat scan stable, mannitol  12/19/2019 intubation repeat CTH  12/20: febrile-broaden abx coverage, SBT, add hydralazine 50 q8h, KUB and mag citrate  12/21: wean FiO2, d/c hydralazine, wean fentanyl  12/22/2019: Patient stable overnight. Lasix 20mg x 1 given today. Enteral water flushes started for hypernatremia.    12/23: SBT trial, started Plavix today, initiated Hydralazine 25 TID, lasix 40 mg X1, decrease statin to 40 mg daily (transaminitis -trending down), add psyllium, Na BID -goal 145-150, initiated sq heparin  12/24/2019: SBT failed, wean fio2 as tolerated, CXR responded to lasix, scheduled BID  12/25/2019 continue lasix. SBT today. Adjust insulin  regimen  12/26/2019: Continue diuresis, SBT again today, wean PEEP and FiO2, wean nicardipine, hold statin for transaminits   12/27/2019: No significant events     Interval History: Placed back on SIMV overnight for apneic episode during spontaneous. Placed back on spontaneous this AM, tolerating well. Extubation parameters completed, but still not ready for extubation from mental status standpoint. Continue diuresis with furosemide. PEEP down to 5. BG high, but TF held for large part of today for possible extubation. Will begin scheduled insulin when consistent caloric intake.     Review of Systems: Unable to obtain a complete ROS due to level of consciousness ad intubation.     Vitals:   Temp: 98.9 °F (37.2 °C)  Pulse: 92  Rhythm: normal sinus rhythm  BP: 136/62  MAP (mmHg): 89  Resp: (!) 23  SpO2: 97 %  Oxygen Concentration (%): 40  O2 Device (Oxygen Therapy): ventilator  Vent Mode: Spont  Set Rate: 6 bmp  Pressure Support: 10 cmH20  PEEP/CPAP: 5 cmH20  Peak Airway Pressure: 16 cmH2O  Mean Airway Pressure: 9.2 cmH20  Plateau Pressure: 16 cmH20    Temp  Min: 98.6 °F (37 °C)  Max: 100.1 °F (37.8 °C)  Pulse  Min: 86  Max: 109  BP  Min: 124/58  Max: 160/72  MAP (mmHg)  Min: 83  Max: 100  Resp  Min: 9  Max: 29  SpO2  Min: 94 %  Max: 99 %  Oxygen Concentration (%)  Min: 40  Max: 40    12/26 0701 - 12/27 0700  In: 1582.5 [I.V.:132.5]  Out: 2350 [Urine:2350]   Unmeasured Output  Urine Occurrence: 1  Stool Occurrence: 0  Pad Count: 1        Examination:   Constitutional: Chronically ill appearance. No apparent distress.   Eyes: Conjunctiva injected, anicteric. Lids no lesions.  Head/Ears/Nose/Mouth/Throat/Neck: Moist mucous membranes. External ears, nose atraumatic.   Cardiovascular: Regular rhythm. No murmurs. No leg edema.  Respiratory: Mechanically ventilated. Comfortable respirations. Clear to auscultation.  Gastrointestinal: Belly soft to touch     Neurologic:  -GCS [id number]  -Wakes up briskly to voice and remains  awake throughout examination.  -PERRL, EOMI, +cough   -Follows commands on R upper and lower extremities. No movement on L to commands, w/d from painful stimuli   Unable to test orientation, language, memory, judgment, coordination, gait due to level of consciousness.       Medications:   Continuous Scheduled  albuterol-ipratropium 3 mL Q4H   amLODIPine 10 mg Daily   chlorhexidine 15 mL BID   clopidogrel 75 mg Daily   famotidine 20 mg BID   furosemide 40 mg BID   heparin (porcine) 5,000 Units Q8H   hydrALAZINE 25 mg Q8H   insulin detemir U-100 4 Units QHS   senna-docusate 8.6-50 mg 1 tablet BID   PRN  acetaminophen 650 mg Q6H PRN   Dextrose 10% Bolus 12.5 g PRN   Dextrose 10% Bolus 25 g PRN   fentaNYL 50 mcg Q2H PRN   glucagon (human recombinant) 1 mg PRN   hydrALAZINE 10 mg Q6H PRN   insulin aspart U-100 1-10 Units Q4H PRN   labetalol 10 mg Q4H PRN   magnesium oxide 800 mg PRN   magnesium oxide 800 mg PRN   ondansetron 4 mg Q6H PRN   potassium chloride 10% 40 mEq PRN   potassium chloride 10% 40 mEq PRN   potassium chloride 10% 60 mEq PRN   potassium, sodium phosphates 2 packet PRN   potassium, sodium phosphates 2 packet PRN   potassium, sodium phosphates 2 packet PRN   sodium chloride 0.9% 10 mL PRN      Today I independently reviewed pertinent medications, lines/drains/airways, imaging, laboratory results, notably:     ISTAT: Recent Labs   Lab 12/27/19  0332   PH 7.465*   PCO2 46.1*   PO2 74*   POCSATURATED 95   HCO3 33.2*   BE 9   POCTCO2 35*   SAMPLE ARTERIAL      Chem: Recent Labs   Lab 12/27/19  0142  12/27/19  1814      < > 143   K 4.0  --   --      --   --    CO2 30*  --   --    *  --   --    BUN 31*  --   --    CREATININE 0.9  --   --    ESTGFRAFRICA >60.0  --   --    EGFRNONAA >60.0  --   --    CALCIUM 9.4  --   --    MG 2.4  --   --    PHOS 3.3  --   --    ANIONGAP 10  --   --    PROT 7.2  --   --    ALBUMIN 2.7*  --   --    BILITOT 0.6  --   --    ALKPHOS 244*  --   --    AST 99*  --    --    *  --   --     < > = values in this interval not displayed.     Heme: Recent Labs   Lab 12/27/19  0142   WBC 9.72   HGB 13.0*   HCT 40.5      INR 0.9     Endo:   Recent Labs   Lab 12/27/19  0809 12/27/19  1208 12/27/19  1705   POCTGLUCOSE 266* 286* 244*        Assessment/Plan:     Neuro  * Embolic stroke involving right middle cerebral artery  78 y/o male admitted for R MCA stroke syndrome s/p thrombectomy TICI 3 reperfusion and ABBY stenting, now s/p hemorrhagic transformation   - Was on DAPT following stenting, discontinued for hemorrhagic transformation, now on clopidogrel 75 mg daily   - Follow up imaging has been stable, neuro exam remains stable   - Neurochecks q1hr  - Vascular Neurology following  - HTS course complete   - SBP Goal < 160  - PT/OT/SLP  - Mechanical SCDs  - Atorvastatin  - Daily SBT to assess for extubation readiness      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, right  Severe R ICA stenosis noted on IR angiogram   - Stent placed  - Clopidogrel daily    Cytotoxic brain edema  - 2/2 R MCA   - Neuro checks Q 1 hr  - Maintain eunatremia  - Maintain Euglyemia    Pulmonary  Acute respiratory failure with hypoxia  - Initially intubated for decreased mental status   - s/p improvement with Lasix, now scheduled 40 IV BID- will continue   - ABG Daily  - Famotidine  - Chlorhexidine  -Daily SBT     Vent Mode: Spont  Oxygen Concentration (%):  [40] 40  Resp Rate Total:  [9.8 br/min-28 br/min] 22 br/min  PEEP/CPAP:  [5 cmH20-7 cmH20] 5 cmH20  Pressure Support:  [10 cmH20] 10 cmH20  Mean Airway Pressure:  [6.4 boV12-74 cmH20] 9.1 cmH20         Cardiac/Vascular  Mixed hyperlipidemia  Hold statin 2/2 transaminitis     Essential hypertension  - Continuous Cardiac Monitoring  - Maintain SBP < 160  - Echo 65%  - Hydralazine 25 q8h  - Amlodipine 10 mg daily  - Furosemide 40 mg BID    Endocrine  Type 2 diabetes mellitus with neurologic complication, without long-term current  use of insulin  - A1c 7.7   - SSI   - POCT Q4  - TF held today for possible extubation, did not begin scheduled insulin as inconsistent caloric intake     GI  Transaminitis  Daily CMP  - Hold statin  - Increase threshold for acetaminophen administration to fever of 101        The patient is being Prophylaxed for:  Venous Thromboembolism with: Mechanical or Chemical  Stress Ulcer with: H2B  Ventilator Pneumonia with: chlorhexidine oral care    Activity Orders          None        Full Code    Trena Reyes PA-C  Neurocritical Care  [facility]

## 2019-12-28 NOTE — ASSESSMENT & PLAN NOTE
78 y/o male admitted for R MCA stroke syndrome s/p thrombectomy TICI 3 reperfusion and ABBY stenting, now s/p hemorrhagic transformation   - Was on DAPT following stenting, discontinued for hemorrhagic transformation   - currently on clopidogrel 75 mg daily  - Follow up imaging has been stable, neuro exam remains stable   - Neurochecks q1hr  - Vascular Neurology following  - SBP Goal < 160  - PT/OT/SLP  - Mechanical SCDs

## 2019-12-29 LAB
ALBUMIN SERPL BCP-MCNC: 2.6 G/DL (ref 3.5–5.2)
ALLENS TEST: ABNORMAL
ALP SERPL-CCNC: 178 U/L (ref 55–135)
ALT SERPL W/O P-5'-P-CCNC: 89 U/L (ref 10–44)
ANION GAP SERPL CALC-SCNC: 9 MMOL/L (ref 8–16)
AST SERPL-CCNC: 46 U/L (ref 10–40)
BASOPHILS # BLD AUTO: 0.01 K/UL (ref 0–0.2)
BASOPHILS NFR BLD: 0.1 % (ref 0–1.9)
BILIRUB SERPL-MCNC: 0.4 MG/DL (ref 0.1–1)
BUN SERPL-MCNC: 48 MG/DL (ref 8–23)
CALCIUM SERPL-MCNC: 9 MG/DL (ref 8.7–10.5)
CHLORIDE SERPL-SCNC: 102 MMOL/L (ref 95–110)
CO2 SERPL-SCNC: 32 MMOL/L (ref 23–29)
CREAT SERPL-MCNC: 1 MG/DL (ref 0.5–1.4)
DELSYS: ABNORMAL
DIFFERENTIAL METHOD: ABNORMAL
EOSINOPHIL # BLD AUTO: 0 K/UL (ref 0–0.5)
EOSINOPHIL NFR BLD: 0 % (ref 0–8)
ERYTHROCYTE [DISTWIDTH] IN BLOOD BY AUTOMATED COUNT: 12.4 % (ref 11.5–14.5)
ERYTHROCYTE [SEDIMENTATION RATE] IN BLOOD BY WESTERGREN METHOD: 17 MM/H
EST. GFR  (AFRICAN AMERICAN): >60 ML/MIN/1.73 M^2
EST. GFR  (NON AFRICAN AMERICAN): >60 ML/MIN/1.73 M^2
FIO2: 40
GLUCOSE SERPL-MCNC: 252 MG/DL (ref 70–110)
HCO3 UR-SCNC: 34.8 MMOL/L (ref 24–28)
HCT VFR BLD AUTO: 37.7 % (ref 40–54)
HGB BLD-MCNC: 11.9 G/DL (ref 14–18)
IMM GRANULOCYTES # BLD AUTO: 0.11 K/UL (ref 0–0.04)
IMM GRANULOCYTES NFR BLD AUTO: 1.2 % (ref 0–0.5)
LYMPHOCYTES # BLD AUTO: 1.4 K/UL (ref 1–4.8)
LYMPHOCYTES NFR BLD: 14.8 % (ref 18–48)
MAGNESIUM SERPL-MCNC: 2.6 MG/DL (ref 1.6–2.6)
MCH RBC QN AUTO: 31.3 PG (ref 27–31)
MCHC RBC AUTO-ENTMCNC: 31.6 G/DL (ref 32–36)
MCV RBC AUTO: 99 FL (ref 82–98)
MODE: ABNORMAL
MONOCYTES # BLD AUTO: 0.4 K/UL (ref 0.3–1)
MONOCYTES NFR BLD: 3.9 % (ref 4–15)
NEUTROPHILS # BLD AUTO: 7.4 K/UL (ref 1.8–7.7)
NEUTROPHILS NFR BLD: 80 % (ref 38–73)
NRBC BLD-RTO: 0 /100 WBC
PCO2 BLDA: 47.1 MMHG (ref 35–45)
PH SMN: 7.48 [PH] (ref 7.35–7.45)
PHOSPHATE SERPL-MCNC: 4.7 MG/DL (ref 2.7–4.5)
PLATELET # BLD AUTO: 206 K/UL (ref 150–350)
PMV BLD AUTO: 11.8 FL (ref 9.2–12.9)
PO2 BLDA: 98 MMHG (ref 80–100)
POC BE: 11 MMOL/L
POC SATURATED O2: 98 % (ref 95–100)
POC TCO2: 36 MMOL/L (ref 23–27)
POCT GLUCOSE: 105 MG/DL (ref 70–110)
POCT GLUCOSE: 119 MG/DL (ref 70–110)
POCT GLUCOSE: 128 MG/DL (ref 70–110)
POCT GLUCOSE: 131 MG/DL (ref 70–110)
POCT GLUCOSE: 145 MG/DL (ref 70–110)
POCT GLUCOSE: 149 MG/DL (ref 70–110)
POCT GLUCOSE: 152 MG/DL (ref 70–110)
POCT GLUCOSE: 156 MG/DL (ref 70–110)
POCT GLUCOSE: 158 MG/DL (ref 70–110)
POCT GLUCOSE: 159 MG/DL (ref 70–110)
POCT GLUCOSE: 162 MG/DL (ref 70–110)
POCT GLUCOSE: 166 MG/DL (ref 70–110)
POCT GLUCOSE: 167 MG/DL (ref 70–110)
POCT GLUCOSE: 171 MG/DL (ref 70–110)
POCT GLUCOSE: 178 MG/DL (ref 70–110)
POCT GLUCOSE: 186 MG/DL (ref 70–110)
POCT GLUCOSE: 200 MG/DL (ref 70–110)
POCT GLUCOSE: 201 MG/DL (ref 70–110)
POCT GLUCOSE: 210 MG/DL (ref 70–110)
POCT GLUCOSE: 210 MG/DL (ref 70–110)
POCT GLUCOSE: 228 MG/DL (ref 70–110)
POCT GLUCOSE: 234 MG/DL (ref 70–110)
POCT GLUCOSE: 245 MG/DL (ref 70–110)
POTASSIUM SERPL-SCNC: 4.1 MMOL/L (ref 3.5–5.1)
PROT SERPL-MCNC: 7 G/DL (ref 6–8.4)
RBC # BLD AUTO: 3.8 M/UL (ref 4.6–6.2)
SAMPLE: ABNORMAL
SITE: ABNORMAL
SODIUM SERPL-SCNC: 143 MMOL/L (ref 136–145)
SP02: 97
WBC # BLD AUTO: 9.21 K/UL (ref 3.9–12.7)

## 2019-12-29 PROCEDURE — 20000000 HC ICU ROOM

## 2019-12-29 PROCEDURE — 36600 WITHDRAWAL OF ARTERIAL BLOOD: CPT

## 2019-12-29 PROCEDURE — 94761 N-INVAS EAR/PLS OXIMETRY MLT: CPT

## 2019-12-29 PROCEDURE — 83735 ASSAY OF MAGNESIUM: CPT

## 2019-12-29 PROCEDURE — 84100 ASSAY OF PHOSPHORUS: CPT

## 2019-12-29 PROCEDURE — 25000003 PHARM REV CODE 250: Performed by: NURSE PRACTITIONER

## 2019-12-29 PROCEDURE — 94150 VITAL CAPACITY TEST: CPT

## 2019-12-29 PROCEDURE — 94010 BREATHING CAPACITY TEST: CPT

## 2019-12-29 PROCEDURE — 25000242 PHARM REV CODE 250 ALT 637 W/ HCPCS: Performed by: INTERNAL MEDICINE

## 2019-12-29 PROCEDURE — 63600175 PHARM REV CODE 636 W HCPCS: Performed by: PSYCHIATRY & NEUROLOGY

## 2019-12-29 PROCEDURE — 94668 MNPJ CHEST WALL SBSQ: CPT

## 2019-12-29 PROCEDURE — 94640 AIRWAY INHALATION TREATMENT: CPT

## 2019-12-29 PROCEDURE — 99900026 HC AIRWAY MAINTENANCE (STAT)

## 2019-12-29 PROCEDURE — 82803 BLOOD GASES ANY COMBINATION: CPT

## 2019-12-29 PROCEDURE — 99900017 HC EXTUBATION W/PARAMETERS (STAT)

## 2019-12-29 PROCEDURE — 99900035 HC TECH TIME PER 15 MIN (STAT)

## 2019-12-29 PROCEDURE — 27000221 HC OXYGEN, UP TO 24 HOURS

## 2019-12-29 PROCEDURE — 63600175 PHARM REV CODE 636 W HCPCS: Performed by: NURSE PRACTITIONER

## 2019-12-29 PROCEDURE — 99291 PR CRITICAL CARE, E/M 30-74 MINUTES: ICD-10-PCS | Mod: GC,,, | Performed by: PSYCHIATRY & NEUROLOGY

## 2019-12-29 PROCEDURE — 25000003 PHARM REV CODE 250: Performed by: PHYSICIAN ASSISTANT

## 2019-12-29 PROCEDURE — 85025 COMPLETE CBC W/AUTO DIFF WBC: CPT

## 2019-12-29 PROCEDURE — 80053 COMPREHEN METABOLIC PANEL: CPT

## 2019-12-29 PROCEDURE — 99291 CRITICAL CARE FIRST HOUR: CPT | Mod: GC,,, | Performed by: PSYCHIATRY & NEUROLOGY

## 2019-12-29 PROCEDURE — 25000003 PHARM REV CODE 250: Performed by: PSYCHIATRY & NEUROLOGY

## 2019-12-29 RX ORDER — AMANTADINE HYDROCHLORIDE 50 MG/5ML
100 SOLUTION ORAL 2 TIMES DAILY
Status: DISCONTINUED | OUTPATIENT
Start: 2019-12-29 | End: 2019-12-29

## 2019-12-29 RX ADMIN — IPRATROPIUM BROMIDE AND ALBUTEROL SULFATE 3 ML: .5; 3 SOLUTION RESPIRATORY (INHALATION) at 03:12

## 2019-12-29 RX ADMIN — CLOPIDOGREL BISULFATE 75 MG: 75 TABLET ORAL at 09:12

## 2019-12-29 RX ADMIN — HYDRALAZINE HYDROCHLORIDE 25 MG: 25 TABLET, FILM COATED ORAL at 05:12

## 2019-12-29 RX ADMIN — IPRATROPIUM BROMIDE AND ALBUTEROL SULFATE 3 ML: .5; 3 SOLUTION RESPIRATORY (INHALATION) at 08:12

## 2019-12-29 RX ADMIN — FUROSEMIDE 40 MG: 10 INJECTION, SOLUTION INTRAVENOUS at 05:12

## 2019-12-29 RX ADMIN — HEPARIN SODIUM 5000 UNITS: 5000 INJECTION, SOLUTION INTRAVENOUS; SUBCUTANEOUS at 09:12

## 2019-12-29 RX ADMIN — FAMOTIDINE 20 MG: 20 TABLET ORAL at 09:12

## 2019-12-29 RX ADMIN — AMLODIPINE BESYLATE 10 MG: 10 TABLET ORAL at 09:12

## 2019-12-29 RX ADMIN — HYDRALAZINE HYDROCHLORIDE 25 MG: 25 TABLET, FILM COATED ORAL at 02:12

## 2019-12-29 RX ADMIN — SENNOSIDES AND DOCUSATE SODIUM 1 TABLET: 8.6; 5 TABLET ORAL at 09:12

## 2019-12-29 RX ADMIN — FUROSEMIDE 40 MG: 10 INJECTION, SOLUTION INTRAVENOUS at 09:12

## 2019-12-29 RX ADMIN — HEPARIN SODIUM 5000 UNITS: 5000 INJECTION, SOLUTION INTRAVENOUS; SUBCUTANEOUS at 02:12

## 2019-12-29 RX ADMIN — HYDRALAZINE HYDROCHLORIDE 25 MG: 25 TABLET, FILM COATED ORAL at 09:12

## 2019-12-29 RX ADMIN — HEPARIN SODIUM 5000 UNITS: 5000 INJECTION, SOLUTION INTRAVENOUS; SUBCUTANEOUS at 05:12

## 2019-12-29 RX ADMIN — IPRATROPIUM BROMIDE AND ALBUTEROL SULFATE 3 ML: .5; 3 SOLUTION RESPIRATORY (INHALATION) at 11:12

## 2019-12-29 RX ADMIN — CHLORHEXIDINE GLUCONATE 0.12% ORAL RINSE 15 ML: 1.2 LIQUID ORAL at 09:12

## 2019-12-29 NOTE — PROGRESS NOTES
[facility]  Vascular Neurology  Comprehensive Stroke Center  Progress Note    Assessment/Plan:     * Embolic stroke involving right middle cerebral artery  79 y.o. yo male with unclear PMHx (HTN, DM, HLD?) who presented to [facility] with R MCA syndrome.  Patient out of IV-tPA time window.  Right internal carotid artery occlusion.  High grade extracranial ICA stenosis.  Treated with angioplasty and stent.  Successful thrombectomy of the right distal ICA and right m1 segment with TICI 3 repefusion.      MRI Brain from 12/17 shows large right MCA territory acute infarction with hemorraghic transformation.Plavix only   CT head on 12/18 with continued evolution of large right MCA territory infarct status post hemorrhagic conversion with regional mass effect. Cotinues to be intubated.    Antithrombotics for secondary stroke prevention:  Plavix   Statins for secondary stroke prevention and hyperlipidemia, if present:   Statins: Atorvastatin- 40 mg daily  Aggressive risk factor modification: Diet, Exercise  Rehab efforts: The patient has been evaluated by a stroke team provider and the therapy needs have been fully considered based off the presenting complaints and exam findings. The following therapy evaluations are needed: PT evaluate and treat, OT evaluate and treat, SLP evaluate and treat, PM&R evaluate for appropriate placement TBD   Diagnostics ordered/pending: None   VTE prophylaxis: Heparin 5000 units SQ every 8 hours  place SCDs  BP parameters: Infarct: Post successful thrombectomy, SBP <140      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, right  Stent placed  Plavix but will like to add ASA when safe      Acute respiratory failure with hypoxia  Intubated NCC managing     Cytotoxic brain edema  Large area of cytotoxic cerebral edema identified when reviewing brain imaging in the territory of the right middle cerebral artery. There is mass effect associated  with it. We will continue to monitor the patients clinical exam for any worsening of symptoms which may indicate expansion of the stroke or the area of the edema resulting in the clinical change. The pattern is suggestive of SULEMA etiology.        Mixed hyperlipidemia  stroke risk factor  LDL less than 70   Statin not indicated       Type 2 diabetes mellitus with neurologic complication, without long-term current use of insulin  Stroke risk factor   on arrival  Hemoglobin A1C 7.7  Recommend tight glycemic control -180  On insulin gtt     Essential hypertension  Stroke risk factor  SBP < 140  Per primary team         Currently intubated in [facility].  NCC managing and starting decadron and since patient is more alert preparing for extubation soon.      STROKE DOCUMENTATION   Acute Stroke Times   Last Known Normal Date: 12/16/19  Last Known Normal Time: 1000  Stroke Team Called Date: 12/16/19  Stroke Team Called Time: 1725  Stroke Team Arrival Date: 12/16/19  Stroke Team Arrival Time: 1730  CT Interpretation Time: 1745  Decision to Treat Time for Alteplase: (N/A)  Decision to Treat Time for IR: 1750    NIH Scale:  1a. Level of Consciousness: 0-->Alert, keenly responsive  1b. LOC Questions: 2-->Answers neither question correctly  1c. LOC Commands: 2-->Performs neither task correctly  2. Best Gaze: 0-->Normal  3. Visual: 0-->No visual loss  4. Facial Palsy: 0-->Normal symmetrical movements  5a. Motor Arm, Left: 4-->No movement  5b. Motor Arm, Right: 2-->Some effort against gravity, limb cannot get to or maintain (if cued) 90 (or 45) degrees, drifts down to bed, but has some effort against gravity  6a. Motor Leg, Left: 4-->No movement  6b. Motor Leg, Right: 2-->Some effort against gravity, leg falls to bed by 5 secs, but has some effort against gravity  7. Limb Ataxia: 0-->Absent  8. Sensory: 1-->Mild-to-moderate sensory loss, patient feels pinprick is less sharp or is dull on the affected side, or there is a loss  of superficial pain with pinprick, but patient is aware of being touched  9. Best Language: 3-->Mute, global aphasia, no usable speech or auditory comprehension  10. Dysarthria: 2-->Severe dysarthria, patients speech is so slurred as to be unintelligible in the absence of or out of proportion to any dysphasia, or is mute/anarthric  11. Extinction and Inattention (formerly Neglect): 1-->Visual, tactile, auditory, spatial, or personal inattention or extinction to bilateral simultaneous stimulation in one of the sensory modalities  Total (NIH Stroke Scale): 23       Modified Bhavesh Score: 0(unknown)  Chan Coma Scale:    ABCD2 Score:    [id number] Score:   HAS -BLED Score:   ICH Score:   Hunt & Reyes Classification:      Hemorrhagic change of an Ischemic Stroke: Does this patient have an ischemic stroke with hemorrhagic changes? Yes, Grading Scale: HI Type 2 (HI-2) = confluent petechiae within the infarcted area but without space-occupying effect. Is this a symptomatic change?  No - Hemorrhage is not clinically significant     Neurologic Chief Complaint: R MCA syndrome     Subjective:     Interval History: Patient is seen for follow-up neurological assessment and treatment recommendations: Currently intubated in NCC.  NCC managing and starting decadron and since patient is more alert preparing for extubation soon.      HPI, Past Medical, Family, and Social History remains the same as documented in the initial encounter.     Review of Systems   Constitutional: Positive for fever. Negative for chills.   Respiratory: Negative for shortness of breath and wheezing.    Neurological: Positive for facial asymmetry, speech difficulty and weakness.     Scheduled Meds:   albuterol-ipratropium  3 mL Nebulization Q4H    amLODIPine  10 mg Per OG tube Daily    chlorhexidine  15 mL Mouth/Throat BID    clopidogrel  75 mg Per OG tube Daily    dexamethasone  8 mg Intravenous Q8H    famotidine  20 mg Per OG tube BID     furosemide  40 mg Intravenous BID    heparin (porcine)  5,000 Units Subcutaneous Q8H    hydrALAZINE  25 mg Per OG tube Q8H    senna-docusate 8.6-50 mg  1 tablet Per NG tube BID     Continuous Infusions:   insulin (HUMAN R) infusion (adults) 14.6 Units/hr (12/28/19 1805)     PRN Meds:acetaminophen, Dextrose 10% Bolus, Dextrose 10% Bolus, Dextrose 10% Bolus, Dextrose 10% Bolus, fentaNYL, hydrALAZINE, labetalol, magnesium oxide, magnesium oxide, ondansetron, potassium chloride 10%, potassium chloride 10%, potassium chloride 10%, potassium, sodium phosphates, potassium, sodium phosphates, potassium, sodium phosphates, sodium chloride 0.9%    Objective:     Vital Signs (Most Recent):  Temp: 100.2 °F (37.9 °C) (12/28/19 1805)  Pulse: 97 (12/28/19 1805)  Resp: (!) 29 (12/28/19 1805)  BP: (!) 143/65 (12/28/19 1805)  SpO2: 96 % (12/28/19 1805)  BP Location: Left arm    Vital Signs Range (Last 24H):  Temp:  [98.9 °F (37.2 °C)-100.6 °F (38.1 °C)]   Pulse:  [86-99]   Resp:  [1-30]   BP: (118-146)/(58-66)   SpO2:  [94 %-99 %]   BP Location: Left arm    Physical Exam   Constitutional: He appears well-developed and well-nourished. He is intubated.   HENT:   Head: Normocephalic and atraumatic.   Eyes: Conjunctivae are normal.   Right gaze- able to cross midline   Neck: Normal range of motion and full passive range of motion without pain.   Cardiovascular: Tachycardia present.   Pulmonary/Chest: Effort normal. He is intubated.   Abdominal: Soft. Normal appearance.   Musculoskeletal:   Left  Side plegia   Neurological: A cranial nerve deficit is present. He exhibits abnormal muscle tone.   Skin: Skin is warm and dry.       Neurological Exam:   LOC: alert  Attention Span:good   Language:receptive aphasia   Visual Fields: blink to threat  EOM (CN III, IV, VI): Gaze preference  left  Pupils (CN II, III): PERRL  Facial Movement (CN VII):difficult to see due to ET tube   Motor: Arm left  Plegia 0/5  Leg left  Plegia 0/5  Arm right   Paresis: 3/5  Leg right Paresis: 4/5       Laboratory:  CMP:   Recent Labs   Lab 12/28/19  0135   CALCIUM 8.8   ALBUMIN 2.5*   PROT 6.6      K 4.0   CO2 31*      BUN 35*   CREATININE 1.0   ALKPHOS 197*   *   AST 61*   BILITOT 0.5     CBC:   Recent Labs   Lab 12/28/19  0135   WBC 7.83   RBC 3.72*   HGB 12.0*   HCT 36.9*      MCV 99*   MCH 32.3*   MCHC 32.5     Lipid Panel:   No results for input(s): CHOL, LDLCALC, HDL, TRIG in the last 168 hours.  Hgb A1C:   No results for input(s): HGBA1C in the last 168 hours.  TSH: No results for input(s): TSH in the last 168 hours.    Diagnostic Results     Brain Imaging   CT head 12/22/19  CT of the head demonstrates subacute infarction within the right anterior temporal lobe, insular cortex, basal ganglia, and internal capsule similar to the prior study with internal hemorrhage.  There is continued mass effect on right lateral ventricle and slight bowing of the midline to the left.  No new infarction or hemorrhage is seen.  Overall degree of mass effect is similar to the prior study.    CT head w/o contrast 12/16/2019     No acute major vascular distribution infarct or hemorrhage.    Vessel Imaging   Cerebral angiogram 12/16/19  Angiography demonstrated high-grade right internal carotid artery stenosis greater than 90% in the bulb and distal ICA occlusion.  This was treated with angioplasty and stenting at the carotid bifurcation resulting in resolution of extracranial stenosis.  Embolectomy was performed in the distal ICA and right M1 segment with complete TICI 3 reperfusion.      Cardiac Imaging   IRIS  · Normal left ventricular systolic function. The estimated ejection fraction is 65%  · Indeterminate left ventricular diastolic function.  · Mild left ventricular enlargement.  · Normal right ventricular systolic function.  · Technically difficult study, poor endocardial visualization, contrast used.  · No wall motion abnormalities.  · Indeterminate  central venous pressure. Estimated PA pressure is at least 9 mmHg.      Yaneli Sanchez PA-C  Comprehensive Stroke Center  Department of Vascular Neurology   [facility]

## 2019-12-29 NOTE — HOSPITAL COURSE
12/29/19: Currently intubated in [facility].  [facility] managing and starting decadron and since patient is more alert preparing for extubation soon.   12/30/2019 Patient extubated. D/C judd, d/c lasix, titrate insulin gtt, d/c hydralazine, KUB

## 2019-12-29 NOTE — ASSESSMENT & PLAN NOTE
79 y.o. yo male with unclear PMHx (HTN, DM, HLD?) who presented to [facility] with R MCA syndrome.  Patient out of IV-tPA time window.  Right internal carotid artery occlusion.  High grade extracranial ICA stenosis.  Treated with angioplasty and stent.  Successful thrombectomy of the right distal ICA and right m1 segment with TICI 3 repefusion.      MRI Brain from 12/17 shows large right MCA territory acute infarction with hemorraghic transformation.Plavix only   CT head on 12/18 with continued evolution of large right MCA territory infarct status post hemorrhagic conversion with regional mass effect. Cotinues to be intubated.    Antithrombotics for secondary stroke prevention:  Plavix   Statins for secondary stroke prevention and hyperlipidemia, if present:   Statins: Atorvastatin- 40 mg daily  Aggressive risk factor modification: Diet, Exercise  Rehab efforts: The patient has been evaluated by a stroke team provider and the therapy needs have been fully considered based off the presenting complaints and exam findings. The following therapy evaluations are needed: PT evaluate and treat, OT evaluate and treat, SLP evaluate and treat, PM&R evaluate for appropriate placement TBD   Diagnostics ordered/pending: None   VTE prophylaxis: Heparin 5000 units SQ every 8 hours  place [facility]  BP parameters: Infarct: Post successful thrombectomy, SBP <140

## 2019-12-29 NOTE — ASSESSMENT & PLAN NOTE
80 y/o male admitted for R MCA stroke syndrome s/p thrombectomy TICI 3 reperfusion and ABBY stenting, now s/p hemorrhagic transformation   - Was on DAPT following stenting, discontinued for hemorrhagic transformation  - currently on clopidogrel 75 mg daily  - Follow up imaging has been stable, neuro exam remains stable   - Neurochecks q1hr  - extubated to room air 12/29  - Vascular Neurology following  - SBP Goal < 160  - PT/OT/SLP  - Mechanical SCDs

## 2019-12-29 NOTE — PLAN OF CARE
POC reviewed with pt and family at 1800. Spouse verbalized understanding. Questions and concerns addressed. No acute events today. Insulin gtt initiated. TF restarted per MD orders. Andrade remains in place. Pt progressing toward goals. Will continue to monitor. See flowsheets for full assessment and VS info.

## 2019-12-29 NOTE — SUBJECTIVE & OBJECTIVE
Interval History: SBT this morning, extubated to room air. Tolerating well. Continue to monitor clinically. Continue insulin ggt.     Review of Systems  Constitutional: Denies fevers, weight loss, chills, or weakness.  Eyes: Denies changes in vision.  ENT: Denies dysphagia, nasal discharge, ear pain or discharge.  Cardiovascular: Denies chest pain, palpitations, orthopnea, or claudication.  Respiratory: Denies shortness of breath, cough, hemoptysis, or wheezing.  GI: Denies nausea/vomitting, hematochezia, melena, abd pain, or changes in appetite.  : Denies dysuria, incontinence, or hematuria.  Musculoskeletal: Denies joint pain or myalgias.  Skin/breast: Denies rashes, lumps, lesions, or discharge.  Neurologic: Denies headache, dizziness, vertigo, or paresthesias.  Psychiatric: Denies changes in mood or hallucinations.  Endocrine: Denies polyuria, polydipsia, heat/cold intolerance.  Hematologic/Lymph: Denies lymphadenopathy, easy bruising or easy bleeding.  Allergic/Immunologic: Denies rash, rhinitis.     Objective:     Vitals:  Temp: 98.6 °F (37 °C)  Pulse: 78  Rhythm: normal sinus rhythm  BP: (!) 143/65  MAP (mmHg): 93  Resp: 19  SpO2: 96 %  Oxygen Concentration (%): 40  O2 Device (Oxygen Therapy): nasal cannula  Vent Mode: Spont  Pressure Support: 5 cmH20  PEEP/CPAP: 5 cmH20  Peak Airway Pressure: 11 cmH2O  Mean Airway Pressure: 7.1 cmH20  Plateau Pressure: 16 cmH20    Temp  Min: 98.1 °F (36.7 °C)  Max: 101.6 °F (38.7 °C)  Pulse  Min: 77  Max: 103  BP  Min: 136/63  Max: 163/70  MAP (mmHg)  Min: 90  Max: 100  Resp  Min: 8  Max: 35  SpO2  Min: 94 %  Max: 97 %  Oxygen Concentration (%)  Min: 40  Max: 40    12/28 0701 - 12/29 0700  In: 1506.6 [I.V.:251.6]  Out: 2210 [Urine:2210]   Unmeasured Output  Urine Occurrence: 1  Stool Occurrence: 0  Pad Count: 1       Physical Exam    GA:  comfortable, no acute distress.   HEENT: No scleral icterus or JVD.   Pulmonary: Diminished to auscultation A/L. No wheezing, crackles,  or rhonchi.  Cardiac: RRR S1 & S2 w/o rubs/murmurs/gallops.   Abdominal: Bowel sounds present x 4. No appreciable hepatosplenomegaly.  Skin: No jaundice, rashes, or visible lesions.  Neuro:  - GCS: E4 V5 M6  - Mental Status:  Follows commands  - Pupils 3mm, PERRL.   - Corneal reflex, gag, cough intact.   - LUE withdraws  - RUE spont  - LLE withdraws  - RLE spont    Medications:  Continuous    insulin (HUMAN R) infusion (adults) Last Rate: 7.2 Units/hr (12/29/19 0700)   Scheduled    albuterol-ipratropium 3 mL Q4H   amLODIPine 10 mg Daily   chlorhexidine 15 mL BID   clopidogrel 75 mg Daily   famotidine 20 mg BID   furosemide 40 mg BID   heparin (porcine) 5,000 Units Q8H   hydrALAZINE 25 mg Q8H   senna-docusate 8.6-50 mg 1 tablet BID   PRN    acetaminophen 650 mg Q6H PRN   Dextrose 10% Bolus 12.5 g PRN   Dextrose 10% Bolus 25 g PRN   Dextrose 10% Bolus 25 g PRN   Dextrose 10% Bolus 12.5 g PRN   fentaNYL 50 mcg Q2H PRN   hydrALAZINE 10 mg Q6H PRN   labetalol 10 mg Q4H PRN   magnesium oxide 800 mg PRN   magnesium oxide 800 mg PRN   ondansetron 4 mg Q6H PRN   potassium chloride 10% 40 mEq PRN   potassium chloride 10% 40 mEq PRN   potassium chloride 10% 60 mEq PRN   potassium, sodium phosphates 2 packet PRN   potassium, sodium phosphates 2 packet PRN   potassium, sodium phosphates 2 packet PRN   sodium chloride 0.9% 10 mL PRN     Today I personally reviewed pertinent medications, lines/drains/airways, imaging, laboratory results,    Diet  No diet orders on file

## 2019-12-29 NOTE — ASSESSMENT & PLAN NOTE
Stroke risk factor   on arrival  Hemoglobin A1C 7.7  Recommend tight glycemic control -180  On insulin gtt

## 2019-12-29 NOTE — ASSESSMENT & PLAN NOTE
Large area of cytotoxic cerebral edema identified when reviewing brain imaging in the territory of the right middle cerebral artery. There is mass effect associated with it. We will continue to monitor the patients clinical exam for any worsening of symptoms which may indicate expansion of the stroke or the area of the edema resulting in the clinical change. The pattern is suggestive of SULEMA etiology.

## 2019-12-29 NOTE — ASSESSMENT & PLAN NOTE
- Initially intubated for decreased mental status   - s/p improvement with Lasix, continue 40 IV BID  - extubated 12/29/19  - duonebs q4h; CPT q6h

## 2019-12-29 NOTE — PLAN OF CARE
POC reviewed with pt at 0500. Pt unable to verbalize understanding. Questions and concerns addressed pts spouse and family. Insulin gtt continued.  Pt placed on cooling blanket for elevated temp.  Tylenol administered. Complete bath given.  No acute events overnight. Pt progressing toward goals. Will continue to monitor. See flowsheets for full assessment and VS info

## 2019-12-29 NOTE — PROGRESS NOTES
[facility]  Neurocritical Care  Progress Note    Admit Date: 12/16/2019  Service Date: 12/29/2019  Length of Stay: 13    Subjective:     Chief Complaint: Embolic stroke involving right middle cerebral artery    History of Present Illness: The patient is a 79 y.o. male who  was admitted as a transfer from OSH for Right MCA stroke syndrome. Patient had an urgent thrombectomy s/p  right MCA stroke syndrome. PMH significant for HTN, T2DM and HLD. Patient stated that around 2:30 pm on 12/16/19 he was driving and felt weak. He drove home and was taken to the ER where he was evaluated for a stroke. He had outside CTA which was reported to have R ICA stenosis at bifurcation with 70% stenosis and right MCA occlusion. Patient was transferred to [facility] for possible intervention. Patient had a right femoral sheath placed and his Angio  revealed 90% R ICA stenosis and R M1 occlusion. The R ICA was treated by thrombectomy and carotid stenting with TICI 3 reperfusion.   Patient was admitted to the [facility] for higher level of care post thrombectomy.    Hospital Course: 12/17/19: Patient admitted to [facility] for higher level of care s/p thrombectomy and right carotid artery stenting with TICI 3 reperfusion  12/18/2019Recent MRI with heme transformation abg Q8, repeat scan stable, mannitol  12/19/2019 intubation repeat CTH  12/20: febrile-broaden abx coverage, SBT, add hydralazine 50 q8h, KUB and mag citrate  12/21: wean FiO2, d/c hydralazine, wean fentanyl  12/22/2019: Patient stable overnight. Lasix 20mg x 1 given today. Enteral water flushes started for hypernatremia.    12/23: SBT trial, started Plavix today, initiated Hydralazine 25 TID, lasix 40 mg X1, decrease statin to 40 mg daily (transaminitis -trending down), add psyllium, Na BID -goal 145-150, initiated sq heparin  12/24/2019: SBT failed, wean fio2 as tolerated, CXR responded to lasix, scheduled BID  12/25/2019 continue lasix. SBT today. Adjust insulin  regimen  12/26/2019: Continue diuresis, SBT again today, wean PEEP and FiO2, wean nicardipine, hold statin for transaminits   12/27/2019: No significant events   12/28 No significant events over night.. Tolerating spon. Breathing trial, increased peep. No cuff leak, started decadron 8mg q8x3 doses and reassess in am for possible extubation. Insulin gtt started while on steroids, as BG running high. CPT added to resp. Treatments. Start TF.  12/29: extubated to room air following SBT    Interval History: SBT this morning, extubated to room air. Tolerating well. Continue to monitor clinically. Continue insulin ggt.     Review of Systems  Constitutional: Denies fevers, weight loss, chills, or weakness.  Eyes: Denies changes in vision.  ENT: Denies dysphagia, nasal discharge, ear pain or discharge.  Cardiovascular: Denies chest pain, palpitations, orthopnea, or claudication.  Respiratory: Denies shortness of breath, cough, hemoptysis, or wheezing.  GI: Denies nausea/vomitting, hematochezia, melena, abd pain, or changes in appetite.  : Denies dysuria, incontinence, or hematuria.  Musculoskeletal: Denies joint pain or myalgias.  Skin/breast: Denies rashes, lumps, lesions, or discharge.  Neurologic: Denies headache, dizziness, vertigo, or paresthesias.  Psychiatric: Denies changes in mood or hallucinations.  Endocrine: Denies polyuria, polydipsia, heat/cold intolerance.  Hematologic/Lymph: Denies lymphadenopathy, easy bruising or easy bleeding.  Allergic/Immunologic: Denies rash, rhinitis.     Objective:     Vitals:  Temp: 98.6 °F (37 °C)  Pulse: 78  Rhythm: normal sinus rhythm  BP: (!) 143/65  MAP (mmHg): 93  Resp: 19  SpO2: 96 %  Oxygen Concentration (%): 40  O2 Device (Oxygen Therapy): nasal cannula  Vent Mode: Spont  Pressure Support: 5 cmH20  PEEP/CPAP: 5 cmH20  Peak Airway Pressure: 11 cmH2O  Mean Airway Pressure: 7.1 cmH20  Plateau Pressure: 16 cmH20    Temp  Min: 98.1 °F (36.7 °C)  Max: 101.6 °F (38.7 °C)  Pulse   Min: 77  Max: 103  BP  Min: 136/63  Max: 163/70  MAP (mmHg)  Min: 90  Max: 100  Resp  Min: 8  Max: 35  SpO2  Min: 94 %  Max: 97 %  Oxygen Concentration (%)  Min: 40  Max: 40    12/28 0701 - 12/29 0700  In: 1506.6 [I.V.:251.6]  Out: 2210 [Urine:2210]   Unmeasured Output  Urine Occurrence: 1  Stool Occurrence: 0  Pad Count: 1       Physical Exam    GA:  comfortable, no acute distress.   HEENT: No scleral icterus or JVD.   Pulmonary: Diminished to auscultation A/L. No wheezing, crackles, or rhonchi.  Cardiac: RRR S1 & S2 w/o rubs/murmurs/gallops.   Abdominal: Bowel sounds present x 4. No appreciable hepatosplenomegaly.  Skin: No jaundice, rashes, or visible lesions.  Neuro:  - GCS: E4 V5 M6  - Mental Status:  Follows commands  - Pupils 3mm, PERRL.   - Corneal reflex, gag, cough intact.   - LUE withdraws  - RUE spont  - LLE withdraws  - RLE spont    Medications:  Continuous    insulin (HUMAN R) infusion (adults) Last Rate: 7.2 Units/hr (12/29/19 0700)   Scheduled    albuterol-ipratropium 3 mL Q4H   amLODIPine 10 mg Daily   chlorhexidine 15 mL BID   clopidogrel 75 mg Daily   famotidine 20 mg BID   furosemide 40 mg BID   heparin (porcine) 5,000 Units Q8H   hydrALAZINE 25 mg Q8H   senna-docusate 8.6-50 mg 1 tablet BID   PRN    acetaminophen 650 mg Q6H PRN   Dextrose 10% Bolus 12.5 g PRN   Dextrose 10% Bolus 25 g PRN   Dextrose 10% Bolus 25 g PRN   Dextrose 10% Bolus 12.5 g PRN   fentaNYL 50 mcg Q2H PRN   hydrALAZINE 10 mg Q6H PRN   labetalol 10 mg Q4H PRN   magnesium oxide 800 mg PRN   magnesium oxide 800 mg PRN   ondansetron 4 mg Q6H PRN   potassium chloride 10% 40 mEq PRN   potassium chloride 10% 40 mEq PRN   potassium chloride 10% 60 mEq PRN   potassium, sodium phosphates 2 packet PRN   potassium, sodium phosphates 2 packet PRN   potassium, sodium phosphates 2 packet PRN   sodium chloride 0.9% 10 mL PRN     Today I personally reviewed pertinent medications, lines/drains/airways, imaging, laboratory results,    Diet  No  diet orders on file      Assessment/Plan:     Neuro  * Embolic stroke involving right middle cerebral artery  80 y/o male admitted for R MCA stroke syndrome s/p thrombectomy TICI 3 reperfusion and ABBY stenting, now s/p hemorrhagic transformation   - Was on DAPT following stenting, discontinued for hemorrhagic transformation  - currently on clopidogrel 75 mg daily  - Follow up imaging has been stable, neuro exam remains stable   - Neurochecks q1hr  - extubated to room air 12/29  - Vascular Neurology following  - SBP Goal < 160  - PT/OT/SLP  - Mechanical SCDs      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, right  Severe R ICA stenosis noted on IR angiogram   - Stent placed   - Clopidogrel daily    Cytotoxic brain edema  - 2/2 R MCA  stroke  - Neuro checks Q 1 hr  - Maintain eunatremia  - Maintain Euglyemia  - Monitor serial imaging    Midline shift of brain  - 2/2 IPH as noted on MRI imaging: mass effect on the right lateral ventricle and approximately 5 mm right-to-left midline shift  - Continue Neuro checks Q1 hr      Vasogenic brain edema  - 2/2 IPH as noted on MRI: intraparenchymal hemorrhage within the right frontal and temporal lobes with associated vasogenic edema resulting in mass effect on the right lateral ventricle  - Neuro checks Q1 hr     Pulmonary  Acute respiratory failure with hypoxia  - Initially intubated for decreased mental status   - s/p improvement with Lasix, continue 40 IV BID  - extubated 12/29/19  - duonebs q4h; CPT q6h    Cardiac/Vascular  Mixed hyperlipidemia  - Hold statin 2/2 transaminitis     Essential hypertension  - Continuous Cardiac Monitoring  - Maintain SBP < 160  - Echo 65%  - Hydralazine 25 q8h  - Amlodipine 10 mg daily  - Furosemide 40 mg BID  - Prn hydralazine, labetalol    Endocrine  Type 2 diabetes mellitus with neurologic complication, without long-term current use of insulin  - A1c 7.7    - Insulin ggt while on steroids  - POCT  Q4    GI  Transaminitis  Daily CMP  - Hold statin  - Increase threshold for acetaminophen administration to fever of 101      Other  Decreased strength, endurance, and mobility  - 2/2 LS Weakness R/T R MCA   - PT/OT           The patient is being Prophylaxed for:  Venous Thromboembolism with: Chemical  Stress Ulcer with: PPI  Ventilator Pneumonia with: not applicable    Activity Orders          None        Full Code    Reji Pool MD  Neurocritical Care  [facility] [address]

## 2019-12-29 NOTE — SUBJECTIVE & OBJECTIVE
Neurologic Chief Complaint: R MCA syndrome     Subjective:     Interval History: Patient is seen for follow-up neurological assessment and treatment recommendations: Currently intubated in [facility].  NCC managing and starting decadron and since patient is more alert preparing for extubation soon.      HPI, Past Medical, Family, and Social History remains the same as documented in the initial encounter.     Review of Systems   Constitutional: Positive for fever. Negative for chills.   Respiratory: Negative for shortness of breath and wheezing.    Neurological: Positive for facial asymmetry, speech difficulty and weakness.     Scheduled Meds:   albuterol-ipratropium  3 mL Nebulization Q4H    amLODIPine  10 mg Per OG tube Daily    chlorhexidine  15 mL Mouth/Throat BID    clopidogrel  75 mg Per OG tube Daily    dexamethasone  8 mg Intravenous Q8H    famotidine  20 mg Per OG tube BID    furosemide  40 mg Intravenous BID    heparin (porcine)  5,000 Units Subcutaneous Q8H    hydrALAZINE  25 mg Per OG tube Q8H    senna-docusate 8.6-50 mg  1 tablet Per NG tube BID     Continuous Infusions:   insulin (HUMAN R) infusion (adults) 14.6 Units/hr (12/28/19 1805)     PRN Meds:acetaminophen, Dextrose 10% Bolus, Dextrose 10% Bolus, Dextrose 10% Bolus, Dextrose 10% Bolus, fentaNYL, hydrALAZINE, labetalol, magnesium oxide, magnesium oxide, ondansetron, potassium chloride 10%, potassium chloride 10%, potassium chloride 10%, potassium, sodium phosphates, potassium, sodium phosphates, potassium, sodium phosphates, sodium chloride 0.9%    Objective:     Vital Signs (Most Recent):  Temp: 100.2 °F (37.9 °C) (12/28/19 1805)  Pulse: 97 (12/28/19 1805)  Resp: (!) 29 (12/28/19 1805)  BP: (!) 143/65 (12/28/19 1805)  SpO2: 96 % (12/28/19 1805)  BP Location: Left arm    Vital Signs Range (Last 24H):  Temp:  [98.9 °F (37.2 °C)-100.6 °F (38.1 °C)]   Pulse:  [86-99]   Resp:  [1-30]   BP: (118-146)/(58-66)   SpO2:  [94 %-99 %]   BP Location: Left  arm    Physical Exam   Constitutional: He appears well-developed and well-nourished. He is intubated.   HENT:   Head: Normocephalic and atraumatic.   Eyes: Conjunctivae are normal.   Right gaze- able to cross midline   Neck: Normal range of motion and full passive range of motion without pain.   Cardiovascular: Tachycardia present.   Pulmonary/Chest: Effort normal. He is intubated.   Abdominal: Soft. Normal appearance.   Musculoskeletal:   Left  Side plegia   Neurological: A cranial nerve deficit is present. He exhibits abnormal muscle tone.   Skin: Skin is warm and dry.       Neurological Exam:   LOC: alert  Attention Span:good   Language:receptive aphasia   Visual Fields: blink to threat  EOM (CN III, IV, VI): Gaze preference  left  Pupils (CN II, III): PERRL  Facial Movement (CN VII):difficult to see due to ET tube   Motor: Arm left  Plegia 0/5  Leg left  Plegia 0/5  Arm right  Paresis: 3/5  Leg right Paresis: 4/5       Laboratory:  CMP:   Recent Labs   Lab 12/28/19  0135   CALCIUM 8.8   ALBUMIN 2.5*   PROT 6.6      K 4.0   CO2 31*      BUN 35*   CREATININE 1.0   ALKPHOS 197*   *   AST 61*   BILITOT 0.5     CBC:   Recent Labs   Lab 12/28/19  0135   WBC 7.83   RBC 3.72*   HGB 12.0*   HCT 36.9*      MCV 99*   MCH 32.3*   MCHC 32.5     Lipid Panel:   No results for input(s): CHOL, LDLCALC, HDL, TRIG in the last 168 hours.  Hgb A1C:   No results for input(s): HGBA1C in the last 168 hours.  TSH: No results for input(s): TSH in the last 168 hours.    Diagnostic Results     Brain Imaging   CT head 12/22/19  CT of the head demonstrates subacute infarction within the right anterior temporal lobe, insular cortex, basal ganglia, and internal capsule similar to the prior study with internal hemorrhage.  There is continued mass effect on right lateral ventricle and slight bowing of the midline to the left.  No new infarction or hemorrhage is seen.  Overall degree of mass effect is similar to the  prior study.    CT head w/o contrast 12/16/2019     No acute major vascular distribution infarct or hemorrhage.    Vessel Imaging   Cerebral angiogram 12/16/19  Angiography demonstrated high-grade right internal carotid artery stenosis greater than 90% in the bulb and distal ICA occlusion.  This was treated with angioplasty and stenting at the carotid bifurcation resulting in resolution of extracranial stenosis.  Embolectomy was performed in the distal ICA and right M1 segment with complete TICI 3 reperfusion.      Cardiac Imaging   IRIS  · Normal left ventricular systolic function. The estimated ejection fraction is 65%  · Indeterminate left ventricular diastolic function.  · Mild left ventricular enlargement.  · Normal right ventricular systolic function.  · Technically difficult study, poor endocardial visualization, contrast used.  · No wall motion abnormalities.  · Indeterminate central venous pressure. Estimated PA pressure is at least 9 mmHg.

## 2019-12-29 NOTE — ASSESSMENT & PLAN NOTE
- Continuous Cardiac Monitoring  - Maintain SBP < 160  - Echo 65%  - Hydralazine 25 q8h  - Amlodipine 10 mg daily  - Furosemide 40 mg BID  - Prn hydralazine, labetalol

## 2019-12-29 NOTE — NURSING
Lazaro HUANG with NCC notified of pts insulin gtt at 24.6 u/hr according to titration on nomogram.  IV patent and drawing back blood.  Blood glucose continues to be in 200s.  5 unit insulin bolus ordered and then to continue with insulin nomogram.

## 2019-12-29 NOTE — PLAN OF CARE
POC reviewed with pt and family at 1500. Pt and family verbalized understanding. Questions and concerns addressed. No acute events today. ETT removed, NC in place. Insulin gtt continued throughout shift. Turns and oral care per protocol. Pt progressing toward goals. PT, OT, SLP consulted. Plan to resume TF tomorrow. Will continue to monitor. See flowsheets for full assessment and VS info.

## 2019-12-29 NOTE — NURSING
Lazaro HUANG with NCC made aware of LUE having no movement to pain.  Previously only intermittent minimal withdraw.  Otherwise neuro exam unchanged.

## 2019-12-29 NOTE — ASSESSMENT & PLAN NOTE
- 2/2 R MCA  stroke  - Neuro checks Q 1 hr  - Maintain eunatremia  - Maintain Euglyemia  - Monitor serial imaging

## 2019-12-30 LAB
ALBUMIN SERPL BCP-MCNC: 2.7 G/DL (ref 3.5–5.2)
ALLENS TEST: ABNORMAL
ALP SERPL-CCNC: 158 U/L (ref 55–135)
ALT SERPL W/O P-5'-P-CCNC: 89 U/L (ref 10–44)
ANION GAP SERPL CALC-SCNC: 11 MMOL/L (ref 8–16)
AST SERPL-CCNC: 60 U/L (ref 10–40)
BASOPHILS # BLD AUTO: 0.03 K/UL (ref 0–0.2)
BASOPHILS NFR BLD: 0.3 % (ref 0–1.9)
BILIRUB SERPL-MCNC: 0.6 MG/DL (ref 0.1–1)
BUN SERPL-MCNC: 46 MG/DL (ref 8–23)
CALCIUM SERPL-MCNC: 8.6 MG/DL (ref 8.7–10.5)
CHLORIDE SERPL-SCNC: 105 MMOL/L (ref 95–110)
CO2 SERPL-SCNC: 30 MMOL/L (ref 23–29)
CREAT SERPL-MCNC: 0.8 MG/DL (ref 0.5–1.4)
DELSYS: ABNORMAL
DIFFERENTIAL METHOD: ABNORMAL
EOSINOPHIL # BLD AUTO: 0 K/UL (ref 0–0.5)
EOSINOPHIL NFR BLD: 0.4 % (ref 0–8)
ERYTHROCYTE [DISTWIDTH] IN BLOOD BY AUTOMATED COUNT: 12.6 % (ref 11.5–14.5)
EST. GFR  (AFRICAN AMERICAN): >60 ML/MIN/1.73 M^2
EST. GFR  (NON AFRICAN AMERICAN): >60 ML/MIN/1.73 M^2
GLUCOSE SERPL-MCNC: 168 MG/DL (ref 70–110)
HCO3 UR-SCNC: 36 MMOL/L (ref 24–28)
HCT VFR BLD AUTO: 40.1 % (ref 40–54)
HGB BLD-MCNC: 12.8 G/DL (ref 14–18)
IMM GRANULOCYTES # BLD AUTO: 0.07 K/UL (ref 0–0.04)
IMM GRANULOCYTES NFR BLD AUTO: 0.8 % (ref 0–0.5)
LYMPHOCYTES # BLD AUTO: 2.2 K/UL (ref 1–4.8)
LYMPHOCYTES NFR BLD: 23.6 % (ref 18–48)
MAGNESIUM SERPL-MCNC: 2.5 MG/DL (ref 1.6–2.6)
MCH RBC QN AUTO: 32 PG (ref 27–31)
MCHC RBC AUTO-ENTMCNC: 31.9 G/DL (ref 32–36)
MCV RBC AUTO: 100 FL (ref 82–98)
MONOCYTES # BLD AUTO: 0.9 K/UL (ref 0.3–1)
MONOCYTES NFR BLD: 9.9 % (ref 4–15)
NEUTROPHILS # BLD AUTO: 5.9 K/UL (ref 1.8–7.7)
NEUTROPHILS NFR BLD: 65 % (ref 38–73)
NRBC BLD-RTO: 0 /100 WBC
PCO2 BLDA: 47 MMHG (ref 35–45)
PH SMN: 7.49 [PH] (ref 7.35–7.45)
PHOSPHATE SERPL-MCNC: 4.5 MG/DL (ref 2.7–4.5)
PLATELET # BLD AUTO: 237 K/UL (ref 150–350)
PMV BLD AUTO: 11.7 FL (ref 9.2–12.9)
PO2 BLDA: 64 MMHG (ref 80–100)
POC BE: 13 MMOL/L
POC SATURATED O2: 93 % (ref 95–100)
POC TCO2: 37 MMOL/L (ref 23–27)
POCT GLUCOSE: 156 MG/DL (ref 70–110)
POCT GLUCOSE: 156 MG/DL (ref 70–110)
POCT GLUCOSE: 157 MG/DL (ref 70–110)
POCT GLUCOSE: 157 MG/DL (ref 70–110)
POCT GLUCOSE: 160 MG/DL (ref 70–110)
POCT GLUCOSE: 161 MG/DL (ref 70–110)
POCT GLUCOSE: 162 MG/DL (ref 70–110)
POCT GLUCOSE: 163 MG/DL (ref 70–110)
POCT GLUCOSE: 163 MG/DL (ref 70–110)
POCT GLUCOSE: 164 MG/DL (ref 70–110)
POCT GLUCOSE: 166 MG/DL (ref 70–110)
POCT GLUCOSE: 167 MG/DL (ref 70–110)
POCT GLUCOSE: 168 MG/DL (ref 70–110)
POCT GLUCOSE: 172 MG/DL (ref 70–110)
POCT GLUCOSE: 172 MG/DL (ref 70–110)
POCT GLUCOSE: 174 MG/DL (ref 70–110)
POCT GLUCOSE: 178 MG/DL (ref 70–110)
POCT GLUCOSE: 179 MG/DL (ref 70–110)
POCT GLUCOSE: 183 MG/DL (ref 70–110)
POCT GLUCOSE: 183 MG/DL (ref 70–110)
POCT GLUCOSE: 185 MG/DL (ref 70–110)
POCT GLUCOSE: 185 MG/DL (ref 70–110)
POTASSIUM SERPL-SCNC: 3.6 MMOL/L (ref 3.5–5.1)
PROT SERPL-MCNC: 6.8 G/DL (ref 6–8.4)
RBC # BLD AUTO: 4 M/UL (ref 4.6–6.2)
SAMPLE: ABNORMAL
SITE: ABNORMAL
SODIUM SERPL-SCNC: 146 MMOL/L (ref 136–145)
WBC # BLD AUTO: 9.1 K/UL (ref 3.9–12.7)

## 2019-12-30 PROCEDURE — 99233 SBSQ HOSP IP/OBS HIGH 50: CPT | Mod: GC,,, | Performed by: PSYCHIATRY & NEUROLOGY

## 2019-12-30 PROCEDURE — 25000242 PHARM REV CODE 250 ALT 637 W/ HCPCS: Performed by: INTERNAL MEDICINE

## 2019-12-30 PROCEDURE — 27000221 HC OXYGEN, UP TO 24 HOURS

## 2019-12-30 PROCEDURE — 25000003 PHARM REV CODE 250: Performed by: STUDENT IN AN ORGANIZED HEALTH CARE EDUCATION/TRAINING PROGRAM

## 2019-12-30 PROCEDURE — 94640 AIRWAY INHALATION TREATMENT: CPT

## 2019-12-30 PROCEDURE — 25000003 PHARM REV CODE 250: Performed by: PSYCHIATRY & NEUROLOGY

## 2019-12-30 PROCEDURE — 94668 MNPJ CHEST WALL SBSQ: CPT

## 2019-12-30 PROCEDURE — 82803 BLOOD GASES ANY COMBINATION: CPT

## 2019-12-30 PROCEDURE — 63600175 PHARM REV CODE 636 W HCPCS: Performed by: NURSE PRACTITIONER

## 2019-12-30 PROCEDURE — 80053 COMPREHEN METABOLIC PANEL: CPT

## 2019-12-30 PROCEDURE — 94761 N-INVAS EAR/PLS OXIMETRY MLT: CPT

## 2019-12-30 PROCEDURE — 99233 PR SUBSEQUENT HOSPITAL CARE,LEVL III: ICD-10-PCS | Mod: GC,,, | Performed by: PSYCHIATRY & NEUROLOGY

## 2019-12-30 PROCEDURE — 20000000 HC ICU ROOM

## 2019-12-30 PROCEDURE — 25000003 PHARM REV CODE 250: Performed by: NURSE PRACTITIONER

## 2019-12-30 PROCEDURE — 27100092 HC HIGH FLOW DELIVERY CANNULA

## 2019-12-30 PROCEDURE — 27100171 HC OXYGEN HIGH FLOW UP TO 24 HOURS

## 2019-12-30 PROCEDURE — 97535 SELF CARE MNGMENT TRAINING: CPT

## 2019-12-30 PROCEDURE — 97112 NEUROMUSCULAR REEDUCATION: CPT

## 2019-12-30 PROCEDURE — 36600 WITHDRAWAL OF ARTERIAL BLOOD: CPT

## 2019-12-30 PROCEDURE — 97164 PT RE-EVAL EST PLAN CARE: CPT

## 2019-12-30 PROCEDURE — 84100 ASSAY OF PHOSPHORUS: CPT

## 2019-12-30 PROCEDURE — 97166 OT EVAL MOD COMPLEX 45 MIN: CPT

## 2019-12-30 PROCEDURE — 63600175 PHARM REV CODE 636 W HCPCS: Performed by: PSYCHIATRY & NEUROLOGY

## 2019-12-30 PROCEDURE — 25000003 PHARM REV CODE 250: Performed by: PHYSICIAN ASSISTANT

## 2019-12-30 PROCEDURE — 92610 EVALUATE SWALLOWING FUNCTION: CPT

## 2019-12-30 PROCEDURE — 85025 COMPLETE CBC W/AUTO DIFF WBC: CPT

## 2019-12-30 PROCEDURE — 83735 ASSAY OF MAGNESIUM: CPT

## 2019-12-30 PROCEDURE — 97530 THERAPEUTIC ACTIVITIES: CPT

## 2019-12-30 PROCEDURE — 99900035 HC TECH TIME PER 15 MIN (STAT)

## 2019-12-30 RX ORDER — CLOPIDOGREL BISULFATE 75 MG/1
75 TABLET ORAL DAILY
Status: DISCONTINUED | OUTPATIENT
Start: 2019-12-31 | End: 2020-01-05

## 2019-12-30 RX ORDER — POLYETHYLENE GLYCOL 3350 17 G/17G
17 POWDER, FOR SOLUTION ORAL DAILY
Status: DISCONTINUED | OUTPATIENT
Start: 2019-12-30 | End: 2020-01-09

## 2019-12-30 RX ORDER — AMLODIPINE BESYLATE 10 MG/1
10 TABLET ORAL DAILY
Status: DISCONTINUED | OUTPATIENT
Start: 2019-12-31 | End: 2020-01-09

## 2019-12-30 RX ADMIN — IPRATROPIUM BROMIDE AND ALBUTEROL SULFATE 3 ML: .5; 3 SOLUTION RESPIRATORY (INHALATION) at 07:12

## 2019-12-30 RX ADMIN — CLOPIDOGREL BISULFATE 75 MG: 75 TABLET ORAL at 08:12

## 2019-12-30 RX ADMIN — IPRATROPIUM BROMIDE AND ALBUTEROL SULFATE 3 ML: .5; 3 SOLUTION RESPIRATORY (INHALATION) at 04:12

## 2019-12-30 RX ADMIN — HEPARIN SODIUM 5000 UNITS: 5000 INJECTION, SOLUTION INTRAVENOUS; SUBCUTANEOUS at 10:12

## 2019-12-30 RX ADMIN — IPRATROPIUM BROMIDE AND ALBUTEROL SULFATE 3 ML: .5; 3 SOLUTION RESPIRATORY (INHALATION) at 11:12

## 2019-12-30 RX ADMIN — HEPARIN SODIUM 5000 UNITS: 5000 INJECTION, SOLUTION INTRAVENOUS; SUBCUTANEOUS at 05:12

## 2019-12-30 RX ADMIN — SENNOSIDES AND DOCUSATE SODIUM 1 TABLET: 8.6; 5 TABLET ORAL at 08:12

## 2019-12-30 RX ADMIN — POLYETHYLENE GLYCOL 3350 17 G: 17 POWDER, FOR SOLUTION ORAL at 04:12

## 2019-12-30 RX ADMIN — FAMOTIDINE 20 MG: 20 TABLET ORAL at 08:12

## 2019-12-30 RX ADMIN — IPRATROPIUM BROMIDE AND ALBUTEROL SULFATE 3 ML: .5; 3 SOLUTION RESPIRATORY (INHALATION) at 01:12

## 2019-12-30 RX ADMIN — HEPARIN SODIUM 5000 UNITS: 5000 INJECTION, SOLUTION INTRAVENOUS; SUBCUTANEOUS at 02:12

## 2019-12-30 RX ADMIN — FUROSEMIDE 40 MG: 10 INJECTION, SOLUTION INTRAVENOUS at 08:12

## 2019-12-30 RX ADMIN — AMLODIPINE BESYLATE 10 MG: 10 TABLET ORAL at 08:12

## 2019-12-30 RX ADMIN — IPRATROPIUM BROMIDE AND ALBUTEROL SULFATE 3 ML: .5; 3 SOLUTION RESPIRATORY (INHALATION) at 08:12

## 2019-12-30 RX ADMIN — IPRATROPIUM BROMIDE AND ALBUTEROL SULFATE 3 ML: .5; 3 SOLUTION RESPIRATORY (INHALATION) at 12:12

## 2019-12-30 RX ADMIN — HYDRALAZINE HYDROCHLORIDE 25 MG: 25 TABLET, FILM COATED ORAL at 05:12

## 2019-12-30 RX ADMIN — POTASSIUM CHLORIDE 40 MEQ: 20 SOLUTION ORAL at 05:12

## 2019-12-30 NOTE — PROGRESS NOTES
"[facility]  Adult Nutrition  Progress Note    SUMMARY       Recommendations  1. As medically able, recommend restarting TF at higher goal rate.   Glucerna 1.5 @ 60mL/hr.   - Provides 2160kcals, 119g protein and 1093mL free water.   - At goal, pt to receive 192g CHO daily.     2. If able to advance diet, recommend Diabetic with texture per SLP recommendations.     RD to monitor.     Goals: Pt to tolerate >85% EEN and EPN by RD follow up  Nutrition Goal Status: goal met  Communication of RD Recs: reviewed with RN    Reason for Assessment    Reason For Assessment: RD follow-up  Diagnosis: stroke/CVA  Relevant Medical History: HTN, T2DM, HLD  Interdisciplinary Rounds: attended  General Information Comments: Pt extubated 12/29. TF remains held s/p extubation. NG tube in place.  Family at bedside providing nutrition hx. Weight stable per wife approx 270-275lb "for years." Pt with adequate po intake and appetite. No indications of malnutrition at this time.   Nutrition Discharge Planning: unable to determine at this time    Nutrition Risk Screen    Nutrition Risk Screen: tube feeding or parenteral nutrition    Nutrition/Diet History    Spiritual, Cultural Beliefs, Mandaen Practices, Values that Affect Care: no  Factors Affecting Nutritional Intake: impaired cognitive status/motor control, NPO    Anthropometrics    Temp: 98.2 °F (36.8 °C)  Height Method: Measured  Height: 6' 1" (185.4 cm)  Height (inches): 73 in  Weight Method: Bed Scale  Weight: 123.4 kg (272 lb)  Weight (lb): 272 lb  Ideal Body Weight (IBW), Male: 184 lb  % Ideal Body Weight, Male (lb): 147.83 %  BMI (Calculated): 35.9  BMI Grade: 35 - 39.9 - obesity - grade II       Lab/Procedures/Meds    Pertinent Labs Reviewed: reviewed  Pertinent Labs Comments: POCT Glu 156-167  Pertinent Medications Reviewed: reviewed  Pertinent Medications Comments: insulin, heparin, lasix    Estimated/Assessed Needs    Weight Used For Calorie Calculations: " 123.4 kg (272 lb 0.8 oz)  Energy Calorie Requirements (kcal): 2515  Energy Need Method: [place]-St Jeor(PAL 1.25)  Protein Requirements: 123-148g(1.0-1.2g/kg)  Weight Used For Protein Calculations: 123.4 kg (272 lb 0.8 oz)  Fluid Requirements (mL): 1mL/kcal or per MD     RDA Method (mL): 2515  CHO Requirement: 314g CHO daily      Nutrition Prescription Ordered    Current Diet Order: NPO  Nutrition Order Comments: TF held  Current Nutrition Support Formula Ordered: Glucerna 1.5  Current Nutrition Support Rate Ordered: 55 (ml)  Current Nutrition Support Frequency Ordered: mL/hr    Evaluation of Received Nutrient/Fluid Intake    Enteral Calories (kcal): 1980  Enteral Protein (gm): 109  Enteral (Free Water) Fluid (mL): 1002    % Kcal Needs: 79  % Protein Needs: 89    I/O: -4.7L since admit, good UOP, LBM 12/27    Energy Calories Required: meeting needs  Protein Required: meeting needs  Fluid Required: other (see comments)(per MD)    Comments: 0mL residuals 12/30    % Intake of Estimated Energy Needs: 75 - 100 %  % Meal Intake: NPO    Nutrition Risk    Level of Risk/Frequency of Follow-up: low(f/u 1x/week)     Assessment and Plan    Nutrition Problem  Inadequate oral intake     Related to (etiology):   intubation     Signs and Symptoms (as evidenced by):   Pt requiring alternative means of nutrition to meet >85% EEN and EPN.      Interventions(treatment strategy):  Collaboration of care with providers     Nutrition Diagnosis Status:   Continues         Monitor and Evaluation    Food and Nutrient Intake: energy intake, food and beverage intake, enteral nutrition intake  Food and Nutrient Adminstration: diet order, enteral and parenteral nutrition administration  Anthropometric Measurements: weight, weight change, body mass index  Biochemical Data, Medical Tests and Procedures: electrolyte and renal panel, gastrointestinal profile, glucose/endocrine profile, inflammatory profile, lipid profile  Nutrition-Focused Physical  Findings: overall appearance       Nutrition Follow-Up    RD Follow-up?: Yes

## 2019-12-30 NOTE — PLAN OF CARE
Problem: SLP Goal  Goal: SLP Goal  Description  Speech Therapy Short Term Goals  Goal expected to be met by 1/13  1. Pt will participate in an ongoing assessment to determine the least restrictive and safest diet with possible updated goals to follow pending results.  2. Pt will participate in a speech, language, and cognitive evaluation with possible updated goals to follow pending results.          Outcome: Ongoing, Progressing  Bedside swallow study completed. Recommend initiate pureed diet and thin liquids with strict aspiration precautions in place, close monitoring, and continued primary means of nutrition/hydration/medication via NGT until patient presents with consistent tolerance of po diet and adequate po intake. ST will continue to follow to complete a Usxksb-Vcmxfbbv-Hwytzonvk Evaluation and for an ongoing assessment of swallowing.   DUSTIN Vargas., CCC-SLP  Pager: [phone]  12/30/2019

## 2019-12-30 NOTE — PLAN OF CARE
POC reviewed with pt and family at 1400. Pt and wife verbalized understanding. Questions and concerns addressed. No acute events today. Pt progressing toward goals. Andrade D/Cd. Tube feedings restarted. Insulin gtt titrated per nomogram. Will continue to monitor. See flowsheets for full assessment and VS info.

## 2019-12-30 NOTE — PT/OT/SLP EVAL
Speech Language Pathology Evaluation  Bedside Swallow    Patient Name:  Jerry Linder   MRN:  [id number]   [id number]    Admitting Diagnosis: Embolic stroke involving right middle cerebral artery    Recommendations:                 General Recommendations:  Dysphagia therapy, Speech language evaluation and Cognitive-linguistic evaluation  Diet recommendations:  Puree, Pleasure Feeding, Thin; continue primary means of nutrition/hydration/medication via NGT 2/2 concern for adequate nutrition via po intake alone and impaired LINDA  Aspiration Precautions:   · 1 small bite/sip at a time,   · Alternating bites/sips,   · Assistance with meals,  · Check for pocketing/oral residue,   · Feed only when awake/alert,   · Frequent oral care,   · HOB to 90 degrees and Remain upright 30 minutes post meal   · Continue to monitor for signs and symptoms of aspiration and discontinue oral feeding should you notice any of the following: watery eyes, reddened facial area, wet vocal quality, increased work of breathing, change in respiratory status, increased congestion, coughing, fever, etc.  General Precautions: Standard, aspiration, fall, pureed diet  Communication strategies:  none    History:     History reviewed. No pertinent past medical history.    Past Surgical History:   Procedure Laterality Date    CEREBRAL ANGIOGRAM N/A 12/16/2019    Procedure: ANGIOGRAM-CEREBRAL;  Surgeon: Jairo Martínez MD;  Location: [facility] OR [address] [place] [address];  Service: Radiology;  Laterality: N/A;       Social History: Patient lives with spouse.    Prior Intubation HX:  12/19-12/29    Chest X-Rays: There is interval extubation.  Stably positioned esophagogastric tube in place with tip projecting over the right upper quadrant.  The cardiomediastinal silhouette is stable.  There is no significant interval detrimental change in lung aeration with bibasilar airspace disease and trace left pleural fluid.  No evidence of pneumothorax.  Visualized osseous structures are  intact.    Prior diet: prior ST recommendations NPO.      Subjective     Patient awake/lethargic. Wife and granddaughter present in room.     Objective:     Oral care provided with oral swabs, mouthwash, and suctioning.     Oral Musculature Evaluation  · Oral Musculature: left weakness, facial asymmetry present  · Dentition: scattered dentition  · Secretion Management: left corner drooling  · Mucosal Quality: sticky, coated tongue  · Mandibular Strength and Mobility: WFL  · Oral Labial Strength and Mobility: impaired retraction  · Lingual Strength and Mobility: impaired strength  · Buccal Strength and Mobility: impaired  · Volitional Cough: elicited  · Volitional Swallow: delayed  · Voice Prior to PO Intake: clear; adequate intensity    Bedside Swallow Eval:   Consistencies Assessed:  · Thin liquids ice chip x1, teaspoon sisp x3, straw siups x3 ounces  · Puree teaspoon bites of pudding x6     Oral Phase:   · Oral residue  · Slow oral transit time   · Thick secretions suctioned from oral cavity throughout assessment.     Pharyngeal Phase:   · Coughing/choking after initial straw sip of water coughing up thick secretions. After suctioning of thick secretions was provided, no further overt s/s of aspiration appreciated.   · delayed swallow initation   · Increased lethargy as trials continued    Compensatory Strategies  · None    Treatment: SLP provided patient and family education on SLP recommendations to intitiate diet slowly with pleasure feedings today and continued primary means of nutrition/hydration/medication via NGT, SLP role, s/s and risks of aspiration, safe swallow precautions, and POC. Patient and patient's spouse v/u and is in agreement with POC. SLP communicated recommendations with RN who was heard BY SLP communicating to MD team during rounds.     Assessment:     Jerry Linder is a 79 y.o. male with an SLP diagnosis of Dysphagia. ST will continue to follow for an ongoing swallowing assessment and to  complete a Crcrqb-Kzpwicks-Piiprjmgd Evaluation.    Goals:   Multidisciplinary Problems     SLP Goals        Problem: SLP Goal    Goal Priority Disciplines Outcome   SLP Goal     SLP Ongoing, Progressing   Description:  Speech Therapy Short Term Goals  Goal expected to be met by 1/13  1. Pt will participate in an ongoing assessment to determine the least restrictive and safest diet with possible updated goals to follow pending results.  2. Pt will participate in a speech, language, and cognitive evaluation with possible updated goals to follow pending results.                           Plan:     · Patient to be seen:  4 x/week   · Plan of Care expires:  01/28/20  · Plan of Care reviewed with:  patient, spouse, grandchild(ginette)   · SLP Follow-Up:  Yes       Discharge recommendations:  rehabilitation facility   Barriers to Discharge:  Level of Skilled Assistance Needed      Time Tracking:     SLP Treatment Date:   12/30/19  Speech Start Time:  0909  Speech Stop Time:  0936     Speech Total Time (min):  27 min    Billable Minutes: Eval 12  and Seld Care/Home Management Training 15    ÁNGELA Mix, CCC-SLP  12/30/2019

## 2019-12-30 NOTE — ASSESSMENT & PLAN NOTE
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

## 2019-12-30 NOTE — ASSESSMENT & PLAN NOTE
- 2/2 IPH as noted on MRI: intraparenchymal hemorrhage within the right frontal and temporal lobes with associated vasogenic edema resulting in mass effect on the right lateral ventricle  - Neuro checks Q1 hr   Goal euthermia  Goal eunatremia  Goal euvolemia  Goal euglycemia

## 2019-12-30 NOTE — PLAN OF CARE
Problem: Physical Therapy Goal  Goal: Physical Therapy Goal  Description  Goals to be met by: 20     Patient will increase functional independence with mobility by performin. Supine to sit with Moderate Assistance  2. Sit to supine with Moderate Assistance  3. Sit to stand transfer with Maximum Assistance  4. Sitting at edge of bed x15 minutes with Minimal Assistance  5. Stand for 1 minutes with Maximum Assistance using LRD, if needed     Outcome: Ongoing, Progressing   Initial evaluation completed. Patient tolerated assessment well. Established POC and goals. Patient would continue to benefit from skilled PT services in order to improve functional mobility independence.       Kerline Palmer, PT, DPT  2019

## 2019-12-30 NOTE — PT/OT/SLP RE-EVAL
Physical Therapy Re-evaluation    Patient Name:  Jerry Linder   MRN:  [id number]    Recommendations:     Discharge Recommendations:  rehabilitation facility   Discharge Equipment Recommendations: other (see comments)(TBD)   Barriers to discharge: Inaccessible home and Decreased caregiver support    Assessment:     Jerry Linder is a 79 y.o. male admitted with a medical diagnosis of Embolic stroke involving right middle cerebral artery.  He presents with the following impairments/functional limitations:  weakness, gait instability, decreased upper extremity function, impaired endurance, impaired balance, decreased lower extremity function, impaired sensation, impaired fine motor, impaired self care skills, impaired functional mobilty, decreased coordination. Patient tolerated session well. Upon d/c he is an appropriate candidate for inpatient rehabilitation in order to progress towards an improve level of functional mobility independence.     Rehab Prognosis: good; patient would benefit from acute skilled PT services to address these deficits and reach maximum level of function.      Recent Surgery: Procedure(s) (LRB):  ANGIOGRAM-CEREBRAL (N/A) 14 Days Post-Op    Plan:     During this hospitalization, patient to be seen 4 x/week to address the above listed problems via gait training, therapeutic activities, therapeutic exercises, neuromuscular re-education  · Plan of Care Expires:  01/29/20   Plan of Care Reviewed with: patient, spouse, grandchild(ginette)    Subjective     Communicated with RN prior to session.  Patient found supine with bed alarm, blood pressure cuff, peripheral IV, pulse ox (continuous), telemetry, whaley catheter, NG tube, oxygen(venti-mask) upon PT entry to room, agreeable to evaluation.      Chief Complaint: L sided weakness  Patient comments/goals: to go home and get stronger  Pain/Comfort:  Pain Rating 1: 0/10  Pain Rating Post-Intervention 1: 0/10    Patients cultural, spiritual, Buddhism conflicts  given the current situation: no      Objective:     Patient found with: bed alarm, blood pressure cuff, peripheral IV, pulse ox (continuous), telemetry, whaley catheter, NG tube, oxygen(venti-mask)     General Precautions: Standard, aspiration, fall, pureed diet   Orthopedic Precautions:N/A   Braces: N/A     Exams:  · Cognitive Exam:  Patient is oriented to Person and unable to assess further; patient with minimal verbalizations   · Fine Motor Coordination:    · -       Impaired  rapid ankle DF/PF   · Postural Exam:  Patient presented with the following abnormalities: -       Rounded shoulders  · -       Forward head  · -       Posterior pelvic tilt  · Sensation:    · -       Intact  light/touch BLE   · RLE ROM: WFL  · RLE Strength: WFL  · LLE ROM: PROM WFL   · LLE Strength: 1/5 throughout all major muscle groups except hip flexion 0/5     Functional Mobility:  · Bed Mobility:     · Rolling Left:  maximal assistance  · Rolling Right: total assistance  · Scooting: total assistance and of 2 persons  · Supine to Sit: total assistance and of 2 persons  · Sit to Supine: total assistance and of 2 persons  · Balance: sitting EOB -max-totalA; patient able to attempt correction of upright posture with anterior trunk lean; posterior pelvic tilt and slouched posture    AM-PAC 6 CLICK MOBILITY  Total Score:8       Therapeutic Activities and Exercises:  Patient and family educated on role and goal of PT within acute care setting   White board updated   Questions and concerns answered within PT scope     Patient left with bed in chair position with all lines intact, call button in reach, RN notified and family present.    GOALS:   Multidisciplinary Problems     Physical Therapy Goals        Problem: Physical Therapy Goal    Goal Priority Disciplines Outcome Goal Variances Interventions   Physical Therapy Goal     PT, PT/OT Ongoing, Progressing     Description:  Goals to be met by: 1/9/20     Patient will increase functional  independence with mobility by performin. Supine to sit with Moderate Assistance  2. Sit to supine with Moderate Assistance  3. Sit to stand transfer with Maximum Assistance  4. Sitting at edge of bed x15 minutes with Minimal Assistance  5. Stand for 1 minutes with Maximum Assistance using LRD, if needed                      History:     History reviewed. No pertinent past medical history.    Past Surgical History:   Procedure Laterality Date    CEREBRAL ANGIOGRAM N/A 2019    Procedure: ANGIOGRAM-CEREBRAL;  Surgeon: Jairo Martínez MD;  Location: [facility] OR [address] [place] [address];  Service: Radiology;  Laterality: N/A;       Time Tracking:     PT Received On: 19  PT Start Time: 1039     PT Stop Time: 1105  PT Total Time (min): 26 min     Billable Minutes: Re-eval 10 and Therapeutic Activity 16      Kerline Palmer, YARELIS  2019

## 2019-12-30 NOTE — ASSESSMENT & PLAN NOTE
- Initially intubated for decreased mental status though not able to be extubated due to respiratory failure with hypoxia (POA)  - extubated 12/29/19  - duonebs q4h; CPT q6h  On high flow 15 L and 50% FiO2  ETT watch

## 2019-12-30 NOTE — SUBJECTIVE & OBJECTIVE
Neurologic Chief Complaint: R MCA syndrome     Subjective:     Interval History: Patient is seen for follow-up neurological assessment and treatment recommendations: Patient extubated. Currently on insulin drip.       HPI, Past Medical, Family, and Social History remains the same as documented in the initial encounter.     Review of Systems   Constitutional: Positive for fever. Negative for chills.   Respiratory: Negative for shortness of breath and wheezing.    Neurological: Positive for facial asymmetry, speech difficulty and weakness.     Scheduled Meds:   albuterol-ipratropium  3 mL Nebulization Q4H    [START ON 12/31/2019] amLODIPine  10 mg Per NG tube Daily    [START ON 12/31/2019] clopidogrel  75 mg Per NG tube Daily    heparin (porcine)  5,000 Units Subcutaneous Q8H    senna-docusate 8.6-50 mg  1 tablet Per NG tube BID     Continuous Infusions:   insulin (HUMAN R) infusion (adults) 2.45 Units/hr (12/30/19 1405)     PRN Meds:acetaminophen, Dextrose 10% Bolus, Dextrose 10% Bolus, Dextrose 10% Bolus, Dextrose 10% Bolus, fentaNYL, hydrALAZINE, labetalol, magnesium oxide, magnesium oxide, ondansetron, potassium chloride 10%, potassium chloride 10%, potassium chloride 10%, potassium, sodium phosphates, potassium, sodium phosphates, potassium, sodium phosphates, sodium chloride 0.9%    Objective:     Vital Signs (Most Recent):  Temp: 99.3 °F (37.4 °C) (12/30/19 1505)  Pulse: 88 (12/30/19 1605)  Resp: (!) 21 (12/30/19 1605)  BP: (!) 148/65 (12/30/19 1605)  SpO2: 96 % (12/30/19 1605)  BP Location: Left arm    Vital Signs Range (Last 24H):  Temp:  [98.1 °F (36.7 °C)-99.3 °F (37.4 °C)]   Pulse:  [73-89]   Resp:  [0-28]   BP: (105-150)/(53-66)   SpO2:  [91 %-96 %]   BP Location: Left arm    Physical Exam   Constitutional: He appears well-developed and well-nourished.   HENT:   Head: Normocephalic and atraumatic.   Eyes: Conjunctivae are normal.   Right gaze- able to cross midline   Neck: Normal range of motion and  full passive range of motion without pain.   Cardiovascular: Tachycardia present.   Pulmonary/Chest: Effort normal.   Abdominal: Soft. Normal appearance.   Musculoskeletal:   Left  Side plegia   Neurological: A cranial nerve deficit is present. He exhibits abnormal muscle tone.   Skin: Skin is warm and dry.       Neurological Exam:   LOC: alert  Attention Span:good   Language:receptive aphasia   Visual Fields: blink to threat  EOM (CN III, IV, VI): Gaze preference  left  Pupils (CN II, III): PERRL  Motor: Arm left  Plegia 1/5  Leg left  Plegia 0/5  Arm right  Paresis: 3/5  Leg right Paresis: 4/5       Laboratory:  CMP:   Recent Labs   Lab 12/30/19  0216   CALCIUM 8.6*   ALBUMIN 2.7*   PROT 6.8   *   K 3.6   CO2 30*      BUN 46*   CREATININE 0.8   ALKPHOS 158*   ALT 89*   AST 60*   BILITOT 0.6     CBC:   Recent Labs   Lab 12/30/19  0216   WBC 9.10   RBC 4.00*   HGB 12.8*   HCT 40.1      *   MCH 32.0*   MCHC 31.9*     Lipid Panel:   No results for input(s): CHOL, LDLCALC, HDL, TRIG in the last 168 hours.  Hgb A1C:   No results for input(s): HGBA1C in the last 168 hours.  TSH: No results for input(s): TSH in the last 168 hours.    Diagnostic Results     Brain Imaging   CT head 12/22/19  CT of the head demonstrates subacute infarction within the right anterior temporal lobe, insular cortex, basal ganglia, and internal capsule similar to the prior study with internal hemorrhage.  There is continued mass effect on right lateral ventricle and slight bowing of the midline to the left.  No new infarction or hemorrhage is seen.  Overall degree of mass effect is similar to the prior study.    CT head w/o contrast 12/16/2019     No acute major vascular distribution infarct or hemorrhage.    Vessel Imaging   Cerebral angiogram 12/16/19  Angiography demonstrated high-grade right internal carotid artery stenosis greater than 90% in the bulb and distal ICA occlusion.  This was treated with angioplasty and  stenting at the carotid bifurcation resulting in resolution of extracranial stenosis.  Embolectomy was performed in the distal ICA and right M1 segment with complete TICI 3 reperfusion.      Cardiac Imaging   IRIS  · Normal left ventricular systolic function. The estimated ejection fraction is 65%  · Indeterminate left ventricular diastolic function.  · Mild left ventricular enlargement.  · Normal right ventricular systolic function.  · Technically difficult study, poor endocardial visualization, contrast used.  · No wall motion abnormalities.  · Indeterminate central venous pressure. Estimated PA pressure is at least 9 mmHg.

## 2019-12-30 NOTE — PLAN OF CARE
POC reviewed with pt at 0400. Pt verbalized understanding. Questions and concerns addressed with pt and family.  Insulin gtt continued.  Pt on venti mask.  Potassium replacement administered. Bath given and linen changed.  No acute events overnight. Pt progressing toward goals. Will continue to monitor. See flowsheets for full assessment and VS info

## 2019-12-30 NOTE — PROGRESS NOTES
[facility]  Neurocritical Care  Progress Note    Admit Date: 12/16/2019  Service Date: 12/30/2019  Length of Stay: 14    Subjective:     Chief Complaint: Embolic stroke involving right middle cerebral artery    History of Present Illness: The patient is a 79 y.o. male who  was admitted as a transfer from OSH for Right MCA stroke syndrome. Patient had an urgent thrombectomy s/p  right MCA stroke syndrome. PMH significant for HTN, T2DM and HLD. Patient stated that around 2:30 pm on 12/16/19 he was driving and felt weak. He drove home and was taken to the ER where he was evaluated for a stroke. He had outside CTA which was reported to have R ICA stenosis at bifurcation with 70% stenosis and right MCA occlusion. Patient was transferred to [facility] for possible intervention. Patient had a right femoral sheath placed and his Angio  revealed 90% R ICA stenosis and R M1 occlusion. The R ICA was treated by thrombectomy and carotid stenting with TICI 3 reperfusion.   Patient was admitted to the [facility] for higher level of care post thrombectomy.    Hospital Course: 12/17/19: Patient admitted to [facility] for higher level of care s/p thrombectomy and right carotid artery stenting with TICI 3 reperfusion  12/18/2019Recent MRI with heme transformation abg Q8, repeat scan stable, mannitol  12/19/2019 intubation repeat CTH  12/20: febrile-broaden abx coverage, SBT, add hydralazine 50 q8h, KUB and mag citrate  12/21: wean FiO2, d/c hydralazine, wean fentanyl  12/22/2019: Patient stable overnight. Lasix 20mg x 1 given today. Enteral water flushes started for hypernatremia.    12/23: SBT trial, started Plavix today, initiated Hydralazine 25 TID, lasix 40 mg X1, decrease statin to 40 mg daily (transaminitis -trending down), add psyllium, Na BID -goal 145-150, initiated sq heparin  12/24/2019: SBT failed, wean fio2 as tolerated, CXR responded to lasix, scheduled BID  12/25/2019 continue lasix. SBT today. Adjust insulin  regimen  12/26/2019: Continue diuresis, SBT again today, wean PEEP and FiO2, wean nicardipine, hold statin for transaminits   12/27/2019: No significant events   12/28 No significant events over night.. Tolerating spon. Breathing trial, increased peep. No cuff leak, started decadron 8mg q8x3 doses and reassess in am for possible extubation. Insulin gtt started while on steroids, as BG running high. CPT added to resp. Treatments. Start TF.  12/29: extubated to room air following SBT  12/30: IS q 6, d/c whaley, d/c lasix, titrate insulin gtt, d/c hydralazine, KUB    Review of Symptoms:   Constitutional: Denies fevers or chills.  ENT: no hearing difficulty, no visual changes  Pulmonary: Denies shortness of breath or cough.  Cardiology: Denies chest pain or palpitations.  GI: Denies abdominal pain or constipation.  Neurologic: Denies new weakness, headaches, or paresthesias.   : no dysuria  Musk: no muscle pain, no joint pain  Psych: no hallucinations    Physical Exam:  GA: Alert, comfortable, no acute distress.   HEENT: No scleral icterus or JVD.   Pulmonary: Clear to auscultation A/L. No wheezing, crackles, or rhonchi.  Cardiac: RRR S1 & S2 w/o rubs/murmurs/gallops.   Abdominal: Bowel sounds present x 4. No appreciable hepatosplenomegaly.  Skin: No jaundice, rashes, or visible lesions.  Pulses: 1+ DP bilat    Neuro:  --sedation: none  --GCS: [id number]  --Mental Status: awake, slow to respond though approriate    --CN II-XII grossly intact.   --Pupils 3-->2mm, PERRL.   --brainstem: intact  --Motor: left flaccid, right no drift   --sensory: intact to soft touch and pain throughout  --Reflexes: not tested  --Gait: deferred    Recent Labs   Lab 12/30/19 0216   WBC 9.10   RBC 4.00*   HGB 12.8*   HCT 40.1      *   MCH 32.0*   MCHC 31.9*     Recent Labs   Lab 12/30/19 0216   CALCIUM 8.6*   PROT 6.8   *   K 3.6   CO2 30*      BUN 46*   CREATININE 0.8   ALKPHOS 158*   ALT 89*   AST 60*   BILITOT 0.6      No results for input(s): PT, INR, APTT in the last 24 hours.  Oxygen Concentration (%):  [40-50] 50    Temp: 98.1 °F (36.7 °C)  Pulse: 86  Rhythm: normal sinus rhythm  BP: 138/63  MAP (mmHg): 90  Resp: (!) 23  SpO2: (!) 93 %  Oxygen Concentration (%): 50  O2 Device (Oxygen Therapy): venti mask    Temp  Min: 98.1 °F (36.7 °C)  Max: 98.9 °F (37.2 °C)  Pulse  Min: 73  Max: 86  BP  Min: 105/53  Max: 150/65  MAP (mmHg)  Min: 76  Max: 95  Resp  Min: 0  Max: 28  SpO2  Min: 91 %  Max: 96 %  Oxygen Concentration (%)  Min: 40  Max: 50    12/29 0701 - 12/30 0700  In: 354.4 [I.V.:74.4]  Out: 2850 [Urine:2850]   Unmeasured Output  Urine Occurrence: 1  Stool Occurrence: 0  Pad Count: 1    Nutrition Prescription Ordered  Current Diet Order: NPO  Nutrition Order Comments: TF held  Current Nutrition Support Formula Ordered: Glucerna 1.5  Current Nutrition Support Rate Ordered: 55 (ml)  Current Nutrition Support Frequency Ordered: mL/hr  Last Bowel Movement: 12/27/19    Body mass index is 35.89 kg/m².      I have personally reviewed all labs, imaging, and studies today        Assessment/Plan:     Neuro  * Embolic stroke involving right middle cerebral artery  80 y/o male admitted for R MCA stroke syndrome s/p thrombectomy TICI 3 reperfusion and ABBY stenting, now s/p hemorrhagic transformation   - Was on DAPT following stenting, discontinued for hemorrhagic transformation  - currently on clopidogrel 75 mg daily  - Follow up imaging has been stable, neuro exam remains stable   - Neurochecks q1hr  - extubated to room air 12/29  - Vascular Neurology following  - SBP Goal < 160  - PT/OT/SLP  - Mechanical SCDs      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, right  Severe R ICA stenosis noted on IR angiogram   - Stent placed   - Clopidogrel daily    Midline shift of brain  - 2/2 IPH as noted on MRI imaging: mass effect on the right lateral ventricle and approximately 5 mm right-to-left midline shift  - Continue Neuro  checks Q1 hr    Goal euthermia  Goal eunatremia  Goal euvolemia  Goal euglycemia      Vasogenic brain edema  - 2/2 IPH as noted on MRI: intraparenchymal hemorrhage within the right frontal and temporal lobes with associated vasogenic edema resulting in mass effect on the right lateral ventricle  - Neuro checks Q1 hr   Goal euthermia  Goal eunatremia  Goal euvolemia  Goal euglycemia      Acute spont intraparenchymal hemorrhage assoc w/ coagulopathy  Follow scans  BP control      Pulmonary  Acute respiratory failure with hypoxia  - Initially intubated for decreased mental status though not able to be extubated due to respiratory failure with hypoxia (POA)  - extubated 12/29/19  - duonebs q4h; CPT q6h  On high flow 15 L and 50% FiO2  ETT watch    Cardiac/Vascular  Mixed hyperlipidemia  - Hold statin 2/2 transaminitis     Essential hypertension  - Continuous Cardiac Monitoring  - Maintain SBP < 160  - Echo 65%  - Hydralazine 25 q8h  - Amlodipine 10 mg daily  - Furosemide 40 mg BID  - Prn hydralazine, labetalol    Hematology  Acute venous embolism and thrombosis of basilic vein, left  - Noted on LE US    Endocrine  Type 2 diabetes mellitus with neurologic complication, without long-term current use of insulin  - A1c 7.7    - Insulin ggt while on steroids  - POCT Q4    GI  Constipation  - Psyllium for 3 days  - Continue BR    Other  Decreased strength, endurance, and mobility  - 2/2 LS Weakness R/T R MCA   - PT/OT           The patient is being Prophylaxed for:  Venous Thromboembolism with: Chemical  Stress Ulcer with: Not Applicable   Ventilator Pneumonia with: not applicable    Activity Orders          Diet NPO: NPO starting at 12/30 1003        Full Code    Jesus Valencia MD  Neurocritical Care  [facility]    Level III

## 2019-12-30 NOTE — PT/OT/SLP EVAL
Occupational Therapy   Evaluation    Name: Jerry Linder  MRN: [id number]  Admitting Diagnosis:  Embolic stroke involving right middle cerebral artery 14 Days Post-Op thrombectomy and right carotid artery stenting with TICI 3 reperfusion    Recommendations:     Discharge Recommendations: rehabilitation facility  Discharge Equipment Recommendations:  (TBD at next level of care; with progression in care)  Barriers to discharge:  (remains in ICU; level of skilled assistance required)    Assessment:     Jerry Linder is a 79 y.o. male with a medical diagnosis of Embolic stroke involving right middle cerebral artery. Pt extubated 12/29/2019.  He presents with L hemiparesis and and a significant decline in his functional indep with all prior roles and routines and activities. He tolerated session fairly on this date- he remains in ICU with venti mask on at this time. OT to cont to follow up 4x/w at this time. Performance deficits affecting function: impaired endurance, weakness, impaired sensation, impaired self care skills, impaired functional mobilty, gait instability, impaired balance, decreased upper extremity function, decreased lower extremity function, abnormal tone, decreased ROM, impaired coordination, impaired fine motor, impaired cardiopulmonary response to activity.      Rehab Prognosis: Good; patient would benefit from acute skilled OT services to address these deficits and reach maximum level of function.       Plan:     Patient to be seen 4 x/week to address the above listed problems via self-care/home management, therapeutic activities, therapeutic exercises, neuromuscular re-education, sensory integration  · Plan of Care Expires: 01/28/20  · Plan of Care Reviewed with: patient, spouse, grandchild(ginette)    Subjective     Chief Complaint: none reported   Patient/Family Comments/goals: for him to get better    Occupational Profile:  Living Environment: Pt lives with spouse, daughter and 2 grandchildren in [facility] with 3  JO. Walk in shower with seat.   Previous level of function: using a cane for functional mobility when needed. Wears glasses. indep with ADLs/IADLs. Driving. Working for [employer].  Roles and Routines: , father, grandfather, care taker to self, home and community dweller,   Equipment Used at Home:  cane, quad, shower chair  Assistance upon Discharge: yes, good family support. Limited physical assistance from spouse    Pain/Comfort:  · Pain Rating 1: 0/10  · Pain Rating Post-Intervention 1: 0/10    Patients cultural, spiritual, Congregation conflicts given the current situation: no    Objective:     Communicated with: RN prior to session.  Completed session with PT in ICU setting. Patient found HOB elevated with bed alarm, peripheral IV, oxygen, whaley catheter, pulse ox (continuous), telemetry, SCD, NG tube upon OT entry to room.    General Precautions: Standard, aspiration, fall, respiratory, diabetic, pureed diet   Orthopedic Precautions:N/A   Braces: N/A     Occupational Performance:    Bed Mobility:    · Patient completed Rolling/Turning to Right with total assistance  · Patient completed Scooting/Bridging with total assistance and 2 persons  · Patient completed Supine to Sit with total assistance and 2 persons  · Patient completed Sit to Supine with total assistance and 2 persons    Functional Mobility/Transfers:  · Deferred 2/2 impaired sitting balance     Activities of Daily Living:  · Upper Body Dressing: total assistance EOB  · Lower Body Dressing: total assistance EOB    Cognitive/Visual Perceptual:  Cognitive/Psychosocial Skills:     -       Oriented to: Person, Place and Situation   -       Follows Commands/attention:Follows multistep  commands  -       Communication: clear/fluent  -       Memory: did not assess  -       Safety awareness/insight to disability: impaired   -       Mood/Affect/Coping skills/emotional control: Cooperative  Visual/Perceptual:      -Impaired  mild L inattention       Physical Exam:  Balance:    -       Impaired (See below)  Postural examination/scapula alignment:    -       Rounded shoulders  -       Forward head  -       Posterior pelvic tilt  Skin integrity: Visible skin intact  Edema:  None noted  Sensation:    -       Impaired  proprioception L UE  Motor Planning:    -       L side impaired  Dominant hand:    -       R  Upper Extremity Range of Motion:     -       Right Upper Extremity: WFL  -       Left Upper Extremity: P ROM WFL  Upper Extremity Strength:    -       Right Upper Extremity: WFL  -       Left Upper Extremity: 0/5   Strength:    -       Right Upper Extremity: WFL  -       Left Upper Extremity: 0/5  Fine Motor Coordination:    -       Impaired  L unable  Gross motor coordination:   L hemiplegia/paresis    AMPA 6 Click ADL:  AMPA Total Score: 9  Vitals:    12/30/19 1405   BP: 138/63   Pulse: 86   Resp: (!) 23   Temp:      Body mass index is 35.89 kg/m².    Treatment & Education:  -Pt alert with eyes open 100% of session  -edu pt and family on OT role in care  -EOB ~10 min with total-max(A) for postural control; Pt with R lateral and posterior lean  *facilitation for L UE in weight bearing  *mod cues for cervical rotation to L  -edu on > L side attention in supine; edu on ROM for L UE  -return to supine; positioned in chair like positioning; L UE positioned in elevation and extension with abduction and open palm  -Communication board updated; questions/concerns addressed within OT scope of practice    Education:    Patient left with bed in chair position with all lines intact, call button in reach, RN notified and spouse and grand daughter present    GOALS:   Multidisciplinary Problems     Occupational Therapy Goals        Problem: Occupational Therapy Goal    Goal Priority Disciplines Outcome Interventions   Occupational Therapy Goal     OT, PT/OT Ongoing, Progressing    Description:  Goals to be met by: 1/13     Patient will increase functional  independence with ADLs by performing:    UE Dressing while seated EOB with Moderate Assistance.  Grooming while EOB with Moderate Assistance.  Sitting at edge of bed x10 minutes for functional task with Maximum Assistance.  Rolling to Bilateral with Maximum Assistance and use of bedrail as needed.   Supine to sit with Maximum Assistance.  Squat pivot transfers with Maximum Assistance.  Pt and CG verbalized understanding for s/s of stroke with teach back.   Pt and CG demo understanding for L UE positioning and ROM with teach back 2/2 sessions.                      History:     History reviewed. No pertinent past medical history.    Past Surgical History:   Procedure Laterality Date    CEREBRAL ANGIOGRAM N/A 12/16/2019    Procedure: ANGIOGRAM-CEREBRAL;  Surgeon: Jairo Martínez MD;  Location: [facility] OR [address] [place] [address];  Service: Radiology;  Laterality: N/A;       Time Tracking:     OT Date of Treatment: 12/30/19  OT Start Time: 1037  OT Stop Time: 1105  OT Total Time (min): 28 min    Billable Minutes:Evaluation 20  Neuromuscular Re-education 8    BRUCE Olivo  12/30/2019

## 2019-12-30 NOTE — PROGRESS NOTES
[facility]  Vascular Neurology  Comprehensive Stroke Center  Progress Note    Assessment/Plan:     * Embolic stroke involving right middle cerebral artery  79 y.o. yo male with unclear PMHx (HTN, DM, HLD?) who presented to [facility] with R MCA syndrome.  Patient out of IV-tPA time window.  Right internal carotid artery occlusion.  High grade extracranial ICA stenosis.  Treated with angioplasty and stent.  Successful thrombectomy of the right distal ICA and right m1 segment with TICI 3 repefusion.      MRI Brain from 12/17 shows large right MCA territory acute infarction with hemorraghic transformation.Plavix only   CT head on 12/18 with continued evolution of large right MCA territory infarct status post hemorrhagic conversion with regional mass effect.    -Patient extubated overnight     Antithrombotics for secondary stroke prevention:  Plavix   Statins for secondary stroke prevention and hyperlipidemia, if present:   Statins: Atorvastatin- 40 mg daily  Aggressive risk factor modification: Diet, Exercise  Rehab efforts: The patient has been evaluated by a stroke team provider and the therapy needs have been fully considered based off the presenting complaints and exam findings. The following therapy evaluations are needed: PT evaluate and treat, OT evaluate and treat, SLP evaluate and treat, PM&R evaluate for appropriate placement TBD   Diagnostics ordered/pending: None   VTE prophylaxis: Heparin 5000 units SQ every 8 hours  place SCDs  BP parameters: Infarct: Post successful thrombectomy, SBP <140      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, right  Stent placed  Plavix but will like to add ASA when safe      Acute respiratory failure with hypoxia  S/p extubation     Cytotoxic brain edema  Large area of cytotoxic cerebral edema identified when reviewing brain imaging in the territory of the right middle cerebral artery. There is mass effect associated with  it. We will continue to monitor the patients clinical exam for any worsening of symptoms which may indicate expansion of the stroke or the area of the edema resulting in the clinical change. The pattern is suggestive of SULEMA etiology.        Mixed hyperlipidemia  stroke risk factor  LDL less than 70   Statin not indicated       Type 2 diabetes mellitus with neurologic complication, without long-term current use of insulin  Stroke risk factor   on arrival  Hemoglobin A1C 7.7  Recommend tight glycemic control -180  On insulin gtt     Essential hypertension  Stroke risk factor  SBP < 140  Per primary team         12/29/19: Currently intubated in NCC.  [facility] managing and starting decadron and since patient is more alert preparing for extubation soon.   12/30/2019 Patient extubated. D/C whaley, d/c lasix, titrate insulin gtt, d/c hydralazine, KUB    STROKE DOCUMENTATION   Acute Stroke Times   Last Known Normal Date: 12/16/19  Last Known Normal Time: 1000  Stroke Team Called Date: 12/16/19  Stroke Team Called Time: 1725  Stroke Team Arrival Date: 12/16/19  Stroke Team Arrival Time: 1730  CT Interpretation Time: 1745  Decision to Treat Time for Alteplase: (N/A)  Decision to Treat Time for IR: 1750    NIH Scale:  1a. Level of Consciousness: 0-->Alert, keenly responsive  1b. LOC Questions: 2-->Answers neither question correctly  1c. LOC Commands: 1-->Performs one task correctly  2. Best Gaze: 0-->Normal  3. Visual: 0-->No visual loss  4. Facial Palsy: 0-->Normal symmetrical movements  5a. Motor Arm, Left: 3-->No effort against gravity, limb falls  5b. Motor Arm, Right: 2-->Some effort against gravity, limb cannot get to or maintain (if cued) 90 (or 45) degrees, drifts down to bed, but has some effort against gravity  6a. Motor Leg, Left: 4-->No movement  6b. Motor Leg, Right: 2-->Some effort against gravity, leg falls to bed by 5 secs, but has some effort against gravity  7. Limb Ataxia: 0-->Absent  8. Sensory:  1-->Mild-to-moderate sensory loss, patient feels pinprick is less sharp or is dull on the affected side, or there is a loss of superficial pain with pinprick, but patient is aware of being touched  9. Best Language: 3-->Mute, global aphasia, no usable speech or auditory comprehension  10. Dysarthria: 2-->Severe dysarthria, patients speech is so slurred as to be unintelligible in the absence of or out of proportion to any dysphasia, or is mute/anarthric  11. Extinction and Inattention (formerly Neglect): 0-->No abnormality  Total (NIH Stroke Scale): 20       Modified McCormick Score: 0(unknown)  Chan Coma Scale:13   ABCD2 Score:    [id number] Score:   HAS -BLED Score:   ICH Score:   Hunt & Reyes Classification:      Hemorrhagic change of an Ischemic Stroke: Does this patient have an ischemic stroke with hemorrhagic changes? No     Neurologic Chief Complaint: R MCA syndrome     Subjective:     Interval History: Patient is seen for follow-up neurological assessment and treatment recommendations: Patient extubated. Currently on insulin drip.       HPI, Past Medical, Family, and Social History remains the same as documented in the initial encounter.     Review of Systems   Constitutional: Positive for fever. Negative for chills.   Respiratory: Negative for shortness of breath and wheezing.    Neurological: Positive for facial asymmetry, speech difficulty and weakness.     Scheduled Meds:   albuterol-ipratropium  3 mL Nebulization Q4H    [START ON 12/31/2019] amLODIPine  10 mg Per NG tube Daily    [START ON 12/31/2019] clopidogrel  75 mg Per NG tube Daily    heparin (porcine)  5,000 Units Subcutaneous Q8H    senna-docusate 8.6-50 mg  1 tablet Per NG tube BID     Continuous Infusions:   insulin (HUMAN R) infusion (adults) 2.45 Units/hr (12/30/19 1405)     PRN Meds:acetaminophen, Dextrose 10% Bolus, Dextrose 10% Bolus, Dextrose 10% Bolus, Dextrose 10% Bolus, fentaNYL, hydrALAZINE, labetalol, magnesium oxide,  magnesium oxide, ondansetron, potassium chloride 10%, potassium chloride 10%, potassium chloride 10%, potassium, sodium phosphates, potassium, sodium phosphates, potassium, sodium phosphates, sodium chloride 0.9%    Objective:     Vital Signs (Most Recent):  Temp: 99.3 °F (37.4 °C) (12/30/19 1505)  Pulse: 88 (12/30/19 1605)  Resp: (!) 21 (12/30/19 1605)  BP: (!) 148/65 (12/30/19 1605)  SpO2: 96 % (12/30/19 1605)  BP Location: Left arm    Vital Signs Range (Last 24H):  Temp:  [98.1 °F (36.7 °C)-99.3 °F (37.4 °C)]   Pulse:  [73-89]   Resp:  [0-28]   BP: (105-150)/(53-66)   SpO2:  [91 %-96 %]   BP Location: Left arm    Physical Exam   Constitutional: He appears well-developed and well-nourished.   HENT:   Head: Normocephalic and atraumatic.   Eyes: Conjunctivae are normal.   Right gaze- able to cross midline   Neck: Normal range of motion and full passive range of motion without pain.   Cardiovascular: Tachycardia present.   Pulmonary/Chest: Effort normal.   Abdominal: Soft. Normal appearance.   Musculoskeletal:   Left  Side plegia   Neurological: A cranial nerve deficit is present. He exhibits abnormal muscle tone.   Skin: Skin is warm and dry.       Neurological Exam:   LOC: alert  Attention Span:good   Language:receptive aphasia   Visual Fields: blink to threat  EOM (CN III, IV, VI): Gaze preference  left  Pupils (CN II, III): PERRL  Motor: Arm left  Plegia 1/5  Leg left  Plegia 0/5  Arm right  Paresis: 3/5  Leg right Paresis: 4/5       Laboratory:  CMP:   Recent Labs   Lab 12/30/19 0216   CALCIUM 8.6*   ALBUMIN 2.7*   PROT 6.8   *   K 3.6   CO2 30*      BUN 46*   CREATININE 0.8   ALKPHOS 158*   ALT 89*   AST 60*   BILITOT 0.6     CBC:   Recent Labs   Lab 12/30/19 0216   WBC 9.10   RBC 4.00*   HGB 12.8*   HCT 40.1      *   MCH 32.0*   MCHC 31.9*     Lipid Panel:   No results for input(s): CHOL, LDLCALC, HDL, TRIG in the last 168 hours.  Hgb A1C:   No results for input(s): HGBA1C in the  last 168 hours.  TSH: No results for input(s): TSH in the last 168 hours.    Diagnostic Results     Brain Imaging   CT head 12/22/19  CT of the head demonstrates subacute infarction within the right anterior temporal lobe, insular cortex, basal ganglia, and internal capsule similar to the prior study with internal hemorrhage.  There is continued mass effect on right lateral ventricle and slight bowing of the midline to the left.  No new infarction or hemorrhage is seen.  Overall degree of mass effect is similar to the prior study.    CT head w/o contrast 12/16/2019     No acute major vascular distribution infarct or hemorrhage.    Vessel Imaging   Cerebral angiogram 12/16/19  Angiography demonstrated high-grade right internal carotid artery stenosis greater than 90% in the bulb and distal ICA occlusion.  This was treated with angioplasty and stenting at the carotid bifurcation resulting in resolution of extracranial stenosis.  Embolectomy was performed in the distal ICA and right M1 segment with complete TICI 3 reperfusion.      Cardiac Imaging   IRIS  · Normal left ventricular systolic function. The estimated ejection fraction is 65%  · Indeterminate left ventricular diastolic function.  · Mild left ventricular enlargement.  · Normal right ventricular systolic function.  · Technically difficult study, poor endocardial visualization, contrast used.  · No wall motion abnormalities.  · Indeterminate central venous pressure. Estimated PA pressure is at least 9 mmHg.      Medardo Morton MD  Comprehensive Stroke Center  Department of Vascular Neurology   [facility]

## 2019-12-30 NOTE — ASSESSMENT & PLAN NOTE
79 y.o. yo male with unclear PMHx (HTN, DM, HLD?) who presented to [facility] with R MCA syndrome.  Patient out of IV-tPA time window.  Right internal carotid artery occlusion.  High grade extracranial ICA stenosis.  Treated with angioplasty and stent.  Successful thrombectomy of the right distal ICA and right m1 segment with TICI 3 repefusion.      MRI Brain from 12/17 shows large right MCA territory acute infarction with hemorraghic transformation.Plavix only   CT head on 12/18 with continued evolution of large right MCA territory infarct status post hemorrhagic conversion with regional mass effect.    -Patient extubated overnight     Antithrombotics for secondary stroke prevention:  Plavix   Statins for secondary stroke prevention and hyperlipidemia, if present:   Statins: Atorvastatin- 40 mg daily  Aggressive risk factor modification: Diet, Exercise  Rehab efforts: The patient has been evaluated by a stroke team provider and the therapy needs have been fully considered based off the presenting complaints and exam findings. The following therapy evaluations are needed: PT evaluate and treat, OT evaluate and treat, SLP evaluate and treat, PM&R evaluate for appropriate placement TBD   Diagnostics ordered/pending: None   VTE prophylaxis: Heparin 5000 units SQ every 8 hours  place [facility]  BP parameters: Infarct: Post successful thrombectomy, SBP <140

## 2019-12-30 NOTE — PLAN OF CARE
OT haley completed; rec rehab at this time pending progression in care. Plan of care for 4x/w. BRUCE Olivo 12/30/2019   Problem: Occupational Therapy Goal  Goal: Occupational Therapy Goal  Description  Goals to be met by: 1/13     Patient will increase functional independence with ADLs by performing:    UE Dressing while seated EOB with Moderate Assistance.  Grooming while EOB with Moderate Assistance.  Sitting at edge of bed x10 minutes for functional task with Maximum Assistance.  Rolling to Bilateral with Maximum Assistance and use of bedrail as needed.   Supine to sit with Maximum Assistance.  Squat pivot transfers with Maximum Assistance.  Pt and CG verbalized understanding for s/s of stroke with teach back.   Pt and CG demo understanding for L UE positioning and ROM with teach back 2/2 sessions.     Outcome: Ongoing, Progressing

## 2019-12-31 LAB
ALBUMIN SERPL BCP-MCNC: 2.9 G/DL (ref 3.5–5.2)
ALLENS TEST: ABNORMAL
ALP SERPL-CCNC: 168 U/L (ref 55–135)
ALT SERPL W/O P-5'-P-CCNC: 92 U/L (ref 10–44)
ANION GAP SERPL CALC-SCNC: 11 MMOL/L (ref 8–16)
AST SERPL-CCNC: 53 U/L (ref 10–40)
BASOPHILS # BLD AUTO: 0.03 K/UL (ref 0–0.2)
BASOPHILS NFR BLD: 0.3 % (ref 0–1.9)
BILIRUB SERPL-MCNC: 0.6 MG/DL (ref 0.1–1)
BUN SERPL-MCNC: 47 MG/DL (ref 8–23)
CALCIUM SERPL-MCNC: 9.2 MG/DL (ref 8.7–10.5)
CHLORIDE SERPL-SCNC: 107 MMOL/L (ref 95–110)
CO2 SERPL-SCNC: 31 MMOL/L (ref 23–29)
CREAT SERPL-MCNC: 0.9 MG/DL (ref 0.5–1.4)
DELSYS: ABNORMAL
DIFFERENTIAL METHOD: ABNORMAL
EOSINOPHIL # BLD AUTO: 0.1 K/UL (ref 0–0.5)
EOSINOPHIL NFR BLD: 1.1 % (ref 0–8)
ERYTHROCYTE [DISTWIDTH] IN BLOOD BY AUTOMATED COUNT: 13 % (ref 11.5–14.5)
EST. GFR  (AFRICAN AMERICAN): >60 ML/MIN/1.73 M^2
EST. GFR  (NON AFRICAN AMERICAN): >60 ML/MIN/1.73 M^2
FIO2: 60
FLOW: 10
GLUCOSE SERPL-MCNC: 174 MG/DL (ref 70–110)
HCO3 UR-SCNC: 34.2 MMOL/L (ref 24–28)
HCT VFR BLD AUTO: 42.6 % (ref 40–54)
HGB BLD-MCNC: 13.4 G/DL (ref 14–18)
IMM GRANULOCYTES # BLD AUTO: 0.06 K/UL (ref 0–0.04)
IMM GRANULOCYTES NFR BLD AUTO: 0.6 % (ref 0–0.5)
LYMPHOCYTES # BLD AUTO: 2.7 K/UL (ref 1–4.8)
LYMPHOCYTES NFR BLD: 26.9 % (ref 18–48)
MAGNESIUM SERPL-MCNC: 2.7 MG/DL (ref 1.6–2.6)
MCH RBC QN AUTO: 31.8 PG (ref 27–31)
MCHC RBC AUTO-ENTMCNC: 31.5 G/DL (ref 32–36)
MCV RBC AUTO: 101 FL (ref 82–98)
MODE: ABNORMAL
MONOCYTES # BLD AUTO: 1.1 K/UL (ref 0.3–1)
MONOCYTES NFR BLD: 11.1 % (ref 4–15)
NEUTROPHILS # BLD AUTO: 5.9 K/UL (ref 1.8–7.7)
NEUTROPHILS NFR BLD: 60 % (ref 38–73)
NRBC BLD-RTO: 0 /100 WBC
PCO2 BLDA: 48.7 MMHG (ref 35–45)
PH SMN: 7.46 [PH] (ref 7.35–7.45)
PHOSPHATE SERPL-MCNC: 4.5 MG/DL (ref 2.7–4.5)
PLATELET # BLD AUTO: 295 K/UL (ref 150–350)
PMV BLD AUTO: 11.9 FL (ref 9.2–12.9)
PO2 BLDA: 80 MMHG (ref 80–100)
POC BE: 10 MMOL/L
POC SATURATED O2: 96 % (ref 95–100)
POC TCO2: 36 MMOL/L (ref 23–27)
POCT GLUCOSE: 142 MG/DL (ref 70–110)
POCT GLUCOSE: 154 MG/DL (ref 70–110)
POCT GLUCOSE: 160 MG/DL (ref 70–110)
POCT GLUCOSE: 167 MG/DL (ref 70–110)
POCT GLUCOSE: 168 MG/DL (ref 70–110)
POCT GLUCOSE: 168 MG/DL (ref 70–110)
POCT GLUCOSE: 185 MG/DL (ref 70–110)
POTASSIUM SERPL-SCNC: 3.9 MMOL/L (ref 3.5–5.1)
PROT SERPL-MCNC: 7.2 G/DL (ref 6–8.4)
RBC # BLD AUTO: 4.22 M/UL (ref 4.6–6.2)
SAMPLE: ABNORMAL
SITE: ABNORMAL
SODIUM SERPL-SCNC: 149 MMOL/L (ref 136–145)
SP02: 94
WBC # BLD AUTO: 9.89 K/UL (ref 3.9–12.7)

## 2019-12-31 PROCEDURE — 25000003 PHARM REV CODE 250: Performed by: PSYCHIATRY & NEUROLOGY

## 2019-12-31 PROCEDURE — C9399 UNCLASSIFIED DRUGS OR BIOLOG: HCPCS | Performed by: PSYCHIATRY & NEUROLOGY

## 2019-12-31 PROCEDURE — 94640 AIRWAY INHALATION TREATMENT: CPT

## 2019-12-31 PROCEDURE — 85025 COMPLETE CBC W/AUTO DIFF WBC: CPT

## 2019-12-31 PROCEDURE — 36600 WITHDRAWAL OF ARTERIAL BLOOD: CPT

## 2019-12-31 PROCEDURE — 99900035 HC TECH TIME PER 15 MIN (STAT)

## 2019-12-31 PROCEDURE — 94668 MNPJ CHEST WALL SBSQ: CPT

## 2019-12-31 PROCEDURE — 97110 THERAPEUTIC EXERCISES: CPT

## 2019-12-31 PROCEDURE — 25000242 PHARM REV CODE 250 ALT 637 W/ HCPCS: Performed by: INTERNAL MEDICINE

## 2019-12-31 PROCEDURE — 51701 INSERT BLADDER CATHETER: CPT

## 2019-12-31 PROCEDURE — 82803 BLOOD GASES ANY COMBINATION: CPT

## 2019-12-31 PROCEDURE — 51798 US URINE CAPACITY MEASURE: CPT

## 2019-12-31 PROCEDURE — 80053 COMPREHEN METABOLIC PANEL: CPT

## 2019-12-31 PROCEDURE — 99233 PR SUBSEQUENT HOSPITAL CARE,LEVL III: ICD-10-PCS | Mod: GC,,, | Performed by: PSYCHIATRY & NEUROLOGY

## 2019-12-31 PROCEDURE — 97535 SELF CARE MNGMENT TRAINING: CPT

## 2019-12-31 PROCEDURE — 63600175 PHARM REV CODE 636 W HCPCS: Performed by: PSYCHIATRY & NEUROLOGY

## 2019-12-31 PROCEDURE — 97530 THERAPEUTIC ACTIVITIES: CPT

## 2019-12-31 PROCEDURE — 94761 N-INVAS EAR/PLS OXIMETRY MLT: CPT

## 2019-12-31 PROCEDURE — 25000003 PHARM REV CODE 250: Performed by: PHYSICIAN ASSISTANT

## 2019-12-31 PROCEDURE — 83735 ASSAY OF MAGNESIUM: CPT

## 2019-12-31 PROCEDURE — 20000000 HC ICU ROOM

## 2019-12-31 PROCEDURE — 97112 NEUROMUSCULAR REEDUCATION: CPT

## 2019-12-31 PROCEDURE — 94799 UNLISTED PULMONARY SVC/PX: CPT

## 2019-12-31 PROCEDURE — 84100 ASSAY OF PHOSPHORUS: CPT

## 2019-12-31 PROCEDURE — 25000003 PHARM REV CODE 250: Performed by: NURSE PRACTITIONER

## 2019-12-31 PROCEDURE — 92523 SPEECH SOUND LANG COMPREHEN: CPT

## 2019-12-31 PROCEDURE — 99233 SBSQ HOSP IP/OBS HIGH 50: CPT | Mod: GC,,, | Performed by: PSYCHIATRY & NEUROLOGY

## 2019-12-31 PROCEDURE — 27000221 HC OXYGEN, UP TO 24 HOURS

## 2019-12-31 PROCEDURE — 63600175 PHARM REV CODE 636 W HCPCS: Performed by: NURSE PRACTITIONER

## 2019-12-31 RX ORDER — INSULIN ASPART 100 [IU]/ML
6 INJECTION, SOLUTION INTRAVENOUS; SUBCUTANEOUS EVERY 4 HOURS
Status: DISCONTINUED | OUTPATIENT
Start: 2019-12-31 | End: 2020-01-02

## 2019-12-31 RX ORDER — GLUCAGON 1 MG
1 KIT INJECTION
Status: DISCONTINUED | OUTPATIENT
Start: 2019-12-31 | End: 2020-01-10 | Stop reason: HOSPADM

## 2019-12-31 RX ORDER — INSULIN ASPART 100 [IU]/ML
1-10 INJECTION, SOLUTION INTRAVENOUS; SUBCUTANEOUS EVERY 4 HOURS PRN
Status: DISCONTINUED | OUTPATIENT
Start: 2019-12-31 | End: 2020-01-10 | Stop reason: HOSPADM

## 2019-12-31 RX ORDER — MODAFINIL 100 MG/1
100 TABLET ORAL ONCE
Status: COMPLETED | OUTPATIENT
Start: 2019-12-31 | End: 2019-12-31

## 2019-12-31 RX ADMIN — POLYETHYLENE GLYCOL 3350 17 G: 17 POWDER, FOR SOLUTION ORAL at 08:12

## 2019-12-31 RX ADMIN — INSULIN ASPART 4 UNITS: 100 INJECTION, SOLUTION INTRAVENOUS; SUBCUTANEOUS at 02:12

## 2019-12-31 RX ADMIN — AMLODIPINE BESYLATE 10 MG: 10 TABLET ORAL at 08:12

## 2019-12-31 RX ADMIN — SENNOSIDES AND DOCUSATE SODIUM 1 TABLET: 8.6; 5 TABLET ORAL at 08:12

## 2019-12-31 RX ADMIN — INSULIN DETEMIR 15 UNITS: 100 INJECTION, SOLUTION SUBCUTANEOUS at 11:12

## 2019-12-31 RX ADMIN — IPRATROPIUM BROMIDE AND ALBUTEROL SULFATE 3 ML: .5; 3 SOLUTION RESPIRATORY (INHALATION) at 03:12

## 2019-12-31 RX ADMIN — INSULIN ASPART 2 UNITS: 100 INJECTION, SOLUTION INTRAVENOUS; SUBCUTANEOUS at 06:12

## 2019-12-31 RX ADMIN — HEPARIN SODIUM 5000 UNITS: 5000 INJECTION, SOLUTION INTRAVENOUS; SUBCUTANEOUS at 05:12

## 2019-12-31 RX ADMIN — CLOPIDOGREL BISULFATE 75 MG: 75 TABLET ORAL at 08:12

## 2019-12-31 RX ADMIN — INSULIN ASPART 6 UNITS: 100 INJECTION, SOLUTION INTRAVENOUS; SUBCUTANEOUS at 06:12

## 2019-12-31 RX ADMIN — IPRATROPIUM BROMIDE AND ALBUTEROL SULFATE 3 ML: .5; 3 SOLUTION RESPIRATORY (INHALATION) at 11:12

## 2019-12-31 RX ADMIN — INSULIN ASPART 6 UNITS: 100 INJECTION, SOLUTION INTRAVENOUS; SUBCUTANEOUS at 09:12

## 2019-12-31 RX ADMIN — IPRATROPIUM BROMIDE AND ALBUTEROL SULFATE 3 ML: .5; 3 SOLUTION RESPIRATORY (INHALATION) at 07:12

## 2019-12-31 RX ADMIN — INSULIN ASPART 6 UNITS: 100 INJECTION, SOLUTION INTRAVENOUS; SUBCUTANEOUS at 02:12

## 2019-12-31 RX ADMIN — MODAFINIL 100 MG: 100 TABLET ORAL at 10:12

## 2019-12-31 RX ADMIN — HEPARIN SODIUM 5000 UNITS: 5000 INJECTION, SOLUTION INTRAVENOUS; SUBCUTANEOUS at 02:12

## 2019-12-31 RX ADMIN — HEPARIN SODIUM 5000 UNITS: 5000 INJECTION, SOLUTION INTRAVENOUS; SUBCUTANEOUS at 09:12

## 2019-12-31 RX ADMIN — INSULIN ASPART 1 UNITS: 100 INJECTION, SOLUTION INTRAVENOUS; SUBCUTANEOUS at 09:12

## 2019-12-31 RX ADMIN — INSULIN DETEMIR 15 UNITS: 100 INJECTION, SOLUTION SUBCUTANEOUS at 08:12

## 2019-12-31 NOTE — PLAN OF CARE
POC reviewed with pt and family at 1600. Pt and wife verbalized understanding. Questions and concerns addressed. No acute events today. Insulin gtt off. Tube feedings at goal. Pt progressing toward goals. Will continue to monitor. See flowsheets for full assessment and VS info.

## 2019-12-31 NOTE — PLAN OF CARE
Per MD, Patient extubated yesterday.  PT/OT are recommending inpatient rehab.    NGT-  Not medically ready for discharge.          12/30/19 2106   Discharge Reassessment   Assessment Type Discharge Planning Reassessment   Provided patient/caregiver education on the expected discharge date and the discharge plan No   Do you have any problems affording any of your prescribed medications? No   Discharge Plan A Rehab   Discharge Plan B Home with family;Home Health   DME Needed Upon Discharge  none   Anticipated Discharge Disposition Rehab   Can the patient answer the patient profile reliably? Yes, cognitively intact   Describe the patient's ability to walk at the present time. Major restrictions/daily assistance from another person   How often would a person be available to care for the patient? Often   Number of comorbid conditions (as recorded on the chart) Four   Post-Acute Status   Post-Acute Authorization Placement   Post-Acute Placement Status Awaiting Internal Medical Clearance   Discharge Delays (!) Diet Not Ready for Discharge  (Puree pleasure feeds/ NGT for supplementary feedings Per SLP)       Leigha Walker RN, CCRN-K, [facility]  Neuro-Critical Care   [phone]

## 2019-12-31 NOTE — PROGRESS NOTES
[facility]  Neurocritical Care  Progress Note    Admit Date: 12/16/2019  Service Date: 12/31/2019  Length of Stay: 15    Subjective:     Chief Complaint: Embolic stroke involving right middle cerebral artery    History of Present Illness: The patient is a 79 y.o. male who  was admitted as a transfer from OSH for Right MCA stroke syndrome. Patient had an urgent thrombectomy s/p  right MCA stroke syndrome. PMH significant for HTN, T2DM and HLD. Patient stated that around 2:30 pm on 12/16/19 he was driving and felt weak. He drove home and was taken to the ER where he was evaluated for a stroke. He had outside CTA which was reported to have R ICA stenosis at bifurcation with 70% stenosis and right MCA occlusion. Patient was transferred to [facility] for possible intervention. Patient had a right femoral sheath placed and his Angio  revealed 90% R ICA stenosis and R M1 occlusion. The R ICA was treated by thrombectomy and carotid stenting with TICI 3 reperfusion.   Patient was admitted to the [facility] for higher level of care post thrombectomy.      Hospital Course: 12/17/19: Patient admitted to [facility] for higher level of care s/p thrombectomy and right carotid artery stenting with TICI 3 reperfusion  12/18/2019Recent MRI with heme transformation abg Q8, repeat scan stable, mannitol  12/19/2019 intubation repeat CTH  12/20: febrile-broaden abx coverage, SBT, add hydralazine 50 q8h, KUB and mag citrate  12/21: wean FiO2, d/c hydralazine, wean fentanyl  12/22/2019: Patient stable overnight. Lasix 20mg x 1 given today. Enteral water flushes started for hypernatremia.    12/23: SBT trial, started Plavix today, initiated Hydralazine 25 TID, lasix 40 mg X1, decrease statin to 40 mg daily (transaminitis -trending down), add psyllium, Na BID -goal 145-150, initiated sq heparin  12/24/2019: SBT failed, wean fio2 as tolerated, CXR responded to lasix, scheduled BID  12/25/2019 continue lasix. SBT today. Adjust insulin  regimen  12/26/2019: Continue diuresis, SBT again today, wean PEEP and FiO2, wean nicardipine, hold statin for transaminits   12/27/2019: No significant events   12/28 No significant events over night.. Tolerating spon. Breathing trial, increased peep. No cuff leak, started decadron 8mg q8x3 doses and reassess in am for possible extubation. Insulin gtt started while on steroids, as BG running high. CPT added to resp. Treatments. Start TF.  12/29: extubated to room air following SBT  12/30: IS q 6, d/c whaley, d/c lasix, titrate insulin gtt, d/c hydralazine, KUB  12/31: cxr, trial of modafinil,  q 6, transition insulin    Review of Symptoms:   Constitutional: Denies fevers or chills.  ENT: no hearing difficulty, no visual changes  Pulmonary: Denies shortness of breath or cough.  Cardiology: Denies chest pain or palpitations.  GI: Denies abdominal pain or constipation.  Neurologic: Denies new weakness, headaches, or paresthesias.   : no dysuria  Musk: no muscle pain, no joint pain  Psych: no hallucinations     Physical Exam:  GA: Alert, comfortable, no acute distress.   HEENT: No scleral icterus or JVD.   Pulmonary: Clear to auscultation A/L. No wheezing, crackles, or rhonchi.  Cardiac: RRR S1 & S2 w/o rubs/murmurs/gallops.   Abdominal: Bowel sounds present x 4. No appreciable hepatosplenomegaly.  Skin: No jaundice, rashes, or visible lesions.  Pulses: 1+ DP bilat     Neuro:  --sedation: none  --GCS: [id number]  --Mental Status: awake, slow to respond though approriate    --CN II-XII grossly intact.   --Pupils 3-->2mm, PERRL.   --brainstem: intact  --Motor: left flaccid, right no drift   --sensory: intact to soft touch and pain throughout  --Reflexes: not tested  --Gait: deferred    Recent Labs   Lab 12/31/19  0401   WBC 9.89   RBC 4.22*   HGB 13.4*   HCT 42.6      *   MCH 31.8*   MCHC 31.5*     Recent Labs   Lab 12/31/19  0502   CALCIUM 9.2   PROT 7.2   *   K 3.9   CO2 31*      BUN 47*    CREATININE 0.9   ALKPHOS 168*   ALT 92*   AST 53*   BILITOT 0.6     No results for input(s): PT, INR, APTT in the last 24 hours.  Oxygen Concentration (%):  [50-60] 60    Temp: 100.2 °F (37.9 °C)  Pulse: 108  Rhythm: sinus tachycardia  BP: (!) 149/70  MAP (mmHg): 100  Resp: (!) 23  SpO2: (!) 92 %  Oxygen Concentration (%): 60  O2 Device (Oxygen Therapy): Aerosol Mask    Temp  Min: 98.1 °F (36.7 °C)  Max: 100.2 °F (37.9 °C)  Pulse  Min: 80  Max: 108  BP  Min: 128/58  Max: 164/77  MAP (mmHg)  Min: 84  Max: 111  Resp  Min: 18  Max: 28  SpO2  Min: 91 %  Max: 96 %  Oxygen Concentration (%)  Min: 50  Max: 60    12/30 0701 - 12/31 0700  In: 646.8 [I.V.:76.8]  Out: 860 [Urine:860]   Unmeasured Output  Urine Occurrence: 1  Stool Occurrence: 0  Pad Count: 1    Nutrition Prescription Ordered  Current Diet Order: NPO  Nutrition Order Comments: TF held  Current Nutrition Support Formula Ordered: Glucerna 1.5  Current Nutrition Support Rate Ordered: 55 (ml)  Current Nutrition Support Frequency Ordered: mL/hr  Last Bowel Movement: 12/31/19    Body mass index is 35.89 kg/m².      I have personally reviewed all labs, imaging, and studies today      Assessment/Plan:     Neuro  * Embolic stroke involving right middle cerebral artery  78 y/o male admitted for R MCA stroke syndrome s/p thrombectomy TICI 3 reperfusion and ABBY stenting, now s/p hemorrhagic transformation   - Was on DAPT following stenting, discontinued for hemorrhagic transformation  - currently on clopidogrel 75 mg daily  - Follow up imaging has been stable, neuro exam remains stable   - Neurochecks q1hr  - extubated to room air 12/29  - Vascular Neurology following  - SBP Goal < 160  - PT/OT/SLP  - Mechanical SCDs      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, right  Severe R ICA stenosis noted on IR angiogram   - Stent placed   - Clopidogrel daily    Midline shift of brain  - 2/2 IPH as noted on MRI imaging: mass effect on the right lateral  ventricle and approximately 5 mm right-to-left midline shift  - Continue Neuro checks Q1 hr    Goal euthermia  Goal eunatremia  Goal euvolemia  Goal euglycemia      Vasogenic brain edema  - 2/2 IPH as noted on MRI: intraparenchymal hemorrhage within the right frontal and temporal lobes with associated vasogenic edema resulting in mass effect on the right lateral ventricle  - Neuro checks Q1 hr   Goal euthermia  Goal eunatremia  Goal euvolemia  Goal euglycemia      Acute spont intraparenchymal hemorrhage assoc w/ coagulopathy  Follow scans  BP control      Pulmonary  Acute respiratory failure with hypoxia  - Initially intubated for decreased mental status though not able to be extubated due to respiratory failure with hypoxia (POA)  - extubated 12/29/19  - duonebs q4h; CPT q6h  On high flow 15 L and 60% FiO2  ETT watch    Cardiac/Vascular  Mixed hyperlipidemia  - Hold statin 2/2 transaminitis     Essential hypertension  - Continuous Cardiac Monitoring  - Maintain SBP < 160  - Echo 65%  - Hydralazine 25 q8h  - Amlodipine 10 mg daily  - Furosemide 40 mg BID  - Prn hydralazine, labetalol    Hematology  Acute venous embolism and thrombosis of basilic vein, left  - Noted on LE US    Endocrine  Type 2 diabetes mellitus with neurologic complication, without long-term current use of insulin  - A1c 7.7    - transition Insulin ggt --> to SQH  - POCT Q4    GI  Constipation  - Psyllium for 3 days completed  - Continue BR    Other  Decreased strength, endurance, and mobility  - 2/2 LS Weakness R/T R MCA   - PT/OT           The patient is being Prophylaxed for:  Venous Thromboembolism with: Chemical  Stress Ulcer with: Not Applicable   Ventilator Pneumonia with: not applicable    Activity Orders          Diet NPO: NPO starting at 12/30 1003        Full Code    Family updated at bedside  Modafinil to increase arousal and clear secretions  Transition insulin gtt  Free water flushes    Jesus Valencia MD  Neurocritical  Care [facility]    Level III

## 2019-12-31 NOTE — ASSESSMENT & PLAN NOTE
- Initially intubated for decreased mental status though not able to be extubated due to respiratory failure with hypoxia (POA)  - extubated 12/29/19  - duonebs q4h; CPT q6h  On high flow 15 L and 60% FiO2  ETT watch

## 2019-12-31 NOTE — PT/OT/SLP PROGRESS
Occupational Therapy   Treatment    Name: Jerry Linder  MRN: [id number]  Admitting Diagnosis:  Embolic stroke involving right middle cerebral artery  15 Days Post-Op    Recommendations:     Discharge Recommendations: rehabilitation facility  Discharge Equipment Recommendations:  (TBD at next level of care; with progression in care)  Barriers to discharge:  (remains in ICU; level of skilled assistance required)    Assessment:     Jerry Linder is a 79 y.o. male with a medical diagnosis of Embolic stroke involving right middle cerebral artery.  He presents with L hemiparesis and a significant decline in his functional indep with all prior roles and routines. He cont to demo good motivation and participation. He remains in ICU on venti-mask at this time. Performance deficits affecting function are impaired endurance, weakness, impaired sensation, impaired self care skills, impaired functional mobilty, gait instability, impaired balance, decreased upper extremity function, decreased lower extremity function, abnormal tone, decreased ROM, impaired coordination, impaired fine motor, impaired cardiopulmonary response to activity.     Rehab Prognosis:  Good; patient would benefit from acute skilled OT services to address these deficits and reach maximum level of function.       Plan:     Patient to be seen 4 x/week to address the above listed problems via self-care/home management, therapeutic activities, therapeutic exercises, neuromuscular re-education, sensory integration  · Plan of Care Expires: 01/28/20  · Plan of Care Reviewed with: patient, spouse, grandchild(ginette)    Subjective     Pain/Comfort:  · Pain Rating 1: 0/10(c/o bladder pain-- requested in and out cath prior)  · Pain Rating Post-Intervention 1: 0/10    Objective:     Communicated with: RN prior to session.  Completed with PT in ICU setting-- Pt with increased O2 demands on this date.  Patient found HOB elevated with bed alarm, peripheral IV, oxygen, whaley  catheter, pulse ox (continuous), telemetry, SCD, NG tube upon OT entry to room.    General Precautions: Standard, aspiration, fall, respiratory, diabetic, pureed diet   Orthopedic Precautions:N/A   Braces: N/A     Occupational Performance:     Bed Mobility:    · Patient completed Rolling/Turning to Right with maximal assistance and 2 persons  · Patient completed Scooting/Bridging with total assistance and 2 persons  · Patient completed Supine to Sit with maximal assistance and 2 persons  · Patient completed Sit to Supine with maximal assistance and 2 persons     Functional Mobility/Transfers:  · Deferred 2/2 impaired sitting balance     Activities of Daily Living:  · Feeding:  NPO; NG    · Grooming: seated EOB with total(A) for postural control and min(A) with R UE-- to wash face    · Upper Body Dressing: total assistance scott technique    AMPAC 6 Click ADL: 9    Treatment & Education:  -Pt alert with eyes open 80% of session; agreeable to therapy session; re edu on OT role in care  -EOB ~14 min with max-total(A) for postural control  *facilitation required for scapular retraction and sustained cervical extension   *facilitation for L UE in extended arm weightbearing   *B trunk rotation x1 with total(A); encouraged active cervical rotation for visual tracking task  *completed R UE reaching task for crossing midline and weight shift as prep for further ADLs  *requiring mod cues throughout for > L side attention   -PROM for L UE as tolerated; GH stability provided  -return to supine in upright position; L UE in elevation and extension with abduction; cervical positioning aid for neutral cervical spine  -Communication board updated; questions/concerns addressed within OT scope of practice      Patient left with bed in chair position with all lines intact, call button in reach, RN notified and wife and granddaughter presentEducation:      GOALS:   Multidisciplinary Problems     Occupational Therapy Goals        Problem:  Occupational Therapy Goal    Goal Priority Disciplines Outcome Interventions   Occupational Therapy Goal     OT, PT/OT Ongoing, Progressing    Description:  Goals to be met by: 1/13     Patient will increase functional independence with ADLs by performing:    UE Dressing while seated EOB with Moderate Assistance.  Grooming while EOB with Moderate Assistance.  Sitting at edge of bed x10 minutes for functional task with Maximum Assistance. progressing  Rolling to Bilateral with Maximum Assistance and use of bedrail as needed.   Supine to sit with Maximum Assistance.  Squat pivot transfers with Maximum Assistance.  Pt and CG verbalized understanding for s/s of stroke with teach back.   Pt and CG demo understanding for L UE positioning and ROM with teach back 2/2 sessions.                       Time Tracking:     OT Date of Treatment: 12/31/19  OT Start Time: 1119  OT Stop Time: 1143  OT Total Time (min): 24 min    Billable Minutes:Self Care/Home Management 8  Therapeutic Exercise 15    BRUCE Olivo  12/31/2019

## 2019-12-31 NOTE — PLAN OF CARE
POC reviewed with pt at 0500. Pt verbalized understanding but showed evidence of needing reinforcement. Insulin running @ 2.45. TF running @ 40. Edy notified of pt's sodium level of 149. No new orders.  Questions and concerns addressed. No acute events overnight. Pt progressing toward goals. Will continue to monitor. See flowsheets for full assessment and VS info

## 2019-12-31 NOTE — PT/OT/SLP PROGRESS
Physical Therapy Treatment    Patient Name:  Jerry Linder   MRN:  [id number]    Recommendations:     Discharge Recommendations:  rehabilitation facility   Discharge Equipment Recommendations: other (see comments)(TBD)   Barriers to discharge: Inaccessible home, Decreased caregiver support and patient in ICU     Assessment:     Jerry Linder is a 79 y.o. male admitted with a medical diagnosis of Embolic stroke involving right middle cerebral artery.  He presents with the following impairments/functional limitations:  weakness, impaired self care skills, impaired endurance, impaired functional mobilty, decreased upper extremity function, impaired sensation, gait instability, decreased lower extremity function, impaired fine motor, abnormal tone, impaired cardiopulmonary response to activity. Patient tolerated session well. Patient would continue to benefit from skilled PT services while in the hospital. At this time, upon d/c he is an appropriate candidate for inpatient rehabilitation in order to progress towards his PLOF.     Rehab Prognosis: Good; patient would benefit from acute skilled PT services to address these deficits and reach maximum level of function.    Recent Surgery: Procedure(s) (LRB):  ANGIOGRAM-CEREBRAL (N/A) 15 Days Post-Op    Plan:     During this hospitalization, patient to be seen 4 x/week to address the identified rehab impairments via gait training, therapeutic activities, therapeutic exercises, neuromuscular re-education and progress toward the following goals:    · Plan of Care Expires:  01/29/20    Subjective     Chief Complaint: none   Patient/Family Comments/goals: to go home and get stronger  Pain/Comfort:  · Pain Rating 1: 0/10(bladder pain prior - in/out cath performed by RN)  · Pain Rating Post-Intervention 1: 0/10      Objective:     Communicated with RN prior to session.  Patient found supine with bed alarm, blood pressure cuff, whaley catheter, oxygen, NG tube, peripheral IV, telemetry,  SCD, pulse ox (continuous), pressure relief boots(Aerosol mask) upon PT entry to room.     General Precautions: Standard, aspiration, fall, respiratory, diabetic, pureed diet   Orthopedic Precautions:N/A   Braces: N/A     Functional Mobility:  · Bed Mobility:     · Rolling Right: maximal assistance and of 2 persons  · Scooting: total assistance and of 2 persons  · Supine to Sit: maximal assistance and of 2 persons  · Sit to Supine: maximal assistance and of 2 persons  · Balance: sitting EOB - 14min with max-totalA; patient with eyes open ~80% of session   ·  slouched posture and posterior pelvic tilt  ·  significant L inattention which slightly improve with verbal and visual cues  ·  x5 reaching task at midline and into L sided environment  ·  x8 PROM LLE knee flexion/ext, ankle DF/PF       AM-PAC 6 CLICK MOBILITY  Turning over in bed (including adjusting bedclothes, sheets and blankets)?: 2  Sitting down on and standing up from a chair with arms (e.g., wheelchair, bedside commode, etc.): 1  Moving from lying on back to sitting on the side of the bed?: 2  Moving to and from a bed to a chair (including a wheelchair)?: 1  Need to walk in hospital room?: 1  Climbing 3-5 steps with a railing?: 1  Basic Mobility Total Score: 8       Therapeutic Activities and Exercises:  -Patient educated on the continued role and goal of PT  -Questions and concerns answered within the the PT scope of practice.   -White board updated in patient room to reflect level of assistance needed with nursing.     Patient left with bed in chair position with all lines intact, call button in reach, RN notified and family present..    GOALS:   Multidisciplinary Problems     Physical Therapy Goals        Problem: Physical Therapy Goal    Goal Priority Disciplines Outcome Goal Variances Interventions   Physical Therapy Goal     PT, PT/OT Ongoing, Progressing     Description:  Goals to be met by: 1/9/20     Patient will increase functional independence  with mobility by performin. Supine to sit with Moderate Assistance  2. Sit to supine with Moderate Assistance  3. Sit to stand transfer with Maximum Assistance  4. Sitting at edge of bed x15 minutes with Minimal Assistance  5. Stand for 1 minutes with Maximum Assistance using LRD, if needed                      Time Tracking:     PT Received On: 19  PT Start Time: 1119     PT Stop Time: 1144  PT Total Time (min): 25 min     Billable Minutes: Therapeutic Activity 15 and Neuromuscular Re-education 10    Treatment Type: Treatment  PT/PTA: PT     PTA Visit Number: 0     Kerline Palmer, PT  2019

## 2019-12-31 NOTE — PT/OT/SLP EVAL
"Speech Language Pathology Evaluation  Cognitive Communication    Patient Name:  Jerry Linder   MRN:  [id number]  Admitting Diagnosis: Embolic stroke involving right middle cerebral artery    Recommendations:     Recommendations:                General Recommendations:  Dysphagia therapy, Speech/language therapy and Cognitive-linguistic therapy  Diet recommendations: NPO, NPO Continue temporary, alternative means nutrition/hydration/medication  Aspiration Precautions: Alternate means of nutrition/hydration, Frequent oral care and Strict aspiration precautions   General Precautions: Standard, aspiration, fall, diabetic, respiratory   Communication strategies:  provide increased time to answer and go to room if call light pushed    History:     History reviewed. No pertinent past medical history.    Past Surgical History:   Procedure Laterality Date    CEREBRAL ANGIOGRAM N/A 12/16/2019    Procedure: ANGIOGRAM-CEREBRAL;  Surgeon: Jairo Martínez MD;  Location: [facility] OR [address] [place] [address];  Service: Radiology;  Laterality: N/A;       Social History: Patient lives with spouse in [place], [place]     Prior Intubation HX:  12/19-12/29     Chest X-Rays: 12/31/19: One view: Heart size is normal.  There is mild perihilar edema and/or atelectasis/infiltrate unchanged.  NG tip below diaphragm.    Occupation/hobbies/homemaking: Retired      Subjective     SLP reviewed Pt with RN, RN confirmed PO diet not initiated 2/2 respiratory status. RN celared ST for cognitive tx.  Pt presents calm, lethargic  He explains, "[facility]"  Spouse explains, "He worked out well with therapy earlier but has been sleeping since."    Pain/Comfort:  · Pain Rating 1: 10/10. Pt reported 10/10 R hand pain upon initiation of session, RN in room to review and reposition Pt. Pt reported minimal (1/10) pain following repositioning.     Objective:   Pt seen in bed with pulse ox (continuous), blood pressure monitor, peripheral IVs, catheter, NG, and aerosol mask in " place.     Cognitive Status:  Minimal assessment 2/2 minimal sustained attention and aersol mask in place. ST to continue to assess cognitive domains for math, reasoning, STM, organization and sequencing as attention improves   Attention: Sustained attention deficit : moderate, Pt required cues to attend to structured task > 30 seconds in length   Perseveration: Not present  Orientation: x3 to Person, Place and Time     Receptive Language:   Comprehension:      Questions Simple yes/no WNL  Complex yes/no 80% accuracy, I'ly   Commands  One step WNL  two step basic commands 50% I'ly  Pragmatics:    Impaired. Pt with  minimal eye cotnact with flat affect noted     Expressive Language:  Verbal:    Automatic Speech  Counting WNL and Days of the week WNL  Initiation : intermittenly delayed, ongoing assessment warranted  Naming Confrontation WNL, Convergent 33% accuracy and Single word responsive naming 60% accuracy, I'ly   Sentence formulation : Pt with short utternace length at the conversational level   Conversational: Pt with minimal verbal expression at the conversational level. Attention and fatigue noted to impact endurance. Further assessment of picture description and conversational speech tasks warranted as attention and respiratory status improve         Motor Speech:  Dysarthria : Moderate. Pt with severly reduced breath support with moderately decreased precision and slow rate.    Voice:   Quality Breathy and Strained  Intensity : mildly decreased     Visual-Spatial:  TBA, visiospatial tasks deferred 2/2 lethargy    Reading:   TBA, reading tasks deferred 2/2 lethargy and decreased sustained attn     Written Expression:   TBA, writing tasks deferred 2/2 lethargy and decreased sustained attn     Treatment: PO trials held 2/2 tenuous respiratory status. SLP educated on SLP role, safety precautions and ongoing SLP POC. Pt further educated on recommendations to continue NPO at this time and plans for ongoing  swallow assessment as oxygenation requirements titrated. Pt nodded along but dd not verbalize full understanding t/o education and would benefit from ongoing review 2/2 minimal sustained attention. No questions noted. Whiteboard updated.     Assessment:   Jerry Linder is a 79 y.o. male with an SLP diagnosis of Dysphagia, Dysarthria and Cognitive-Linguistic Impairment.  He presents with minimal sustained attention and tenuous respiratory status this service day.  ST to continue to follow.     Goals:   Multidisciplinary Problems     SLP Goals        Problem: SLP Goal    Goal Priority Disciplines Outcome   SLP Goal     SLP Ongoing, Progressing   Description:  Speech Therapy Short Term Goals  Goal expected to be met by 1/13  1. Pt will participate in an ongoing assessment to determine the least restrictive and safest diet with possible updated goals to follow pending results.  2. Pt will participate in further assessment of cognitive domains for sequencing, math, STM, visiospatial and reasoning skills as attention improves   3. Pt will participate in further assessment of reading/writing skills   4. Pt will complete 2-unit commands 80% of attempts or higher, MIN A  5. Pt will complete responsive speech tasks with 80% accuracy or higher, MIN A  6. Pt will attend to structured task for at least 1 minute at least 2 times per session provided minimal verbal and visual cues  7. Educate Pt and family on S/S aspiration and safety precautions                             Plan:   · Patient to be seen:  4 x/week   · Plan of Care expires:  01/28/20  · Plan of Care reviewed with:  patient, spouse, grandchild(ginette)   · SLP Follow-Up:  Yes       Discharge recommendations:  Discharge Facility/Level of Care Needs: rehabilitation facility   Barriers to Discharge: means of nutrition/hydration not yet established     Time Tracking:   SLP Treatment Date:   12/31/19  Speech Start Time:  1340  Speech Stop Time:  1355     Speech Total Time (min):   15 min  Billable Minutes: Eval 15     JEFERSON Youngblood, [facility]-SLP  Speech-Language Pathology  Pager: [phone]    12/31/2019

## 2019-12-31 NOTE — PLAN OF CARE
Problem: SLP Goal  Goal: SLP Goal  Description  Speech Therapy Short Term Goals  Goal expected to be met by 1/13  1. Pt will participate in an ongoing assessment to determine the least restrictive and safest diet with possible updated goals to follow pending results.  2. Pt will participate in further assessment of cognitive domains for sequencing, math, STM, visiospatial and reasoning skills as attention improves   3. Pt will participate in further assessment of reading/writing skills   4. Pt will complete 2-unit commands 80% of attempts or higher, MIN A  5. Pt will complete responsive speech tasks with 80% accuracy or higher, MIN A  6. Pt will attend to structured task for at least 1 minute at least 2 times per session provided minimal verbal and visual cues  7. Educate Pt and family on S/S aspiration and safety precautions            Outcome: Ongoing, Progressing    Pt seen for further assessment of speech and language. Pt presents with dysarthria and cognitive-linguistic impairment. PO trials held this service day 2/2 respiratory status. ST to continue to follow for speech and language tx and ongoing swallow assessment.     DUSTIN Youngblood., [facility]-SLP  Speech-Language Pathology  Pager: [phone]  12/31/2019

## 2019-12-31 NOTE — PLAN OF CARE
Problem: Physical Therapy Goal  Goal: Physical Therapy Goal  Description  Goals to be met by: 20     Patient will increase functional independence with mobility by performin. Supine to sit with Moderate Assistance  2. Sit to supine with Moderate Assistance  3. Sit to stand transfer with Maximum Assistance  4. Sitting at edge of bed x15 minutes with Minimal Assistance  5. Stand for 1 minutes with Maximum Assistance using LRD, if needed     Outcome: Ongoing, Progressing   Patient tolerated treatment well. Established POC and goals reviewed and remain appropriate. Plan is to continue to improve patient's functional mobility capabilities.    .  Kerline Palmer, PT, DPT  2019

## 2019-12-31 NOTE — PLAN OF CARE
12/31/19 1157   Post-Acute Status   Post-Acute Authorization Placement   Post-Acute Placement Status Referrals Sent   Discharge Delays None known at this time     Patient list provided and referrals sent to [facility], [facility] and [facility]. Pending status    Sheron Augustine LMSW

## 2020-01-01 LAB
ALBUMIN SERPL BCP-MCNC: 2.8 G/DL (ref 3.5–5.2)
ALLENS TEST: ABNORMAL
ALP SERPL-CCNC: 156 U/L (ref 55–135)
ALT SERPL W/O P-5'-P-CCNC: 82 U/L (ref 10–44)
ANION GAP SERPL CALC-SCNC: 9 MMOL/L (ref 8–16)
AST SERPL-CCNC: 47 U/L (ref 10–40)
BASOPHILS # BLD AUTO: 0.03 K/UL (ref 0–0.2)
BASOPHILS NFR BLD: 0.4 % (ref 0–1.9)
BILIRUB SERPL-MCNC: 0.6 MG/DL (ref 0.1–1)
BUN SERPL-MCNC: 44 MG/DL (ref 8–23)
CALCIUM SERPL-MCNC: 9.1 MG/DL (ref 8.7–10.5)
CHLORIDE SERPL-SCNC: 108 MMOL/L (ref 95–110)
CO2 SERPL-SCNC: 30 MMOL/L (ref 23–29)
CREAT SERPL-MCNC: 0.9 MG/DL (ref 0.5–1.4)
DELSYS: ABNORMAL
DIFFERENTIAL METHOD: ABNORMAL
EOSINOPHIL # BLD AUTO: 0.2 K/UL (ref 0–0.5)
EOSINOPHIL NFR BLD: 2.4 % (ref 0–8)
ERYTHROCYTE [DISTWIDTH] IN BLOOD BY AUTOMATED COUNT: 12.9 % (ref 11.5–14.5)
EST. GFR  (AFRICAN AMERICAN): >60 ML/MIN/1.73 M^2
EST. GFR  (NON AFRICAN AMERICAN): >60 ML/MIN/1.73 M^2
FIO2: 60
FLOW: 10
GLUCOSE SERPL-MCNC: 234 MG/DL (ref 70–110)
HCO3 UR-SCNC: 32.9 MMOL/L (ref 24–28)
HCT VFR BLD AUTO: 42.6 % (ref 40–54)
HGB BLD-MCNC: 13.6 G/DL (ref 14–18)
IMM GRANULOCYTES # BLD AUTO: 0.03 K/UL (ref 0–0.04)
IMM GRANULOCYTES NFR BLD AUTO: 0.4 % (ref 0–0.5)
LYMPHOCYTES # BLD AUTO: 2 K/UL (ref 1–4.8)
LYMPHOCYTES NFR BLD: 24.5 % (ref 18–48)
MAGNESIUM SERPL-MCNC: 2.7 MG/DL (ref 1.6–2.6)
MCH RBC QN AUTO: 32.5 PG (ref 27–31)
MCHC RBC AUTO-ENTMCNC: 31.9 G/DL (ref 32–36)
MCV RBC AUTO: 102 FL (ref 82–98)
MODE: ABNORMAL
MONOCYTES # BLD AUTO: 1 K/UL (ref 0.3–1)
MONOCYTES NFR BLD: 11.6 % (ref 4–15)
NEUTROPHILS # BLD AUTO: 5 K/UL (ref 1.8–7.7)
NEUTROPHILS NFR BLD: 60.7 % (ref 38–73)
NRBC BLD-RTO: 0 /100 WBC
PCO2 BLDA: 44.9 MMHG (ref 35–45)
PH SMN: 7.47 [PH] (ref 7.35–7.45)
PHOSPHATE SERPL-MCNC: 4.2 MG/DL (ref 2.7–4.5)
PLATELET # BLD AUTO: 307 K/UL (ref 150–350)
PMV BLD AUTO: 11.3 FL (ref 9.2–12.9)
PO2 BLDA: 65 MMHG (ref 80–100)
POC BE: 9 MMOL/L
POC SATURATED O2: 94 % (ref 95–100)
POC TCO2: 34 MMOL/L (ref 23–27)
POCT GLUCOSE: 189 MG/DL (ref 70–110)
POCT GLUCOSE: 193 MG/DL (ref 70–110)
POCT GLUCOSE: 194 MG/DL (ref 70–110)
POCT GLUCOSE: 206 MG/DL (ref 70–110)
POCT GLUCOSE: 224 MG/DL (ref 70–110)
POTASSIUM SERPL-SCNC: 4.2 MMOL/L (ref 3.5–5.1)
PROT SERPL-MCNC: 7 G/DL (ref 6–8.4)
RBC # BLD AUTO: 4.19 M/UL (ref 4.6–6.2)
SAMPLE: ABNORMAL
SITE: ABNORMAL
SODIUM SERPL-SCNC: 147 MMOL/L (ref 136–145)
SP02: 92
WBC # BLD AUTO: 8.27 K/UL (ref 3.9–12.7)

## 2020-01-01 PROCEDURE — 25000003 PHARM REV CODE 250: Performed by: PHYSICIAN ASSISTANT

## 2020-01-01 PROCEDURE — 83735 ASSAY OF MAGNESIUM: CPT

## 2020-01-01 PROCEDURE — 99233 SBSQ HOSP IP/OBS HIGH 50: CPT | Mod: GC,,, | Performed by: PSYCHIATRY & NEUROLOGY

## 2020-01-01 PROCEDURE — 80053 COMPREHEN METABOLIC PANEL: CPT

## 2020-01-01 PROCEDURE — 85025 COMPLETE CBC W/AUTO DIFF WBC: CPT

## 2020-01-01 PROCEDURE — 94640 AIRWAY INHALATION TREATMENT: CPT

## 2020-01-01 PROCEDURE — 25000242 PHARM REV CODE 250 ALT 637 W/ HCPCS: Performed by: INTERNAL MEDICINE

## 2020-01-01 PROCEDURE — 94761 N-INVAS EAR/PLS OXIMETRY MLT: CPT

## 2020-01-01 PROCEDURE — 94668 MNPJ CHEST WALL SBSQ: CPT

## 2020-01-01 PROCEDURE — 27000221 HC OXYGEN, UP TO 24 HOURS

## 2020-01-01 PROCEDURE — 99900035 HC TECH TIME PER 15 MIN (STAT)

## 2020-01-01 PROCEDURE — 84100 ASSAY OF PHOSPHORUS: CPT

## 2020-01-01 PROCEDURE — 99233 PR SUBSEQUENT HOSPITAL CARE,LEVL III: ICD-10-PCS | Mod: GC,,, | Performed by: PSYCHIATRY & NEUROLOGY

## 2020-01-01 PROCEDURE — 20000000 HC ICU ROOM

## 2020-01-01 PROCEDURE — 25000003 PHARM REV CODE 250: Performed by: PSYCHIATRY & NEUROLOGY

## 2020-01-01 PROCEDURE — 25000003 PHARM REV CODE 250: Performed by: NURSE PRACTITIONER

## 2020-01-01 PROCEDURE — 63600175 PHARM REV CODE 636 W HCPCS: Performed by: NURSE PRACTITIONER

## 2020-01-01 RX ORDER — MODAFINIL 100 MG/1
100 TABLET ORAL ONCE
Status: COMPLETED | OUTPATIENT
Start: 2020-01-01 | End: 2020-01-01

## 2020-01-01 RX ADMIN — SENNOSIDES AND DOCUSATE SODIUM 1 TABLET: 8.6; 5 TABLET ORAL at 08:01

## 2020-01-01 RX ADMIN — INSULIN ASPART 6 UNITS: 100 INJECTION, SOLUTION INTRAVENOUS; SUBCUTANEOUS at 02:01

## 2020-01-01 RX ADMIN — IPRATROPIUM BROMIDE AND ALBUTEROL SULFATE 3 ML: .5; 3 SOLUTION RESPIRATORY (INHALATION) at 08:01

## 2020-01-01 RX ADMIN — CLOPIDOGREL BISULFATE 75 MG: 75 TABLET ORAL at 08:01

## 2020-01-01 RX ADMIN — IPRATROPIUM BROMIDE AND ALBUTEROL SULFATE 3 ML: .5; 3 SOLUTION RESPIRATORY (INHALATION) at 03:01

## 2020-01-01 RX ADMIN — INSULIN ASPART 6 UNITS: 100 INJECTION, SOLUTION INTRAVENOUS; SUBCUTANEOUS at 05:01

## 2020-01-01 RX ADMIN — ACETAMINOPHEN 650 MG: 325 TABLET ORAL at 11:01

## 2020-01-01 RX ADMIN — INSULIN ASPART 6 UNITS: 100 INJECTION, SOLUTION INTRAVENOUS; SUBCUTANEOUS at 09:01

## 2020-01-01 RX ADMIN — IPRATROPIUM BROMIDE AND ALBUTEROL SULFATE 3 ML: .5; 3 SOLUTION RESPIRATORY (INHALATION) at 12:01

## 2020-01-01 RX ADMIN — INSULIN ASPART 1 UNITS: 100 INJECTION, SOLUTION INTRAVENOUS; SUBCUTANEOUS at 09:01

## 2020-01-01 RX ADMIN — AMLODIPINE BESYLATE 10 MG: 10 TABLET ORAL at 08:01

## 2020-01-01 RX ADMIN — INSULIN ASPART 2 UNITS: 100 INJECTION, SOLUTION INTRAVENOUS; SUBCUTANEOUS at 02:01

## 2020-01-01 RX ADMIN — HEPARIN SODIUM 5000 UNITS: 5000 INJECTION, SOLUTION INTRAVENOUS; SUBCUTANEOUS at 05:01

## 2020-01-01 RX ADMIN — MODAFINIL 100 MG: 100 TABLET ORAL at 10:01

## 2020-01-01 RX ADMIN — POLYETHYLENE GLYCOL 3350 17 G: 17 POWDER, FOR SOLUTION ORAL at 08:01

## 2020-01-01 RX ADMIN — INSULIN DETEMIR 15 UNITS: 100 INJECTION, SOLUTION SUBCUTANEOUS at 09:01

## 2020-01-01 RX ADMIN — INSULIN ASPART 6 UNITS: 100 INJECTION, SOLUTION INTRAVENOUS; SUBCUTANEOUS at 01:01

## 2020-01-01 RX ADMIN — HEPARIN SODIUM 5000 UNITS: 5000 INJECTION, SOLUTION INTRAVENOUS; SUBCUTANEOUS at 01:01

## 2020-01-01 RX ADMIN — INSULIN ASPART 2 UNITS: 100 INJECTION, SOLUTION INTRAVENOUS; SUBCUTANEOUS at 05:01

## 2020-01-01 RX ADMIN — INSULIN ASPART 4 UNITS: 100 INJECTION, SOLUTION INTRAVENOUS; SUBCUTANEOUS at 01:01

## 2020-01-01 RX ADMIN — HEPARIN SODIUM 5000 UNITS: 5000 INJECTION, SOLUTION INTRAVENOUS; SUBCUTANEOUS at 09:01

## 2020-01-01 RX ADMIN — INSULIN ASPART 2 UNITS: 100 INJECTION, SOLUTION INTRAVENOUS; SUBCUTANEOUS at 09:01

## 2020-01-01 RX ADMIN — INSULIN ASPART 4 UNITS: 100 INJECTION, SOLUTION INTRAVENOUS; SUBCUTANEOUS at 05:01

## 2020-01-01 NOTE — PROGRESS NOTES
[facility]  Neurocritical Care  Progress Note    Admit Date: 12/16/2019  Service Date: 01/01/2020  Length of Stay: 16    Subjective:     Chief Complaint: Embolic stroke involving right middle cerebral artery    History of Present Illness: The patient is a 79 y.o. male who  was admitted as a transfer from OSH for Right MCA stroke syndrome. Patient had an urgent thrombectomy s/p  right MCA stroke syndrome. PMH significant for HTN, T2DM and HLD. Patient stated that around 2:30 pm on 12/16/19 he was driving and felt weak. He drove home and was taken to the ER where he was evaluated for a stroke. He had outside CTA which was reported to have R ICA stenosis at bifurcation with 70% stenosis and right MCA occlusion. Patient was [place] [facility] [place]. Patient had a right femoral sheath placed and his Angio  revealed 90% R ICA stenosis and R M1 occlusion. The R ICA was treated by thrombectomy and carotid stenting with TICI 3 reperfusion.   Patient was admitted to the [facility] for higher level of care post thrombectomy.      Hospital Course: 12/17/19: Patient admitted to [facility] for higher level of care s/p thrombectomy and right carotid artery stenting with TICI 3 reperfusion  12/18/2019Recent MRI with heme transformation abg Q8, repeat scan stable, mannitol  12/19/2019 intubation repeat CTH  12/20: febrile-broaden abx coverage, SBT, add hydralazine 50 q8h, KUB and mag citrate  12/21: wean FiO2, d/c hydralazine, wean fentanyl  12/22/2019: Patient stable overnight. Lasix 20mg x 1 given today. Enteral water flushes started for hypernatremia.    12/23: SBT trial, started Plavix today, initiated Hydralazine 25 TID, lasix 40 mg X1, decrease statin to 40 mg daily (transaminitis -trending down), add psyllium, Na BID -goal 145-150, initiated sq heparin  12/24/2019: SBT failed, wean fio2 as tolerated, CXR responded to lasix, scheduled BID  12/25/2019 continue lasix. SBT today. Adjust insulin  regimen  12/26/2019: Continue diuresis, SBT again today, wean PEEP and FiO2, wean nicardipine, hold statin for transaminits   12/27/2019: No significant events   12/28 No significant events over night.. Tolerating spon. Breathing trial, increased peep. No cuff leak, started decadron 8mg q8x3 doses and reassess in am for possible extubation. Insulin gtt started while on steroids, as BG running high. CPT added to resp. Treatments. Start TF.  12/29: extubated to room air following SBT  12/30: IS q 6, d/c whaley, d/c lasix, titrate insulin gtt, d/c hydralazine, KUB  12/31: cxr, trial of modafinil,  q 6, transition insulin  1/1: increase FWF, modafinil, insulin    Review of Symptoms:   Constitutional: Denies fevers or chills.  ENT: no hearing difficulty, no visual changes  Pulmonary: Denies shortness of breath or cough.  Cardiology: Denies chest pain or palpitations.  GI: Denies abdominal pain or constipation.  Neurologic: Denies new weakness, headaches, or paresthesias.   : no dysuria  Musk: no muscle pain, no joint pain  Psych: no hallucinations     Physical Exam:  GA: Alert, comfortable, no acute distress.   HEENT: No scleral icterus or JVD.   Pulmonary: Clear to auscultation A/L. No wheezing, crackles, or rhonchi.  Cardiac: RRR S1 & S2 w/o rubs/murmurs/gallops.   Abdominal: Bowel sounds present x 4. No appreciable hepatosplenomegaly.  Skin: No jaundice, rashes, or visible lesions.  Pulses: 1+ DP bilat     Neuro:  --sedation: none  --GCS: E4V4M6  --Mental Status: awake, slow to respond though approriate    --CN II-XII grossly intact.   --Pupils 3-->2mm, PERRL.   --brainstem: intact  --Motor: left flaccid, right no drift   --sensory: intact to soft touch and pain throughout  --Reflexes: not tested  --Gait: deferred    Recent Labs   Lab 01/01/20  0210   WBC 8.27   RBC 4.19*   HGB 13.6*   HCT 42.6      *   MCH 32.5*   MCHC 31.9*     Recent Labs   Lab 01/01/20  0210   CALCIUM 9.1   PROT 7.0   *    K 4.2   CO2 30*      BUN 44*   CREATININE 0.9   ALKPHOS 156*   ALT 82*   AST 47*   BILITOT 0.6     No results for input(s): PT, INR, APTT in the last 24 hours.  Oxygen Concentration (%):  [40-60] 40  Resp Rate Total:  [17 br/min] 17 br/min    Temp: (!) 100.4 °F (38 °C)  Pulse: 104  Rhythm: normal sinus rhythm, sinus tachycardia  BP: (!) 147/65  MAP (mmHg): 93  Resp: (!) 26  SpO2: 100 %  Oxygen Concentration (%): 40  O2 Device (Oxygen Therapy): Aerosol Mask    Temp  Min: 98.6 °F (37 °C)  Max: 100.4 °F (38 °C)  Pulse  Min: 97  Max: 107  BP  Min: 116/57  Max: 162/73  MAP (mmHg)  Min: 80  Max: 105  Resp  Min: 18  Max: 29  SpO2  Min: 90 %  Max: 100 %  Oxygen Concentration (%)  Min: 40  Max: 60    12/31 0701 - 01/01 0700  In: 2052.3 [I.V.:17.3]  Out: 2560 [Urine:2560]   Unmeasured Output  Urine Occurrence: 2  Stool Occurrence: 0  Pad Count: 1    Nutrition Prescription Ordered  Current Diet Order: NPO  Nutrition Order Comments: TF held  Current Nutrition Support Formula Ordered: Glucerna 1.5  Current Nutrition Support Rate Ordered: 55 (ml)  Current Nutrition Support Frequency Ordered: mL/hr  Last Bowel Movement: 12/31/19    Body mass index is 35.89 kg/m².      I have personally reviewed all labs, imaging, and studies today    Assessment/Plan:     Neuro  * Embolic stroke involving right middle cerebral artery  78 y/o male admitted for R MCA stroke syndrome s/p thrombectomy TICI 3 reperfusion and ABBY stenting, now s/p hemorrhagic transformation   - Was on DAPT following stenting, discontinued for hemorrhagic transformation  - currently on clopidogrel 75 mg daily  - Follow up imaging has been stable, neuro exam remains stable   - Neurochecks q1hr  - extubated to room air 12/29  - Vascular Neurology following  - SBP Goal < 160  - PT/OT/SLP  - Mechanical SCDs      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, right  Severe R ICA stenosis noted on IR angiogram   - Stent placed   - Clopidogrel  daily    Midline shift of brain  - 2/2 IPH as noted on MRI imaging: mass effect on the right lateral ventricle and approximately 5 mm right-to-left midline shift  - Continue Neuro checks Q1 hr    Goal euthermia  Goal eunatremia  Goal euvolemia  Goal euglycemia      Vasogenic brain edema  - 2/2 IPH as noted on MRI: intraparenchymal hemorrhage within the right frontal and temporal lobes with associated vasogenic edema resulting in mass effect on the right lateral ventricle  - Neuro checks Q1 hr   Goal euthermia  Goal eunatremia  Goal euvolemia  Goal euglycemia      Acute spont intraparenchymal hemorrhage assoc w/ coagulopathy  Follow scans  BP control      Pulmonary  Acute respiratory failure with hypoxia  - Initially intubated for decreased mental status though not able to be extubated due to respiratory failure with hypoxia (POA)  - extubated 12/29/19  - duonebs q4h; CPT q6h  On high flow 15 L and 60% FiO2  ETT watch    Cardiac/Vascular  Mixed hyperlipidemia  - Hold statin 2/2 transaminitis     Essential hypertension  - Continuous Cardiac Monitoring  - Maintain SBP < 160  - Echo 65%  - Hydralazine 25 q8h  - Amlodipine 10 mg daily  - Furosemide 40 mg BID  - Prn hydralazine, labetalol    Hematology  Acute venous embolism and thrombosis of basilic vein, left  - Noted on LE US    Endocrine  Type 2 diabetes mellitus with neurologic complication, without long-term current use of insulin  - A1c 7.7    - transition Insulin ggt --> to SQH  - POCT Q4    GI  Constipation  - Psyllium for 3 days completed  - Continue BR    Other  Decreased strength, endurance, and mobility  - 2/2 LS Weakness R/T R MCA   - PT/OT           The patient is being Prophylaxed for:  Venous Thromboembolism with: Chemical  Stress Ulcer with: Not Applicable   Ventilator Pneumonia with: not applicable    Activity Orders          Diet NPO: NPO starting at 12/30 1003        Full Code    Jesus Valencia MD  Neurocritical Care  [facility]    Level III

## 2020-01-01 NOTE — PLAN OF CARE
POC reviewed with pt and family at 1400. Pt and wife verbalized understanding. Questions and concerns addressed. No acute events today. Pt progressing toward goals. Will continue to monitor. See flowsheets for full assessment and VS info.  Patient turned and oral care performed q2 hours. Patient unable to participate in IS, NCC team aware. Aersol mask weaned to 40% today per RT, sp02 remained above 92%.     Problem: Adjustment to Illness (Stroke, Ischemic/Transient Ischemic Attack)  Goal: Optimal Coping  Outcome: Ongoing, Progressing

## 2020-01-01 NOTE — PLAN OF CARE
POC reviewed with pt at 0430. Pt verbalized understanding. Questions and concerns addressed with pt and family. R wrist restraint applied. TF continued. PM bath given. No acute events overnight. Pt progressing toward goals. Will continue to monitor. See flowsheets for full assessment and VS info

## 2020-01-02 LAB
ALBUMIN SERPL BCP-MCNC: 2.8 G/DL (ref 3.5–5.2)
ALLENS TEST: ABNORMAL
ALP SERPL-CCNC: 157 U/L (ref 55–135)
ALT SERPL W/O P-5'-P-CCNC: 94 U/L (ref 10–44)
ANION GAP SERPL CALC-SCNC: 11 MMOL/L (ref 8–16)
AST SERPL-CCNC: 52 U/L (ref 10–40)
BACTERIA #/AREA URNS AUTO: ABNORMAL /HPF
BASOPHILS # BLD AUTO: 0.04 K/UL (ref 0–0.2)
BASOPHILS NFR BLD: 0.3 % (ref 0–1.9)
BILIRUB SERPL-MCNC: 0.6 MG/DL (ref 0.1–1)
BILIRUB UR QL STRIP: NEGATIVE
BUN SERPL-MCNC: 44 MG/DL (ref 8–23)
CALCIUM SERPL-MCNC: 9.3 MG/DL (ref 8.7–10.5)
CHLORIDE SERPL-SCNC: 110 MMOL/L (ref 95–110)
CLARITY UR REFRACT.AUTO: CLEAR
CO2 SERPL-SCNC: 29 MMOL/L (ref 23–29)
COLOR UR AUTO: YELLOW
CREAT SERPL-MCNC: 1 MG/DL (ref 0.5–1.4)
DELSYS: ABNORMAL
DIFFERENTIAL METHOD: ABNORMAL
EOSINOPHIL # BLD AUTO: 0.3 K/UL (ref 0–0.5)
EOSINOPHIL NFR BLD: 2.4 % (ref 0–8)
ERYTHROCYTE [DISTWIDTH] IN BLOOD BY AUTOMATED COUNT: 13 % (ref 11.5–14.5)
EST. GFR  (AFRICAN AMERICAN): >60 ML/MIN/1.73 M^2
EST. GFR  (NON AFRICAN AMERICAN): >60 ML/MIN/1.73 M^2
FIO2: 40
FLOW: 10
GLUCOSE SERPL-MCNC: 236 MG/DL (ref 70–110)
GLUCOSE UR QL STRIP: NEGATIVE
HCO3 UR-SCNC: 31.1 MMOL/L (ref 24–28)
HCT VFR BLD AUTO: 44.8 % (ref 40–54)
HGB BLD-MCNC: 13.7 G/DL (ref 14–18)
HGB UR QL STRIP: ABNORMAL
HYALINE CASTS UR QL AUTO: 0 /LPF
IMM GRANULOCYTES # BLD AUTO: 0.08 K/UL (ref 0–0.04)
IMM GRANULOCYTES NFR BLD AUTO: 0.6 % (ref 0–0.5)
KETONES UR QL STRIP: NEGATIVE
LEUKOCYTE ESTERASE UR QL STRIP: NEGATIVE
LYMPHOCYTES # BLD AUTO: 2.5 K/UL (ref 1–4.8)
LYMPHOCYTES NFR BLD: 19.4 % (ref 18–48)
MAGNESIUM SERPL-MCNC: 2.6 MG/DL (ref 1.6–2.6)
MCH RBC QN AUTO: 31.6 PG (ref 27–31)
MCHC RBC AUTO-ENTMCNC: 30.6 G/DL (ref 32–36)
MCV RBC AUTO: 103 FL (ref 82–98)
MICROSCOPIC COMMENT: ABNORMAL
MODE: ABNORMAL
MONOCYTES # BLD AUTO: 1 K/UL (ref 0.3–1)
MONOCYTES NFR BLD: 7.8 % (ref 4–15)
NEUTROPHILS # BLD AUTO: 8.8 K/UL (ref 1.8–7.7)
NEUTROPHILS NFR BLD: 69.5 % (ref 38–73)
NITRITE UR QL STRIP: NEGATIVE
NRBC BLD-RTO: 0 /100 WBC
PCO2 BLDA: 40.9 MMHG (ref 35–45)
PH SMN: 7.49 [PH] (ref 7.35–7.45)
PH UR STRIP: 7 [PH] (ref 5–8)
PHOSPHATE SERPL-MCNC: 4.1 MG/DL (ref 2.7–4.5)
PLATELET # BLD AUTO: 288 K/UL (ref 150–350)
PMV BLD AUTO: 11.6 FL (ref 9.2–12.9)
PO2 BLDA: 69 MMHG (ref 80–100)
POC BE: 8 MMOL/L
POC SATURATED O2: 95 % (ref 95–100)
POC TCO2: 32 MMOL/L (ref 23–27)
POCT GLUCOSE: 197 MG/DL (ref 70–110)
POCT GLUCOSE: 213 MG/DL (ref 70–110)
POCT GLUCOSE: 226 MG/DL (ref 70–110)
POCT GLUCOSE: 235 MG/DL (ref 70–110)
POCT GLUCOSE: 243 MG/DL (ref 70–110)
POCT GLUCOSE: 252 MG/DL (ref 70–110)
POCT GLUCOSE: 302 MG/DL (ref 70–110)
POTASSIUM SERPL-SCNC: 4 MMOL/L (ref 3.5–5.1)
PROT SERPL-MCNC: 7 G/DL (ref 6–8.4)
PROT UR QL STRIP: ABNORMAL
RBC # BLD AUTO: 4.34 M/UL (ref 4.6–6.2)
RBC #/AREA URNS AUTO: 25 /HPF (ref 0–4)
SAMPLE: ABNORMAL
SITE: ABNORMAL
SODIUM SERPL-SCNC: 150 MMOL/L (ref 136–145)
SP GR UR STRIP: 1.03 (ref 1–1.03)
SP02: 98
URN SPEC COLLECT METH UR: ABNORMAL
WBC # BLD AUTO: 12.61 K/UL (ref 3.9–12.7)
WBC #/AREA URNS AUTO: 1 /HPF (ref 0–5)

## 2020-01-02 PROCEDURE — 27000221 HC OXYGEN, UP TO 24 HOURS

## 2020-01-02 PROCEDURE — 36620 ARTERIAL LINE: ICD-10-PCS | Mod: ,,, | Performed by: PHYSICIAN ASSISTANT

## 2020-01-02 PROCEDURE — 63600175 PHARM REV CODE 636 W HCPCS: Performed by: NURSE PRACTITIONER

## 2020-01-02 PROCEDURE — 25000003 PHARM REV CODE 250: Performed by: PSYCHIATRY & NEUROLOGY

## 2020-01-02 PROCEDURE — 63600175 PHARM REV CODE 636 W HCPCS: Performed by: PHYSICIAN ASSISTANT

## 2020-01-02 PROCEDURE — 94640 AIRWAY INHALATION TREATMENT: CPT

## 2020-01-02 PROCEDURE — 63600175 PHARM REV CODE 636 W HCPCS

## 2020-01-02 PROCEDURE — 82803 BLOOD GASES ANY COMBINATION: CPT

## 2020-01-02 PROCEDURE — 99900035 HC TECH TIME PER 15 MIN (STAT)

## 2020-01-02 PROCEDURE — 94761 N-INVAS EAR/PLS OXIMETRY MLT: CPT

## 2020-01-02 PROCEDURE — 25000003 PHARM REV CODE 250: Performed by: NURSE PRACTITIONER

## 2020-01-02 PROCEDURE — 87070 CULTURE OTHR SPECIMN AEROBIC: CPT

## 2020-01-02 PROCEDURE — 85025 COMPLETE CBC W/AUTO DIFF WBC: CPT

## 2020-01-02 PROCEDURE — 36600 WITHDRAWAL OF ARTERIAL BLOOD: CPT

## 2020-01-02 PROCEDURE — 25000242 PHARM REV CODE 250 ALT 637 W/ HCPCS: Performed by: INTERNAL MEDICINE

## 2020-01-02 PROCEDURE — 81001 URINALYSIS AUTO W/SCOPE: CPT

## 2020-01-02 PROCEDURE — 94668 MNPJ CHEST WALL SBSQ: CPT

## 2020-01-02 PROCEDURE — 94002 VENT MGMT INPAT INIT DAY: CPT

## 2020-01-02 PROCEDURE — 84100 ASSAY OF PHOSPHORUS: CPT

## 2020-01-02 PROCEDURE — 87205 SMEAR GRAM STAIN: CPT

## 2020-01-02 PROCEDURE — 80053 COMPREHEN METABOLIC PANEL: CPT

## 2020-01-02 PROCEDURE — 27200966 HC CLOSED SUCTION SYSTEM

## 2020-01-02 PROCEDURE — 99900026 HC AIRWAY MAINTENANCE (STAT)

## 2020-01-02 PROCEDURE — 31500 INSERT EMERGENCY AIRWAY: CPT

## 2020-01-02 PROCEDURE — 20000000 HC ICU ROOM

## 2020-01-02 PROCEDURE — 25000003 PHARM REV CODE 250: Performed by: PHYSICIAN ASSISTANT

## 2020-01-02 PROCEDURE — 36620 INSERTION CATHETER ARTERY: CPT | Mod: ,,, | Performed by: PHYSICIAN ASSISTANT

## 2020-01-02 PROCEDURE — 99291 CRITICAL CARE FIRST HOUR: CPT | Mod: GC,,, | Performed by: PSYCHIATRY & NEUROLOGY

## 2020-01-02 PROCEDURE — 63600175 PHARM REV CODE 636 W HCPCS: Performed by: PSYCHIATRY & NEUROLOGY

## 2020-01-02 PROCEDURE — 87040 BLOOD CULTURE FOR BACTERIA: CPT

## 2020-01-02 PROCEDURE — 99291 PR CRITICAL CARE, E/M 30-74 MINUTES: ICD-10-PCS | Mod: GC,,, | Performed by: PSYCHIATRY & NEUROLOGY

## 2020-01-02 PROCEDURE — 83735 ASSAY OF MAGNESIUM: CPT

## 2020-01-02 RX ORDER — PROPOFOL 10 MG/ML
INJECTION, EMULSION INTRAVENOUS
Status: COMPLETED
Start: 2020-01-02 | End: 2020-01-02

## 2020-01-02 RX ORDER — VANCOMYCIN 2 GRAM/500 ML IN 0.9 % SODIUM CHLORIDE INTRAVENOUS
2000 ONCE
Status: COMPLETED | OUTPATIENT
Start: 2020-01-02 | End: 2020-01-02

## 2020-01-02 RX ORDER — ETOMIDATE 2 MG/ML
30 INJECTION INTRAVENOUS ONCE
Status: COMPLETED | OUTPATIENT
Start: 2020-01-02 | End: 2020-01-02

## 2020-01-02 RX ORDER — CHLORHEXIDINE GLUCONATE ORAL RINSE 1.2 MG/ML
15 SOLUTION DENTAL 2 TIMES DAILY
Status: DISCONTINUED | OUTPATIENT
Start: 2020-01-02 | End: 2020-01-10 | Stop reason: HOSPADM

## 2020-01-02 RX ORDER — FENTANYL CITRATE 50 UG/ML
50 INJECTION, SOLUTION INTRAMUSCULAR; INTRAVENOUS ONCE
Status: COMPLETED | OUTPATIENT
Start: 2020-01-02 | End: 2020-01-02

## 2020-01-02 RX ORDER — ROCURONIUM BROMIDE 10 MG/ML
100 INJECTION, SOLUTION INTRAVENOUS ONCE
Status: COMPLETED | OUTPATIENT
Start: 2020-01-02 | End: 2020-01-02

## 2020-01-02 RX ORDER — INSULIN ASPART 100 [IU]/ML
8 INJECTION, SOLUTION INTRAVENOUS; SUBCUTANEOUS EVERY 4 HOURS
Status: DISCONTINUED | OUTPATIENT
Start: 2020-01-02 | End: 2020-01-10 | Stop reason: HOSPADM

## 2020-01-02 RX ORDER — PROPOFOL 10 MG/ML
5 INJECTION, EMULSION INTRAVENOUS CONTINUOUS
Status: DISCONTINUED | OUTPATIENT
Start: 2020-01-02 | End: 2020-01-03

## 2020-01-02 RX ORDER — VANCOMYCIN 1.75 GRAM/500 ML IN 0.9 % SODIUM CHLORIDE INTRAVENOUS
1750
Status: DISCONTINUED | OUTPATIENT
Start: 2020-01-03 | End: 2020-01-02

## 2020-01-02 RX ORDER — FAMOTIDINE 20 MG/1
20 TABLET, FILM COATED ORAL 2 TIMES DAILY
Status: DISCONTINUED | OUTPATIENT
Start: 2020-01-02 | End: 2020-01-09

## 2020-01-02 RX ADMIN — INSULIN DETEMIR 15 UNITS: 100 INJECTION, SOLUTION SUBCUTANEOUS at 08:01

## 2020-01-02 RX ADMIN — IPRATROPIUM BROMIDE AND ALBUTEROL SULFATE 3 ML: .5; 3 SOLUTION RESPIRATORY (INHALATION) at 04:01

## 2020-01-02 RX ADMIN — INSULIN ASPART 6 UNITS: 100 INJECTION, SOLUTION INTRAVENOUS; SUBCUTANEOUS at 09:01

## 2020-01-02 RX ADMIN — FENTANYL CITRATE 50 MCG: 50 INJECTION INTRAMUSCULAR; INTRAVENOUS at 05:01

## 2020-01-02 RX ADMIN — INSULIN DETEMIR 15 UNITS: 100 INJECTION, SOLUTION SUBCUTANEOUS at 09:01

## 2020-01-02 RX ADMIN — INSULIN ASPART 4 UNITS: 100 INJECTION, SOLUTION INTRAVENOUS; SUBCUTANEOUS at 05:01

## 2020-01-02 RX ADMIN — IPRATROPIUM BROMIDE AND ALBUTEROL SULFATE 3 ML: .5; 3 SOLUTION RESPIRATORY (INHALATION) at 12:01

## 2020-01-02 RX ADMIN — IPRATROPIUM BROMIDE AND ALBUTEROL SULFATE 3 ML: .5; 3 SOLUTION RESPIRATORY (INHALATION) at 03:01

## 2020-01-02 RX ADMIN — INSULIN ASPART 8 UNITS: 100 INJECTION, SOLUTION INTRAVENOUS; SUBCUTANEOUS at 09:01

## 2020-01-02 RX ADMIN — HEPARIN SODIUM 5000 UNITS: 5000 INJECTION, SOLUTION INTRAVENOUS; SUBCUTANEOUS at 05:01

## 2020-01-02 RX ADMIN — FENTANYL CITRATE 50 MCG: 50 INJECTION INTRAMUSCULAR; INTRAVENOUS at 03:01

## 2020-01-02 RX ADMIN — INSULIN ASPART 4 UNITS: 100 INJECTION, SOLUTION INTRAVENOUS; SUBCUTANEOUS at 09:01

## 2020-01-02 RX ADMIN — INSULIN ASPART 6 UNITS: 100 INJECTION, SOLUTION INTRAVENOUS; SUBCUTANEOUS at 05:01

## 2020-01-02 RX ADMIN — ACETAMINOPHEN 650 MG: 325 TABLET ORAL at 04:01

## 2020-01-02 RX ADMIN — INSULIN ASPART 8 UNITS: 100 INJECTION, SOLUTION INTRAVENOUS; SUBCUTANEOUS at 05:01

## 2020-01-02 RX ADMIN — ETOMIDATE 30 MG: 2 INJECTION INTRAVENOUS at 03:01

## 2020-01-02 RX ADMIN — CHLORHEXIDINE GLUCONATE 0.12% ORAL RINSE 15 ML: 1.2 LIQUID ORAL at 09:01

## 2020-01-02 RX ADMIN — IPRATROPIUM BROMIDE AND ALBUTEROL SULFATE 3 ML: .5; 3 SOLUTION RESPIRATORY (INHALATION) at 07:01

## 2020-01-02 RX ADMIN — PROPOFOL 5 MCG/KG/MIN: 10 INJECTION, EMULSION INTRAVENOUS at 03:01

## 2020-01-02 RX ADMIN — PIPERACILLIN AND TAZOBACTAM 4.5 G: 4; .5 INJECTION, POWDER, FOR SOLUTION INTRAVENOUS at 04:01

## 2020-01-02 RX ADMIN — HYDRALAZINE HYDROCHLORIDE 10 MG: 20 INJECTION INTRAMUSCULAR; INTRAVENOUS at 05:01

## 2020-01-02 RX ADMIN — HEPARIN SODIUM 5000 UNITS: 5000 INJECTION, SOLUTION INTRAVENOUS; SUBCUTANEOUS at 09:01

## 2020-01-02 RX ADMIN — VANCOMYCIN HYDROCHLORIDE 2000 MG: 100 INJECTION, POWDER, LYOPHILIZED, FOR SOLUTION INTRAVENOUS at 04:01

## 2020-01-02 RX ADMIN — INSULIN ASPART 6 UNITS: 100 INJECTION, SOLUTION INTRAVENOUS; SUBCUTANEOUS at 01:01

## 2020-01-02 RX ADMIN — SENNOSIDES AND DOCUSATE SODIUM 1 TABLET: 8.6; 5 TABLET ORAL at 08:01

## 2020-01-02 RX ADMIN — CLOPIDOGREL BISULFATE 75 MG: 75 TABLET ORAL at 08:01

## 2020-01-02 RX ADMIN — PROPOFOL 40 MCG/KG/MIN: 10 INJECTION, EMULSION INTRAVENOUS at 10:01

## 2020-01-02 RX ADMIN — ROCURONIUM BROMIDE 100 MG: 10 INJECTION, SOLUTION INTRAVENOUS at 03:01

## 2020-01-02 RX ADMIN — IPRATROPIUM BROMIDE AND ALBUTEROL SULFATE 3 ML: .5; 3 SOLUTION RESPIRATORY (INHALATION) at 01:01

## 2020-01-02 RX ADMIN — SENNOSIDES AND DOCUSATE SODIUM 1 TABLET: 8.6; 5 TABLET ORAL at 09:01

## 2020-01-02 RX ADMIN — HEPARIN SODIUM 5000 UNITS: 5000 INJECTION, SOLUTION INTRAVENOUS; SUBCUTANEOUS at 01:01

## 2020-01-02 RX ADMIN — AMLODIPINE BESYLATE 10 MG: 10 TABLET ORAL at 08:01

## 2020-01-02 RX ADMIN — IPRATROPIUM BROMIDE AND ALBUTEROL SULFATE 3 ML: .5; 3 SOLUTION RESPIRATORY (INHALATION) at 11:01

## 2020-01-02 RX ADMIN — ACETAMINOPHEN 650 MG: 325 TABLET ORAL at 09:01

## 2020-01-02 RX ADMIN — INSULIN ASPART 2 UNITS: 100 INJECTION, SOLUTION INTRAVENOUS; SUBCUTANEOUS at 01:01

## 2020-01-02 RX ADMIN — FAMOTIDINE 20 MG: 20 TABLET ORAL at 09:01

## 2020-01-02 RX ADMIN — IPRATROPIUM BROMIDE AND ALBUTEROL SULFATE 3 ML: .5; 3 SOLUTION RESPIRATORY (INHALATION) at 08:01

## 2020-01-02 RX ADMIN — POLYETHYLENE GLYCOL 3350 17 G: 17 POWDER, FOR SOLUTION ORAL at 08:01

## 2020-01-02 NOTE — PT/OT/SLP PROGRESS
Physical Therapy      Patient Name:  Jerry Linder   MRN:  [id number]    PT POC and established goals reviewed; remain appropriate. Will follow-up tomorrow, 1/3.    Kerline Palmer, PT   01/02/2020

## 2020-01-02 NOTE — ASSESSMENT & PLAN NOTE
- Initially intubated for decreased mental status though not able to be extubated due to respiratory failure with hypoxia (POA)  - patient has been tenuous respiratory status every since extubation   - extubated 12/29/19  - duonebs q4h; CPT q6h  On high flow 15 L and 50-60% FiO2  Intubated today 01/02/2020 for aspiration risk and airway protection  I do not feel strongly that this patient will succeed without trach and peg for a short while  Discussed with family  Vent Mode: A/C  Oxygen Concentration (%):  [] 50  Resp Rate Total:  [21 br/min-22 br/min] 22 br/min  Vt Set:  [420 mL] 420 mL  PEEP/CPAP:  [5 cmH20] 5 cmH20  Pressure Support:  [0 cmH20] 0 cmH20  Mean Airway Pressure:  [8.5 cmH20] 8.5 cmH20

## 2020-01-02 NOTE — PLAN OF CARE
POC reviewed with pt at 0500. Pt verbalized understanding. Questions and concerns addressed. No acute events overnight. Aerosol mask continued at 40%. Pt progressing toward goals. Will continue to monitor. See flowsheets for full assessment and VS info

## 2020-01-02 NOTE — PROGRESS NOTES
Pharmacokinetic Initial Assessment: IV Vancomycin    Assessment/Plan:  · Initiate intravenous vancomycin with loading dose of 2,000 mg once followed by a maintenance dose of vancomycin 1,250 mg IV every 12 hours.  · Desired empiric serum trough concentration is 15 to 20 mcg/mL  · Draw vancomycin trough level 30 min prior to third dose on 1/3 at approximately 1615    Pharmacy will continue to follow and monitor vancomycin.  Please contact pharmacy with any questions regarding this assessment.     Thank you for the consult,   Pily Douglass, PharmD, BCCCP  Neurocritical Care Clinical Pharmacist  Spectralink: [id number]  _____________________________________________________________________________________________________________     Patient brief summary:  Jerry Linder is a 79 y.o. male initiated on antimicrobial therapy with IV Vancomycin for treatment of suspected lower respiratory infection    Drug Allergies:   Review of patient's allergies indicates:  No Known Allergies    Actual Body Weight:   123.4 kg    Renal Function:   Estimated Creatinine Clearance: 85.1 mL/min (based on SCr of 1 mg/dL).,     Dialysis Method (if applicable):  N/A    CBC (last 72 hours):  Recent Labs   Lab Result Units 12/31/19  0401 01/01/20 0210 01/02/20  0224   WBC K/uL 9.89 8.27 12.61   Hemoglobin g/dL 13.4* 13.6* 13.7*   Hematocrit % 42.6 42.6 44.8   Platelets K/uL 295 307 288   Gran% % 60.0 60.7 69.5   Lymph% % 26.9 24.5 19.4   Mono% % 11.1 11.6 7.8   Eosinophil% % 1.1 2.4 2.4   Basophil% % 0.3 0.4 0.3   Differential Method  Automated Automated Automated       Metabolic Panel (last 72 hours):  Recent Labs   Lab Result Units 12/31/19  0502 01/01/20  0210 01/02/20  0224   Sodium mmol/L 149* 147* 150*   Potassium mmol/L 3.9 4.2 4.0   Chloride mmol/L 107 108 110   CO2 mmol/L 31* 30* 29   Glucose mg/dL 174* 234* 236*   BUN, Bld mg/dL 47* 44* 44*   Creatinine mg/dL 0.9 0.9 1.0   Albumin g/dL 2.9* 2.8* 2.8*   Total Bilirubin mg/dL 0.6 0.6 0.6    Alkaline Phosphatase U/L 168* 156* 157*   AST U/L 53* 47* 52*   ALT U/L 92* 82* 94*   Magnesium mg/dL 2.7* 2.7* 2.6   Phosphorus mg/dL 4.5 4.2 4.1       Drug levels (last 3 results):  No results for input(s): VANCOMYCINRA, VANCOMYCINPE, VANCOMYCINTR in the last 72 hours.    Microbiologic Results:  Microbiology Results (last 7 days)     Procedure Component Value Units Date/Time    Blood culture [[id number]]     Order Status:  No result Specimen:  Blood     Blood culture [[id number]]     Order Status:  No result Specimen:  Blood     Culture, Respiratory with Gram Stain [[id number]]     Order Status:  No result Specimen:  Respiratory from Sputum

## 2020-01-02 NOTE — CARE UPDATE
Pt intubated with size 8.0 ETT 25cm at the lips. Pt tolerated intubation well with no respiratory distress noted. Pt placed on documented ventilator settings. Will continue to monitor.

## 2020-01-02 NOTE — PROGRESS NOTES
Wound consult received on patient. 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 Recommend applying clear barrier cream with miconazole BID/prn. covidien moisture wicking pads in use.  Skin to sacrum and heels intact. Heel boots utilized. Patient on sizewise immerse surface.    Ana BRITT with [facility] approved recommendations.  Nursing to continue care. Wound care to follow prn.     01/02/20 1126        Wound 01/01/20 0924 Incontinence associated dermatitis Groin   Date First Assessed/Time First Assessed: 01/01/20 0924   Pre-existing: No  Primary Wound Type: Incontinence associated dermatitis  Location: Groin   Wound Image    Wound WDL ex   Drainage Amount None   Drainage Characteristics/Odor No odor   Appearance Red   Tissue loss description Not applicable   Periwound Area Intact;Satellite lesion   Skin Interventions   Pressure Reduction Devices positioning supports utilized;heel offloading device utilized;pressure-redistributing mattress utilized  (sizewise immerse)

## 2020-01-02 NOTE — PT/OT/SLP PROGRESS
Occupational Therapy      Patient Name:  Jerry Linder   MRN:  [id number]    OT to HOLD on this date.   Will follow up 1/3 as appropriate.    BRUCE Olivo  1/2/2020

## 2020-01-02 NOTE — PROGRESS NOTES
[facility]  Neurocritical Care  Progress Note    Admit Date: 12/16/2019  Service Date: 01/02/2020  Length of Stay: 17    Subjective:     Chief Complaint: Embolic stroke involving right middle cerebral artery    History of Present Illness: The patient is a 79 y.o. male who  was admitted as a transfer from OSH for Right MCA stroke syndrome. Patient had an urgent thrombectomy s/p  right MCA stroke syndrome. PMH significant for HTN, T2DM and HLD. Patient stated that around 2:30 pm on 12/16/19 he was driving and felt weak. He drove home and was taken to the ER where he was evaluated for a stroke. He had outside CTA which was reported to have R ICA stenosis at bifurcation with 70% stenosis and right MCA occlusion. [place] [facility] [place] Patient had a right femoral sheath placed and his Angio  revealed 90% R ICA stenosis and R M1 occlusion. The R ICA was treated by thrombectomy and carotid stenting with TICI 3 reperfusion.   Patient was admitted to the [facility] for higher level of care post thrombectomy.      Hospital Course: 12/17/19: Patient admitted to [facility] for higher level of care s/p thrombectomy and right carotid artery stenting with TICI 3 reperfusion  12/18/2019Recent MRI with heme transformation abg Q8, repeat scan stable, mannitol  12/19/2019 intubation repeat CTH  12/20: febrile-broaden abx coverage, SBT, add hydralazine 50 q8h, KUB and mag citrate  12/21: wean FiO2, d/c hydralazine, wean fentanyl  12/22/2019: Patient stable overnight. Lasix 20mg x 1 given today. Enteral water flushes started for hypernatremia.    12/23: SBT trial, started Plavix today, initiated Hydralazine 25 TID, lasix 40 mg X1, decrease statin to 40 mg daily (transaminitis -trending down), add psyllium, Na BID -goal 145-150, initiated sq heparin  12/24/2019: SBT failed, wean fio2 as tolerated, CXR responded to lasix, scheduled BID  12/25/2019 continue lasix. SBT today. Adjust insulin  regimen  12/26/2019: Continue diuresis, SBT again today, wean PEEP and FiO2, wean nicardipine, hold statin for transaminits   12/27/2019: No significant events   12/28 No significant events over night.. Tolerating spon. Breathing trial, increased peep. No cuff leak, started decadron 8mg q8x3 doses and reassess in am for possible extubation. Insulin gtt started while on steroids, as BG running high. CPT added to resp. Treatments. Start TF.  12/29: extubated to room air following SBT  12/30: IS q 6, d/c whaley, d/c lasix, titrate insulin gtt, d/c hydralazine, KUB  12/31: cxr, trial of modafinil,  q 6, transition insulin  1/1: increase FWF, modafinil, insulin  1/2: intubated, titrate insulin, family discussion    Review of Symptoms:   Constitutional: Denies fevers or chills.  ENT: no hearing difficulty, no visual changes  Pulmonary: Denies shortness of breath or cough.  Cardiology: Denies chest pain or palpitations.  GI: Denies abdominal pain or constipation.  Neurologic: Denies new weakness, headaches, or paresthesias.   : no dysuria  Musk: no muscle pain, no joint pain  Psych: no hallucinations     Physical Exam:  GA:  comfortable, no acute distress.   HEENT: No scleral icterus or JVD.   Pulmonary: Clear to auscultation A/L. ventimask,  Cardiac: RRR S1 & S2 w/o rubs/murmurs/gallops.   Abdominal: Bowel sounds present x 4. No appreciable hepatosplenomegaly.  Skin: No jaundice, rashes, or visible lesions.  Pulses: 1+ DP bilat     Neuro:  --sedation: none  --GCS: E4V4M6  --Mental Status: awake, slower to respond today, mouth open breathing  --CN II-XII grossly intact.   --Pupils 3-->2mm, PERRL.   --brainstem: intact  --Motor: left flaccid, right no drift   --sensory: intact to soft touch and pain throughout  --Reflexes: not tested  --Gait: deferred    Recent Labs   Lab 01/02/20  0224   WBC 12.61   RBC 4.34*   HGB 13.7*   HCT 44.8      *   MCH 31.6*   MCHC 30.6*     Recent Labs   Lab 01/02/20  0225    CALCIUM 9.3   PROT 7.0   *   K 4.0   CO2 29      BUN 44*   CREATININE 1.0   ALKPHOS 157*   ALT 94*   AST 52*   BILITOT 0.6     No results for input(s): PT, INR, APTT in the last 24 hours.  Vent Mode: A/C  Oxygen Concentration (%):  [] 50  Resp Rate Total:  [21 br/min-22 br/min] 22 br/min  Vt Set:  [420 mL] 420 mL  PEEP/CPAP:  [5 cmH20] 5 cmH20  Pressure Support:  [0 cmH20] 0 cmH20  Mean Airway Pressure:  [8.5 cmH20] 8.5 cmH20    Temp: (!) 103.2 °F (39.6 °C)(cooling blanket applied)  Pulse: 102  Rhythm: sinus tachycardia  BP: 128/60  MAP (mmHg): 87  Resp: (!) 22  SpO2: 100 %  Oxygen Concentration (%): 50  O2 Device (Oxygen Therapy): ventilator  Vent Mode: A/C  Set Rate: 20 bmp  Vt Set: 420 mL  Pressure Support: 0 cmH20  PEEP/CPAP: 5 cmH20  Peak Airway Pressure: 28 cmH2O  Mean Airway Pressure: 8.5 cmH20  Plateau Pressure: 0 cmH20    Temp  Min: 99 °F (37.2 °C)  Max: 103.2 °F (39.6 °C)  Pulse  Min: 98  Max: 118  BP  Min: 124/61  Max: 197/93  MAP (mmHg)  Min: 84  Max: 133  Resp  Min: 18  Max: 32  SpO2  Min: 93 %  Max: 100 %  Oxygen Concentration (%)  Min: 40  Max: 100    01/01 0701 - 01/02 0700  In: 2040   Out: -    Unmeasured Output  Urine Occurrence: 1  Stool Occurrence: 0  Pad Count: 1    Nutrition Prescription Ordered  Current Diet Order: NPO  Nutrition Order Comments: TF held  Current Nutrition Support Formula Ordered: Glucerna 1.5  Current Nutrition Support Rate Ordered: 55 (ml)  Current Nutrition Support Frequency Ordered: mL/hr  Last Bowel Movement: 12/31/19    Body mass index is 35.89 kg/m².      I have personally reviewed all labs, imaging, and studies today    Assessment/Plan:     Neuro  * Embolic stroke involving right middle cerebral artery  80 y/o male admitted for R MCA stroke syndrome s/p thrombectomy TICI 3 reperfusion and ABBY stenting, now s/p hemorrhagic transformation   - Was on DAPT following stenting, discontinued for hemorrhagic transformation  - currently on clopidogrel 75 mg  daily  - Follow up imaging has been stable, neuro exam remains stable   - Neurochecks q1hr  - extubated to room air 12/29  - Vascular Neurology following  - SBP Goal < 160  - PT/OT/SLP  - Mechanical SCDs      Midline shift of brain  - 2/2 IPH as noted on MRI imaging: mass effect on the right lateral ventricle and approximately 5 mm right-to-left midline shift  - Continue Neuro checks Q1 hr    Goal euthermia  Goal eunatremia  Goal euvolemia  Goal euglycemia      Vasogenic brain edema  - 2/2 IPH as noted on MRI: intraparenchymal hemorrhage within the right frontal and temporal lobes with associated vasogenic edema resulting in mass effect on the right lateral ventricle  - Neuro checks Q1 hr   Goal euthermia  Goal eunatremia  Goal euvolemia  Goal euglycemia      Pulmonary  Acute respiratory failure with hypoxia  - Initially intubated for decreased mental status though not able to be extubated due to respiratory failure with hypoxia (POA)  - patient has been tenuous respiratory status every since extubation   - extubated 12/29/19  - duonebs q4h; CPT q6h  On high flow 15 L and 50-60% FiO2  Intubated today 01/02/2020 for aspiration risk and airway protection  I do not feel strongly that this patient will succeed without trach and peg for a short while  Discussed with family  Vent Mode: A/C  Oxygen Concentration (%):  [] 50  Resp Rate Total:  [21 br/min-22 br/min] 22 br/min  Vt Set:  [420 mL] 420 mL  PEEP/CPAP:  [5 cmH20] 5 cmH20  Pressure Support:  [0 cmH20] 0 cmH20  Mean Airway Pressure:  [8.5 cmH20] 8.5 cmH20      Cardiac/Vascular  Mixed hyperlipidemia  - Hold statin 2/2 transaminitis     Essential hypertension  - Continuous Cardiac Monitoring  - Maintain SBP < 160  - Echo 65%  - Hydralazine 25 q8h  - Amlodipine 10 mg daily  - Furosemide 40 mg BID  - Prn hydralazine, labetalol    Hematology  Acute venous embolism and thrombosis of basilic vein, left  - Noted on LE US    Endocrine  Type 2 diabetes mellitus with  neurologic complication, without long-term current use of insulin  - A1c 7.7    - increase aspart to 8 q4  - POCT Q4    Other  Decreased strength, endurance, and mobility  - 2/2 LS Weakness R/T R MCA   - PT/OT         The patient is being Prophylaxed for:  Venous Thromboembolism with: Chemical  Stress Ulcer with: Not Applicable   Ventilator Pneumonia with: not applicable    Activity Orders          Diet NPO: NPO starting at 12/30 1003        Full Code    Jesus Valencia MD  Neurocritical Care  [facility]    32     minutes of Critical care time was spent personally by me on the following activities: development of treatment plan with patient or surrogate and bedside caregivers, discussions with consultants, evaluation of patient's response to treatment, examination of patient, ordering and performing treatments and interventions, ordering and review of laboratory studies, ordering and review of radiographic studies, pulse oximetry, antibiotic titration if applicable, vasopressor titration if applicable, re-evaluation of patient's condition. This critical care time did not overlap with that of any other provider or involve time for any procedures. There is potential for acute life threatening neurological and or medical decompensation therefore will continue to monitor closely. Current critical conditions posing threat to organ systems, limb, or life include: AIS, brain edema, ence[phalopathy, respiratory insufficiency, hyperglycemia, aspiration pneumonia

## 2020-01-02 NOTE — EICU
15:22 Called into room for emergent intubation. Dr Flores at bedside preparing to intubate /61, , SP02 93%  15:27 20mg etomidate given followed by 100mg rocuronium  15:28 10mg etomidate IVP SP02 100% HR 125bpm, /88  15:32 50 mcg fentanyl IVP, 8.0 ETT in place with positive color change SP02 97%

## 2020-01-02 NOTE — PT/OT/SLP PROGRESS
Speech Language Pathology      Jerry Linder  MRN: [id number]    Patient not seen today secondary to RN Hold.  Pt with tenuous respiratory status and not appropriate for therapy per RN. ST to continue to monitor and will follow-up per POC.     DUSTIN Youngblood., [facility]-SLP  Speech-Language Pathology  Pager: [phone]  1/2/2020

## 2020-01-03 LAB
ALBUMIN SERPL BCP-MCNC: 2.6 G/DL (ref 3.5–5.2)
ALLENS TEST: ABNORMAL
ALP SERPL-CCNC: 170 U/L (ref 55–135)
ALT SERPL W/O P-5'-P-CCNC: 74 U/L (ref 10–44)
ANION GAP SERPL CALC-SCNC: 9 MMOL/L (ref 8–16)
AST SERPL-CCNC: 34 U/L (ref 10–40)
BASOPHILS # BLD AUTO: 0.03 K/UL (ref 0–0.2)
BASOPHILS NFR BLD: 0.2 % (ref 0–1.9)
BILIRUB SERPL-MCNC: 0.8 MG/DL (ref 0.1–1)
BUN SERPL-MCNC: 46 MG/DL (ref 8–23)
CALCIUM SERPL-MCNC: 8.9 MG/DL (ref 8.7–10.5)
CHLORIDE SERPL-SCNC: 111 MMOL/L (ref 95–110)
CO2 SERPL-SCNC: 28 MMOL/L (ref 23–29)
CREAT SERPL-MCNC: 0.9 MG/DL (ref 0.5–1.4)
DELSYS: ABNORMAL
DIFFERENTIAL METHOD: ABNORMAL
EOSINOPHIL # BLD AUTO: 0.2 K/UL (ref 0–0.5)
EOSINOPHIL NFR BLD: 1.5 % (ref 0–8)
ERYTHROCYTE [DISTWIDTH] IN BLOOD BY AUTOMATED COUNT: 13.2 % (ref 11.5–14.5)
ERYTHROCYTE [SEDIMENTATION RATE] IN BLOOD BY WESTERGREN METHOD: 20 MM/H
EST. GFR  (AFRICAN AMERICAN): >60 ML/MIN/1.73 M^2
EST. GFR  (NON AFRICAN AMERICAN): >60 ML/MIN/1.73 M^2
FIO2: 50
GLUCOSE SERPL-MCNC: 248 MG/DL (ref 70–110)
HCO3 UR-SCNC: 28.2 MMOL/L (ref 24–28)
HCT VFR BLD AUTO: 43.9 % (ref 40–54)
HGB BLD-MCNC: 13.4 G/DL (ref 14–18)
IMM GRANULOCYTES # BLD AUTO: 0.06 K/UL (ref 0–0.04)
IMM GRANULOCYTES NFR BLD AUTO: 0.5 % (ref 0–0.5)
LYMPHOCYTES # BLD AUTO: 2.3 K/UL (ref 1–4.8)
LYMPHOCYTES NFR BLD: 17.5 % (ref 18–48)
MAGNESIUM SERPL-MCNC: 2.7 MG/DL (ref 1.6–2.6)
MCH RBC QN AUTO: 31.5 PG (ref 27–31)
MCHC RBC AUTO-ENTMCNC: 30.5 G/DL (ref 32–36)
MCV RBC AUTO: 103 FL (ref 82–98)
MODE: ABNORMAL
MONOCYTES # BLD AUTO: 0.9 K/UL (ref 0.3–1)
MONOCYTES NFR BLD: 6.6 % (ref 4–15)
NEUTROPHILS # BLD AUTO: 9.5 K/UL (ref 1.8–7.7)
NEUTROPHILS NFR BLD: 73.7 % (ref 38–73)
NRBC BLD-RTO: 0 /100 WBC
PCO2 BLDA: 44.2 MMHG (ref 35–45)
PEEP: 5
PH SMN: 7.41 [PH] (ref 7.35–7.45)
PHOSPHATE SERPL-MCNC: 3.5 MG/DL (ref 2.7–4.5)
PLATELET # BLD AUTO: 282 K/UL (ref 150–350)
PMV BLD AUTO: 11.5 FL (ref 9.2–12.9)
PO2 BLDA: 71 MMHG (ref 80–100)
POC BE: 4 MMOL/L
POC SATURATED O2: 94 % (ref 95–100)
POC TCO2: 30 MMOL/L (ref 23–27)
POCT GLUCOSE: 135 MG/DL (ref 70–110)
POCT GLUCOSE: 186 MG/DL (ref 70–110)
POCT GLUCOSE: 205 MG/DL (ref 70–110)
POCT GLUCOSE: 212 MG/DL (ref 70–110)
POCT GLUCOSE: 228 MG/DL (ref 70–110)
POCT GLUCOSE: 292 MG/DL (ref 70–110)
POTASSIUM SERPL-SCNC: 3.3 MMOL/L (ref 3.5–5.1)
POTASSIUM SERPL-SCNC: 4 MMOL/L (ref 3.5–5.1)
PROT SERPL-MCNC: 7.1 G/DL (ref 6–8.4)
RBC # BLD AUTO: 4.25 M/UL (ref 4.6–6.2)
SAMPLE: ABNORMAL
SITE: ABNORMAL
SODIUM SERPL-SCNC: 148 MMOL/L (ref 136–145)
SP02: 100
VT: 420
WBC # BLD AUTO: 12.92 K/UL (ref 3.9–12.7)

## 2020-01-03 PROCEDURE — 63600175 PHARM REV CODE 636 W HCPCS: Performed by: PSYCHIATRY & NEUROLOGY

## 2020-01-03 PROCEDURE — 94640 AIRWAY INHALATION TREATMENT: CPT

## 2020-01-03 PROCEDURE — 25000003 PHARM REV CODE 250: Performed by: PSYCHIATRY & NEUROLOGY

## 2020-01-03 PROCEDURE — 63600175 PHARM REV CODE 636 W HCPCS: Performed by: NURSE PRACTITIONER

## 2020-01-03 PROCEDURE — 25000003 PHARM REV CODE 250: Performed by: PHYSICIAN ASSISTANT

## 2020-01-03 PROCEDURE — 84132 ASSAY OF SERUM POTASSIUM: CPT

## 2020-01-03 PROCEDURE — 99900035 HC TECH TIME PER 15 MIN (STAT)

## 2020-01-03 PROCEDURE — 97110 THERAPEUTIC EXERCISES: CPT

## 2020-01-03 PROCEDURE — 99900026 HC AIRWAY MAINTENANCE (STAT)

## 2020-01-03 PROCEDURE — 80053 COMPREHEN METABOLIC PANEL: CPT

## 2020-01-03 PROCEDURE — 94761 N-INVAS EAR/PLS OXIMETRY MLT: CPT

## 2020-01-03 PROCEDURE — 20000000 HC ICU ROOM

## 2020-01-03 PROCEDURE — 25000242 PHARM REV CODE 250 ALT 637 W/ HCPCS: Performed by: INTERNAL MEDICINE

## 2020-01-03 PROCEDURE — 63600175 PHARM REV CODE 636 W HCPCS: Performed by: PHYSICIAN ASSISTANT

## 2020-01-03 PROCEDURE — 85025 COMPLETE CBC W/AUTO DIFF WBC: CPT

## 2020-01-03 PROCEDURE — 94668 MNPJ CHEST WALL SBSQ: CPT

## 2020-01-03 PROCEDURE — 99291 CRITICAL CARE FIRST HOUR: CPT | Mod: GC,,, | Performed by: PSYCHIATRY & NEUROLOGY

## 2020-01-03 PROCEDURE — 27000221 HC OXYGEN, UP TO 24 HOURS

## 2020-01-03 PROCEDURE — 99291 PR CRITICAL CARE, E/M 30-74 MINUTES: ICD-10-PCS | Mod: GC,,, | Performed by: PSYCHIATRY & NEUROLOGY

## 2020-01-03 PROCEDURE — 82803 BLOOD GASES ANY COMBINATION: CPT

## 2020-01-03 PROCEDURE — 25000003 PHARM REV CODE 250: Performed by: STUDENT IN AN ORGANIZED HEALTH CARE EDUCATION/TRAINING PROGRAM

## 2020-01-03 PROCEDURE — 37799 UNLISTED PX VASCULAR SURGERY: CPT

## 2020-01-03 PROCEDURE — 27200966 HC CLOSED SUCTION SYSTEM

## 2020-01-03 PROCEDURE — 94003 VENT MGMT INPAT SUBQ DAY: CPT

## 2020-01-03 PROCEDURE — 84100 ASSAY OF PHOSPHORUS: CPT

## 2020-01-03 PROCEDURE — 25000003 PHARM REV CODE 250: Performed by: NURSE PRACTITIONER

## 2020-01-03 PROCEDURE — 51702 INSERT TEMP BLADDER CATH: CPT

## 2020-01-03 PROCEDURE — 83735 ASSAY OF MAGNESIUM: CPT

## 2020-01-03 RX ORDER — PROPOFOL 10 MG/ML
5 INJECTION, EMULSION INTRAVENOUS CONTINUOUS
Status: DISCONTINUED | OUTPATIENT
Start: 2020-01-03 | End: 2020-01-10

## 2020-01-03 RX ADMIN — IPRATROPIUM BROMIDE AND ALBUTEROL SULFATE 3 ML: .5; 3 SOLUTION RESPIRATORY (INHALATION) at 03:01

## 2020-01-03 RX ADMIN — IPRATROPIUM BROMIDE AND ALBUTEROL SULFATE 3 ML: .5; 3 SOLUTION RESPIRATORY (INHALATION) at 12:01

## 2020-01-03 RX ADMIN — VANCOMYCIN HYDROCHLORIDE 1250 MG: 1.25 INJECTION, POWDER, LYOPHILIZED, FOR SOLUTION INTRAVENOUS at 04:01

## 2020-01-03 RX ADMIN — PIPERACILLIN AND TAZOBACTAM 4.5 G: 4; .5 INJECTION, POWDER, FOR SOLUTION INTRAVENOUS at 08:01

## 2020-01-03 RX ADMIN — PIPERACILLIN AND TAZOBACTAM 4.5 G: 4; .5 INJECTION, POWDER, FOR SOLUTION INTRAVENOUS at 12:01

## 2020-01-03 RX ADMIN — INSULIN ASPART 8 UNITS: 100 INJECTION, SOLUTION INTRAVENOUS; SUBCUTANEOUS at 06:01

## 2020-01-03 RX ADMIN — INSULIN ASPART 8 UNITS: 100 INJECTION, SOLUTION INTRAVENOUS; SUBCUTANEOUS at 02:01

## 2020-01-03 RX ADMIN — POTASSIUM CHLORIDE 40 MEQ: 20 SOLUTION ORAL at 08:01

## 2020-01-03 RX ADMIN — MICONAZOLE NITRATE: 20 OINTMENT TOPICAL at 08:01

## 2020-01-03 RX ADMIN — HEPARIN SODIUM 5000 UNITS: 5000 INJECTION, SOLUTION INTRAVENOUS; SUBCUTANEOUS at 02:01

## 2020-01-03 RX ADMIN — POTASSIUM CHLORIDE 20 MEQ: 20 SOLUTION ORAL at 06:01

## 2020-01-03 RX ADMIN — CHLORHEXIDINE GLUCONATE 0.12% ORAL RINSE 15 ML: 1.2 LIQUID ORAL at 08:01

## 2020-01-03 RX ADMIN — CHLORHEXIDINE GLUCONATE 0.12% ORAL RINSE 15 ML: 1.2 LIQUID ORAL at 09:01

## 2020-01-03 RX ADMIN — PIPERACILLIN AND TAZOBACTAM 4.5 G: 4; .5 INJECTION, POWDER, FOR SOLUTION INTRAVENOUS at 04:01

## 2020-01-03 RX ADMIN — FENTANYL CITRATE 50 MCG: 50 INJECTION INTRAMUSCULAR; INTRAVENOUS at 04:01

## 2020-01-03 RX ADMIN — IPRATROPIUM BROMIDE AND ALBUTEROL SULFATE 3 ML: .5; 3 SOLUTION RESPIRATORY (INHALATION) at 07:01

## 2020-01-03 RX ADMIN — AMLODIPINE BESYLATE 10 MG: 10 TABLET ORAL at 08:01

## 2020-01-03 RX ADMIN — INSULIN DETEMIR 15 UNITS: 100 INJECTION, SOLUTION SUBCUTANEOUS at 08:01

## 2020-01-03 RX ADMIN — CLOPIDOGREL BISULFATE 75 MG: 75 TABLET ORAL at 08:01

## 2020-01-03 RX ADMIN — INSULIN ASPART 4 UNITS: 100 INJECTION, SOLUTION INTRAVENOUS; SUBCUTANEOUS at 02:01

## 2020-01-03 RX ADMIN — HEPARIN SODIUM 5000 UNITS: 5000 INJECTION, SOLUTION INTRAVENOUS; SUBCUTANEOUS at 06:01

## 2020-01-03 RX ADMIN — SENNOSIDES AND DOCUSATE SODIUM 1 TABLET: 8.6; 5 TABLET ORAL at 08:01

## 2020-01-03 RX ADMIN — HEPARIN SODIUM 5000 UNITS: 5000 INJECTION, SOLUTION INTRAVENOUS; SUBCUTANEOUS at 09:01

## 2020-01-03 RX ADMIN — MICONAZOLE NITRATE: 20 OINTMENT TOPICAL at 09:01

## 2020-01-03 RX ADMIN — FAMOTIDINE 20 MG: 20 TABLET ORAL at 09:01

## 2020-01-03 RX ADMIN — INSULIN ASPART 4 UNITS: 100 INJECTION, SOLUTION INTRAVENOUS; SUBCUTANEOUS at 09:01

## 2020-01-03 RX ADMIN — INSULIN ASPART 8 UNITS: 100 INJECTION, SOLUTION INTRAVENOUS; SUBCUTANEOUS at 09:01

## 2020-01-03 RX ADMIN — INSULIN ASPART 2 UNITS: 100 INJECTION, SOLUTION INTRAVENOUS; SUBCUTANEOUS at 06:01

## 2020-01-03 RX ADMIN — INSULIN DETEMIR 18 UNITS: 100 INJECTION, SOLUTION SUBCUTANEOUS at 09:01

## 2020-01-03 RX ADMIN — SENNOSIDES AND DOCUSATE SODIUM 1 TABLET: 8.6; 5 TABLET ORAL at 09:01

## 2020-01-03 RX ADMIN — FAMOTIDINE 20 MG: 20 TABLET ORAL at 08:01

## 2020-01-03 RX ADMIN — IPRATROPIUM BROMIDE AND ALBUTEROL SULFATE 3 ML: .5; 3 SOLUTION RESPIRATORY (INHALATION) at 11:01

## 2020-01-03 RX ADMIN — POLYETHYLENE GLYCOL 3350 17 G: 17 POWDER, FOR SOLUTION ORAL at 08:01

## 2020-01-03 RX ADMIN — PROPOFOL 50 MCG/KG/MIN: 10 INJECTION, EMULSION INTRAVENOUS at 06:01

## 2020-01-03 RX ADMIN — PROPOFOL 50 MCG/KG/MIN: 10 INJECTION, EMULSION INTRAVENOUS at 02:01

## 2020-01-03 RX ADMIN — INSULIN ASPART 8 UNITS: 100 INJECTION, SOLUTION INTRAVENOUS; SUBCUTANEOUS at 10:01

## 2020-01-03 NOTE — PT/OT/SLP PROGRESS
Physical Therapy      Patient Name:  Jerry Linder   MRN:  [id number]    Patient not seen today secondary to patient was intubated yesterday, 1/3. At this time, he is appropriate for 1 therapy discipline; OT will follow. Will follow-up when medically appropriate for 2 therapy disciplines.    Kerline Palmer, PT   01/03/2020

## 2020-01-03 NOTE — PT/OT/SLP PROGRESS
Speech Language Pathology  Discharge    Jerry Linder  MRN: [id number]    Patient not seen today secondary to pt intubated at this time. Please re-consult when medically appropriate. No further ST warranted at this time.     Sheela Bunch CCC-SLP  1/3/2020

## 2020-01-03 NOTE — ASSESSMENT & PLAN NOTE
- Initially intubated for decreased mental status though not able to be extubated due to respiratory failure with hypoxia (POA)  - patient has been tenuous respiratory status every since extubation   - extubated 12/29/19  - duonebs q4h; CPT q6h  On high flow 15 L and 50-60% FiO2  Intubated today 01/03/2020 for aspiration risk and airway protection  I do not feel strongly that this patient will succeed without trach and peg for a short while  Discussed with family  Vent Mode: A/C  Oxygen Concentration (%):  [50] 50  Resp Rate Total:  [20 br/min-51 br/min] 29 br/min  Vt Set:  [420 mL] 420 mL  PEEP/CPAP:  [5 cmH20] 5 cmH20  Pressure Support:  [0 cmH20] 0 cmH20  Mean Airway Pressure:  [7.8 riZ19-80 cmH20] 8.5 cmH20

## 2020-01-03 NOTE — PLAN OF CARE
Bed level exercises and positioning completed on this date. Plan of care discussed with pt's wife and family at bedside. BRUCE Olivo 1/3/2020   Problem: Occupational Therapy Goal  Goal: Occupational Therapy Goal  Description  Goals to be met by: 1/17     Patient will increase functional independence with ADLs by performing:    Grooming while EOB with Moderate Assistance.  Sitting at edge of bed x10 minutes for functional task with Maximum Assistance. progressing  Rolling to Bilateral with Maximum Assistance and use of bedrail as needed.   Supine to sit with Maximum Assistance.  Squat pivot transfers with Maximum Assistance.  Pt and CG verbalized understanding for s/s of stroke with teach back.   Pt and CG demo understanding for L UE positioning and ROM with teach back 2/2 sessions.       Outcome: Ongoing, Progressing

## 2020-01-03 NOTE — PROGRESS NOTES
[facility]  Neurocritical Care  Progress Note    Admit Date: 12/16/2019  Service Date: 01/03/2020  Length of Stay: 18    Subjective:     Chief Complaint: Embolic stroke involving right middle cerebral artery    History of Present Illness: The patient is a 79 y.o. male who  was admitted as a transfer from OSH for Right MCA stroke syndrome. Patient had an urgent thrombectomy s/p  right MCA stroke syndrome. PMH significant for HTN, T2DM and HLD. Patient stated that around 2:30 pm on 12/16/19 he was driving and felt weak. He drove home and was taken to the ER where he was evaluated for a stroke. He had outside CTA which was reported to have R ICA stenosis at bifurcation with 70% stenosis and right MCA occlusion. Patient was transferred to [facility] for possible intervention. Patient had a right femoral sheath placed and his Angio  revealed 90% R ICA stenosis and R M1 occlusion. The R ICA was treated by thrombectomy and carotid stenting with TICI 3 reperfusion.   Patient was admitted to the [facility] for higher level of care post thrombectomy.      Hospital Course: 12/17/19: Patient admitted to [facility] for higher level of care s/p thrombectomy and right carotid artery stenting with TICI 3 reperfusion  12/18/2019Recent MRI with heme transformation abg Q8, repeat scan stable, mannitol  12/19/2019 intubation repeat CTH  12/20: febrile-broaden abx coverage, SBT, add hydralazine 50 q8h, KUB and mag citrate  12/21: wean FiO2, d/c hydralazine, wean fentanyl  12/22/2019: Patient stable overnight. Lasix 20mg x 1 given today. Enteral water flushes started for hypernatremia.    12/23: SBT trial, started Plavix today, initiated Hydralazine 25 TID, lasix 40 mg X1, decrease statin to 40 mg daily (transaminitis -trending down), add psyllium, Na BID -goal 145-150, initiated sq heparin  12/24/2019: SBT failed, wean fio2 as tolerated, CXR responded to lasix, scheduled BID  12/25/2019 continue lasix. SBT today. Adjust insulin  regimen  12/26/2019: Continue diuresis, SBT again today, wean PEEP and FiO2, wean nicardipine, hold statin for transaminits   12/27/2019: No significant events   12/28 No significant events over night.. Tolerating spon. Breathing trial, increased peep. No cuff leak, started decadron 8mg q8x3 doses and reassess in am for possible extubation. Insulin gtt started while on steroids, as BG running high. CPT added to resp. Treatments. Start TF.  12/29: extubated to room air following SBT  12/30: IS q 6, d/c whaley, d/c lasix, titrate insulin gtt, d/c hydralazine, KUB  12/31: cxr, trial of modafinil,  q 6, transition insulin  1/1: increase FWF, modafinil, insulin  1/2: intubated, titrate insulin, family discussion  1/3: CT head in AM, cxr, kub, increase insulin, family updated    Review of Symptoms:   Unable to obtain, intubated, neuro-exam    Physical Exam:  GA: comfortable, no acute distress.   HEENT: No scleral icterus or JVD.   Pulmonary: Clear to auscultation A/L. No wheezing, crackles, or rhonchi. intubated  Cardiac: RRR S1 & S2 w/o rubs/murmurs/gallops.   Abdominal: Bowel sounds present x 4. No appreciable hepatosplenomegaly.  Skin: No jaundice, rashes, or visible lesions.  Pulses: 1+ Dp bilat    Neuro:  --sedation: fentanyl  --GCS: E6N8xN3  --Mental Status: sedated   --CN II-XII grossly intact.   --Pupils 3-->2mm, PERRL.   --brainstem: intact  --Motor: right side withdrawal to deep pain, left upper flaccid, left lower intermittent withdrawal  --sensory: see motor  --Reflexes: not tested  --Gait: deferred    Recent Labs   Lab 01/03/20 0218   WBC 12.92*   RBC 4.25*   HGB 13.4*   HCT 43.9      *   MCH 31.5*   MCHC 30.5*     Recent Labs   Lab 01/03/20 0218 01/03/20  1157   CALCIUM 8.9  --    PROT 7.1  --    *  --    K 3.3* 4.0   CO2 28  --    *  --    BUN 46*  --    CREATININE 0.9  --    ALKPHOS 170*  --    ALT 74*  --    AST 34  --    BILITOT 0.8  --      No results for input(s): PT,  INR, APTT in the last 24 hours.  Vent Mode: A/C  Oxygen Concentration (%):  [50] 50  Resp Rate Total:  [20 br/min-51 br/min] 29 br/min  Vt Set:  [420 mL] 420 mL  PEEP/CPAP:  [5 cmH20] 5 cmH20  Pressure Support:  [0 cmH20] 0 cmH20  Mean Airway Pressure:  [7.8 ouK28-76 cmH20] 8.5 cmH20    Temp: 98.9 °F (37.2 °C)  Pulse: 90  Rhythm: normal sinus rhythm  BP: 139/63  MAP (mmHg): 90  Resp: (!) 29  SpO2: 100 %  Oxygen Concentration (%): 50  O2 Device (Oxygen Therapy): ventilator  Vent Mode: A/C  Set Rate: 20 bmp  Vt Set: 420 mL  Pressure Support: 0 cmH20  PEEP/CPAP: 5 cmH20  Peak Airway Pressure: 16 cmH2O  Mean Airway Pressure: 8.5 cmH20  Plateau Pressure: 0 cmH20    Temp  Min: 97.8 °F (36.6 °C)  Max: 100.6 °F (38.1 °C)  Pulse  Min: 77  Max: 106  BP  Min: 93/54  Max: 139/63  MAP (mmHg)  Min: 69  Max: 90  Resp  Min: 5  Max: 33  SpO2  Min: 96 %  Max: 100 %  Oxygen Concentration (%)  Min: 50  Max: 50    01/02 0701 - 01/03 0700  In: 3259.7 [I.V.:474.7]  Out: 2450 [Urine:2450]   Unmeasured Output  Urine Occurrence: 1  Stool Occurrence: 0  Pad Count: 1    Nutrition Prescription Ordered  Current Diet Order: NPO  Nutrition Order Comments: TF held  Current Nutrition Support Formula Ordered: Glucerna 1.5  Current Nutrition Support Rate Ordered: 55 (ml)  Current Nutrition Support Frequency Ordered: mL/hr  Last Bowel Movement: 12/31/19    Body mass index is 35.89 kg/m².      I have personally reviewed all labs, imaging, and studies today      Assessment/Plan:     Neuro  * Embolic stroke involving right middle cerebral artery  80 y/o male admitted for R MCA stroke syndrome s/p thrombectomy TICI 3 reperfusion and ABBY stenting, now s/p hemorrhagic transformation   - Was on DAPT following stenting, discontinued for hemorrhagic transformation  - currently on clopidogrel 75 mg daily  - Follow up imaging has been stable, neuro exam remains stable   - Neurochecks q1hr  - extubated to room air 12/29- reintubated 1/2/2020  - Vascular Neurology  following  - SBP Goal < 160  - PT/OT/SLP  - Mechanical SCDs      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, right  Severe R ICA stenosis noted on IR angiogram   - Stent placed   - Clopidogrel daily    Midline shift of brain  - 2/2 IPH as noted on MRI imaging: mass effect on the right lateral ventricle and approximately 5 mm right-to-left midline shift  - Continue Neuro checks Q1 hr    Goal euthermia  Goal eunatremia  Goal euvolemia  Goal euglycemia      Vasogenic brain edema  - 2/2 IPH as noted on MRI: intraparenchymal hemorrhage within the right frontal and temporal lobes with associated vasogenic edema resulting in mass effect on the right lateral ventricle  - Neuro checks Q1 hr   Goal euthermia  Goal eunatremia  Goal euvolemia  Goal euglycemia      Acute spont intraparenchymal hemorrhage assoc w/ coagulopathy  Follow scans  BP control      Pulmonary  Acute respiratory failure with hypoxia  - Initially intubated for decreased mental status though not able to be extubated due to respiratory failure with hypoxia (POA)  - patient has been tenuous respiratory status every since extubation   - extubated 12/29/19  - duonebs q4h; CPT q6h  On high flow 15 L and 50-60% FiO2  Intubated today 01/03/2020 for aspiration risk and airway protection  I do not feel strongly that this patient will succeed without trach and peg for a short while  Discussed with family  Vent Mode: A/C  Oxygen Concentration (%):  [50] 50  Resp Rate Total:  [20 br/min-51 br/min] 29 br/min  Vt Set:  [420 mL] 420 mL  PEEP/CPAP:  [5 cmH20] 5 cmH20  Pressure Support:  [0 cmH20] 0 cmH20  Mean Airway Pressure:  [7.8 nmJ46-34 cmH20] 8.5 cmH20      Cardiac/Vascular  Mixed hyperlipidemia  - Hold statin 2/2 transaminitis     Essential hypertension  - Continuous Cardiac Monitoring  - Maintain SBP < 160  - Echo 65%  - Hydralazine 25 q8h  - Amlodipine 10 mg daily  - Furosemide 40 mg BID  - Prn hydralazine, labetalol    Hematology  Acute venous  embolism and thrombosis of basilic vein, left  - Noted on LE US    Endocrine  Type 2 diabetes mellitus with neurologic complication, without long-term current use of insulin  - A1c 7.7    - increase aspart to 8 q4  - POCT Q4    Other  Decreased strength, endurance, and mobility  - 2/2 LS Weakness R/T R MCA   - PT/OT           The patient is being Prophylaxed for:  Venous Thromboembolism with: Chemical  Stress Ulcer with: H2B  Ventilator Pneumonia with: chlorhexidine oral care    Activity Orders          Diet NPO: NPO starting at 12/30 1003        Full Code    Jesus Valencia MD  Neurocritical Care  [facility]    32     minutes of Critical care time was spent personally by me on the following activities: development of treatment plan with patient or surrogate and bedside caregivers, discussions with consultants, evaluation of patient's response to treatment, examination of patient, ordering and performing treatments and interventions, ordering and review of laboratory studies, ordering and review of radiographic studies, pulse oximetry, antibiotic titration if applicable, vasopressor titration if applicable, re-evaluation of patient's condition. This critical care time did not overlap with that of any other provider or involve time for any procedures. There is potential for acute life threatening neurological and or medical decompensation therefore will continue to monitor closely. Current critical conditions posing threat to organ systems, limb, or life include:AIS, brain edmea, brain compression, intubated, hypernatremia, encephalopathy

## 2020-01-03 NOTE — PLAN OF CARE
POC reviewed with pt and pt's wife, Carolina, at 1000. Pt's wife verbalized understanding. Questions and concerns addressed. Pt intubated this afternoon, possible trach placement next week.  Arterial line placed.  Pt with fevers up to 103.1, Dr. Valencia aware, pan cultures obtained, cooling blanket applied, antibiotics initiated.  Propofol gtt for sedation.  Pt progressing toward goals. Will continue to monitor. See flowsheets for full assessment and VS info.

## 2020-01-03 NOTE — PT/OT/SLP PROGRESS
Occupational Therapy   Treatment    Name: Jerry Linder  MRN: [id number]  Admitting Diagnosis:  Embolic stroke involving right middle cerebral artery  18 Days Post-Op    Recommendations:     Discharge Recommendations: (TBD with progression in care)  Discharge Equipment Recommendations:  (TBD at next level of care; with progression in care)  Barriers to discharge:  (now intubated; awaiting trach and PEG placement)    Assessment:     Jerry Linder is a 79 y.o. male with a medical diagnosis of Embolic stroke involving right middle cerebral artery.  He was intubated 1/2 and is now pending trach and PEG placement following fever. Session today focus on joint and skin integrity at bed level. OT to cont to follow up 3x/w at this time. Performance deficits affecting function are impaired endurance, weakness, impaired sensation, impaired self care skills, impaired functional mobilty, gait instability, impaired balance, decreased upper extremity function, decreased lower extremity function, abnormal tone, decreased ROM, impaired coordination, impaired fine motor, impaired cardiopulmonary response to activity.     Rehab Prognosis:  Fair; patient would benefit from acute skilled OT services to address these deficits and reach maximum level of function.       Plan:     Patient to be seen 3 x/week to address the above listed problems via self-care/home management, therapeutic activities, therapeutic exercises, neuromuscular re-education, sensory integration  · Plan of Care Expires: 01/28/20  · Plan of Care Reviewed with: patient, spouse    Subjective     Pain/Comfort:  · Pain Rating 1: 0/10  · Pain Rating Post-Intervention 1: 0/10    Objective:     Communicated with: RN (Zulma) prior to session.  Patient found HOB elevated with arterial line, blood pressure cuff, ventilator, SCD, telemetry, pulse ox (continuous), pressure relief boots, peripheral IV, oxygen upon OT entry to room.    General Precautions: Standard, aspiration, fall,  respiratory, diabetic, pureed diet   Orthopedic Precautions:N/A   Braces: N/A     Occupational Performance:     Bed Mobility:    · Patient completed Rolling/Turning to Left with  total assistance  · Patient completed Scooting/Bridging with total assistance and 2 persons     [facility] 6 Click ADL: 6    Treatment & Education:  -Pt's wife edu on OT role in care and follow up at this time to 3x/w   -completed bed level P ROM with gentle end range stretch for L UE and LE x15 reps; support provided for GH stability  -completed AAROM for RUE and LE x10 reps with gentle end range stretch  -completed positioning in supine; cervical spine to neutral; B UE in elevation and extension  -Communication board updated; questions/concerns addressed within OT scope of practice    Patient left left sidelying with all lines intact, call button in reach and RN and family presentEducation:      GOALS:   Multidisciplinary Problems     Occupational Therapy Goals        Problem: Occupational Therapy Goal    Goal Priority Disciplines Outcome Interventions   Occupational Therapy Goal     OT, PT/OT Ongoing, Progressing    Description:  Goals to be met by: 1/17     Patient will increase functional independence with ADLs by performing:    Grooming while EOB with Moderate Assistance.  Sitting at edge of bed x10 minutes for functional task with Maximum Assistance. progressing  Rolling to Bilateral with Maximum Assistance and use of bedrail as needed.   Supine to sit with Maximum Assistance.  Squat pivot transfers with Maximum Assistance.  Pt and CG verbalized understanding for s/s of stroke with teach back.   Pt and CG demo understanding for L UE positioning and ROM with teach back 2/2 sessions.                        Time Tracking:     OT Date of Treatment: 01/03/20  OT Start Time: 1348  OT Stop Time: 1403  OT Total Time (min): 15 min    Billable Minutes:Therapeutic Exercise 15    BRUCE Olivo  1/3/2020

## 2020-01-03 NOTE — PLAN OF CARE
POC reviewed with pt and family at 0500. Pt unable to verbalize understanding. Questions and concerns addressed. No acute events overnight. Propofol infusing at 50 mcg/kg/min. Pt progressing toward goals. Will continue to monitor. See flowsheets for full assessment and VS info

## 2020-01-03 NOTE — PLAN OF CARE
Per MD, patient intubated today.  Per MD note:  trach and peg discussed with family today.        01/02/20 1933   Discharge Reassessment   Assessment Type Discharge Planning Reassessment   Provided patient/caregiver education on the expected discharge date and the discharge plan No   Do you have any problems affording any of your prescribed medications? No   Discharge Plan A Long-term acute care facility (LTAC)   Discharge Plan B Skilled Nursing Facility   DME Needed Upon Discharge  none   Patient choice form signed by patient/caregiver N/A   Anticipated Discharge Disposition LTAC   Can the patient answer the patient profile reliably? No, cognitively impaired   Describe the patient's ability to walk at the present time. Does not walk or unable to take any steps at all   How often would a person be available to care for the patient? Often   Number of comorbid conditions (as recorded on the chart) Four   Post-Acute Status   Post-Acute Authorization Placement   Post-Acute Placement Status Awaiting Internal Medical Clearance   Discharge Delays (!) Change in Medical Condition  (Patient intubated today per MD)       Leigha Walker RN, CCRN-K, [facility]  Neuro-Critical Care   [phone]

## 2020-01-04 LAB
ALBUMIN SERPL BCP-MCNC: 2.3 G/DL (ref 3.5–5.2)
ALLENS TEST: ABNORMAL
ALP SERPL-CCNC: 161 U/L (ref 55–135)
ALT SERPL W/O P-5'-P-CCNC: 92 U/L (ref 10–44)
ANION GAP SERPL CALC-SCNC: 12 MMOL/L (ref 8–16)
AST SERPL-CCNC: 65 U/L (ref 10–40)
BACTERIA SPEC AEROBE CULT: NORMAL
BACTERIA SPEC AEROBE CULT: NORMAL
BASOPHILS # BLD AUTO: 0.03 K/UL (ref 0–0.2)
BASOPHILS NFR BLD: 0.3 % (ref 0–1.9)
BILIRUB SERPL-MCNC: 0.6 MG/DL (ref 0.1–1)
BUN SERPL-MCNC: 46 MG/DL (ref 8–23)
CALCIUM SERPL-MCNC: 8.7 MG/DL (ref 8.7–10.5)
CHLORIDE SERPL-SCNC: 116 MMOL/L (ref 95–110)
CO2 SERPL-SCNC: 25 MMOL/L (ref 23–29)
CREAT SERPL-MCNC: 0.9 MG/DL (ref 0.5–1.4)
DELSYS: ABNORMAL
DIFFERENTIAL METHOD: ABNORMAL
EOSINOPHIL # BLD AUTO: 0.3 K/UL (ref 0–0.5)
EOSINOPHIL NFR BLD: 3.8 % (ref 0–8)
ERYTHROCYTE [DISTWIDTH] IN BLOOD BY AUTOMATED COUNT: 13.3 % (ref 11.5–14.5)
ERYTHROCYTE [SEDIMENTATION RATE] IN BLOOD BY WESTERGREN METHOD: 20 MM/H
EST. GFR  (AFRICAN AMERICAN): >60 ML/MIN/1.73 M^2
EST. GFR  (NON AFRICAN AMERICAN): >60 ML/MIN/1.73 M^2
FIO2: 50
GLUCOSE SERPL-MCNC: 232 MG/DL (ref 70–110)
GRAM STN SPEC: NORMAL
HCO3 UR-SCNC: 28.9 MMOL/L (ref 24–28)
HCT VFR BLD AUTO: 40.1 % (ref 40–54)
HGB BLD-MCNC: 12.7 G/DL (ref 14–18)
IMM GRANULOCYTES # BLD AUTO: 0.05 K/UL (ref 0–0.04)
IMM GRANULOCYTES NFR BLD AUTO: 0.6 % (ref 0–0.5)
LYMPHOCYTES # BLD AUTO: 1.6 K/UL (ref 1–4.8)
LYMPHOCYTES NFR BLD: 18.2 % (ref 18–48)
MAGNESIUM SERPL-MCNC: 2.8 MG/DL (ref 1.6–2.6)
MCH RBC QN AUTO: 32.5 PG (ref 27–31)
MCHC RBC AUTO-ENTMCNC: 31.7 G/DL (ref 32–36)
MCV RBC AUTO: 103 FL (ref 82–98)
MIN VOL: 10.4
MODE: ABNORMAL
MONOCYTES # BLD AUTO: 0.8 K/UL (ref 0.3–1)
MONOCYTES NFR BLD: 8.9 % (ref 4–15)
NEUTROPHILS # BLD AUTO: 5.9 K/UL (ref 1.8–7.7)
NEUTROPHILS NFR BLD: 68.2 % (ref 38–73)
NRBC BLD-RTO: 0 /100 WBC
PCO2 BLDA: 40.8 MMHG (ref 35–45)
PEEP: 5
PH SMN: 7.46 [PH] (ref 7.35–7.45)
PHOSPHATE SERPL-MCNC: 4.1 MG/DL (ref 2.7–4.5)
PIP: 17
PLATELET # BLD AUTO: 273 K/UL (ref 150–350)
PMV BLD AUTO: 11.8 FL (ref 9.2–12.9)
PO2 BLDA: 81 MMHG (ref 80–100)
POC BE: 5 MMOL/L
POC SATURATED O2: 97 % (ref 95–100)
POC TCO2: 30 MMOL/L (ref 23–27)
POCT GLUCOSE: 173 MG/DL (ref 70–110)
POCT GLUCOSE: 178 MG/DL (ref 70–110)
POCT GLUCOSE: 205 MG/DL (ref 70–110)
POCT GLUCOSE: 244 MG/DL (ref 70–110)
POTASSIUM SERPL-SCNC: 4.2 MMOL/L (ref 3.5–5.1)
PROT SERPL-MCNC: 6.4 G/DL (ref 6–8.4)
RBC # BLD AUTO: 3.91 M/UL (ref 4.6–6.2)
SAMPLE: ABNORMAL
SITE: ABNORMAL
SODIUM SERPL-SCNC: 153 MMOL/L (ref 136–145)
SP02: 100
VANCOMYCIN TROUGH SERPL-MCNC: 17.6 UG/ML (ref 10–22)
VT: 420
WBC # BLD AUTO: 8.58 K/UL (ref 3.9–12.7)

## 2020-01-04 PROCEDURE — 80202 ASSAY OF VANCOMYCIN: CPT

## 2020-01-04 PROCEDURE — 25000242 PHARM REV CODE 250 ALT 637 W/ HCPCS: Performed by: INTERNAL MEDICINE

## 2020-01-04 PROCEDURE — 27200966 HC CLOSED SUCTION SYSTEM

## 2020-01-04 PROCEDURE — 63600175 PHARM REV CODE 636 W HCPCS: Performed by: PSYCHIATRY & NEUROLOGY

## 2020-01-04 PROCEDURE — 80053 COMPREHEN METABOLIC PANEL: CPT

## 2020-01-04 PROCEDURE — 94668 MNPJ CHEST WALL SBSQ: CPT

## 2020-01-04 PROCEDURE — 82803 BLOOD GASES ANY COMBINATION: CPT

## 2020-01-04 PROCEDURE — 63600175 PHARM REV CODE 636 W HCPCS: Performed by: NURSE PRACTITIONER

## 2020-01-04 PROCEDURE — 37799 UNLISTED PX VASCULAR SURGERY: CPT

## 2020-01-04 PROCEDURE — 84100 ASSAY OF PHOSPHORUS: CPT

## 2020-01-04 PROCEDURE — 25000003 PHARM REV CODE 250: Performed by: PSYCHIATRY & NEUROLOGY

## 2020-01-04 PROCEDURE — 99291 CRITICAL CARE FIRST HOUR: CPT | Mod: GC,,, | Performed by: PSYCHIATRY & NEUROLOGY

## 2020-01-04 PROCEDURE — 63600175 PHARM REV CODE 636 W HCPCS: Performed by: PHYSICIAN ASSISTANT

## 2020-01-04 PROCEDURE — 27000221 HC OXYGEN, UP TO 24 HOURS

## 2020-01-04 PROCEDURE — 99291 PR CRITICAL CARE, E/M 30-74 MINUTES: ICD-10-PCS | Mod: GC,,, | Performed by: PSYCHIATRY & NEUROLOGY

## 2020-01-04 PROCEDURE — 94003 VENT MGMT INPAT SUBQ DAY: CPT

## 2020-01-04 PROCEDURE — 94640 AIRWAY INHALATION TREATMENT: CPT

## 2020-01-04 PROCEDURE — 99900035 HC TECH TIME PER 15 MIN (STAT)

## 2020-01-04 PROCEDURE — 94761 N-INVAS EAR/PLS OXIMETRY MLT: CPT

## 2020-01-04 PROCEDURE — 20000000 HC ICU ROOM

## 2020-01-04 PROCEDURE — 99900026 HC AIRWAY MAINTENANCE (STAT)

## 2020-01-04 PROCEDURE — 83735 ASSAY OF MAGNESIUM: CPT

## 2020-01-04 PROCEDURE — 25000003 PHARM REV CODE 250: Performed by: NURSE PRACTITIONER

## 2020-01-04 PROCEDURE — 85025 COMPLETE CBC W/AUTO DIFF WBC: CPT

## 2020-01-04 RX ADMIN — INSULIN ASPART 8 UNITS: 100 INJECTION, SOLUTION INTRAVENOUS; SUBCUTANEOUS at 06:01

## 2020-01-04 RX ADMIN — CHLORHEXIDINE GLUCONATE 0.12% ORAL RINSE 15 ML: 1.2 LIQUID ORAL at 08:01

## 2020-01-04 RX ADMIN — INSULIN ASPART 8 UNITS: 100 INJECTION, SOLUTION INTRAVENOUS; SUBCUTANEOUS at 02:01

## 2020-01-04 RX ADMIN — INSULIN DETEMIR 18 UNITS: 100 INJECTION, SOLUTION SUBCUTANEOUS at 08:01

## 2020-01-04 RX ADMIN — CHLORHEXIDINE GLUCONATE 0.12% ORAL RINSE 15 ML: 1.2 LIQUID ORAL at 09:01

## 2020-01-04 RX ADMIN — HEPARIN SODIUM 5000 UNITS: 5000 INJECTION, SOLUTION INTRAVENOUS; SUBCUTANEOUS at 01:01

## 2020-01-04 RX ADMIN — INSULIN DETEMIR 18 UNITS: 100 INJECTION, SOLUTION SUBCUTANEOUS at 09:01

## 2020-01-04 RX ADMIN — HEPARIN SODIUM 5000 UNITS: 5000 INJECTION, SOLUTION INTRAVENOUS; SUBCUTANEOUS at 09:01

## 2020-01-04 RX ADMIN — PIPERACILLIN AND TAZOBACTAM 4.5 G: 4; .5 INJECTION, POWDER, FOR SOLUTION INTRAVENOUS at 12:01

## 2020-01-04 RX ADMIN — IPRATROPIUM BROMIDE AND ALBUTEROL SULFATE 3 ML: .5; 3 SOLUTION RESPIRATORY (INHALATION) at 07:01

## 2020-01-04 RX ADMIN — VANCOMYCIN HYDROCHLORIDE 1250 MG: 1.25 INJECTION, POWDER, LYOPHILIZED, FOR SOLUTION INTRAVENOUS at 04:01

## 2020-01-04 RX ADMIN — PROPOFOL 15 MCG/KG/MIN: 10 INJECTION, EMULSION INTRAVENOUS at 06:01

## 2020-01-04 RX ADMIN — INSULIN ASPART 1 UNITS: 100 INJECTION, SOLUTION INTRAVENOUS; SUBCUTANEOUS at 03:01

## 2020-01-04 RX ADMIN — INSULIN ASPART 8 UNITS: 100 INJECTION, SOLUTION INTRAVENOUS; SUBCUTANEOUS at 05:01

## 2020-01-04 RX ADMIN — FAMOTIDINE 20 MG: 20 TABLET ORAL at 08:01

## 2020-01-04 RX ADMIN — INSULIN ASPART 8 UNITS: 100 INJECTION, SOLUTION INTRAVENOUS; SUBCUTANEOUS at 09:01

## 2020-01-04 RX ADMIN — PIPERACILLIN AND TAZOBACTAM 4.5 G: 4; .5 INJECTION, POWDER, FOR SOLUTION INTRAVENOUS at 03:01

## 2020-01-04 RX ADMIN — PROPOFOL 15 MCG/KG/MIN: 10 INJECTION, EMULSION INTRAVENOUS at 11:01

## 2020-01-04 RX ADMIN — VANCOMYCIN HYDROCHLORIDE 1250 MG: 1.25 INJECTION, POWDER, LYOPHILIZED, FOR SOLUTION INTRAVENOUS at 03:01

## 2020-01-04 RX ADMIN — FENTANYL CITRATE 50 MCG: 50 INJECTION INTRAMUSCULAR; INTRAVENOUS at 03:01

## 2020-01-04 RX ADMIN — PIPERACILLIN AND TAZOBACTAM 4.5 G: 4; .5 INJECTION, POWDER, FOR SOLUTION INTRAVENOUS at 08:01

## 2020-01-04 RX ADMIN — FAMOTIDINE 20 MG: 20 TABLET ORAL at 09:01

## 2020-01-04 RX ADMIN — IPRATROPIUM BROMIDE AND ALBUTEROL SULFATE 3 ML: .5; 3 SOLUTION RESPIRATORY (INHALATION) at 11:01

## 2020-01-04 RX ADMIN — INSULIN ASPART 8 UNITS: 100 INJECTION, SOLUTION INTRAVENOUS; SUBCUTANEOUS at 01:01

## 2020-01-04 RX ADMIN — MICONAZOLE NITRATE: 20 OINTMENT TOPICAL at 08:01

## 2020-01-04 RX ADMIN — CLOPIDOGREL BISULFATE 75 MG: 75 TABLET ORAL at 08:01

## 2020-01-04 RX ADMIN — MICONAZOLE NITRATE: 20 OINTMENT TOPICAL at 09:01

## 2020-01-04 RX ADMIN — AMLODIPINE BESYLATE 10 MG: 10 TABLET ORAL at 08:01

## 2020-01-04 RX ADMIN — HEPARIN SODIUM 5000 UNITS: 5000 INJECTION, SOLUTION INTRAVENOUS; SUBCUTANEOUS at 06:01

## 2020-01-04 RX ADMIN — INSULIN ASPART 4 UNITS: 100 INJECTION, SOLUTION INTRAVENOUS; SUBCUTANEOUS at 07:01

## 2020-01-04 RX ADMIN — IPRATROPIUM BROMIDE AND ALBUTEROL SULFATE 3 ML: .5; 3 SOLUTION RESPIRATORY (INHALATION) at 03:01

## 2020-01-04 RX ADMIN — INSULIN ASPART 8 UNITS: 100 INJECTION, SOLUTION INTRAVENOUS; SUBCUTANEOUS at 10:01

## 2020-01-04 RX ADMIN — IPRATROPIUM BROMIDE AND ALBUTEROL SULFATE 3 ML: .5; 3 SOLUTION RESPIRATORY (INHALATION) at 04:01

## 2020-01-04 RX ADMIN — INSULIN ASPART 2 UNITS: 100 INJECTION, SOLUTION INTRAVENOUS; SUBCUTANEOUS at 11:01

## 2020-01-04 NOTE — PLAN OF CARE
POC reviewed with pt and pt's wife, Carolina, at 1300. Pt's wife verbalized understanding. Questions and concerns addressed. No acute events today. Afebrile throughout shift.  KUB to eval constipation.  CT head tonight.  Pt progressing toward goals. Will continue to monitor. See flowsheets for full assessment and VS info.

## 2020-01-04 NOTE — ASSESSMENT & PLAN NOTE
- Neurological exam is limited by sedation but unchanged  - CTH this with no interval changes  - On Plavix

## 2020-01-04 NOTE — SUBJECTIVE & OBJECTIVE
Interval History:  >4 elements OR status of 3 inpatient conditions    Review of Systems   Unable to perform ROS: Intubated     2 systems OR Unable to obtain a complete ROS due to level of consciousness.  Objective:     Vitals:  Temp: 98.7 °F (37.1 °C)  Pulse: 92  Rhythm: normal sinus rhythm  BP: (!) 122/58  MAP (mmHg): 84  Resp: 16  SpO2: 100 %  Oxygen Concentration (%): 50  O2 Device (Oxygen Therapy): ventilator  Vent Mode: A/C  Set Rate: 20 bmp  Vt Set: 420 mL  Pressure Support: 0 cmH20  PEEP/CPAP: 5 cmH20  Peak Airway Pressure: 24 cmH2O  Mean Airway Pressure: 11 cmH20  Plateau Pressure: 0 cmH20    Temp  Min: 98.3 °F (36.8 °C)  Max: 99 °F (37.2 °C)  Pulse  Min: 83  Max: 94  BP  Min: 109/53  Max: 197/92  MAP (mmHg)  Min: 76  Max: 131  Resp  Min: 0  Max: 30  SpO2  Min: 95 %  Max: 100 %  Oxygen Concentration (%)  Min: 50  Max: 50    01/03 0701 - 01/04 0700  In: 3274.6 [I.V.:699.6]  Out: 1150 [Urine:1150]   Unmeasured Output  Urine Occurrence: 1  Stool Occurrence: 1  Pad Count: 1       Physical Exam  Constitutional: No apparent distress.   Eyes: Conjunctiva clear, anicteric. Lids no lesions.  Head/Ears/Nose/Mouth/Throat/Neck: Moist mucous membranes. External ears, nose atraumatic.   Cardiovascular: Regular rhythm. ESM  Respiratory: Clear to auscultation.  Gastrointestinal: No hernia. Soft, nondistended, nontender. + bowel sounds.      Neurologic Examination:    -Mental Status: Comatose on propofol   -Cranial nerves: Pupils equal, round, and reactive bilateral. Extraocular movements intact with VOR. Intact corneal reflexes bilateral. Intact cough reflex   -Motor: Withdraw to noxious stimuli easier on the right         Medications:  Continuous  propofol Last Rate: 15 mcg/kg/min (01/04/20 1500)   Scheduled  albuterol-ipratropium 3 mL Q4H   amLODIPine 10 mg Daily   chlorhexidine 15 mL BID   clopidogrel 75 mg Daily   famotidine 20 mg BID   heparin (porcine) 5,000 Units Q8H   insulin aspart U-100 8 Units Q4H   insulin  detemir U-100 18 Units BID   miconazole nitrate 2%  BID   piperacillin-tazobactam (ZOSYN) IVPB 4.5 g Q8H   polyethylene glycol 17 g Daily   senna-docusate 8.6-50 mg 1 tablet BID   vancomycin (VANCOCIN) IVPB 1,250 mg Q12H   PRN  acetaminophen 650 mg Q6H PRN   Dextrose 10% Bolus 12.5 g PRN   Dextrose 10% Bolus 25 g PRN   Dextrose 10% Bolus 25 g PRN   Dextrose 10% Bolus 12.5 g PRN   fentaNYL 50 mcg Q2H PRN   glucagon (human recombinant) 1 mg PRN   hydrALAZINE 10 mg Q6H PRN   insulin aspart U-100 1-10 Units Q4H PRN   labetalol 10 mg Q4H PRN   magnesium oxide 800 mg PRN   magnesium oxide 800 mg PRN   ondansetron 4 mg Q6H PRN   potassium chloride 10% 40 mEq PRN   potassium chloride 10% 40 mEq PRN   potassium chloride 10% 60 mEq PRN   potassium, sodium phosphates 2 packet PRN   potassium, sodium phosphates 2 packet PRN   potassium, sodium phosphates 2 packet PRN   sodium chloride 0.9% 10 mL PRN     Today I personally reviewed pertinent medications, lines/drains/airways, imaging, cardiology results, laboratory results, microbiology results, notably:    Diet  Diet NPO  Diet NPO

## 2020-01-04 NOTE — PROGRESS NOTES
This patient's chart was reviewed by a stroke team provider.  Patient required re-intubation on 1/2/19. Neuro ICU team is discussing PEG/trach with family.    There is no new imaging to review. Pending diagnostics to follow up on include: CT Head 1/4/20.      There are no new recommendations at this time; for other recommendations please see our previous note completed on 12/30/19.   Will continue to follow. Discussed patient with staff. Please contact stroke team for any questions or concerns.        Carmencita Jarvis PA-C  [facility] - [phone]  Department of Vascular Neurology   [facility]

## 2020-01-04 NOTE — PLAN OF CARE
POC reviewed with Pt Family @ 0500. Pt family verbalized understanding. All questions and concerns addressed. No acute events overnight. Pt progressing as tolerated. Will continue to monitor.

## 2020-01-04 NOTE — PROGRESS NOTES
[facility]  Neurocritical Care  Progress Note    Admit Date: 12/16/2019  Service Date: 01/04/2020  Length of Stay: 19    Subjective:     Chief Complaint: Embolic stroke involving right middle cerebral artery    History of Present Illness: The patient is a 79 y.o. male who  was admitted as a transfer from OSH for Right MCA stroke syndrome. Patient had an urgent thrombectomy s/p  right MCA stroke syndrome. PMH significant for HTN, T2DM and HLD. Patient stated that around 2:30 pm on 12/16/19 he was driving and felt weak. He drove home and was taken to the ER where he was evaluated for a stroke. He had outside CTA which was reported to have R ICA stenosis at bifurcation with 70% stenosis and right MCA occlusion. Patient was transferred to [facility] for possible intervention. Patient had a right femoral sheath placed and his Angio  revealed 90% R ICA stenosis and R M1 occlusion. The R ICA was treated by thrombectomy and carotid stenting with TICI 3 reperfusion.   Patient was admitted to the [facility] for higher level of care post thrombectomy.      Hospital Course: 12/17/19: Patient admitted to [facility] for higher level of care s/p thrombectomy and right carotid artery stenting with TICI 3 reperfusion  12/18/2019Recent MRI with heme transformation abg Q8, repeat scan stable, mannitol  12/19/2019 intubation repeat CTH  12/20: febrile-broaden abx coverage, SBT, add hydralazine 50 q8h, KUB and mag citrate  12/21: wean FiO2, d/c hydralazine, wean fentanyl  12/22/2019: Patient stable overnight. Lasix 20mg x 1 given today. Enteral water flushes started for hypernatremia.    12/23: SBT trial, started Plavix today, initiated Hydralazine 25 TID, lasix 40 mg X1, decrease statin to 40 mg daily (transaminitis -trending down), add psyllium, Na BID -goal 145-150, initiated sq heparin  12/24/2019: SBT failed, wean fio2 as tolerated, CXR responded to lasix, scheduled BID  12/25/2019 continue lasix. SBT today. Adjust insulin  regimen  12/26/2019: Continue diuresis, SBT again today, wean PEEP and FiO2, wean nicardipine, hold statin for transaminits   12/27/2019: No significant events   12/28 No significant events over night.. Tolerating spon. Breathing trial, increased peep. No cuff leak, started decadron 8mg q8x3 doses and reassess in am for possible extubation. Insulin gtt started while on steroids, as BG running high. CPT added to resp. Treatments. Start TF.  12/29: extubated to room air following SBT  12/30: IS q 6, d/c whaley, d/c lasix, titrate insulin gtt, d/c hydralazine, KUB  12/31: cxr, trial of modafinil,  q 6, transition insulin  1/1: increase FWF, modafinil, insulin  1/2: intubated, titrate insulin, family discussion  1/3: CT head in AM, cxr, kub, increase insulin, family updated  1/4: NAEO    Interval History:  >4 elements OR status of 3 inpatient conditions    Review of Systems   Unable to perform ROS: Intubated     2 systems OR Unable to obtain a complete ROS due to level of consciousness.  Objective:     Vitals:  Temp: 98.7 °F (37.1 °C)  Pulse: 92  Rhythm: normal sinus rhythm  BP: (!) 122/58  MAP (mmHg): 84  Resp: 16  SpO2: 100 %  Oxygen Concentration (%): 50  O2 Device (Oxygen Therapy): ventilator  Vent Mode: A/C  Set Rate: 20 bmp  Vt Set: 420 mL  Pressure Support: 0 cmH20  PEEP/CPAP: 5 cmH20  Peak Airway Pressure: 24 cmH2O  Mean Airway Pressure: 11 cmH20  Plateau Pressure: 0 cmH20    Temp  Min: 98.3 °F (36.8 °C)  Max: 99 °F (37.2 °C)  Pulse  Min: 83  Max: 94  BP  Min: 109/53  Max: 197/92  MAP (mmHg)  Min: 76  Max: 131  Resp  Min: 0  Max: 30  SpO2  Min: 95 %  Max: 100 %  Oxygen Concentration (%)  Min: 50  Max: 50    01/03 0701 - 01/04 0700  In: 3274.6 [I.V.:699.6]  Out: 1150 [Urine:1150]   Unmeasured Output  Urine Occurrence: 1  Stool Occurrence: 1  Pad Count: 1       Physical Exam  Constitutional: No apparent distress.   Eyes: Conjunctiva clear, anicteric. Lids no lesions.  Head/Ears/Nose/Mouth/Throat/Neck: Moist  mucous membranes. External ears, nose atraumatic.   Cardiovascular: Regular rhythm. ESM  Respiratory: Clear to auscultation.  Gastrointestinal: No hernia. Soft, nondistended, nontender. + bowel sounds.      Neurologic Examination:    -Mental Status: Comatose on propofol   -Cranial nerves: Pupils equal, round, and reactive bilateral. Extraocular movements intact with VOR. Intact corneal reflexes bilateral. Intact cough reflex   -Motor: Withdraw to noxious stimuli easier on the right         Medications:  Continuous  propofol Last Rate: 15 mcg/kg/min (01/04/20 1500)   Scheduled  albuterol-ipratropium 3 mL Q4H   amLODIPine 10 mg Daily   chlorhexidine 15 mL BID   clopidogrel 75 mg Daily   famotidine 20 mg BID   heparin (porcine) 5,000 Units Q8H   insulin aspart U-100 8 Units Q4H   insulin detemir U-100 18 Units BID   miconazole nitrate 2%  BID   piperacillin-tazobactam (ZOSYN) IVPB 4.5 g Q8H   polyethylene glycol 17 g Daily   senna-docusate 8.6-50 mg 1 tablet BID   vancomycin (VANCOCIN) IVPB 1,250 mg Q12H   PRN  acetaminophen 650 mg Q6H PRN   Dextrose 10% Bolus 12.5 g PRN   Dextrose 10% Bolus 25 g PRN   Dextrose 10% Bolus 25 g PRN   Dextrose 10% Bolus 12.5 g PRN   fentaNYL 50 mcg Q2H PRN   glucagon (human recombinant) 1 mg PRN   hydrALAZINE 10 mg Q6H PRN   insulin aspart U-100 1-10 Units Q4H PRN   labetalol 10 mg Q4H PRN   magnesium oxide 800 mg PRN   magnesium oxide 800 mg PRN   ondansetron 4 mg Q6H PRN   potassium chloride 10% 40 mEq PRN   potassium chloride 10% 40 mEq PRN   potassium chloride 10% 60 mEq PRN   potassium, sodium phosphates 2 packet PRN   potassium, sodium phosphates 2 packet PRN   potassium, sodium phosphates 2 packet PRN   sodium chloride 0.9% 10 mL PRN     Today I personally reviewed pertinent medications, lines/drains/airways, imaging, cardiology results, laboratory results, microbiology results, notably:    Diet  Diet NPO  Diet NPO        Assessment/Plan:     Neuro  * Embolic stroke involving right  middle cerebral artery  - Neurological exam is limited by sedation but unchanged  - CTH this with no interval changes  - On Plavix       Cytotoxic brain edema  - 2/2 R MCA  stroke  - Neuro checks Q 1 hr  - Maintain eunatremia  - Maintain Euglyemia  - Monitor serial imaging    Acute spont intraparenchymal hemorrhage assoc w/ coagulopathy  See stroke       Pulmonary  Acute respiratory failure with hypoxia  Con current vent settings  Daily abg and cxr  Plans for trach/peg       Cardiac/Vascular  Essential hypertension  - MAP>65 and SBP<160    Endocrine  Type 2 diabetes mellitus with neurologic complication, without long-term current use of insulin  - Con current regimen     Other  Fever due to infection  - con abx and f/u cx          The patient is being Prophylaxed for:  Venous Thromboembolism with: Mechanical or Chemical  Stress Ulcer with: H2B  Ventilator Pneumonia with: chlorhexidine oral care    Activity Orders          Diet NPO: NPO starting at 12/30 1003        Full Code    Uninterrupted Critical Care/Counseling Time (not including procedures): 35 minutes     Radha Leon MD  Neurocritical Care  [facility]

## 2020-01-04 NOTE — CONSULTS
Informed bedside nurse that we will be unable to see patient due to heavy pt volume. Re-consult if needed.

## 2020-01-04 NOTE — PLAN OF CARE
POC reviewed with pt and pt's family at 1500. Pt's family verbalized understanding of the POc. Pt is unable to verbalize due to ETT in place and Propofol gtt ongoing. Water boluses increased to 400 cc q6. Plan to titrate down propofol. Plan for trach & Peg on Monday. Midline team consulted for new PIVs. Questions and concerns addressed. Pt progressing towards goals of care. Please see flow sheet for detailed nursing assessment and VS. Will continue to monitor.

## 2020-01-04 NOTE — PROGRESS NOTES
Pharmacokinetic Assessment Follow Up: IV Vancomycin    Vancomycin serum concentration assessment(s):    Vancomycin trough level resulted at 17.6 mcg/mL drawn appropriately. Goal trough level is 15 to 20 mcg/mL.     Drug levels (last 3 results):  Recent Labs   Lab Result Units 01/04/20  0300   Vancomycin-Trough ug/mL 17.6     Vancomycin Regimen Plan:    Continue vancomycin 1250 mg IV every 12 hours with next serum trough concentration measured on 1/6 0400 to check for accumulation.    Pharmacy will continue to follow and monitor vancomycin.    Please contact pharmacy at extension [phone] for questions regarding this assessment.    Thank you for the consult,   Edith Galdamez, PharmD, BCCCP             Patient brief summary:  Jerry Linder is a 79 y.o. male initiated on antimicrobial therapy with IV vancomycin for treatment of pneumonia    Drug Allergies:   Review of patient's allergies indicates:  No Known Allergies    Actual Body Weight:   123.4 kg     Renal Function:   Estimated Creatinine Clearance: 94.6 mL/min (based on SCr of 0.9 mg/dL).    CBC (last 72 hours):  Recent Labs   Lab Result Units 01/02/20 0224 01/03/20  0218 01/04/20  0300   WBC K/uL 12.61 12.92* 8.58   Hemoglobin g/dL 13.7* 13.4* 12.7*   Hematocrit % 44.8 43.9 40.1   Platelets K/uL 288 282 273   Gran% % 69.5 73.7* 68.2   Lymph% % 19.4 17.5* 18.2   Mono% % 7.8 6.6 8.9   Eosinophil% % 2.4 1.5 3.8   Basophil% % 0.3 0.2 0.3   Differential Method  Automated Automated Automated       Metabolic Panel (last 72 hours):  Recent Labs   Lab Result Units 01/02/20  0224 01/02/20  1625 01/03/20  0218 01/03/20  1157 01/04/20  0300   Sodium mmol/L 150*  --  148*  --  153*   Potassium mmol/L 4.0  --  3.3* 4.0 4.2   Chloride mmol/L 110  --  111*  --  116*   CO2 mmol/L 29  --  28  --  25   Glucose mg/dL 236*  --  248*  --  232*   Glucose, UA   --  Negative  --   --   --    BUN, Bld mg/dL 44*  --  46*  --  46*   Creatinine mg/dL 1.0  --  0.9  --  0.9   Albumin g/dL 2.8*   --  2.6*  --  2.3*   Total Bilirubin mg/dL 0.6  --  0.8  --  0.6   Alkaline Phosphatase U/L 157*  --  170*  --  161*   AST U/L 52*  --  34  --  65*   ALT U/L 94*  --  74*  --  92*   Magnesium mg/dL 2.6  --  2.7*  --  2.8*   Phosphorus mg/dL 4.1  --  3.5  --  4.1       Vancomycin Administrations:  vancomycin given in the last 96 hours                   vancomycin 1.25 g in dextrose 5% 250 mL IVPB (ready to mix) (mg) 1,250 mg New Bag 01/04/20 0445     1,250 mg New Bag 01/03/20 1608     1,250 mg New Bag  0432    vancomycin 2 g in 0.9% sodium chloride 500 mL IVPB (mg) 2,000 mg New Bag 01/02/20 1633                Microbiologic Results:  Microbiology Results (last 7 days)     Procedure Component Value Units Date/Time    Culture, Respiratory with Gram Stain [[id number]] Collected:  01/02/20 1625    Order Status:  Completed Specimen:  Respiratory from Sputum Updated:  01/04/20 0747     Respiratory Culture Normal respiratory balaji      No S aureus or Pseudomonas isolated.     Gram Stain (Respiratory) <10 epithelial cells per low power field.     Gram Stain (Respiratory) Many WBC's     Gram Stain (Respiratory) Many Gram positive rods     Gram Stain (Respiratory) Rare Gram positive cocci     Gram Stain (Respiratory) Rare Gram negative rods    Blood culture [[id number]] Collected:  01/02/20 1547    Order Status:  Completed Specimen:  Blood from Peripheral, Antecubital, Right Updated:  01/03/20 1812     Blood Culture, Routine No Growth to date      No Growth to date    Blood culture [[id number]] Collected:  01/02/20 1554    Order Status:  Completed Specimen:  Blood from Peripheral, Hand, Left Updated:  01/03/20 1812     Blood Culture, Routine No Growth to date      No Growth to date

## 2020-01-05 LAB
ALBUMIN SERPL BCP-MCNC: 2.3 G/DL (ref 3.5–5.2)
ALLENS TEST: ABNORMAL
ALP SERPL-CCNC: 173 U/L (ref 55–135)
ALT SERPL W/O P-5'-P-CCNC: 85 U/L (ref 10–44)
ANION GAP SERPL CALC-SCNC: 9 MMOL/L (ref 8–16)
AST SERPL-CCNC: 53 U/L (ref 10–40)
BASOPHILS # BLD AUTO: 0.04 K/UL (ref 0–0.2)
BASOPHILS NFR BLD: 0.5 % (ref 0–1.9)
BILIRUB SERPL-MCNC: 0.4 MG/DL (ref 0.1–1)
BUN SERPL-MCNC: 42 MG/DL (ref 8–23)
CALCIUM SERPL-MCNC: 8.6 MG/DL (ref 8.7–10.5)
CHLORIDE SERPL-SCNC: 116 MMOL/L (ref 95–110)
CO2 SERPL-SCNC: 27 MMOL/L (ref 23–29)
CREAT SERPL-MCNC: 1 MG/DL (ref 0.5–1.4)
DELSYS: ABNORMAL
DIFFERENTIAL METHOD: ABNORMAL
EOSINOPHIL # BLD AUTO: 0.3 K/UL (ref 0–0.5)
EOSINOPHIL NFR BLD: 3.4 % (ref 0–8)
ERYTHROCYTE [DISTWIDTH] IN BLOOD BY AUTOMATED COUNT: 13.4 % (ref 11.5–14.5)
ERYTHROCYTE [SEDIMENTATION RATE] IN BLOOD BY WESTERGREN METHOD: 20 MM/H
EST. GFR  (AFRICAN AMERICAN): >60 ML/MIN/1.73 M^2
EST. GFR  (NON AFRICAN AMERICAN): >60 ML/MIN/1.73 M^2
FIO2: 50
GLUCOSE SERPL-MCNC: 198 MG/DL (ref 70–110)
HCO3 UR-SCNC: 26.6 MMOL/L (ref 24–28)
HCT VFR BLD AUTO: 40 % (ref 40–54)
HGB BLD-MCNC: 11.9 G/DL (ref 14–18)
IMM GRANULOCYTES # BLD AUTO: 0.1 K/UL (ref 0–0.04)
IMM GRANULOCYTES NFR BLD AUTO: 1.3 % (ref 0–0.5)
LYMPHOCYTES # BLD AUTO: 1.7 K/UL (ref 1–4.8)
LYMPHOCYTES NFR BLD: 22.3 % (ref 18–48)
MAGNESIUM SERPL-MCNC: 2.9 MG/DL (ref 1.6–2.6)
MCH RBC QN AUTO: 31.2 PG (ref 27–31)
MCHC RBC AUTO-ENTMCNC: 29.8 G/DL (ref 32–36)
MCV RBC AUTO: 105 FL (ref 82–98)
MIN VOL: 11.1
MODE: ABNORMAL
MONOCYTES # BLD AUTO: 0.9 K/UL (ref 0.3–1)
MONOCYTES NFR BLD: 11.6 % (ref 4–15)
NEUTROPHILS # BLD AUTO: 4.6 K/UL (ref 1.8–7.7)
NEUTROPHILS NFR BLD: 60.9 % (ref 38–73)
NRBC BLD-RTO: 0 /100 WBC
PCO2 BLDA: 38.7 MMHG (ref 35–45)
PEEP: 5
PH SMN: 7.45 [PH] (ref 7.35–7.45)
PHOSPHATE SERPL-MCNC: 4.6 MG/DL (ref 2.7–4.5)
PIP: 21
PLATELET # BLD AUTO: 271 K/UL (ref 150–350)
PMV BLD AUTO: 12.8 FL (ref 9.2–12.9)
PO2 BLDA: 85 MMHG (ref 80–100)
POC BE: 3 MMOL/L
POC SATURATED O2: 97 % (ref 95–100)
POC TCO2: 28 MMOL/L (ref 23–27)
POCT GLUCOSE: 147 MG/DL (ref 70–110)
POCT GLUCOSE: 170 MG/DL (ref 70–110)
POCT GLUCOSE: 170 MG/DL (ref 70–110)
POCT GLUCOSE: 174 MG/DL (ref 70–110)
POCT GLUCOSE: 181 MG/DL (ref 70–110)
POCT GLUCOSE: 183 MG/DL (ref 70–110)
POCT GLUCOSE: 189 MG/DL (ref 70–110)
POCT GLUCOSE: 190 MG/DL (ref 70–110)
POCT GLUCOSE: 191 MG/DL (ref 70–110)
POCT GLUCOSE: 192 MG/DL (ref 70–110)
POCT GLUCOSE: 195 MG/DL (ref 70–110)
POCT GLUCOSE: 209 MG/DL (ref 70–110)
POCT GLUCOSE: 213 MG/DL (ref 70–110)
POCT GLUCOSE: 217 MG/DL (ref 70–110)
POTASSIUM SERPL-SCNC: 4.6 MMOL/L (ref 3.5–5.1)
PROT SERPL-MCNC: 6.4 G/DL (ref 6–8.4)
RBC # BLD AUTO: 3.81 M/UL (ref 4.6–6.2)
SAMPLE: ABNORMAL
SITE: ABNORMAL
SODIUM SERPL-SCNC: 152 MMOL/L (ref 136–145)
SP02: 100
VT: 420
WBC # BLD AUTO: 7.57 K/UL (ref 3.9–12.7)

## 2020-01-05 PROCEDURE — 27000221 HC OXYGEN, UP TO 24 HOURS

## 2020-01-05 PROCEDURE — 63600175 PHARM REV CODE 636 W HCPCS: Performed by: PSYCHIATRY & NEUROLOGY

## 2020-01-05 PROCEDURE — 99900035 HC TECH TIME PER 15 MIN (STAT)

## 2020-01-05 PROCEDURE — 25000003 PHARM REV CODE 250: Performed by: NURSE PRACTITIONER

## 2020-01-05 PROCEDURE — 20000000 HC ICU ROOM

## 2020-01-05 PROCEDURE — 94668 MNPJ CHEST WALL SBSQ: CPT

## 2020-01-05 PROCEDURE — 83735 ASSAY OF MAGNESIUM: CPT

## 2020-01-05 PROCEDURE — 94640 AIRWAY INHALATION TREATMENT: CPT

## 2020-01-05 PROCEDURE — 63600175 PHARM REV CODE 636 W HCPCS: Performed by: NURSE PRACTITIONER

## 2020-01-05 PROCEDURE — 99233 SBSQ HOSP IP/OBS HIGH 50: CPT | Mod: GC,,, | Performed by: PSYCHIATRY & NEUROLOGY

## 2020-01-05 PROCEDURE — 63600175 PHARM REV CODE 636 W HCPCS: Performed by: PHYSICIAN ASSISTANT

## 2020-01-05 PROCEDURE — 94003 VENT MGMT INPAT SUBQ DAY: CPT

## 2020-01-05 PROCEDURE — 82803 BLOOD GASES ANY COMBINATION: CPT

## 2020-01-05 PROCEDURE — 84100 ASSAY OF PHOSPHORUS: CPT

## 2020-01-05 PROCEDURE — 80053 COMPREHEN METABOLIC PANEL: CPT

## 2020-01-05 PROCEDURE — 37799 UNLISTED PX VASCULAR SURGERY: CPT

## 2020-01-05 PROCEDURE — 94761 N-INVAS EAR/PLS OXIMETRY MLT: CPT

## 2020-01-05 PROCEDURE — 25000003 PHARM REV CODE 250: Performed by: PSYCHIATRY & NEUROLOGY

## 2020-01-05 PROCEDURE — 99233 PR SUBSEQUENT HOSPITAL CARE,LEVL III: ICD-10-PCS | Mod: GC,,, | Performed by: PSYCHIATRY & NEUROLOGY

## 2020-01-05 PROCEDURE — 99900026 HC AIRWAY MAINTENANCE (STAT)

## 2020-01-05 PROCEDURE — 25000242 PHARM REV CODE 250 ALT 637 W/ HCPCS: Performed by: INTERNAL MEDICINE

## 2020-01-05 PROCEDURE — 85025 COMPLETE CBC W/AUTO DIFF WBC: CPT

## 2020-01-05 RX ADMIN — IPRATROPIUM BROMIDE AND ALBUTEROL SULFATE 3 ML: .5; 3 SOLUTION RESPIRATORY (INHALATION) at 11:01

## 2020-01-05 RX ADMIN — PIPERACILLIN AND TAZOBACTAM 4.5 G: 4; .5 INJECTION, POWDER, FOR SOLUTION INTRAVENOUS at 03:01

## 2020-01-05 RX ADMIN — HEPARIN SODIUM 5000 UNITS: 5000 INJECTION, SOLUTION INTRAVENOUS; SUBCUTANEOUS at 06:01

## 2020-01-05 RX ADMIN — PIPERACILLIN AND TAZOBACTAM 4.5 G: 4; .5 INJECTION, POWDER, FOR SOLUTION INTRAVENOUS at 07:01

## 2020-01-05 RX ADMIN — PIPERACILLIN AND TAZOBACTAM 4.5 G: 4; .5 INJECTION, POWDER, FOR SOLUTION INTRAVENOUS at 12:01

## 2020-01-05 RX ADMIN — VANCOMYCIN HYDROCHLORIDE 1250 MG: 1.25 INJECTION, POWDER, LYOPHILIZED, FOR SOLUTION INTRAVENOUS at 03:01

## 2020-01-05 RX ADMIN — MICONAZOLE NITRATE: 20 OINTMENT TOPICAL at 08:01

## 2020-01-05 RX ADMIN — CLOPIDOGREL BISULFATE 75 MG: 75 TABLET ORAL at 08:01

## 2020-01-05 RX ADMIN — INSULIN ASPART 8 UNITS: 100 INJECTION, SOLUTION INTRAVENOUS; SUBCUTANEOUS at 09:01

## 2020-01-05 RX ADMIN — CHLORHEXIDINE GLUCONATE 0.12% ORAL RINSE 15 ML: 1.2 LIQUID ORAL at 08:01

## 2020-01-05 RX ADMIN — VANCOMYCIN HYDROCHLORIDE 1250 MG: 1.25 INJECTION, POWDER, LYOPHILIZED, FOR SOLUTION INTRAVENOUS at 04:01

## 2020-01-05 RX ADMIN — INSULIN ASPART 2 UNITS: 100 INJECTION, SOLUTION INTRAVENOUS; SUBCUTANEOUS at 08:01

## 2020-01-05 RX ADMIN — HEPARIN SODIUM 5000 UNITS: 5000 INJECTION, SOLUTION INTRAVENOUS; SUBCUTANEOUS at 09:01

## 2020-01-05 RX ADMIN — IPRATROPIUM BROMIDE AND ALBUTEROL SULFATE 3 ML: .5; 3 SOLUTION RESPIRATORY (INHALATION) at 07:01

## 2020-01-05 RX ADMIN — INSULIN ASPART 8 UNITS: 100 INJECTION, SOLUTION INTRAVENOUS; SUBCUTANEOUS at 10:01

## 2020-01-05 RX ADMIN — HEPARIN SODIUM 5000 UNITS: 5000 INJECTION, SOLUTION INTRAVENOUS; SUBCUTANEOUS at 01:01

## 2020-01-05 RX ADMIN — INSULIN ASPART 8 UNITS: 100 INJECTION, SOLUTION INTRAVENOUS; SUBCUTANEOUS at 01:01

## 2020-01-05 RX ADMIN — AMLODIPINE BESYLATE 10 MG: 10 TABLET ORAL at 08:01

## 2020-01-05 RX ADMIN — IPRATROPIUM BROMIDE AND ALBUTEROL SULFATE 3 ML: .5; 3 SOLUTION RESPIRATORY (INHALATION) at 03:01

## 2020-01-05 RX ADMIN — INSULIN ASPART 8 UNITS: 100 INJECTION, SOLUTION INTRAVENOUS; SUBCUTANEOUS at 06:01

## 2020-01-05 RX ADMIN — PROPOFOL 10 MCG/KG/MIN: 10 INJECTION, EMULSION INTRAVENOUS at 08:01

## 2020-01-05 RX ADMIN — FAMOTIDINE 20 MG: 20 TABLET ORAL at 08:01

## 2020-01-05 RX ADMIN — INSULIN ASPART 2 UNITS: 100 INJECTION, SOLUTION INTRAVENOUS; SUBCUTANEOUS at 11:01

## 2020-01-05 RX ADMIN — INSULIN DETEMIR 18 UNITS: 100 INJECTION, SOLUTION SUBCUTANEOUS at 08:01

## 2020-01-05 RX ADMIN — FAMOTIDINE 20 MG: 20 TABLET ORAL at 09:01

## 2020-01-05 RX ADMIN — CHLORHEXIDINE GLUCONATE 0.12% ORAL RINSE 15 ML: 1.2 LIQUID ORAL at 09:01

## 2020-01-05 RX ADMIN — INSULIN DETEMIR 18 UNITS: 100 INJECTION, SOLUTION SUBCUTANEOUS at 09:01

## 2020-01-05 RX ADMIN — INSULIN ASPART 8 UNITS: 100 INJECTION, SOLUTION INTRAVENOUS; SUBCUTANEOUS at 05:01

## 2020-01-05 RX ADMIN — INSULIN ASPART 2 UNITS: 100 INJECTION, SOLUTION INTRAVENOUS; SUBCUTANEOUS at 05:01

## 2020-01-05 RX ADMIN — PROPOFOL 15 MCG/KG/MIN: 10 INJECTION, EMULSION INTRAVENOUS at 09:01

## 2020-01-05 RX ADMIN — INSULIN ASPART 8 UNITS: 100 INJECTION, SOLUTION INTRAVENOUS; SUBCUTANEOUS at 02:01

## 2020-01-05 RX ADMIN — MICONAZOLE NITRATE: 20 OINTMENT TOPICAL at 09:01

## 2020-01-05 NOTE — PLAN OF CARE
POC reviewed with pt and pt's family at 1400. Pt's wife verbalized understanding of the POC. Pt is unable to verbalize due to ETT present and on propofol gtt. Gen surgery consulted by NCC for Trach and PEG. 2 new PIVs inserted. No acute events as of this writing. Questions and concerns addressed. Pt progressing towards goals of care. Please see flow sheet for detailed nursing assessment and VS. Will continue to monitor.

## 2020-01-05 NOTE — SUBJECTIVE & OBJECTIVE
Interval History:  >4 elements OR status of 3 inpatient conditions    Review of Systems   Unable to perform ROS: Intubated     2 systems OR Unable to obtain a complete ROS due to level of consciousness.  Objective:     Vitals:  Temp: 98.8 °F (37.1 °C)  Pulse: 89  Rhythm: normal sinus rhythm  BP: 138/64  MAP (mmHg): 92  Resp: (!) 22  SpO2: 98 %  Oxygen Concentration (%): 50  O2 Device (Oxygen Therapy): ventilator  Vent Mode: A/C  Set Rate: 20 bmp  Vt Set: 420 mL  Pressure Support: 0 cmH20  PEEP/CPAP: 5 cmH20  Peak Airway Pressure: 22 cmH2O  Mean Airway Pressure: 9.3 cmH20  Plateau Pressure: 0 cmH20    Temp  Min: 98.3 °F (36.8 °C)  Max: 99 °F (37.2 °C)  Pulse  Min: 86  Max: 98  BP  Min: 97/50  Max: 142/65  MAP (mmHg)  Min: 71  Max: 93  Resp  Min: 0  Max: 26  SpO2  Min: 97 %  Max: 100 %  Oxygen Concentration (%)  Min: 50  Max: 50    01/04 0701 - 01/05 0700  In: 3916.3 [I.V.:536.3]  Out: 1575 [Urine:1575]   Unmeasured Output  Urine Occurrence: 1  Stool Occurrence: 1  Pad Count: 3       Physical Exam    Constitutional: No apparent distress.   Eyes: Conjunctiva clear, anicteric. Lids no lesions.  Head/Ears/Nose/Mouth/Throat/Neck: Moist mucous membranes. External ears, nose atraumatic.   Cardiovascular: Regular rhythm. ESM  Respiratory: Clear to auscultation.  Gastrointestinal: No hernia. Soft, nondistended, nontender. + bowel sounds.      Neurologic Examination:    -Mental Status: Comatose on propofol   -Cranial nerves: Pupils equal, round, and reactive bilateral. Extraocular movements intact with VOR. Intact corneal reflexes bilateral. Intact cough reflex   -Motor: Withdraw to noxious stimuli easier on the right         Medications:  Continuous    propofol Last Rate: 5 mcg/kg/min (01/05/20 1300)   Scheduled    albuterol-ipratropium 3 mL Q4H   amLODIPine 10 mg Daily   chlorhexidine 15 mL BID   famotidine 20 mg BID   heparin (porcine) 5,000 Units Q8H   insulin aspart U-100 8 Units Q4H   insulin detemir U-100 18 Units BID    miconazole nitrate 2%  BID   piperacillin-tazobactam (ZOSYN) IVPB 4.5 g Q8H   polyethylene glycol 17 g Daily   senna-docusate 8.6-50 mg 1 tablet BID   vancomycin (VANCOCIN) IVPB 1,250 mg Q12H   PRN    acetaminophen 650 mg Q6H PRN   Dextrose 10% Bolus 12.5 g PRN   Dextrose 10% Bolus 25 g PRN   Dextrose 10% Bolus 25 g PRN   Dextrose 10% Bolus 12.5 g PRN   fentaNYL 50 mcg Q2H PRN   glucagon (human recombinant) 1 mg PRN   hydrALAZINE 10 mg Q6H PRN   insulin aspart U-100 1-10 Units Q4H PRN   labetalol 10 mg Q4H PRN   magnesium oxide 800 mg PRN   magnesium oxide 800 mg PRN   ondansetron 4 mg Q6H PRN   potassium chloride 10% 40 mEq PRN   potassium chloride 10% 40 mEq PRN   potassium chloride 10% 60 mEq PRN   potassium, sodium phosphates 2 packet PRN   potassium, sodium phosphates 2 packet PRN   potassium, sodium phosphates 2 packet PRN   sodium chloride 0.9% 10 mL PRN     Today I personally reviewed pertinent medications, lines/drains/airways, imaging, cardiology results, laboratory results, microbiology results, notably:    Diet  Diet NPO  Diet NPO

## 2020-01-05 NOTE — PROGRESS NOTES
[facility]  Neurocritical Care  Progress Note    Admit Date: 12/16/2019  Service Date: 01/05/2020  Length of Stay: 20    Subjective:     Chief Complaint: Embolic stroke involving right middle cerebral artery    History of Present Illness: The patient is a 79 y.o. male who  was admitted as a transfer from OSH for Right MCA stroke syndrome. Patient had an urgent thrombectomy s/p  right MCA stroke syndrome. PMH significant for HTN, T2DM and HLD. Patient stated that around 2:30 pm on 12/16/19 he was driving and felt weak. He drove home and was taken to the ER where he was evaluated for a stroke. He had outside CTA which was reported to have R ICA stenosis at bifurcation with 70% stenosis and right MCA occlusion. Patient was transferred to [facility] for possible intervention. Patient had a right femoral sheath placed and his Angio  revealed 90% R ICA stenosis and R M1 occlusion. The R ICA was treated by thrombectomy and carotid stenting with TICI 3 reperfusion.   Patient was admitted to the [facility] for higher level of care post thrombectomy.      Hospital Course: 12/17/19: Patient admitted to [facility] for higher level of care s/p thrombectomy and right carotid artery stenting with TICI 3 reperfusion  12/18/2019Recent MRI with heme transformation abg Q8, repeat scan stable, mannitol  12/19/2019 intubation repeat CTH  12/20: febrile-broaden abx coverage, SBT, add hydralazine 50 q8h, KUB and mag citrate  12/21: wean FiO2, d/c hydralazine, wean fentanyl  12/22/2019: Patient stable overnight. Lasix 20mg x 1 given today. Enteral water flushes started for hypernatremia.    12/23: SBT trial, started Plavix today, initiated Hydralazine 25 TID, lasix 40 mg X1, decrease statin to 40 mg daily (transaminitis -trending down), add psyllium, Na BID -goal 145-150, initiated sq heparin  12/24/2019: SBT failed, wean fio2 as tolerated, CXR responded to lasix, scheduled BID  12/25/2019 continue lasix. SBT today. Adjust insulin  regimen  12/26/2019: Continue diuresis, SBT again today, wean PEEP and FiO2, wean nicardipine, hold statin for transaminits   12/27/2019: No significant events   12/28 No significant events over night.. Tolerating spon. Breathing trial, increased peep. No cuff leak, started decadron 8mg q8x3 doses and reassess in am for possible extubation. Insulin gtt started while on steroids, as BG running high. CPT added to resp. Treatments. Start TF.  12/29: extubated to room air following SBT  12/30: IS q 6, d/c whaley, d/c lasix, titrate insulin gtt, d/c hydralazine, KUB  12/31: cxr, trial of modafinil,  q 6, transition insulin  1/1: increase FWF, modafinil, insulin  1/2: intubated, titrate insulin, family discussion  1/3: CT head in AM, cxr, kub, increase insulin, family updated  1/4: NAEO  1/5: NAEO    Interval History:  >4 elements OR status of 3 inpatient conditions    Review of Systems   Unable to perform ROS: Intubated     2 systems OR Unable to obtain a complete ROS due to level of consciousness.  Objective:     Vitals:  Temp: 98.8 °F (37.1 °C)  Pulse: 89  Rhythm: normal sinus rhythm  BP: 138/64  MAP (mmHg): 92  Resp: (!) 22  SpO2: 98 %  Oxygen Concentration (%): 50  O2 Device (Oxygen Therapy): ventilator  Vent Mode: A/C  Set Rate: 20 bmp  Vt Set: 420 mL  Pressure Support: 0 cmH20  PEEP/CPAP: 5 cmH20  Peak Airway Pressure: 22 cmH2O  Mean Airway Pressure: 9.3 cmH20  Plateau Pressure: 0 cmH20    Temp  Min: 98.3 °F (36.8 °C)  Max: 99 °F (37.2 °C)  Pulse  Min: 86  Max: 98  BP  Min: 97/50  Max: 142/65  MAP (mmHg)  Min: 71  Max: 93  Resp  Min: 0  Max: 26  SpO2  Min: 97 %  Max: 100 %  Oxygen Concentration (%)  Min: 50  Max: 50    01/04 0701 - 01/05 0700  In: 3916.3 [I.V.:536.3]  Out: 1575 [Urine:1575]   Unmeasured Output  Urine Occurrence: 1  Stool Occurrence: 1  Pad Count: 3       Physical Exam    Constitutional: No apparent distress.   Eyes: Conjunctiva clear, anicteric. Lids no  lesions.  Head/Ears/Nose/Mouth/Throat/Neck: Moist mucous membranes. External ears, nose atraumatic.   Cardiovascular: Regular rhythm. ESM  Respiratory: Clear to auscultation.  Gastrointestinal: No hernia. Soft, nondistended, nontender. + bowel sounds.      Neurologic Examination:    -Mental Status: Comatose on propofol   -Cranial nerves: Pupils equal, round, and reactive bilateral. Extraocular movements intact with VOR. Intact corneal reflexes bilateral. Intact cough reflex   -Motor: Withdraw to noxious stimuli easier on the right         Medications:  Continuous    propofol Last Rate: 5 mcg/kg/min (01/05/20 1300)   Scheduled    albuterol-ipratropium 3 mL Q4H   amLODIPine 10 mg Daily   chlorhexidine 15 mL BID   famotidine 20 mg BID   heparin (porcine) 5,000 Units Q8H   insulin aspart U-100 8 Units Q4H   insulin detemir U-100 18 Units BID   miconazole nitrate 2%  BID   piperacillin-tazobactam (ZOSYN) IVPB 4.5 g Q8H   polyethylene glycol 17 g Daily   senna-docusate 8.6-50 mg 1 tablet BID   vancomycin (VANCOCIN) IVPB 1,250 mg Q12H   PRN    acetaminophen 650 mg Q6H PRN   Dextrose 10% Bolus 12.5 g PRN   Dextrose 10% Bolus 25 g PRN   Dextrose 10% Bolus 25 g PRN   Dextrose 10% Bolus 12.5 g PRN   fentaNYL 50 mcg Q2H PRN   glucagon (human recombinant) 1 mg PRN   hydrALAZINE 10 mg Q6H PRN   insulin aspart U-100 1-10 Units Q4H PRN   labetalol 10 mg Q4H PRN   magnesium oxide 800 mg PRN   magnesium oxide 800 mg PRN   ondansetron 4 mg Q6H PRN   potassium chloride 10% 40 mEq PRN   potassium chloride 10% 40 mEq PRN   potassium chloride 10% 60 mEq PRN   potassium, sodium phosphates 2 packet PRN   potassium, sodium phosphates 2 packet PRN   potassium, sodium phosphates 2 packet PRN   sodium chloride 0.9% 10 mL PRN     Today I personally reviewed pertinent medications, lines/drains/airways, imaging, cardiology results, laboratory results, microbiology results, notably:    Diet  Diet NPO  Diet NPO        Assessment/Plan:     Neuro  *  Embolic stroke involving right middle cerebral artery  - Neurological exam is limited by sedation but unchanged  - CTH this with no interval changes  - On Plavix       Cytotoxic brain edema  - 2/2 R MCA  stroke      Acute spont intraparenchymal hemorrhage assoc w/ coagulopathy  See stroke       Pulmonary  Acute respiratory failure with hypoxia  Con current vent settings  Daily abg and cxr  Plans for trach/peg       Cardiac/Vascular  Essential hypertension  - MAP>65 and SBP<160    Endocrine  Type 2 diabetes mellitus with neurologic complication, without long-term current use of insulin  - Con current regimen     Other  Fever due to infection  - con abx and f/u cx    Decreased strength, endurance, and mobility  - 2/2 LS Weakness R/T R MCA   - PT/OT           The patient is being Prophylaxed for:  Venous Thromboembolism with: Mechanical or Chemical  Stress Ulcer with: H2B  Ventilator Pneumonia with: chlorhexidine oral care    Activity Orders          Diet NPO: NPO starting at 12/30 1003        Full Code    Discussed with family at bedside.    Care level 3    Radha Leon MD  Neurocritical Care  [facility]

## 2020-01-05 NOTE — CONSULTS
Consult Note  General Surgery    Reason for Consult: Trach/PEG    SUBJECTIVE:     History of Present Illness:  Patient is a 79 y.o. male with hx of HTN, T2DM, HLD, carotid stenosis who presented as as transfer for R MCA stroke on 12/16. He underwent urgent thrombectomy and R carotid artery stenting on 12/17/19. He was started on plavix on 12/23. General surgery has been consulted for trach/peg. He is on low vent settings - 50/5, hemodynamically stable. He was extubated on 12/29 and reintubated on 1/2.  He is on vanc and zosyn (wbc normal) for pneumonia.  He is currently on sedation, so neurological exam is limited but according to the nurse he intermittently follows commands. Wife and daughter were at bedside to facilitate the history      Prior abdominal surgeries: none  Current feeding status Tube feeds via NGT/[place] at 55cc/hr.     Prior Neck surgeries:None  Neck mobility limitations:none   ET Tube size: 8.0 tube    Anticoagulation: plavix (last dose yesterday), ppx SQH    Coagulopathy:none     Dialysis status: none     Review of patient's allergies indicates:  No Known Allergies    History reviewed. No pertinent past medical history.  Past Surgical History:   Procedure Laterality Date    CEREBRAL ANGIOGRAM N/A 12/16/2019    Procedure: ANGIOGRAM-CEREBRAL;  Surgeon: Jairo Martínez MD;  Location: [facility] OR [address] [place] [address];  Service: Radiology;  Laterality: N/A;     No family history on file.  Social History     Tobacco Use    Smoking status: Former Smoker     Types: Cigarettes   Substance Use Topics    Alcohol use: Not on file    Drug use: Never      No medications prior to admission.     No current facility-administered medications on file prior to encounter.      No current outpatient medications on file prior to encounter.       Review of Systems:  Unable to obtain due to patient factors.     OBJECTIVE:     Vital Signs (Most Recent)  Temp: 98.8 °F (37.1 °C) (01/05/20 1100)  Pulse: 94 (01/05/20 1200)  Resp: (!) 22  (01/05/20 1200)  BP: 138/62 (01/05/20 1100)  SpO2: 97 % (01/05/20 1200)    Physical Exam:  General: well developed.morbidly obese Body mass index is 35.89 kg/m².  Neck: supple, symmetrical, trachea midline, no JVD and thyroid not enlarged, symmetric, no tenderness/mass/nodules  Lungs:  Mechanical vent sounds.  Vent Mode: A/C  Oxygen Concentration (%):  [50] 50  Resp Rate Total:  [20 br/min-25 br/min] 23 br/min  Vt Set:  [420 mL] 420 mL  PEEP/CPAP:  [5 cmH20] 5 cmH20  Pressure Support:  [0 cmH20] 0 cmH20  Mean Airway Pressure:  [8.5 kpH12-34 cmH20] 9.3 cmH20  Cardiovascular: Heart: regular rate and rhythm  Chest Wall: no tenderness.   Extremities: no cyanosis or edema, or clubbing. Pulses: 2+ and symmetric.  Abdomen: Obese. Soft, ND. NT.  Musculoskeletal:no clubbing, cyanosis      Laboratory:  CBC:   Recent Labs   Lab 01/05/20  0243   WBC 7.57   RBC 3.81*   HGB 11.9*   HCT 40.0      *   MCH 31.2*   MCHC 29.8*     CMP:   Recent Labs   Lab 01/05/20  0243   *   CALCIUM 8.6*   ALBUMIN 2.3*   PROT 6.4   *   K 4.6   CO2 27   *   BUN 42*   CREATININE 1.0   ALKPHOS 173*   ALT 85*   AST 53*   BILITOT 0.4     Coagulation: No results for input(s): PT, INR, APTT in the last 168 hours.      )  Antibiotics (From admission, onward)    Start     Stop Route Frequency Ordered    01/03/20 0445  vancomycin 1.25 g in dextrose 5% 250 mL IVPB (ready to mix)      -- IV Every 12 hours (non-standard times) 01/02/20 1533    01/02/20 1630  piperacillin-tazobactam 4.5 g in sodium chloride 0.9% 100 mL IVPB (ready to mix system)      -- IV Every 8 hours (non-standard times) 01/02/20 1526          ASSESSMENT/PLAN:     Patient is a 79 y.o. male with hx of type 2 DM, HTN, HLD, recent R MCA stroke and carotid stenosis s/p carotid stent (on plavix) who requires trach/peg     - please repeat INR   - Discussed with Dr. Conde, would like palliative care consult to discuss goals of care and prognosis with family before  electing to proceed.   - once that is complete, please contact the acute care surgery team and will go forward with trach/peg.   - consent needs to be obtained.   - will tentatively plan for Wednesday once palliative care consult performed   - stop TFs at midnight on Tuesday  - will follow up with patient      Alec Stephany, PGY-5  General Surgery  [phone]

## 2020-01-06 ENCOUNTER — ANESTHESIA EVENT (OUTPATIENT)
Dept: SURGERY | Facility: HOSPITAL | Age: 80
DRG: 003 | End: 2020-01-06
Payer: COMMERCIAL

## 2020-01-06 LAB
ALBUMIN SERPL BCP-MCNC: 2.2 G/DL (ref 3.5–5.2)
ALLENS TEST: ABNORMAL
ALP SERPL-CCNC: 163 U/L (ref 55–135)
ALT SERPL W/O P-5'-P-CCNC: 65 U/L (ref 10–44)
ANION GAP SERPL CALC-SCNC: 10 MMOL/L (ref 8–16)
AST SERPL-CCNC: 40 U/L (ref 10–40)
BASOPHILS # BLD AUTO: 0.05 K/UL (ref 0–0.2)
BASOPHILS NFR BLD: 0.7 % (ref 0–1.9)
BILIRUB SERPL-MCNC: 0.4 MG/DL (ref 0.1–1)
BUN SERPL-MCNC: 35 MG/DL (ref 8–23)
CALCIUM SERPL-MCNC: 8.4 MG/DL (ref 8.7–10.5)
CHLORIDE SERPL-SCNC: 115 MMOL/L (ref 95–110)
CO2 SERPL-SCNC: 26 MMOL/L (ref 23–29)
CREAT SERPL-MCNC: 0.9 MG/DL (ref 0.5–1.4)
DELSYS: ABNORMAL
DIFFERENTIAL METHOD: ABNORMAL
EOSINOPHIL # BLD AUTO: 0.3 K/UL (ref 0–0.5)
EOSINOPHIL NFR BLD: 3.7 % (ref 0–8)
ERYTHROCYTE [DISTWIDTH] IN BLOOD BY AUTOMATED COUNT: 13.3 % (ref 11.5–14.5)
ERYTHROCYTE [SEDIMENTATION RATE] IN BLOOD BY WESTERGREN METHOD: 20 MM/H
EST. GFR  (AFRICAN AMERICAN): >60 ML/MIN/1.73 M^2
EST. GFR  (NON AFRICAN AMERICAN): >60 ML/MIN/1.73 M^2
FIO2: 40
FIO2: 50
FIO2: 50
GLUCOSE SERPL-MCNC: 200 MG/DL (ref 70–110)
HCO3 UR-SCNC: 27.6 MMOL/L (ref 24–28)
HCO3 UR-SCNC: 27.9 MMOL/L (ref 24–28)
HCO3 UR-SCNC: 28.4 MMOL/L (ref 24–28)
HCT VFR BLD AUTO: 37.5 % (ref 40–54)
HGB BLD-MCNC: 11.3 G/DL (ref 14–18)
IMM GRANULOCYTES # BLD AUTO: 0.15 K/UL (ref 0–0.04)
IMM GRANULOCYTES NFR BLD AUTO: 2 % (ref 0–0.5)
INR PPP: 0.9 (ref 0.8–1.2)
LYMPHOCYTES # BLD AUTO: 2 K/UL (ref 1–4.8)
LYMPHOCYTES NFR BLD: 25.9 % (ref 18–48)
MAGNESIUM SERPL-MCNC: 2.6 MG/DL (ref 1.6–2.6)
MCH RBC QN AUTO: 31.8 PG (ref 27–31)
MCHC RBC AUTO-ENTMCNC: 30.1 G/DL (ref 32–36)
MCV RBC AUTO: 106 FL (ref 82–98)
MIN VOL: 9.88
MODE: ABNORMAL
MONOCYTES # BLD AUTO: 0.7 K/UL (ref 0.3–1)
MONOCYTES NFR BLD: 9.7 % (ref 4–15)
NEUTROPHILS # BLD AUTO: 4.4 K/UL (ref 1.8–7.7)
NEUTROPHILS NFR BLD: 58 % (ref 38–73)
NRBC BLD-RTO: 0 /100 WBC
PCO2 BLDA: 40.4 MMHG (ref 35–45)
PCO2 BLDA: 40.9 MMHG (ref 35–45)
PCO2 BLDA: 44 MMHG (ref 35–45)
PEEP: 5
PH SMN: 7.42 [PH] (ref 7.35–7.45)
PH SMN: 7.44 [PH] (ref 7.35–7.45)
PH SMN: 7.45 [PH] (ref 7.35–7.45)
PHOSPHATE SERPL-MCNC: 4.4 MG/DL (ref 2.7–4.5)
PIP: 21
PLATELET # BLD AUTO: 241 K/UL (ref 150–350)
PMV BLD AUTO: 12.5 FL (ref 9.2–12.9)
PO2 BLDA: 78 MMHG (ref 80–100)
PO2 BLDA: 84 MMHG (ref 80–100)
PO2 BLDA: 85 MMHG (ref 80–100)
POC BE: 3 MMOL/L
POC BE: 4 MMOL/L
POC BE: 4 MMOL/L
POC SATURATED O2: 96 % (ref 95–100)
POC SATURATED O2: 96 % (ref 95–100)
POC SATURATED O2: 97 % (ref 95–100)
POC TCO2: 29 MMOL/L (ref 23–27)
POC TCO2: 29 MMOL/L (ref 23–27)
POC TCO2: 30 MMOL/L (ref 23–27)
POCT GLUCOSE: 188 MG/DL (ref 70–110)
POCT GLUCOSE: 191 MG/DL (ref 70–110)
POCT GLUCOSE: 193 MG/DL (ref 70–110)
POCT GLUCOSE: 208 MG/DL (ref 70–110)
POCT GLUCOSE: 215 MG/DL (ref 70–110)
POCT GLUCOSE: 222 MG/DL (ref 70–110)
POTASSIUM SERPL-SCNC: 4.6 MMOL/L (ref 3.5–5.1)
PROT SERPL-MCNC: 6.2 G/DL (ref 6–8.4)
PROTHROMBIN TIME: 9.6 SEC (ref 9–12.5)
RBC # BLD AUTO: 3.55 M/UL (ref 4.6–6.2)
SAMPLE: ABNORMAL
SITE: ABNORMAL
SODIUM SERPL-SCNC: 151 MMOL/L (ref 136–145)
SP02: 100
SP02: 100
SP02: 98
VANCOMYCIN TROUGH SERPL-MCNC: 20.1 UG/ML (ref 10–22)
VT: 420
WBC # BLD AUTO: 7.6 K/UL (ref 3.9–12.7)

## 2020-01-06 PROCEDURE — 99221 1ST HOSP IP/OBS SF/LOW 40: CPT | Mod: GC,,, | Performed by: SURGERY

## 2020-01-06 PROCEDURE — 83735 ASSAY OF MAGNESIUM: CPT

## 2020-01-06 PROCEDURE — 99900035 HC TECH TIME PER 15 MIN (STAT)

## 2020-01-06 PROCEDURE — 85025 COMPLETE CBC W/AUTO DIFF WBC: CPT

## 2020-01-06 PROCEDURE — 80202 ASSAY OF VANCOMYCIN: CPT

## 2020-01-06 PROCEDURE — 63600175 PHARM REV CODE 636 W HCPCS: Performed by: PSYCHIATRY & NEUROLOGY

## 2020-01-06 PROCEDURE — 94640 AIRWAY INHALATION TREATMENT: CPT

## 2020-01-06 PROCEDURE — 99221 PR INITIAL HOSPITAL CARE,LEVL I: ICD-10-PCS | Mod: GC,,, | Performed by: SURGERY

## 2020-01-06 PROCEDURE — 94003 VENT MGMT INPAT SUBQ DAY: CPT

## 2020-01-06 PROCEDURE — 85610 PROTHROMBIN TIME: CPT

## 2020-01-06 PROCEDURE — 94761 N-INVAS EAR/PLS OXIMETRY MLT: CPT

## 2020-01-06 PROCEDURE — 99233 PR SUBSEQUENT HOSPITAL CARE,LEVL III: ICD-10-PCS | Mod: GC,,, | Performed by: PSYCHIATRY & NEUROLOGY

## 2020-01-06 PROCEDURE — 25000003 PHARM REV CODE 250: Performed by: PHYSICIAN ASSISTANT

## 2020-01-06 PROCEDURE — 94668 MNPJ CHEST WALL SBSQ: CPT

## 2020-01-06 PROCEDURE — 82800 BLOOD PH: CPT

## 2020-01-06 PROCEDURE — 82803 BLOOD GASES ANY COMBINATION: CPT

## 2020-01-06 PROCEDURE — 63600175 PHARM REV CODE 636 W HCPCS: Performed by: NURSE PRACTITIONER

## 2020-01-06 PROCEDURE — 99233 SBSQ HOSP IP/OBS HIGH 50: CPT | Mod: GC,,, | Performed by: PSYCHIATRY & NEUROLOGY

## 2020-01-06 PROCEDURE — 37799 UNLISTED PX VASCULAR SURGERY: CPT

## 2020-01-06 PROCEDURE — 84100 ASSAY OF PHOSPHORUS: CPT

## 2020-01-06 PROCEDURE — 25000242 PHARM REV CODE 250 ALT 637 W/ HCPCS: Performed by: INTERNAL MEDICINE

## 2020-01-06 PROCEDURE — 20000000 HC ICU ROOM

## 2020-01-06 PROCEDURE — 27200966 HC CLOSED SUCTION SYSTEM

## 2020-01-06 PROCEDURE — 25000003 PHARM REV CODE 250: Performed by: NURSE PRACTITIONER

## 2020-01-06 PROCEDURE — 25000003 PHARM REV CODE 250: Performed by: PSYCHIATRY & NEUROLOGY

## 2020-01-06 PROCEDURE — 99900026 HC AIRWAY MAINTENANCE (STAT)

## 2020-01-06 PROCEDURE — 80053 COMPREHEN METABOLIC PANEL: CPT

## 2020-01-06 PROCEDURE — 27000221 HC OXYGEN, UP TO 24 HOURS

## 2020-01-06 RX ORDER — VANCOMYCIN HCL IN 5 % DEXTROSE 1G/250ML
1000 PLASTIC BAG, INJECTION (ML) INTRAVENOUS
Status: COMPLETED | OUTPATIENT
Start: 2020-01-06 | End: 2020-01-09

## 2020-01-06 RX ORDER — VANCOMYCIN HCL IN 5 % DEXTROSE 1G/250ML
1000 PLASTIC BAG, INJECTION (ML) INTRAVENOUS
Status: DISCONTINUED | OUTPATIENT
Start: 2020-01-06 | End: 2020-01-06

## 2020-01-06 RX ORDER — DEXMEDETOMIDINE HYDROCHLORIDE 4 UG/ML
0.2 INJECTION, SOLUTION INTRAVENOUS CONTINUOUS
Status: DISCONTINUED | OUTPATIENT
Start: 2020-01-06 | End: 2020-01-10

## 2020-01-06 RX ADMIN — AMLODIPINE BESYLATE 10 MG: 10 TABLET ORAL at 08:01

## 2020-01-06 RX ADMIN — IPRATROPIUM BROMIDE AND ALBUTEROL SULFATE 3 ML: .5; 3 SOLUTION RESPIRATORY (INHALATION) at 11:01

## 2020-01-06 RX ADMIN — ACETAMINOPHEN 650 MG: 325 TABLET ORAL at 07:01

## 2020-01-06 RX ADMIN — DEXMEDETOMIDINE HYDROCHLORIDE 0.6 MCG/KG/HR: 4 INJECTION, SOLUTION INTRAVENOUS at 12:01

## 2020-01-06 RX ADMIN — PIPERACILLIN AND TAZOBACTAM 4.5 G: 4; .5 INJECTION, POWDER, FOR SOLUTION INTRAVENOUS at 12:01

## 2020-01-06 RX ADMIN — FAMOTIDINE 20 MG: 20 TABLET ORAL at 09:01

## 2020-01-06 RX ADMIN — MICONAZOLE NITRATE: 20 OINTMENT TOPICAL at 08:01

## 2020-01-06 RX ADMIN — PIPERACILLIN AND TAZOBACTAM 4.5 G: 4; .5 INJECTION, POWDER, FOR SOLUTION INTRAVENOUS at 03:01

## 2020-01-06 RX ADMIN — INSULIN ASPART 8 UNITS: 100 INJECTION, SOLUTION INTRAVENOUS; SUBCUTANEOUS at 06:01

## 2020-01-06 RX ADMIN — INSULIN ASPART 8 UNITS: 100 INJECTION, SOLUTION INTRAVENOUS; SUBCUTANEOUS at 02:01

## 2020-01-06 RX ADMIN — INSULIN ASPART 8 UNITS: 100 INJECTION, SOLUTION INTRAVENOUS; SUBCUTANEOUS at 09:01

## 2020-01-06 RX ADMIN — INSULIN DETEMIR 18 UNITS: 100 INJECTION, SOLUTION SUBCUTANEOUS at 08:01

## 2020-01-06 RX ADMIN — ACETAMINOPHEN 650 MG: 325 TABLET ORAL at 08:01

## 2020-01-06 RX ADMIN — HEPARIN SODIUM 5000 UNITS: 5000 INJECTION, SOLUTION INTRAVENOUS; SUBCUTANEOUS at 01:01

## 2020-01-06 RX ADMIN — HEPARIN SODIUM 5000 UNITS: 5000 INJECTION, SOLUTION INTRAVENOUS; SUBCUTANEOUS at 06:01

## 2020-01-06 RX ADMIN — SENNOSIDES AND DOCUSATE SODIUM 1 TABLET: 8.6; 5 TABLET ORAL at 08:01

## 2020-01-06 RX ADMIN — INSULIN ASPART 2 UNITS: 100 INJECTION, SOLUTION INTRAVENOUS; SUBCUTANEOUS at 01:01

## 2020-01-06 RX ADMIN — CHLORHEXIDINE GLUCONATE 0.12% ORAL RINSE 15 ML: 1.2 LIQUID ORAL at 08:01

## 2020-01-06 RX ADMIN — INSULIN ASPART 2 UNITS: 100 INJECTION, SOLUTION INTRAVENOUS; SUBCUTANEOUS at 05:01

## 2020-01-06 RX ADMIN — DEXMEDETOMIDINE HYDROCHLORIDE 0.1 MCG/KG/HR: 4 INJECTION, SOLUTION INTRAVENOUS at 08:01

## 2020-01-06 RX ADMIN — IPRATROPIUM BROMIDE AND ALBUTEROL SULFATE 3 ML: .5; 3 SOLUTION RESPIRATORY (INHALATION) at 03:01

## 2020-01-06 RX ADMIN — INSULIN ASPART 8 UNITS: 100 INJECTION, SOLUTION INTRAVENOUS; SUBCUTANEOUS at 05:01

## 2020-01-06 RX ADMIN — PIPERACILLIN AND TAZOBACTAM 4.5 G: 4; .5 INJECTION, POWDER, FOR SOLUTION INTRAVENOUS at 07:01

## 2020-01-06 RX ADMIN — VANCOMYCIN HYDROCHLORIDE 1000 MG: 1 INJECTION, POWDER, LYOPHILIZED, FOR SOLUTION INTRAVENOUS at 09:01

## 2020-01-06 RX ADMIN — INSULIN ASPART 4 UNITS: 100 INJECTION, SOLUTION INTRAVENOUS; SUBCUTANEOUS at 09:01

## 2020-01-06 RX ADMIN — IPRATROPIUM BROMIDE AND ALBUTEROL SULFATE 3 ML: .5; 3 SOLUTION RESPIRATORY (INHALATION) at 07:01

## 2020-01-06 RX ADMIN — FAMOTIDINE 20 MG: 20 TABLET ORAL at 08:01

## 2020-01-06 RX ADMIN — INSULIN ASPART 2 UNITS: 100 INJECTION, SOLUTION INTRAVENOUS; SUBCUTANEOUS at 09:01

## 2020-01-06 RX ADMIN — HEPARIN SODIUM 5000 UNITS: 5000 INJECTION, SOLUTION INTRAVENOUS; SUBCUTANEOUS at 09:01

## 2020-01-06 RX ADMIN — VANCOMYCIN HYDROCHLORIDE 1250 MG: 1.25 INJECTION, POWDER, LYOPHILIZED, FOR SOLUTION INTRAVENOUS at 04:01

## 2020-01-06 RX ADMIN — PIPERACILLIN AND TAZOBACTAM 4.5 G: 4; .5 INJECTION, POWDER, FOR SOLUTION INTRAVENOUS at 11:01

## 2020-01-06 RX ADMIN — INSULIN ASPART 8 UNITS: 100 INJECTION, SOLUTION INTRAVENOUS; SUBCUTANEOUS at 01:01

## 2020-01-06 NOTE — PROGRESS NOTES
Pharmacokinetic Assessment Follow Up: IV Vancomycin    Assessment and Plan:    Surveillance vanc trough drawn appropriately, resulted at 20.1 mcg/mL  Within goal trough of 15-20 mcg/mL  Appears to be accumulating on this regimen  Decrease vancomycin to 1000 mg Q12H, to start at 22:00 to allow for extra clearance  Draw trough before fourth dose on 1/8 at 09:30    Drug levels (last 3 results):  Recent Labs   Lab Result Units 01/04/20  0300 01/06/20  0310   Vancomycin-Trough ug/mL 17.6 20.1     Pharmacy will continue to follow and monitor vancomycin. Please call for questions regarding this assessment.    Thank you for the consult,   Danielle Piper, PharmD, [facility]  Neurocritical Care Pharmacist  [id number]         Patient brief summary:  Jerry Linder is a 79 y.o. male initiated on antimicrobial therapy with IV vancomycin for treatment of pneumonia    Drug Allergies:   Review of patient's allergies indicates:  No Known Allergies    Actual Body Weight:   123.4 kg     Renal Function:   Estimated Creatinine Clearance: 94.6 mL/min (based on SCr of 0.9 mg/dL).    CBC (last 72 hours):  Recent Labs   Lab Result Units 01/04/20  0300 01/05/20  0243 01/06/20  0310   WBC K/uL 8.58 7.57 7.60   Hemoglobin g/dL 12.7* 11.9* 11.3*   Hematocrit % 40.1 40.0 37.5*   Platelets K/uL 273 271 241   Gran% % 68.2 60.9 58.0   Lymph% % 18.2 22.3 25.9   Mono% % 8.9 11.6 9.7   Eosinophil% % 3.8 3.4 3.7   Basophil% % 0.3 0.5 0.7   Differential Method  Automated Automated Automated       Metabolic Panel (last 72 hours):  Recent Labs   Lab Result Units 01/03/20  1157 01/04/20  0300 01/05/20  0243 01/06/20  0310   Sodium mmol/L  --  153* 152* 151*   Potassium mmol/L 4.0 4.2 4.6 4.6   Chloride mmol/L  --  116* 116* 115*   CO2 mmol/L  --  25 27 26   Glucose mg/dL  --  232* 198* 200*   BUN, Bld mg/dL  --  46* 42* 35*   Creatinine mg/dL  --  0.9 1.0 0.9   Albumin g/dL  --  2.3* 2.3* 2.2*   Total Bilirubin mg/dL  --  0.6 0.4 0.4   Alkaline Phosphatase U/L  --   161* 173* 163*   AST U/L  --  65* 53* 40   ALT U/L  --  92* 85* 65*   Magnesium mg/dL  --  2.8* 2.9* 2.6   Phosphorus mg/dL  --  4.1 4.6* 4.4       Vancomycin Administrations:  vancomycin given in the last 96 hours                   vancomycin 1.25 g in dextrose 5% 250 mL IVPB (ready to mix) (mg) 1,250 mg New Bag 01/04/20 0445     1,250 mg New Bag 01/03/20 1608     1,250 mg New Bag  0432    vancomycin 2 g in 0.9% sodium chloride 500 mL IVPB (mg) 2,000 mg New Bag 01/02/20 1633                Microbiologic Results:  Microbiology Results (last 7 days)     Procedure Component Value Units Date/Time    Blood culture [[id number]] Collected:  01/02/20 1547    Order Status:  Completed Specimen:  Blood from Peripheral, Antecubital, Right Updated:  01/05/20 1812     Blood Culture, Routine No Growth to date      No Growth to date      No Growth to date      No Growth to date    Blood culture [[id number]] Collected:  01/02/20 1554    Order Status:  Completed Specimen:  Blood from Peripheral, Hand, Left Updated:  01/05/20 1812     Blood Culture, Routine No Growth to date      No Growth to date      No Growth to date      No Growth to date    Culture, Respiratory with Gram Stain [[id number]] Collected:  01/02/20 1625    Order Status:  Completed Specimen:  Respiratory from Sputum Updated:  01/04/20 0747     Respiratory Culture Normal respiratory balaji      No S aureus or Pseudomonas isolated.     Gram Stain (Respiratory) <10 epithelial cells per low power field.     Gram Stain (Respiratory) Many WBC's     Gram Stain (Respiratory) Many Gram positive rods     Gram Stain (Respiratory) Rare Gram positive cocci     Gram Stain (Respiratory) Rare Gram negative rods

## 2020-01-06 NOTE — PROGRESS NOTES
[facility]  Neurocritical Care  Progress Note    Admit Date: 12/16/2019  Service Date: 01/06/2020  Length of Stay: 21    Subjective:     Chief Complaint: Embolic stroke involving right middle cerebral artery    History of Present Illness: The patient is a 79 y.o. male who  was admitted as a transfer from OSH for Right MCA stroke syndrome. Patient had an urgent thrombectomy s/p  right MCA stroke syndrome. PMH significant for HTN, T2DM and HLD. Patient stated that around 2:30 pm on 12/16/19 he was driving and felt weak. He drove home and was taken to the ER where he was evaluated for a stroke. He had outside CTA which was reported to have R ICA stenosis at bifurcation with 70% stenosis and right MCA occlusion. Patient was transferred to [facility] for possible intervention. Patient had a right femoral sheath placed and his Angio  revealed 90% R ICA stenosis and R M1 occlusion. The R ICA was treated by thrombectomy and carotid stenting with TICI 3 reperfusion.   Patient was admitted to the [facility] for higher level of care post thrombectomy.      Hospital Course: 12/17/19: Patient admitted to [facility] for higher level of care s/p thrombectomy and right carotid artery stenting with TICI 3 reperfusion  12/18/2019Recent MRI with heme transformation abg Q8, repeat scan stable, mannitol  12/19/2019 intubation repeat CTH  12/20: febrile-broaden abx coverage, SBT, add hydralazine 50 q8h, KUB and mag citrate  12/21: wean FiO2, d/c hydralazine, wean fentanyl  12/22/2019: Patient stable overnight. Lasix 20mg x 1 given today. Enteral water flushes started for hypernatremia.    12/23: SBT trial, started Plavix today, initiated Hydralazine 25 TID, lasix 40 mg X1, decrease statin to 40 mg daily (transaminitis -trending down), add psyllium, Na BID -goal 145-150, initiated sq heparin  12/24/2019: SBT failed, wean fio2 as tolerated, CXR responded to lasix, scheduled BID  12/25/2019 continue lasix. SBT today. Adjust insulin  regimen  12/26/2019: Continue diuresis, SBT again today, wean PEEP and FiO2, wean nicardipine, hold statin for transaminits   12/27/2019: No significant events   12/28 No significant events over night.. Tolerating spon. Breathing trial, increased peep. No cuff leak, started decadron 8mg q8x3 doses and reassess in am for possible extubation. Insulin gtt started while on steroids, as BG running high. CPT added to resp. Treatments. Start TF.  12/29: extubated to room air following SBT  12/30: IS q 6, d/c whaley, d/c lasix, titrate insulin gtt, d/c hydralazine, KUB  12/31: cxr, trial of modafinil,  q 6, transition insulin  1/1: increase FWF, modafinil, insulin  1/2: intubated, titrate insulin, family discussion  1/3: CT head in AM, cxr, kub, increase insulin, family updated  1/4: NAEO  1/5: NAEO  01/06/2020 NAEO. Transition from propofol to precedex      Review of Symptoms: intubated cannot participate  Constitutional: Denies fevers, weight loss, chills, or weakness.   Eyes: Denies changes in vision.   ENT: Denies dysphagia, nasal discharge, ear pain or discharge.   Cardiovascular: Denies chest pain, palpitations, orthopnea, or claudication.   Respiratory: Denies shortness of breath, cough, hemoptysis, or wheezing.   GI: Denies nausea/vomitting, hematochezia, melena, abd pain, or changes in appetite.   : Denies dysuria, incontinence, or hematuria.   Musculoskeletal: Denies joint pain or myalgias.   Skin/breast: Denies rashes, lumps, lesions, or discharge.   Neurologic: Denies headache, dizziness, vertigo, or paresthesias.   Psychiatric: Denies changes in mood or hallucinations.   Endocrine: Denies polyuria, polydipsia, heat/cold intolerance.   Hematologic/Lymph: Denies lymphadenopathy, easy bruising or easy bleeding.   Allergic/Immunologic: Denies rash, rhinitis      Medications:  Continuous Infusions:   dexmedetomidine (PRECEDEX) infusion 0.1 mcg/kg/hr (01/06/20 1600)    propofol Stopped (01/06/20 1227)      Scheduled Meds:   albuterol-ipratropium  3 mL Nebulization Q4H    amLODIPine  10 mg Per NG tube Daily    chlorhexidine  15 mL Mouth/Throat BID    famotidine  20 mg Per NG tube BID    heparin (porcine)  5,000 Units Subcutaneous Q8H    insulin aspart U-100  8 Units Subcutaneous Q4H    insulin detemir U-100  18 Units Subcutaneous BID    miconazole nitrate 2%   Topical (Top) BID    piperacillin-tazobactam (ZOSYN) IVPB  4.5 g Intravenous Q8H    polyethylene glycol  17 g Per NG tube Daily    senna-docusate 8.6-50 mg  1 tablet Per NG tube BID    vancomycin (VANCOCIN) IVPB  1,000 mg Intravenous Q12H     PRN Meds:.acetaminophen, Dextrose 10% Bolus, Dextrose 10% Bolus, Dextrose 10% Bolus, Dextrose 10% Bolus, fentaNYL, glucagon (human recombinant), hydrALAZINE, insulin aspart U-100, labetalol, magnesium oxide, magnesium oxide, ondansetron, potassium chloride 10%, potassium chloride 10%, potassium chloride 10%, potassium, sodium phosphates, potassium, sodium phosphates, potassium, sodium phosphates, sodium chloride 0.9%    OBJECTIVE:   Vital Signs (Most Recent):   Temp: 99.2 °F (37.3 °C) (01/06/20 1500)  Pulse: 91 (01/06/20 1600)  Resp: (!) 24 (01/06/20 1600)  BP: 138/65 (01/06/20 1600)  SpO2: 98 % (01/06/20 1600)    Vital Signs (24h Range):   Temp:  [99.1 °F (37.3 °C)-99.4 °F (37.4 °C)] 99.2 °F (37.3 °C)  Pulse:  [] 91  Resp:  [21-29] 24  SpO2:  [95 %-100 %] 98 %  BP: (115-158)/(55-70) 138/65  Arterial Line BP: (120-156)/(50-62) 138/54    ICP/CPP (Last 24h):        I & O (Last 24h):     Intake/Output Summary (Last 24 hours) at 1/6/2020 1640  Last data filed at 1/6/2020 1600  Gross per 24 hour   Intake 2948.97 ml   Output 1400 ml   Net 1548.97 ml     Physical Exam:  GA: sedated, comfortable, no acute distress.   HEENT: No scleral icterus or JVD. Neck supple  Pulmonary: Air entry equal to auscultation A/P/L. Wheezing no, crackles no  Cardiac: RRR, S1 & S2 w/o rubs/murmurs/gallops.   Abdominal: soft,  non-tender, bowel sounds present x 4. No appreciable hepatosplenomegaly.  Skin: No jaundice, rashes, or visible lesions.  Neuro:  --Mental Status:  sedated,   --Pupils 3.5mm, PERRL.   --Corneal reflex not done in this awake patient, gag, cough intact.  Left hemiparesis  MSK:  No edema in UE and LE    Vent Data:   Vent Mode: A/C  Oxygen Concentration (%):  [40-50] 40  Resp Rate Total:  [20 br/min-39 br/min] 26 br/min  Vt Set:  [420 mL] 420 mL  PEEP/CPAP:  [5 cmH20] 5 cmH20  Pressure Support:  [0 cmH20] 0 cmH20  Mean Airway Pressure:  [9.1 fdU71-20 cmH20] 9.1 cmH20    Lines/Drains/Airway:        Airway - Non-Surgical 01/02/20 1529 Endotracheal Tube (Active)   Secured at 25 cm 1/6/2020  3:27 PM   Measured At Lips 1/6/2020  3:27 PM   Secured Location Right 1/6/2020  3:27 PM   Secured by Commercial tube lara 1/6/2020  3:27 PM   Bite Block none 1/6/2020  3:27 PM   Site Condition Cool;Dry 1/6/2020  3:27 PM   Status Intact;Secured;Patent 1/6/2020  3:27 PM   Site Assessment Clean;Dry 1/6/2020  3:27 PM   Cuff Pressure 25 cm H2O 1/6/2020  3:27 PM             Arterial Line 01/02/20 1732 Right Radial (Active)   Site Assessment Clean;Dry;Intact 1/6/2020  3:00 PM   Line Status Pulsatile blood flow 1/6/2020  3:00 PM   Art Line Waveform Appropriate 1/6/2020  3:00 PM   Arterial Line Interventions Zeroed and calibrated;Leveled;Connections checked and tightened;Flushed per protocol 1/6/2020  3:00 PM   Color/Movement/Sensation Capillary refill less than 3 sec 1/6/2020  3:00 PM   Dressing Type Transparent;Securing device 1/6/2020  3:00 PM   Dressing Status Biopatch in place;Clean;Dry;Intact 1/6/2020  3:00 PM   Dressing Intervention Dressing reinforced 1/6/2020  3:00 PM   Dressing Change Due 01/09/20 1/6/2020  3:00 PM           NG/OG Tube 12/17/19 1100 nasogastric 14 Fr. Left nostril (Active)   Placement Check placement verified by x-ray 1/6/2020  3:00 PM   Advancement advanced manually 12/28/2019  3:05 PM   Tolerance no signs/symptoms  of discomfort 1/6/2020  3:00 PM   Securement secured to nostril center w/ adhesive device 1/6/2020  3:00 PM   Clamp Status/Tolerance unclamped 1/6/2020  3:00 PM   Suction Setting/Drainage Method suction at the bedside 1/3/2020  3:00 PM   Insertion Site Appearance no redness, warmth, tenderness, skin breakdown, drainage 1/6/2020  3:00 PM   Drainage None 1/6/2020  3:00 PM   Flush/Irrigation flushed w/;water;no resistance met 1/6/2020  3:00 PM   Feeding Method continuous 1/6/2020  3:00 PM   Feeding Action feeding continued 1/6/2020  3:00 PM   Current Rate (mL/hr) 55 mL/hr 1/6/2020  7:00 AM   Goal Rate (mL/hr) 55 mL/hr 1/6/2020  7:00 AM   Intake (mL) 30 mL 1/6/2020  9:00 AM   Water Bolus (mL) 400 mL 1/6/2020 12:00 PM   Rate Formula Tube Feeding (mL/hr) 55 mL/hr 1/6/2020  7:00 AM   Formula Name Glucerna 1/6/2020  3:00 PM   Intake (mL) - Formula Tube Feeding 55 1/6/2020  4:00 PM   Residual Amount (ml) 15 ml 1/3/2020  7:05 AM            Rectal Tube 01/06/20 0613 rectal tube w/ balloon (indicate number of mLs) (Active)   Balloon Inflation Volume (mL) 45 1/6/2020  3:00 PM   Reposition drainage bags for BMS & Andrade on opposite sides of bed 1/6/2020  3:00 PM   Outcome gas expelled, stool evacuated;stool evacuated;comfort enhanced 1/6/2020  3:00 PM   Stool Color Brown 1/6/2020  3:00 PM   Insertion Site Appearance no redness, warmth, tenderness, skin breakdown, drainage 1/6/2020  3:00 PM   Flush/Irrigation flushed w/;water;no resistance met 1/6/2020  3:00 PM   Intake (mL) 45 mL 1/6/2020  7:00 AM   Rectal Tube Output 200 mL 1/6/2020  7:00 AM       Male External Urinary Catheter 01/04/20 0700 Small (Active)   Collection Container Urimeter 1/6/2020  3:00 PM   Securement Method secured to top of thigh w/ adhesive device 1/6/2020  3:00 PM   Skin no redness;no breakdown;penis/scrotum cleansed w/ soap and water;pubic hair clipped with safety clippers;skin barrier applied 1/6/2020  3:00 PM   Tolerance no signs/symptoms of discomfort  1/6/2020  3:00 PM   Output (mL) 100 mL 1/6/2020  3:00 PM   Catheter Change Date 01/06/20 1/6/2020  3:00 PM   Catheter Change Time 0700 1/6/2020  3:00 PM     Nutrition/Tube Feeds (if NPO state why): tf     Labs:  ABG:   Recent Labs   Lab 01/06/20  0428   PH 7.448   PO2 78*   PCO2 40.4   HCO3 27.9   POCSATURATED 96   BE 4     BMP:  Recent Labs   Lab 01/06/20  0310   *   K 4.6   *   CO2 26   BUN 35*   CREATININE 0.9   *   MG 2.6   PHOS 4.4     LFT:   Lab Results   Component Value Date    AST 40 01/06/2020    ALT 65 (H) 01/06/2020    ALKPHOS 163 (H) 01/06/2020    BILITOT 0.4 01/06/2020    ALBUMIN 2.2 (L) 01/06/2020    PROT 6.2 01/06/2020     CBC:   Lab Results   Component Value Date    WBC 7.60 01/06/2020    HGB 11.3 (L) 01/06/2020    HCT 37.5 (L) 01/06/2020     (H) 01/06/2020     01/06/2020     Microbiology x 7d:   Microbiology Results (last 7 days)     Procedure Component Value Units Date/Time    Blood culture [[id number]] Collected:  01/02/20 1547    Order Status:  Completed Specimen:  Blood from Peripheral, Antecubital, Right Updated:  01/05/20 1812     Blood Culture, Routine No Growth to date      No Growth to date      No Growth to date      No Growth to date    Blood culture [[id number]] Collected:  01/02/20 1554    Order Status:  Completed Specimen:  Blood from Peripheral, Hand, Left Updated:  01/05/20 1812     Blood Culture, Routine No Growth to date      No Growth to date      No Growth to date      No Growth to date    Culture, Respiratory with Gram Stain [[id number]] Collected:  01/02/20 1625    Order Status:  Completed Specimen:  Respiratory from Sputum Updated:  01/04/20 0747     Respiratory Culture Normal respiratory balaji      No S aureus or Pseudomonas isolated.     Gram Stain (Respiratory) <10 epithelial cells per low power field.     Gram Stain (Respiratory) Many WBC's     Gram Stain (Respiratory) Many Gram positive rods     Gram Stain (Respiratory) Rare Gram positive  cocci     Gram Stain (Respiratory) Rare Gram negative rods        Imaging:  CXR 1/6 - tubes in place. Left basilar atelectasis  I personally reviewed the above image.  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, right    Transaminitis    Constipation    Acute respiratory failure with hypoxia    Acute venous embolism and thrombosis of basilic vein, left    Acute spont intraparenchymal hemorrhage assoc w/ coagulopathy    Vasogenic brain edema    Midline shift of brain    Cytotoxic brain edema    Fever due to infection    Acute ischemic right MCA stroke    Decreased strength, endurance, and mobility    Essential hypertension    Type 2 diabetes mellitus with neurologic complication, without long-term current use of insulin    Mixed hyperlipidemia    Cerebrovascular accident (CVA)      Neuro:   Right MCA stroke  Transition propofol to precedex to encourage arousal  Extensive discussion with family concerning goals of care had by me and my colleague Dr. Valencia. Family believes he will have an acceptable quality of life despite left hemiparesis and neglect.  Continue secondary stroke prevention per protocol    Pulmonary:   mech vent  CXR/ABG daily  Plan for trach and PEG    Cardiac:   SBP < 160  EF 65%    Renal:    Making urine  BUN/CRr > 20    ID:   Afebrile, normal wbc  Complete 7 day course of antibiotics - appears to have had a clinical response    Hem/Onc:   Stable hh and plt count    Endocrine:    BS stable    Fluids/Electrolytes/Nutrition/GI:   Tf  Replete lytes as needed  Lines:  Art +  CVC NA  ETT +  Andrade NA  NG +  PEG NA    Proph:  DVT:SCD/heparin   Constipation:  Last output:bowel regimen as  needed  PUP: pepcid  VAP:peridex      Uninterrupted Critical Care/Counseling Time (not including procedures):: III    Deep Shirley MD  Neurocritical care attending    Full Code    Deep Shirley MD  Neurocritical Care  [facility]

## 2020-01-06 NOTE — PLAN OF CARE
Wean propofol.   Consider precedex  Plan for trach and peg  Continue antibiotics pending culture results - appears to have clinical response.  Extensive discussion with family concerning goals of care had by me and my colleague Dr. Valencia.  Family believes he will have an acceptable quality of life despite left hemiparesis and neglect.

## 2020-01-06 NOTE — PLAN OF CARE
POC reviewed with pt and pt's wife at 1600. Pt is unable to verbalize understanding of the POC due ETT present, on precedex gtt. Palliative care consulted by NCC prior to tentative trach and PEG on Wednesday. Propofol turned off. Questions and concerns addressed. Pt progressing towards goals of care. Please see flow sheet for detailed nursing assessment and VS. Will continue to monitor.

## 2020-01-06 NOTE — ANESTHESIA PREPROCEDURE EVALUATION
[facility]  Anesthesia Pre-Operative Evaluation         Patient Name: Jerry Linder  YOB: 1940  MRN: [id number]    SUBJECTIVE:     Pre-operative evaluation for Procedure(s) (LRB):  CREATION, TRACHEOSTOMY (N/A)  INSERTION, PEG TUBE (N/A)     01/06/2020    Jerry Linder is a 79 y.o. male w/ a significant PMHx of HTN, T2DM and HLD. Suffered stroke on 12/16/19. Transferred from OSH where CTA showed R MCA occlusion treated by thrombectomy and carotid stenting. Intubated 12/19/19, extubated 12/29 and reintubated 1/2. Patient on plavix.    Patient now presents for the above procedure(s).      LDA: PIV x3, A line, NGT, ETT, rectal tube, condom cath    Prev airway: Currently intubated. 12/18/19, easy mask, 8.0 tube via glidescope.    Drips:    propofol Stopped (01/06/20 0930)       Patient Active Problem List   Diagnosis    Embolic stroke involving right middle cerebral artery    Essential hypertension    Type 2 diabetes mellitus with neurologic complication, without long-term current use of insulin    Mixed hyperlipidemia    Cerebrovascular accident (CVA)    Decreased strength, endurance, and mobility    Acute spont intraparenchymal hemorrhage assoc w/ coagulopathy    Vasogenic brain edema    Midline shift of brain    Cytotoxic brain edema    Fever due to infection    Acute ischemic right MCA stroke    Acute respiratory failure with hypoxia    Acute venous embolism and thrombosis of basilic vein, left    Constipation    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, right    Transaminitis       Review of patient's allergies indicates:  No Known Allergies    Current Inpatient Medications:   albuterol-ipratropium  3 mL Nebulization Q4H    amLODIPine  10 mg Per NG tube Daily    chlorhexidine  15 mL Mouth/Throat BID    famotidine  20 mg Per NG tube BID    heparin (porcine)  5,000 Units Subcutaneous Q8H    insulin aspart U-100  8 Units Subcutaneous Q4H    insulin  detemir U-100  18 Units Subcutaneous BID    miconazole nitrate 2%   Topical (Top) BID    piperacillin-tazobactam (ZOSYN) IVPB  4.5 g Intravenous Q8H    polyethylene glycol  17 g Per NG tube Daily    senna-docusate 8.6-50 mg  1 tablet Per NG tube BID    vancomycin (VANCOCIN) IVPB  1,000 mg Intravenous Q12H       No current facility-administered medications on file prior to encounter.      No current outpatient medications on file prior to encounter.       Past Surgical History:   Procedure Laterality Date    CEREBRAL ANGIOGRAM N/A 12/16/2019    Procedure: ANGIOGRAM-CEREBRAL;  Surgeon: Jairo Martínez MD;  Location: [facility] OR [address] [place] [address];  Service: Radiology;  Laterality: N/A;       Social History     Socioeconomic History    Marital status: Not on file     Spouse name: Not on file    Number of children: Not on file    Years of education: Not on file    Highest education level: Not on file   Occupational History    Not on file   Social Needs    Financial resource strain: Not on file    Food insecurity:     Worry: Not on file     Inability: Not on file    Transportation needs:     Medical: Not on file     Non-medical: Not on file   Tobacco Use    Smoking status: Former Smoker     Types: Cigarettes   Substance and Sexual Activity    Alcohol use: Not on file    Drug use: Never    Sexual activity: Not on file   Lifestyle    Physical activity:     Days per week: Not on file     Minutes per session: Not on file    Stress: Not on file   Relationships    Social connections:     Talks on phone: Not on file     Gets together: Not on file     Attends Buddhist service: Not on file     Active member of club or organization: Not on file     Attends meetings of clubs or organizations: Not on file     Relationship status: Not on file   Other Topics Concern    Not on file   Social History Narrative    Not on file       OBJECTIVE:     Vital Signs Range (Last 24H):  Temp:  [37 °C (98.6 °F)-37.4 °C (99.4 °F)]   Pulse:   []   Resp:  [21-29]   BP: (115-150)/(55-69)   SpO2:  [96 %-100 %]   Arterial Line BP: (120-156)/(49-62)       Significant Labs:  Lab Results   Component Value Date    WBC 7.60 01/06/2020    HGB 11.3 (L) 01/06/2020    HCT 37.5 (L) 01/06/2020     01/06/2020    CHOL 136 12/17/2019    TRIG 177 (H) 12/17/2019    HDL 33 (L) 12/17/2019    ALT 65 (H) 01/06/2020    AST 40 01/06/2020     (H) 01/06/2020    K 4.6 01/06/2020     (H) 01/06/2020    CREATININE 0.9 01/06/2020    BUN 35 (H) 01/06/2020    CO2 26 01/06/2020    INR 0.9 01/06/2020    HGBA1C 7.7 (H) 12/17/2019       Diagnostic Studies: No relevant studies.    EKG: No results found for this or any previous visit.    ECHOCARDIOGRAM:  TTE:  Results for orders placed or performed during the hospital encounter of 12/16/19   Echo Color Flow Doppler? Yes; Bubble Contrast? Yes    Narrative    · Normal left ventricular systolic function. The estimated ejection   fraction is 65%  · Indeterminate left ventricular diastolic function.  · Mild left ventricular enlargement.  · Normal right ventricular systolic function.  · Technically difficult study, poor endocardial visualization, contrast   used.  · No wall motion abnormalities.  · Indeterminate central venous pressure. Estimated PA pressure is at least   9 mmHg.            ASSESSMENT/PLAN:         Anesthesia Evaluation    I have reviewed the Patient Summary Reports.        Review of Systems  Anesthesia Hx:  No problems with previous Anesthesia  History of prior surgery of interest to airway management or planning:  Denies Personal Hx of Anesthesia complications.   Social:  Former Smoker    Cardiovascular:   Hypertension hyperlipidemia    Neurological:   CVA, residual symptoms    Endocrine:   Diabetes        Physical Exam  General:  Well nourished    Airway/Jaw/Neck:  Airway Findings: Pre-Existing Airway Tube(s): Oral Endotracheal tube      Chest/Lungs:  Chest/Lungs Findings: Clear to auscultation      Heart/Vascular:  Heart Findings: Rate: Normal        Mental Status:  Mental Status Findings:  Confusion, Lethargic         Anesthesia Plan  Type of Anesthesia, risks & benefits discussed:  Anesthesia Type:  general  Patient's Preference:   Intra-op Monitoring Plan: standard ASA monitors and arterial line  Intra-op Monitoring Plan Comments:   Post Op Pain Control Plan: multimodal analgesia and per primary service following discharge from PACU  Post Op Pain Control Plan Comments:   Induction:   IV  Beta Blocker:  Patient is not currently on a Beta-Blocker (No further documentation required).       Informed Consent: Patient representative understands risks and agrees with Anesthesia plan.  Questions answered. Anesthesia consent signed with patient representative.  ASA Score: 4     Day of Surgery Review of History & Physical:    H&P update referred to the surgeon.         Ready For Surgery From Anesthesia Perspective.

## 2020-01-06 NOTE — PROGRESS NOTES
"[facility]  Adult Nutrition  Progress Note    SUMMARY       Recommendations  Continue Glucerna 1.5 @ goal rate 55mL/hr.   - Provides 1980kcals, 109g protein and 1002mL free water.     RD to monitor.    Goals: Pt to tolerate >85% EEN and EPN by RD follow up  Nutrition Goal Status: goal met  Communication of RD Recs: reviewed with RN    Reason for Assessment    Reason For Assessment: RD follow-up  Diagnosis: stroke/CVA  Relevant Medical History: HTN, T2DM, HLD  Interdisciplinary Rounds: attended  General Information Comments: Pt extubated 12/29, reintubated 1/3. Plans for trach/PEG. TF at goal, tolerating appropriately. Family at bedside providing nutrition hx. Weight stable per wife approx 270-275lb "for years." Pt with adequate po intake and appetite. No indications of malnutrition at this time. Pt has been receiving adequate TF since admission.  Nutrition Discharge Planning: unable to determine at this time    Nutrition Risk Screen    Nutrition Risk Screen: tube feeding or parenteral nutrition    Nutrition/Diet History    Spiritual, Cultural Beliefs, Pentecostal Practices, Values that Affect Care: no  Factors Affecting Nutritional Intake: NPO, on mechanical ventilation    Anthropometrics    Temp: 99.4 °F (37.4 °C)  Height Method: Measured  Height: 6' 1" (185.4 cm)  Height (inches): 73 in  Weight Method: Bed Scale  Weight: 123.4 kg (272 lb)  Weight (lb): 272 lb  Ideal Body Weight (IBW), Male: 184 lb  % Ideal Body Weight, Male (lb): 147.83 %  BMI (Calculated): 35.9  BMI Grade: 35 - 39.9 - obesity - grade II       Lab/Procedures/Meds    Pertinent Labs Reviewed: reviewed  Pertinent Labs Comments: POCT Glu 170-192  Pertinent Medications Reviewed: reviewed  Pertinent Medications Comments: insulin, propofol, heparin    Estimated/Assessed Needs    Weight Used For Calorie Calculations: 123.4 kg (272 lb 0.8 oz)  Energy Calorie Requirements (kcal): 2179  Energy Need Method: [place] (modified)  Protein " Requirements: 126-168g(1.5- 2.0g/kg)  Weight Used For Protein Calculations: 83.6 kg (184 lb 4.9 oz)  Fluid Requirements (mL): 1mL/kcal or per MD     RDA Method (mL): 2179  CHO Requirement: 314g CHO daily      Nutrition Prescription Ordered    Current Diet Order: NPO  Nutrition Order Comments: TF at goal  Current Nutrition Support Formula Ordered: Glucerna 1.5  Current Nutrition Support Rate Ordered: 55 (ml)  Current Nutrition Support Frequency Ordered: mL/hr    Evaluation of Received Nutrient/Fluid Intake    Enteral Calories (kcal): 1980  Enteral Protein (gm): 109  Enteral (Free Water) Fluid (mL): 1002    % Kcal Needs: 100  % Protein Needs: 87    I/O: +3.4L since admit, good UOP, LBM 1/5  Energy Calories Required: meeting needs  Protein Required: meeting needs    Fluid Required: other (see comments)(per MD)    % Intake of Estimated Energy Needs: 75 - 100 %  % Meal Intake: NPO    Nutrition Risk    Level of Risk/Frequency of Follow-up: low(f/u 1x/week)     Assessment and Plan    Nutrition Problem  Inadequate oral intake     Related to (etiology):   intubation     Signs and Symptoms (as evidenced by):   Pt requiring alternative means of nutrition to meet >85% EEN and EPN.      Interventions(treatment strategy):  Collaboration of care with providers     Nutrition Diagnosis Status:   Continues       Monitor and Evaluation    Food and Nutrient Intake: enteral nutrition intake  Food and Nutrient Adminstration: enteral and parenteral nutrition administration  Anthropometric Measurements: weight, weight change, body mass index  Biochemical Data, Medical Tests and Procedures: electrolyte and renal panel, gastrointestinal profile, glucose/endocrine profile, inflammatory profile, lipid profile  Nutrition-Focused Physical Findings: overall appearance     Nutrition Follow-Up    RD Follow-up?: Yes

## 2020-01-07 PROBLEM — Z51.5 PALLIATIVE CARE ENCOUNTER: Status: ACTIVE | Noted: 2020-01-07

## 2020-01-07 LAB
ALBUMIN SERPL BCP-MCNC: 2.2 G/DL (ref 3.5–5.2)
ALLENS TEST: ABNORMAL
ALP SERPL-CCNC: 158 U/L (ref 55–135)
ALT SERPL W/O P-5'-P-CCNC: 58 U/L (ref 10–44)
ANION GAP SERPL CALC-SCNC: 11 MMOL/L (ref 8–16)
AST SERPL-CCNC: 39 U/L (ref 10–40)
BACTERIA BLD CULT: NORMAL
BACTERIA BLD CULT: NORMAL
BASOPHILS # BLD AUTO: 0.05 K/UL (ref 0–0.2)
BASOPHILS NFR BLD: 0.4 % (ref 0–1.9)
BILIRUB SERPL-MCNC: 0.5 MG/DL (ref 0.1–1)
BUN SERPL-MCNC: 33 MG/DL (ref 8–23)
CALCIUM SERPL-MCNC: 8.2 MG/DL (ref 8.7–10.5)
CHLORIDE SERPL-SCNC: 113 MMOL/L (ref 95–110)
CO2 SERPL-SCNC: 22 MMOL/L (ref 23–29)
CREAT SERPL-MCNC: 0.9 MG/DL (ref 0.5–1.4)
DELSYS: ABNORMAL
DIFFERENTIAL METHOD: ABNORMAL
EOSINOPHIL # BLD AUTO: 0.2 K/UL (ref 0–0.5)
EOSINOPHIL NFR BLD: 1.4 % (ref 0–8)
ERYTHROCYTE [DISTWIDTH] IN BLOOD BY AUTOMATED COUNT: 13.2 % (ref 11.5–14.5)
ERYTHROCYTE [SEDIMENTATION RATE] IN BLOOD BY WESTERGREN METHOD: 20 MM/H
EST. GFR  (AFRICAN AMERICAN): >60 ML/MIN/1.73 M^2
EST. GFR  (NON AFRICAN AMERICAN): >60 ML/MIN/1.73 M^2
FIO2: 40
GLUCOSE SERPL-MCNC: 232 MG/DL (ref 70–110)
HCO3 UR-SCNC: 26.7 MMOL/L (ref 24–28)
HCT VFR BLD AUTO: 37.5 % (ref 40–54)
HGB BLD-MCNC: 11.3 G/DL (ref 14–18)
IMM GRANULOCYTES # BLD AUTO: 0.19 K/UL (ref 0–0.04)
IMM GRANULOCYTES NFR BLD AUTO: 1.7 % (ref 0–0.5)
LYMPHOCYTES # BLD AUTO: 1.8 K/UL (ref 1–4.8)
LYMPHOCYTES NFR BLD: 15.9 % (ref 18–48)
MAGNESIUM SERPL-MCNC: 2.5 MG/DL (ref 1.6–2.6)
MCH RBC QN AUTO: 31.7 PG (ref 27–31)
MCHC RBC AUTO-ENTMCNC: 30.1 G/DL (ref 32–36)
MCV RBC AUTO: 105 FL (ref 82–98)
MODE: ABNORMAL
MONOCYTES # BLD AUTO: 1 K/UL (ref 0.3–1)
MONOCYTES NFR BLD: 9.2 % (ref 4–15)
NEUTROPHILS # BLD AUTO: 8 K/UL (ref 1.8–7.7)
NEUTROPHILS NFR BLD: 71.4 % (ref 38–73)
NRBC BLD-RTO: 0 /100 WBC
PCO2 BLDA: 39.6 MMHG (ref 35–45)
PEEP: 5
PH SMN: 7.44 [PH] (ref 7.35–7.45)
PHOSPHATE SERPL-MCNC: 3.7 MG/DL (ref 2.7–4.5)
PLATELET # BLD AUTO: 217 K/UL (ref 150–350)
PMV BLD AUTO: 11.8 FL (ref 9.2–12.9)
PO2 BLDA: 77 MMHG (ref 80–100)
POC BE: 3 MMOL/L
POC SATURATED O2: 96 % (ref 95–100)
POC TCO2: 28 MMOL/L (ref 23–27)
POCT GLUCOSE: 154 MG/DL (ref 70–110)
POCT GLUCOSE: 203 MG/DL (ref 70–110)
POCT GLUCOSE: 206 MG/DL (ref 70–110)
POCT GLUCOSE: 213 MG/DL (ref 70–110)
POCT GLUCOSE: 215 MG/DL (ref 70–110)
POCT GLUCOSE: 224 MG/DL (ref 70–110)
POTASSIUM SERPL-SCNC: 4.4 MMOL/L (ref 3.5–5.1)
PROT SERPL-MCNC: 6.3 G/DL (ref 6–8.4)
RBC # BLD AUTO: 3.57 M/UL (ref 4.6–6.2)
SAMPLE: ABNORMAL
SITE: ABNORMAL
SODIUM SERPL-SCNC: 146 MMOL/L (ref 136–145)
SP02: 100
VT: 420
WBC # BLD AUTO: 11.15 K/UL (ref 3.9–12.7)

## 2020-01-07 PROCEDURE — 63600175 PHARM REV CODE 636 W HCPCS: Performed by: NURSE PRACTITIONER

## 2020-01-07 PROCEDURE — 25000242 PHARM REV CODE 250 ALT 637 W/ HCPCS: Performed by: INTERNAL MEDICINE

## 2020-01-07 PROCEDURE — 99900026 HC AIRWAY MAINTENANCE (STAT)

## 2020-01-07 PROCEDURE — 94668 MNPJ CHEST WALL SBSQ: CPT

## 2020-01-07 PROCEDURE — 27200966 HC CLOSED SUCTION SYSTEM

## 2020-01-07 PROCEDURE — 84100 ASSAY OF PHOSPHORUS: CPT

## 2020-01-07 PROCEDURE — 63600175 PHARM REV CODE 636 W HCPCS: Performed by: PSYCHIATRY & NEUROLOGY

## 2020-01-07 PROCEDURE — 99497 PR ADVNCD CARE PLAN 30 MIN: ICD-10-PCS | Mod: ,,, | Performed by: CLINICAL NURSE SPECIALIST

## 2020-01-07 PROCEDURE — 25000003 PHARM REV CODE 250: Performed by: NURSE PRACTITIONER

## 2020-01-07 PROCEDURE — 37799 UNLISTED PX VASCULAR SURGERY: CPT

## 2020-01-07 PROCEDURE — 99233 SBSQ HOSP IP/OBS HIGH 50: CPT | Mod: ,,, | Performed by: CLINICAL NURSE SPECIALIST

## 2020-01-07 PROCEDURE — 80053 COMPREHEN METABOLIC PANEL: CPT

## 2020-01-07 PROCEDURE — 25000003 PHARM REV CODE 250: Performed by: PHYSICIAN ASSISTANT

## 2020-01-07 PROCEDURE — 94640 AIRWAY INHALATION TREATMENT: CPT

## 2020-01-07 PROCEDURE — 99233 PR SUBSEQUENT HOSPITAL CARE,LEVL III: ICD-10-PCS | Mod: GC,,, | Performed by: PSYCHIATRY & NEUROLOGY

## 2020-01-07 PROCEDURE — 27000221 HC OXYGEN, UP TO 24 HOURS

## 2020-01-07 PROCEDURE — 99497 ADVNCD CARE PLAN 30 MIN: CPT | Mod: ,,, | Performed by: CLINICAL NURSE SPECIALIST

## 2020-01-07 PROCEDURE — 97530 THERAPEUTIC ACTIVITIES: CPT

## 2020-01-07 PROCEDURE — 99900035 HC TECH TIME PER 15 MIN (STAT)

## 2020-01-07 PROCEDURE — 85025 COMPLETE CBC W/AUTO DIFF WBC: CPT

## 2020-01-07 PROCEDURE — 20000000 HC ICU ROOM

## 2020-01-07 PROCEDURE — 99233 SBSQ HOSP IP/OBS HIGH 50: CPT | Mod: GC,,, | Performed by: PSYCHIATRY & NEUROLOGY

## 2020-01-07 PROCEDURE — 83735 ASSAY OF MAGNESIUM: CPT

## 2020-01-07 PROCEDURE — 82803 BLOOD GASES ANY COMBINATION: CPT

## 2020-01-07 PROCEDURE — 97110 THERAPEUTIC EXERCISES: CPT

## 2020-01-07 PROCEDURE — 99233 PR SUBSEQUENT HOSPITAL CARE,LEVL III: ICD-10-PCS | Mod: ,,, | Performed by: CLINICAL NURSE SPECIALIST

## 2020-01-07 PROCEDURE — 94761 N-INVAS EAR/PLS OXIMETRY MLT: CPT

## 2020-01-07 PROCEDURE — 94003 VENT MGMT INPAT SUBQ DAY: CPT

## 2020-01-07 PROCEDURE — 25000003 PHARM REV CODE 250: Performed by: PSYCHIATRY & NEUROLOGY

## 2020-01-07 RX ADMIN — INSULIN ASPART 4 UNITS: 100 INJECTION, SOLUTION INTRAVENOUS; SUBCUTANEOUS at 05:01

## 2020-01-07 RX ADMIN — HEPARIN SODIUM 5000 UNITS: 5000 INJECTION, SOLUTION INTRAVENOUS; SUBCUTANEOUS at 09:01

## 2020-01-07 RX ADMIN — INSULIN ASPART 8 UNITS: 100 INJECTION, SOLUTION INTRAVENOUS; SUBCUTANEOUS at 05:01

## 2020-01-07 RX ADMIN — FAMOTIDINE 20 MG: 20 TABLET ORAL at 08:01

## 2020-01-07 RX ADMIN — PIPERACILLIN AND TAZOBACTAM 4.5 G: 4; .5 INJECTION, POWDER, FOR SOLUTION INTRAVENOUS at 03:01

## 2020-01-07 RX ADMIN — INSULIN ASPART 4 UNITS: 100 INJECTION, SOLUTION INTRAVENOUS; SUBCUTANEOUS at 01:01

## 2020-01-07 RX ADMIN — VANCOMYCIN HYDROCHLORIDE 1000 MG: 1 INJECTION, POWDER, LYOPHILIZED, FOR SOLUTION INTRAVENOUS at 09:01

## 2020-01-07 RX ADMIN — HEPARIN SODIUM 5000 UNITS: 5000 INJECTION, SOLUTION INTRAVENOUS; SUBCUTANEOUS at 05:01

## 2020-01-07 RX ADMIN — SENNOSIDES AND DOCUSATE SODIUM 1 TABLET: 8.6; 5 TABLET ORAL at 08:01

## 2020-01-07 RX ADMIN — HEPARIN SODIUM 5000 UNITS: 5000 INJECTION, SOLUTION INTRAVENOUS; SUBCUTANEOUS at 01:01

## 2020-01-07 RX ADMIN — INSULIN ASPART 8 UNITS: 100 INJECTION, SOLUTION INTRAVENOUS; SUBCUTANEOUS at 09:01

## 2020-01-07 RX ADMIN — PIPERACILLIN AND TAZOBACTAM 4.5 G: 4; .5 INJECTION, POWDER, FOR SOLUTION INTRAVENOUS at 07:01

## 2020-01-07 RX ADMIN — POLYETHYLENE GLYCOL 3350 17 G: 17 POWDER, FOR SOLUTION ORAL at 08:01

## 2020-01-07 RX ADMIN — IPRATROPIUM BROMIDE AND ALBUTEROL SULFATE 3 ML: .5; 3 SOLUTION RESPIRATORY (INHALATION) at 07:01

## 2020-01-07 RX ADMIN — INSULIN ASPART 4 UNITS: 100 INJECTION, SOLUTION INTRAVENOUS; SUBCUTANEOUS at 09:01

## 2020-01-07 RX ADMIN — MICONAZOLE NITRATE: 20 OINTMENT TOPICAL at 08:01

## 2020-01-07 RX ADMIN — CHLORHEXIDINE GLUCONATE 0.12% ORAL RINSE 15 ML: 1.2 LIQUID ORAL at 08:01

## 2020-01-07 RX ADMIN — INSULIN ASPART 8 UNITS: 100 INJECTION, SOLUTION INTRAVENOUS; SUBCUTANEOUS at 01:01

## 2020-01-07 RX ADMIN — AMLODIPINE BESYLATE 10 MG: 10 TABLET ORAL at 08:01

## 2020-01-07 RX ADMIN — INSULIN ASPART 1 UNITS: 100 INJECTION, SOLUTION INTRAVENOUS; SUBCUTANEOUS at 09:01

## 2020-01-07 RX ADMIN — INSULIN DETEMIR 18 UNITS: 100 INJECTION, SOLUTION SUBCUTANEOUS at 08:01

## 2020-01-07 RX ADMIN — IPRATROPIUM BROMIDE AND ALBUTEROL SULFATE 3 ML: .5; 3 SOLUTION RESPIRATORY (INHALATION) at 11:01

## 2020-01-07 RX ADMIN — INSULIN ASPART 2 UNITS: 100 INJECTION, SOLUTION INTRAVENOUS; SUBCUTANEOUS at 01:01

## 2020-01-07 RX ADMIN — IPRATROPIUM BROMIDE AND ALBUTEROL SULFATE 3 ML: .5; 3 SOLUTION RESPIRATORY (INHALATION) at 03:01

## 2020-01-07 RX ADMIN — PIPERACILLIN AND TAZOBACTAM 4.5 G: 4; .5 INJECTION, POWDER, FOR SOLUTION INTRAVENOUS at 11:01

## 2020-01-07 RX ADMIN — IPRATROPIUM BROMIDE AND ALBUTEROL SULFATE 3 ML: .5; 3 SOLUTION RESPIRATORY (INHALATION) at 04:01

## 2020-01-07 NOTE — PLAN OF CARE
POC reviewed with pt and family at 0400. Pt unable to verbalize understanding d/t ETT. Questions and concerns addressed with family. No acute events overnight. T-max 101.7. PRN Tylenol administered. Temp @ 99.7. Bath complete. Trach/peg planned for Wednesday. Will continue to monitor. See flowsheets for full assessment and VS info.

## 2020-01-07 NOTE — PT/OT/SLP PROGRESS
Occupational Therapy   Treatment    Name: Jerry Linder  MRN: [id number]  Admitting Diagnosis:  Embolic stroke involving right middle cerebral artery  22 Days Post-Op    Recommendations:     Discharge Recommendations: (TBD with progression in care)  Discharge Equipment Recommendations:  (TBD at next level of care; with progression in care)  Barriers to discharge:  (now intubated; awaiting trach and PEG placement)    Assessment:     Jerry Linder is a 79 y.o. male with a medical diagnosis of Embolic stroke involving right middle cerebral artery.  He remains orally intubated at this time. trach and PEG planned for 1/8/2020. OT to cont to follow up 3x/w at this time pending further progression in care. Performance deficits affecting function are impaired endurance, weakness, impaired sensation, impaired self care skills, impaired functional mobilty, gait instability, impaired balance, decreased upper extremity function, decreased lower extremity function, abnormal tone, decreased ROM, impaired coordination, impaired fine motor, impaired cardiopulmonary response to activity.     Rehab Prognosis:  Fair; patient would benefit from acute skilled OT services to address these deficits and reach maximum level of function.       Plan:     Patient to be seen 3 x/week to address the above listed problems via self-care/home management, therapeutic activities, therapeutic exercises, neuromuscular re-education, sensory integration  · Plan of Care Expires: 01/28/20  · Plan of Care Reviewed with: patient    Subjective     Pain/Comfort:  · Pain Rating 1: 0/10  · Pain Rating Post-Intervention 1: 0/10    Objective:     Communicated with: RN (Dada) prior to session. Pt's son and daughter in law present in room  Patient found left sidelying with arterial line, blood pressure cuff, ventilator, SCD, telemetry, pulse ox (continuous), pressure relief boots, peripheral IV, oxygen upon OT entry to room.    General Precautions: Standard, aspiration,  fall, respiratory, diabetic, pureed diet   Orthopedic Precautions:N/A   Braces: N/A     Occupational Performance:   Bed Mobility:    · Patient completed Rolling/Turning to Left with  total assistance and 2 persons  · Patient completed Scooting/Bridging with total assistance and 2 persons     [facility] 6 Click ADL: 6   Body mass index is 37.81 kg/m².  Vitals:    01/07/20 1405   BP: (!) 124/58   Pulse: 73   Resp: (!) 23   Temp:        Treatment & Education:  -Pt alert with eyes open ~80% of session; following commands with delay for R UE  -Completed PROM for L UE and LE in all available planes x10 reps  -edema mgmt technique completed for L hand; positioning aid completed for increased elevation and open palm  -AAROM for R UE in all available planes x10 reps; pt mouthing reps on occasion  -P ROM for R LE x10 reps in all available planes   -AAROM for cervical rotation ; positioning aid completed for sustained neutral  -Communication board updated; questions/concerns addressed within OT scope of practice  -edu pt's family on ROM and L side cervical rotation    Patient left left sidelying with all lines intact, call button in reach, restraints reapplied at end of session, RN notified and family presentEducation:      GOALS:   Multidisciplinary Problems     Occupational Therapy Goals        Problem: Occupational Therapy Goal    Goal Priority Disciplines Outcome Interventions   Occupational Therapy Goal     OT, PT/OT Ongoing, Progressing    Description:  Goals to be met by: 1/17     Patient will increase functional independence with ADLs by performing:    Grooming while EOB with Moderate Assistance.  Sitting at edge of bed x10 minutes for functional task with Maximum Assistance. progressing  Rolling to Bilateral with Maximum Assistance and use of bedrail as needed.   Supine to sit with Maximum Assistance.  Squat pivot transfers with Maximum Assistance.  Pt and CG verbalized understanding for s/s of stroke with teach back.   Pt  and CG demo understanding for L UE positioning and ROM with teach back 2/2 sessions.                        Time Tracking:     OT Date of Treatment: 01/07/20  OT Start Time: 1401  OT Stop Time: 1426  OT Total Time (min): 25 min    Billable Minutes:Therapeutic Activity 10  Therapeutic Exercise 15    BRUCE Olivo  1/7/2020

## 2020-01-07 NOTE — PT/OT/SLP PROGRESS
Physical Therapy  One Discipline Only    Patient Name:  Jerry Linder   MRN:  [id number]  Admitting Diagnosis: Embolic stroke involving right middle cerebral artery  Recent Surgery: Procedure(s) (LRB):  ANGIOGRAM-CEREBRAL (N/A) 22 Days Post-Op    Plan:     Patient is intubated 1/3/20 and appropriate for one discipline only. Pt with plans for Trach & PEG 1/8/20.  Occupational Therapy to follow and inform PT when appropriate for two disciplines post trach & PEG.    Allison Pascual PT, DPT  01/07/2020   Pager: [phone]

## 2020-01-07 NOTE — PLAN OF CARE
POC reviewed with pt and family at 1500. Pt intubated/sedated and unable to verbalize understanding. Pt's family verbalized understanding. Precedex gtt titrated throughout shift for pt comfort. Hold tube feeds at midnight for trach/peg tomorrow. Questions and concerns addressed. No acute events today. Pt progressing toward goals. Will continue to monitor. See flowsheets for full assessment and VS info.

## 2020-01-07 NOTE — PROGRESS NOTES
[facility]  Neurocritical Care  Progress Note    Admit Date: 12/16/2019  Service Date: 01/07/2020  Length of Stay: 22    Subjective:     Chief Complaint: Embolic stroke involving right middle cerebral artery    History of Present Illness: The patient is a 79 y.o. male who  was admitted as a transfer from OSH for Right MCA stroke syndrome. Patient had an urgent thrombectomy s/p  right MCA stroke syndrome. PMH significant for HTN, T2DM and HLD. Patient stated that around 2:30 pm on 12/16/19 he was driving and felt weak. He drove home and was taken to the ER where he was evaluated for a stroke. He had outside CTA which was reported to have R ICA stenosis at bifurcation with 70% stenosis and right MCA occlusion. Patient was transferred to [facility] for possible intervention. Patient had a right femoral sheath placed and his Angio  revealed 90% R ICA stenosis and R M1 occlusion. The R ICA was treated by thrombectomy and carotid stenting with TICI 3 reperfusion.   Patient was admitted to the [facility] for higher level of care post thrombectomy.      Hospital Course: 12/17/19: Patient admitted to [facility] for higher level of care s/p thrombectomy and right carotid artery stenting with TICI 3 reperfusion  12/18/2019Recent MRI with heme transformation abg Q8, repeat scan stable, mannitol  12/19/2019 intubation repeat CTH  12/20: febrile-broaden abx coverage, SBT, add hydralazine 50 q8h, KUB and mag citrate  12/21: wean FiO2, d/c hydralazine, wean fentanyl  12/22/2019: Patient stable overnight. Lasix 20mg x 1 given today. Enteral water flushes started for hypernatremia.    12/23: SBT trial, started Plavix today, initiated Hydralazine 25 TID, lasix 40 mg X1, decrease statin to 40 mg daily (transaminitis -trending down), add psyllium, Na BID -goal 145-150, initiated sq heparin  12/24/2019: SBT failed, wean fio2 as tolerated, CXR responded to lasix, scheduled BID  12/25/2019 continue lasix. SBT today. Adjust insulin  regimen  12/26/2019: Continue diuresis, SBT again today, wean PEEP and FiO2, wean nicardipine, hold statin for transaminits   12/27/2019: No significant events   12/28 No significant events over night.. Tolerating spon. Breathing trial, increased peep. No cuff leak, started decadron 8mg q8x3 doses and reassess in am for possible extubation. Insulin gtt started while on steroids, as BG running high. CPT added to resp. Treatments. Start TF.  12/29: extubated to room air following SBT  12/30: IS q 6, d/c whaley, d/c lasix, titrate insulin gtt, d/c hydralazine, KUB  12/31: cxr, trial of modafinil,  q 6, transition insulin  1/1: increase FWF, modafinil, insulin  1/2: intubated, titrate insulin, family discussion  1/3: CT head in AM, cxr, kub, increase insulin, family updated  1/4: NAEO  1/5: NAEO  01/06/2020 NAEO. Transition from propofol to precedex  01/07/2020 NAEO      Review of Symptoms: intubated cannot participate  Constitutional: Denies fevers, weight loss, chills, or weakness.   Eyes: Denies changes in vision.   ENT: Denies dysphagia, nasal discharge, ear pain or discharge.   Cardiovascular: Denies chest pain, palpitations, orthopnea, or claudication.   Respiratory: Denies shortness of breath, cough, hemoptysis, or wheezing.   GI: Denies nausea/vomitting, hematochezia, melena, abd pain, or changes in appetite.   : Denies dysuria, incontinence, or hematuria.   Musculoskeletal: Denies joint pain or myalgias.   Skin/breast: Denies rashes, lumps, lesions, or discharge.   Neurologic: Denies headache, dizziness, vertigo, or paresthesias.   Psychiatric: Denies changes in mood or hallucinations.   Endocrine: Denies polyuria, polydipsia, heat/cold intolerance.   Hematologic/Lymph: Denies lymphadenopathy, easy bruising or easy bleeding.   Allergic/Immunologic: Denies rash, rhinitis      Medications:  Continuous Infusions:   dexmedetomidine (PRECEDEX) infusion 0.2 mcg/kg/hr (01/07/20 0905)    propofol Stopped  (01/06/20 1227)     Scheduled Meds:   albuterol-ipratropium  3 mL Nebulization Q4H    amLODIPine  10 mg Per NG tube Daily    chlorhexidine  15 mL Mouth/Throat BID    famotidine  20 mg Per NG tube BID    heparin (porcine)  5,000 Units Subcutaneous Q8H    insulin aspart U-100  8 Units Subcutaneous Q4H    insulin detemir U-100  18 Units Subcutaneous BID    miconazole nitrate 2%   Topical (Top) BID    piperacillin-tazobactam (ZOSYN) IVPB  4.5 g Intravenous Q8H    polyethylene glycol  17 g Per NG tube Daily    senna-docusate 8.6-50 mg  1 tablet Per NG tube BID    vancomycin (VANCOCIN) IVPB  1,000 mg Intravenous Q12H     PRN Meds:.acetaminophen, Dextrose 10% Bolus, Dextrose 10% Bolus, Dextrose 10% Bolus, Dextrose 10% Bolus, fentaNYL, glucagon (human recombinant), hydrALAZINE, insulin aspart U-100, labetalol, magnesium oxide, magnesium oxide, ondansetron, potassium chloride 10%, potassium chloride 10%, potassium chloride 10%, potassium, sodium phosphates, potassium, sodium phosphates, potassium, sodium phosphates, sodium chloride 0.9%    OBJECTIVE:   Vital Signs (Most Recent):   Temp: 99.5 °F (37.5 °C) (01/07/20 0705)  Pulse: 78 (01/07/20 0905)  Resp: (!) 24 (01/07/20 0905)  BP: (!) 124/58 (01/07/20 0905)  SpO2: 96 % (01/07/20 0905)    Vital Signs (24h Range):   Temp:  [99.2 °F (37.3 °C)-101.7 °F (38.7 °C)] 99.5 °F (37.5 °C)  Pulse:  [] 78  Resp:  [20-27] 24  SpO2:  [95 %-100 %] 96 %  BP: (110-160)/(53-72) 124/58  Arterial Line BP: (115-155)/(46-58) 126/52    ICP/CPP (Last 24h):        I & O (Last 24h):     Intake/Output Summary (Last 24 hours) at 1/7/2020 1008  Last data filed at 1/7/2020 0905  Gross per 24 hour   Intake 3681.25 ml   Output 1425 ml   Net 2256.25 ml     Physical Exam:  GA: awake, comfortable, no acute distress.   HEENT: No scleral icterus or JVD. Neck supple  Pulmonary: Air entry equal to auscultation A/P/L. Wheezing no, crackles no  Cardiac: RRR, S1 & S2 w/o rubs/murmurs/gallops.    Abdominal: soft, non-tender, bowel sounds present x 4. No appreciable hepatosplenomegaly.  Skin: No jaundice, rashes, or visible lesions.  Neuro:  --Mental Status:  Awake, follows commands, spontaneous eye opening.    --Pupils 3.5mm, PERRL.   --Corneal reflex not done in this awake patient, gag, cough intact.  Left hemiparesis  MSK:  No edema in UE and LE    Vent Data:   Vent Mode: A/C  Oxygen Concentration (%):  [40] 40  Resp Rate Total:  [20 br/min-39 br/min] 24 br/min  Vt Set:  [420 mL] 420 mL  PEEP/CPAP:  [5 cmH20] 5 cmH20  Pressure Support:  [0 cmH20] 0 cmH20  Mean Airway Pressure:  [8.2 wlC94-85 cmH20] 8.2 cmH20    Lines/Drains/Airway:        Airway - Non-Surgical 01/02/20 1529 Endotracheal Tube (Active)   Secured at 25 cm 1/7/2020  7:39 AM   Measured At Lips 1/7/2020  7:39 AM   Secured Location Center 1/7/2020  7:39 AM   Secured by Commercial tube lara 1/7/2020  7:39 AM   Bite Block none 1/7/2020  7:39 AM   Site Condition Dry 1/7/2020  7:39 AM   Status Intact;Secured;Patent 1/7/2020  7:39 AM   Site Assessment Clean 1/7/2020  7:39 AM   Cuff Pressure 28 cm H2O 1/7/2020  7:39 AM             Arterial Line 01/02/20 1732 Right Radial (Active)   Site Assessment Clean;Dry;Intact;No redness;No swelling 1/7/2020  7:05 AM   Line Status Pulsatile blood flow 1/7/2020  7:05 AM   Art Line Waveform Appropriate;Square wave test performed 1/7/2020  7:05 AM   Arterial Line Interventions Zeroed and calibrated;Leveled;Flushed per protocol 1/7/2020  7:05 AM   Color/Movement/Sensation Capillary refill less than 3 sec 1/7/2020  7:05 AM   Dressing Type Transparent 1/7/2020  7:05 AM   Dressing Status Biopatch in place;Clean;Dry;Intact 1/7/2020  7:05 AM   Dressing Intervention Dressing reinforced 1/7/2020  7:05 AM   Dressing Change Due 01/09/20 1/7/2020  7:05 AM           NG/OG Tube 12/17/19 1100 nasogastric 14 Fr. Left nostril (Active)   Placement Check placement verified by aspirate characteristics 1/7/2020  7:05 AM   Advancement  advanced manually 12/28/2019  3:05 PM   Tolerance no signs/symptoms of discomfort 1/7/2020  7:05 AM   Securement secured to nostril center w/ adhesive device 1/7/2020  7:05 AM   Clamp Status/Tolerance unclamped 1/7/2020  7:05 AM   Suction Setting/Drainage Method suction at the bedside 1/3/2020  3:00 PM   Insertion Site Appearance no redness, warmth, tenderness, skin breakdown, drainage 1/7/2020  7:05 AM   Drainage None 1/6/2020  3:00 PM   Flush/Irrigation flushed w/;water;no resistance met 1/7/2020  7:05 AM   Feeding Method continuous 1/7/2020  7:05 AM   Feeding Action feeding continued 1/7/2020  7:05 AM   Current Rate (mL/hr) 55 mL/hr 1/7/2020  7:05 AM   Goal Rate (mL/hr) 55 mL/hr 1/7/2020  7:05 AM   Intake (mL) 30 mL 1/6/2020  9:00 AM   Water Bolus (mL) 400 mL 1/7/2020  6:00 AM   Rate Formula Tube Feeding (mL/hr) 55 mL/hr 1/7/2020  7:05 AM   Formula Name Glucerna 1/7/2020  7:05 AM   Intake (mL) - Formula Tube Feeding 55 1/7/2020  9:05 AM   Residual Amount (ml) 20 ml 1/7/2020  7:05 AM            Rectal Tube 01/06/20 0613 rectal tube w/ balloon (indicate number of mLs) (Active)   Balloon Inflation Volume (mL) 45 1/7/2020  7:05 AM   Reposition drainage bags for BMS & Andrade on opposite sides of bed 1/7/2020  7:05 AM   Outcome gas expelled, stool evacuated 1/7/2020  7:05 AM   Stool Color Brown 1/7/2020  7:05 AM   Insertion Site Appearance no redness, warmth, tenderness, skin breakdown, drainage 1/7/2020  7:05 AM   Flush/Irrigation flushed w/;water;no resistance met 1/7/2020  7:05 AM   Intake (mL) 45 mL 1/7/2020  7:05 AM   Rectal Tube Output 200 mL 1/6/2020  5:00 PM       Male External Urinary Catheter 01/04/20 0700 Small (Active)   Collection Container Urimeter 1/7/2020  7:05 AM   Securement Method secured to top of thigh w/ adhesive device 1/7/2020  7:05 AM   Skin no redness;no breakdown 1/7/2020  7:05 AM   Tolerance no signs/symptoms of discomfort 1/7/2020  7:05 AM   Output (mL) 200 mL 1/7/2020  6:00 AM   Catheter  Change Date 01/07/20 1/7/2020  7:05 AM   Catheter Change Time 0715 1/7/2020  7:05 AM       Nutrition/Tube Feeds (if NPO state why): tf     Labs:  ABG:   Recent Labs   Lab 01/07/20  0309   PH 7.438   PO2 77*   PCO2 39.6   HCO3 26.7   POCSATURATED 96   BE 3     BMP:  Recent Labs   Lab 01/07/20  0136   *   K 4.4   *   CO2 22*   BUN 33*   CREATININE 0.9   *   MG 2.5   PHOS 3.7     LFT:   Lab Results   Component Value Date    AST 39 01/07/2020    ALT 58 (H) 01/07/2020    ALKPHOS 158 (H) 01/07/2020    BILITOT 0.5 01/07/2020    ALBUMIN 2.2 (L) 01/07/2020    PROT 6.3 01/07/2020     CBC:   Lab Results   Component Value Date    WBC 11.15 01/07/2020    HGB 11.3 (L) 01/07/2020    HCT 37.5 (L) 01/07/2020     (H) 01/07/2020     01/07/2020     Microbiology x 7d:   Microbiology Results (last 7 days)     Procedure Component Value Units Date/Time    Blood culture [[id number]] Collected:  01/02/20 1547    Order Status:  Completed Specimen:  Blood from Peripheral, Antecubital, Right Updated:  01/06/20 1812     Blood Culture, Routine No Growth to date      No Growth to date      No Growth to date      No Growth to date      No Growth to date    Blood culture [[id number]] Collected:  01/02/20 1554    Order Status:  Completed Specimen:  Blood from Peripheral, Hand, Left Updated:  01/06/20 1812     Blood Culture, Routine No Growth to date      No Growth to date      No Growth to date      No Growth to date      No Growth to date    Culture, Respiratory with Gram Stain [[id number]] Collected:  01/02/20 1625    Order Status:  Completed Specimen:  Respiratory from Sputum Updated:  01/04/20 0747     Respiratory Culture Normal respiratory balaji      No S aureus or Pseudomonas isolated.     Gram Stain (Respiratory) <10 epithelial cells per low power field.     Gram Stain (Respiratory) Many WBC's     Gram Stain (Respiratory) Many Gram positive rods     Gram Stain (Respiratory) Rare Gram positive cocci     Gram Stain  (Respiratory) Rare Gram negative rods        Imaging:  CXR 1/7 - tubes in place. Left basilar atelectasis improved  I personally reviewed the above image.  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, right    Transaminitis    Constipation    Acute respiratory failure with hypoxia    Acute venous embolism and thrombosis of basilic vein, left    Acute spont intraparenchymal hemorrhage assoc w/ coagulopathy    Vasogenic brain edema    Midline shift of brain    Cytotoxic brain edema    Fever due to infection    Acute ischemic right MCA stroke    Decreased strength, endurance, and mobility    Essential hypertension    Type 2 diabetes mellitus with neurologic complication, without long-term current use of insulin    Mixed hyperlipidemia    Cerebrovascular accident (CVA)      Neuro:   Right MCA stroke  Precedex - more responsive today  Extensive discussion with family concerning goals of care had by me and my colleague Dr. Valencia. Family believes he will have an acceptable quality of life despite left hemiparesis and neglect.  Continue secondary stroke prevention per protocol    Pulmonary:   mech vent  CXR/ABG daily  Plan for trach and PEG  Hold tf @ MN    Cardiac:   SBP < 160  EF 65%    Renal:    Making urine  BUN/CRr > 20    ID:   Afebrile, normal wbc  Complete 7 day course of antibiotics - appears to have had a clinical response    Hem/Onc:   Stable hh and plt count    Endocrine:    BS stable    Fluids/Electrolytes/Nutrition/GI:   Tf  Replete lytes as needed  Lines:  Art +  CVC NA  ETT +  Andrade NA  NG +  PEG NA    Proph:  DVT:SCD/heparin   Constipation:  Last output:bowel regimen as needed  PUP:  pepcid  VAP:peridex      Uninterrupted Critical Care/Counseling Time (not including procedures):: III    Deep Shirley MD  Neurocritical care attending    Full Code    Deep Shirley MD  Neurocritical Care  [facility]

## 2020-01-07 NOTE — CONSULTS
[facility]  Palliative Medicine  Consult Note    Patient Name: Jerry Linder  MRN: [id number]  Admission Date: 12/16/2019  Hospital Length of Stay: 22 days  Code Status: Full Code   Attending Provider: Deep Shirley MD  Consulting Provider: LISA Huerta  Primary Care Physician: Jonnie Summers MD  Principal Problem:Embolic stroke involving right middle cerebral artery    Inpatient consult to Palliative Care  Consult performed by: LISA Spaulding  Consult ordered by: Hasmukh Sun PA-C  Reason for consult: goals of care and advanced care planning   Assessment/Recommendations: Palliative medicine consult received.  Will contact primary team prior to seeing patient.  Full consult to follow.    Thank you for consult and opportunity to participate in Mr. Linder's care.   ZHAO Lin, ACNS-BC  Palliative Medicine  Ext [phone]

## 2020-01-07 NOTE — PHYSICIAN QUERY
PT Name: Jerry Linder  MR #: [id number]     PHYSICIAN QUERY -  ELECTROLYTE CLARIFICATION      CDS/: Keyonna Anaya RN              Contact information: [email or web address]  This form is a permanent document in the medical record.     Query Date: January 7, 2020    By submitting this query, we are merely seeking further clarification of documentation to reflect the severity of illness of your patient. Please utilize your independent clinical judgment when addressing the question(s) below.    The Medical record reflects the following:     Indicators   Supporting Clinical Findings Location in Medical Record   x Lab Value(s) Potassium Level 4.2, 3.3, 4.4 Labs 12/16, 1/3, 1/7   x Treatment                                 Medication Potassium Chloride 10 % Oral Solution 40 Meq Mar 12/19, 12/21, 12/30, 1/3 X 2    Other       Provider, please specify the diagnosis or diagnoses that correspond(s) to the above indicators. Johnnie all that apply:    [ X  ] Hypokalemia   [   ] Other electrolyte disturbance (please specify): _______   [   ]  Clinically Undetermined       Please document in your progress notes daily for the duration of treatment until resolved, and include in your discharge summary.

## 2020-01-07 NOTE — PLAN OF CARE
Patient on minimal vent settings. Chart reviewed. Will plan for trach/PEG tomorrow morning. Please hold tube feeds at midnight. Call with questions.    Bernadette De La Cruz MD  PGY5  [phone]

## 2020-01-08 ENCOUNTER — ANESTHESIA (OUTPATIENT)
Dept: SURGERY | Facility: HOSPITAL | Age: 80
DRG: 003 | End: 2020-01-08
Payer: COMMERCIAL

## 2020-01-08 LAB
ABO + RH BLD: NORMAL
ALBUMIN SERPL BCP-MCNC: 2.2 G/DL (ref 3.5–5.2)
ALLENS TEST: ABNORMAL
ALP SERPL-CCNC: 142 U/L (ref 55–135)
ALT SERPL W/O P-5'-P-CCNC: 49 U/L (ref 10–44)
ANION GAP SERPL CALC-SCNC: 9 MMOL/L (ref 8–16)
AST SERPL-CCNC: 28 U/L (ref 10–40)
BASOPHILS # BLD AUTO: 0.03 K/UL (ref 0–0.2)
BASOPHILS NFR BLD: 0.3 % (ref 0–1.9)
BILIRUB SERPL-MCNC: 0.4 MG/DL (ref 0.1–1)
BLD GP AB SCN CELLS X3 SERPL QL: NORMAL
BUN SERPL-MCNC: 33 MG/DL (ref 8–23)
CALCIUM SERPL-MCNC: 8.4 MG/DL (ref 8.7–10.5)
CHLORIDE SERPL-SCNC: 114 MMOL/L (ref 95–110)
CO2 SERPL-SCNC: 27 MMOL/L (ref 23–29)
CREAT SERPL-MCNC: 0.7 MG/DL (ref 0.5–1.4)
DELSYS: ABNORMAL
DIFFERENTIAL METHOD: ABNORMAL
EOSINOPHIL # BLD AUTO: 0.2 K/UL (ref 0–0.5)
EOSINOPHIL NFR BLD: 2.3 % (ref 0–8)
ERYTHROCYTE [DISTWIDTH] IN BLOOD BY AUTOMATED COUNT: 13.1 % (ref 11.5–14.5)
ERYTHROCYTE [SEDIMENTATION RATE] IN BLOOD BY WESTERGREN METHOD: 20 MM/H
EST. GFR  (AFRICAN AMERICAN): >60 ML/MIN/1.73 M^2
EST. GFR  (NON AFRICAN AMERICAN): >60 ML/MIN/1.73 M^2
FIO2: 40
GLUCOSE SERPL-MCNC: 175 MG/DL (ref 70–110)
HCO3 UR-SCNC: 30.6 MMOL/L (ref 24–28)
HCT VFR BLD AUTO: 36.5 % (ref 40–54)
HGB BLD-MCNC: 11.2 G/DL (ref 14–18)
IMM GRANULOCYTES # BLD AUTO: 0.15 K/UL (ref 0–0.04)
IMM GRANULOCYTES NFR BLD AUTO: 1.5 % (ref 0–0.5)
LYMPHOCYTES # BLD AUTO: 2.5 K/UL (ref 1–4.8)
LYMPHOCYTES NFR BLD: 24.9 % (ref 18–48)
MAGNESIUM SERPL-MCNC: 2.5 MG/DL (ref 1.6–2.6)
MCH RBC QN AUTO: 31.8 PG (ref 27–31)
MCHC RBC AUTO-ENTMCNC: 30.7 G/DL (ref 32–36)
MCV RBC AUTO: 104 FL (ref 82–98)
MIN VOL: 9
MODE: ABNORMAL
MONOCYTES # BLD AUTO: 0.9 K/UL (ref 0.3–1)
MONOCYTES NFR BLD: 8.8 % (ref 4–15)
NEUTROPHILS # BLD AUTO: 6.2 K/UL (ref 1.8–7.7)
NEUTROPHILS NFR BLD: 62.2 % (ref 38–73)
NRBC BLD-RTO: 0 /100 WBC
PCO2 BLDA: 40.8 MMHG (ref 35–45)
PEEP: 5
PH SMN: 7.48 [PH] (ref 7.35–7.45)
PHOSPHATE SERPL-MCNC: 3.5 MG/DL (ref 2.7–4.5)
PIP: 13
PLATELET # BLD AUTO: 200 K/UL (ref 150–350)
PMV BLD AUTO: 12 FL (ref 9.2–12.9)
PO2 BLDA: 84 MMHG (ref 80–100)
POC BE: 7 MMOL/L
POC SATURATED O2: 97 % (ref 95–100)
POC TCO2: 32 MMOL/L (ref 23–27)
POCT GLUCOSE: 120 MG/DL (ref 70–110)
POCT GLUCOSE: 139 MG/DL (ref 70–110)
POCT GLUCOSE: 143 MG/DL (ref 70–110)
POCT GLUCOSE: 144 MG/DL (ref 70–110)
POCT GLUCOSE: 151 MG/DL (ref 70–110)
POCT GLUCOSE: 154 MG/DL (ref 70–110)
POCT GLUCOSE: 158 MG/DL (ref 70–110)
POCT GLUCOSE: 162 MG/DL (ref 70–110)
POCT GLUCOSE: 168 MG/DL (ref 70–110)
POTASSIUM SERPL-SCNC: 4 MMOL/L (ref 3.5–5.1)
PROT SERPL-MCNC: 6 G/DL (ref 6–8.4)
RBC # BLD AUTO: 3.52 M/UL (ref 4.6–6.2)
SAMPLE: ABNORMAL
SITE: ABNORMAL
SODIUM SERPL-SCNC: 150 MMOL/L (ref 136–145)
SP02: 100
VANCOMYCIN TROUGH SERPL-MCNC: 14.7 UG/ML (ref 10–22)
VT: 420
WBC # BLD AUTO: 9.93 K/UL (ref 3.9–12.7)

## 2020-01-08 PROCEDURE — 63600175 PHARM REV CODE 636 W HCPCS: Performed by: NURSE PRACTITIONER

## 2020-01-08 PROCEDURE — D9220A PRA ANESTHESIA: Mod: ANES,,, | Performed by: ANESTHESIOLOGY

## 2020-01-08 PROCEDURE — 27201423 OPTIME MED/SURG SUP & DEVICES STERILE SUPPLY: Performed by: SURGERY

## 2020-01-08 PROCEDURE — 43246 PR EGD, FLEX, W/PLCMT, GASTROSTOMY TUBE: ICD-10-PCS | Mod: 51,,, | Performed by: SURGERY

## 2020-01-08 PROCEDURE — 63600175 PHARM REV CODE 636 W HCPCS: Performed by: NURSE ANESTHETIST, CERTIFIED REGISTERED

## 2020-01-08 PROCEDURE — 83735 ASSAY OF MAGNESIUM: CPT

## 2020-01-08 PROCEDURE — 94003 VENT MGMT INPAT SUBQ DAY: CPT

## 2020-01-08 PROCEDURE — 94761 N-INVAS EAR/PLS OXIMETRY MLT: CPT

## 2020-01-08 PROCEDURE — 99233 PR SUBSEQUENT HOSPITAL CARE,LEVL III: ICD-10-PCS | Mod: GC,,, | Performed by: PSYCHIATRY & NEUROLOGY

## 2020-01-08 PROCEDURE — 31600 PR TRACHEOSTOMY, PLANNED: ICD-10-PCS | Mod: 59,,, | Performed by: SURGERY

## 2020-01-08 PROCEDURE — 25000003 PHARM REV CODE 250: Performed by: NURSE ANESTHETIST, CERTIFIED REGISTERED

## 2020-01-08 PROCEDURE — 20000000 HC ICU ROOM

## 2020-01-08 PROCEDURE — 94640 AIRWAY INHALATION TREATMENT: CPT

## 2020-01-08 PROCEDURE — 99900026 HC AIRWAY MAINTENANCE (STAT)

## 2020-01-08 PROCEDURE — 25000003 PHARM REV CODE 250: Performed by: NURSE PRACTITIONER

## 2020-01-08 PROCEDURE — 25000003 PHARM REV CODE 250: Performed by: PHYSICIAN ASSISTANT

## 2020-01-08 PROCEDURE — 63600175 PHARM REV CODE 636 W HCPCS: Performed by: PSYCHIATRY & NEUROLOGY

## 2020-01-08 PROCEDURE — 99900035 HC TECH TIME PER 15 MIN (STAT)

## 2020-01-08 PROCEDURE — C9399 UNCLASSIFIED DRUGS OR BIOLOG: HCPCS | Performed by: PSYCHIATRY & NEUROLOGY

## 2020-01-08 PROCEDURE — 94668 MNPJ CHEST WALL SBSQ: CPT

## 2020-01-08 PROCEDURE — D9220A PRA ANESTHESIA: ICD-10-PCS | Mod: ANES,,, | Performed by: ANESTHESIOLOGY

## 2020-01-08 PROCEDURE — 36000706: Performed by: SURGERY

## 2020-01-08 PROCEDURE — 37799 UNLISTED PX VASCULAR SURGERY: CPT

## 2020-01-08 PROCEDURE — 99233 SBSQ HOSP IP/OBS HIGH 50: CPT | Mod: GC,,, | Performed by: PSYCHIATRY & NEUROLOGY

## 2020-01-08 PROCEDURE — 63600175 PHARM REV CODE 636 W HCPCS: Performed by: PHYSICIAN ASSISTANT

## 2020-01-08 PROCEDURE — 43246 EGD PLACE GASTROSTOMY TUBE: CPT | Mod: 51,,, | Performed by: SURGERY

## 2020-01-08 PROCEDURE — 85025 COMPLETE CBC W/AUTO DIFF WBC: CPT

## 2020-01-08 PROCEDURE — 37000008 HC ANESTHESIA 1ST 15 MINUTES: Performed by: SURGERY

## 2020-01-08 PROCEDURE — D9220A PRA ANESTHESIA: ICD-10-PCS | Mod: CRNA,,, | Performed by: NURSE ANESTHETIST, CERTIFIED REGISTERED

## 2020-01-08 PROCEDURE — 82803 BLOOD GASES ANY COMBINATION: CPT

## 2020-01-08 PROCEDURE — 31600 PLANNED TRACHEOSTOMY: CPT | Mod: 59,,, | Performed by: SURGERY

## 2020-01-08 PROCEDURE — 80053 COMPREHEN METABOLIC PANEL: CPT

## 2020-01-08 PROCEDURE — 27000221 HC OXYGEN, UP TO 24 HOURS

## 2020-01-08 PROCEDURE — 36000707: Performed by: SURGERY

## 2020-01-08 PROCEDURE — 25000242 PHARM REV CODE 250 ALT 637 W/ HCPCS: Performed by: INTERNAL MEDICINE

## 2020-01-08 PROCEDURE — 27800903 OPTIME MED/SURG SUP & DEVICES OTHER IMPLANTS: Performed by: SURGERY

## 2020-01-08 PROCEDURE — 86850 RBC ANTIBODY SCREEN: CPT

## 2020-01-08 PROCEDURE — D9220A PRA ANESTHESIA: Mod: CRNA,,, | Performed by: NURSE ANESTHETIST, CERTIFIED REGISTERED

## 2020-01-08 PROCEDURE — 25000003 PHARM REV CODE 250: Performed by: PSYCHIATRY & NEUROLOGY

## 2020-01-08 PROCEDURE — 80202 ASSAY OF VANCOMYCIN: CPT

## 2020-01-08 PROCEDURE — 37000009 HC ANESTHESIA EA ADD 15 MINS: Performed by: SURGERY

## 2020-01-08 PROCEDURE — C1894 INTRO/SHEATH, NON-LASER: HCPCS | Performed by: SURGERY

## 2020-01-08 PROCEDURE — 84100 ASSAY OF PHOSPHORUS: CPT

## 2020-01-08 RX ORDER — MIDAZOLAM HYDROCHLORIDE 1 MG/ML
INJECTION, SOLUTION INTRAMUSCULAR; INTRAVENOUS
Status: DISCONTINUED | OUTPATIENT
Start: 2020-01-08 | End: 2020-01-08

## 2020-01-08 RX ORDER — LIDOCAINE HCL/PF 100 MG/5ML
SYRINGE (ML) INTRAVENOUS
Status: DISCONTINUED | OUTPATIENT
Start: 2020-01-08 | End: 2020-01-08

## 2020-01-08 RX ORDER — ROCURONIUM BROMIDE 10 MG/ML
INJECTION, SOLUTION INTRAVENOUS
Status: DISCONTINUED | OUTPATIENT
Start: 2020-01-08 | End: 2020-01-08

## 2020-01-08 RX ORDER — PROPOFOL 10 MG/ML
VIAL (ML) INTRAVENOUS
Status: DISCONTINUED | OUTPATIENT
Start: 2020-01-08 | End: 2020-01-08

## 2020-01-08 RX ORDER — FENTANYL CITRATE 50 UG/ML
INJECTION, SOLUTION INTRAMUSCULAR; INTRAVENOUS
Status: DISCONTINUED | OUTPATIENT
Start: 2020-01-08 | End: 2020-01-08

## 2020-01-08 RX ADMIN — FAMOTIDINE 20 MG: 20 TABLET ORAL at 08:01

## 2020-01-08 RX ADMIN — CHLORHEXIDINE GLUCONATE 0.12% ORAL RINSE 15 ML: 1.2 LIQUID ORAL at 08:01

## 2020-01-08 RX ADMIN — HYDRALAZINE HYDROCHLORIDE 10 MG: 20 INJECTION INTRAMUSCULAR; INTRAVENOUS at 02:01

## 2020-01-08 RX ADMIN — SENNOSIDES AND DOCUSATE SODIUM 1 TABLET: 8.6; 5 TABLET ORAL at 08:01

## 2020-01-08 RX ADMIN — PIPERACILLIN AND TAZOBACTAM 4.5 G: 4; .5 INJECTION, POWDER, FOR SOLUTION INTRAVENOUS at 07:01

## 2020-01-08 RX ADMIN — HEPARIN SODIUM 5000 UNITS: 5000 INJECTION, SOLUTION INTRAVENOUS; SUBCUTANEOUS at 09:01

## 2020-01-08 RX ADMIN — HEPARIN SODIUM 5000 UNITS: 5000 INJECTION, SOLUTION INTRAVENOUS; SUBCUTANEOUS at 01:01

## 2020-01-08 RX ADMIN — FENTANYL CITRATE 50 MCG: 50 INJECTION, SOLUTION INTRAMUSCULAR; INTRAVENOUS at 11:01

## 2020-01-08 RX ADMIN — INSULIN DETEMIR 18 UNITS: 100 INJECTION, SOLUTION SUBCUTANEOUS at 08:01

## 2020-01-08 RX ADMIN — VANCOMYCIN HYDROCHLORIDE 1000 MG: 1 INJECTION, POWDER, LYOPHILIZED, FOR SOLUTION INTRAVENOUS at 10:01

## 2020-01-08 RX ADMIN — ROCURONIUM BROMIDE 20 MG: 10 INJECTION, SOLUTION INTRAVENOUS at 11:01

## 2020-01-08 RX ADMIN — IPRATROPIUM BROMIDE AND ALBUTEROL SULFATE 3 ML: .5; 3 SOLUTION RESPIRATORY (INHALATION) at 12:01

## 2020-01-08 RX ADMIN — PROPOFOL 30 MG: 10 INJECTION, EMULSION INTRAVENOUS at 11:01

## 2020-01-08 RX ADMIN — MICONAZOLE NITRATE: 20 OINTMENT TOPICAL at 08:01

## 2020-01-08 RX ADMIN — PIPERACILLIN AND TAZOBACTAM 4.5 G: 4; .5 INJECTION, POWDER, FOR SOLUTION INTRAVENOUS at 03:01

## 2020-01-08 RX ADMIN — FENTANYL CITRATE 50 MCG: 50 INJECTION INTRAMUSCULAR; INTRAVENOUS at 04:01

## 2020-01-08 RX ADMIN — FENTANYL CITRATE 50 MCG: 50 INJECTION INTRAMUSCULAR; INTRAVENOUS at 09:01

## 2020-01-08 RX ADMIN — IPRATROPIUM BROMIDE AND ALBUTEROL SULFATE 3 ML: .5; 3 SOLUTION RESPIRATORY (INHALATION) at 07:01

## 2020-01-08 RX ADMIN — INSULIN ASPART 1 UNITS: 100 INJECTION, SOLUTION INTRAVENOUS; SUBCUTANEOUS at 09:01

## 2020-01-08 RX ADMIN — VANCOMYCIN HYDROCHLORIDE 1000 MG: 1 INJECTION, POWDER, LYOPHILIZED, FOR SOLUTION INTRAVENOUS at 09:01

## 2020-01-08 RX ADMIN — FENTANYL CITRATE 50 MCG: 50 INJECTION INTRAMUSCULAR; INTRAVENOUS at 02:01

## 2020-01-08 RX ADMIN — IPRATROPIUM BROMIDE AND ALBUTEROL SULFATE 3 ML: .5; 3 SOLUTION RESPIRATORY (INHALATION) at 11:01

## 2020-01-08 RX ADMIN — LIDOCAINE HYDROCHLORIDE 50 MG: 20 INJECTION, SOLUTION INTRAVENOUS at 11:01

## 2020-01-08 RX ADMIN — ROCURONIUM BROMIDE 30 MG: 10 INJECTION, SOLUTION INTRAVENOUS at 11:01

## 2020-01-08 RX ADMIN — IPRATROPIUM BROMIDE AND ALBUTEROL SULFATE 3 ML: .5; 3 SOLUTION RESPIRATORY (INHALATION) at 03:01

## 2020-01-08 RX ADMIN — AMLODIPINE BESYLATE 10 MG: 10 TABLET ORAL at 08:01

## 2020-01-08 RX ADMIN — MIDAZOLAM HYDROCHLORIDE 2 MG: 1 INJECTION, SOLUTION INTRAMUSCULAR; INTRAVENOUS at 11:01

## 2020-01-08 RX ADMIN — INSULIN ASPART 8 UNITS: 100 INJECTION, SOLUTION INTRAVENOUS; SUBCUTANEOUS at 01:01

## 2020-01-08 RX ADMIN — INSULIN ASPART 1 UNITS: 100 INJECTION, SOLUTION INTRAVENOUS; SUBCUTANEOUS at 01:01

## 2020-01-08 RX ADMIN — FENTANYL CITRATE 50 MCG: 50 INJECTION INTRAMUSCULAR; INTRAVENOUS at 07:01

## 2020-01-08 RX ADMIN — FENTANYL CITRATE 50 MCG: 50 INJECTION, SOLUTION INTRAMUSCULAR; INTRAVENOUS at 12:01

## 2020-01-08 RX ADMIN — POLYETHYLENE GLYCOL 3350 17 G: 17 POWDER, FOR SOLUTION ORAL at 08:01

## 2020-01-08 RX ADMIN — SODIUM CHLORIDE, SODIUM GLUCONATE, SODIUM ACETATE, POTASSIUM CHLORIDE, MAGNESIUM CHLORIDE, SODIUM PHOSPHATE, DIBASIC, AND POTASSIUM PHOSPHATE: .53; .5; .37; .037; .03; .012; .00082 INJECTION, SOLUTION INTRAVENOUS at 11:01

## 2020-01-08 NOTE — PLAN OF CARE
POC reviewed with pt and family at 0400. Pt's wife verbalized understanding. Questions and concerns addressed. No acute events overnight. Precedex off. NPO @ midnight. 6AM heparin held. CHG bath complete. Pre-op checklist complete - pending blood consent - for pt's trach/peg operation. Will continue to monitor. See flowsheets for full assessment and VS info.

## 2020-01-08 NOTE — TRANSFER OF CARE
"Anesthesia Transfer of Care Note    Patient: Jerry Linder    Procedure(s) Performed: Procedure(s) (LRB):  CREATION, TRACHEOSTOMY (N/A)  INSERTION, PEG TUBE (N/A)    Patient location: ICU    Anesthesia Type: general    Transport from OR: Upon arrival to PACU/ICU, patient attached to ventilator and auscultated to confirm bilateral breath sounds and adequate TV. Transported from OR intubated on 100% O2 by AMBU with adequate controlled ventilation. Continuous ECG monitoring in transport. Continuous SpO2 monitoring in transport. Continuos invasive BP monitoring in transport    Post pain: adequate analgesia    Post assessment: no apparent anesthetic complications and tolerated procedure well    Post vital signs: stable    Level of consciousness: awake    Nausea/Vomiting: no nausea/vomiting    Complications: none    Transfer of care protocol was followed      Last vitals:   Visit Vitals  BP (!) 140/66 (BP Location: Left arm, Patient Position: Lying)   Pulse 76   Temp 37.3 °C (99.2 °F) (Oral)   Resp (!) 24   Ht 6' 1" (1.854 m)   Wt 130 kg (286 lb 9.6 oz)   SpO2 96%   BMI 37.81 kg/m²     "

## 2020-01-08 NOTE — CARE UPDATE
Palliative medicine follow up.  Goals of care have been established.  Trach and PEG placement today. Palliative medicine will sign off.  Please re-consult as needed

## 2020-01-08 NOTE — ASSESSMENT & PLAN NOTE
Palliative medicine consulted for goals of care. Chart reviewed and patient discussed with primary team BALWINDER Sun.   Met with patient, son, daughter-in-law at bedside.  Palliative medicine introduced    Impression: Mr. Linder is a 80 yo gentleman admitted with R MCA stroke, s/p thrombectomy.  Re-intubated after increased work of breathing and increased oxygen following extubation.  Remains sedated at this time.  Unresponsive at this time and unable to follow commands.  Nursing staff and family have reported he is able to open eyes, give thumbs up and wiggle his toes on demand.  Appears comfortable and in no acute distress.     Advance Care Planning     - no advanced directives have been received. Mr. Linder is unable to complete documents at this time.   - Next of kin for medical decision making his wife Carolina: [phone]  - Son Stephen states the family makes the big decisions as a whole.  Also states his mother does not want to make this decision,   - Son reports they had never had any discussions about advanced directives.  - Full code per primary team.  Family state no interest in changing code status at this time.     Goals of Care:   - Family states the wish is to give Mr. Linder every chance to recover including trach and PEG  - Risk and benefits of trach, PEG and what this care might look like after leaving [facility].  - Hard Choices - a printed resource provided  - The family's hope is the trach will give him more time to become more alert,  Improve his speech and be able to tell them what his wishes are.   - Family with some concern and requesting assurance that Mr. Linder's wishes will be honored.     Plan/Recommendation  - Goals of care have been established. PEG and trach placement scheduled for 1/8/2020.  - Family is amenable to continued palliative care. Please notify palliative medicine of further goals of care conversations.   - Consult  for additional emotional support.    Primary team aware of  the above goals of care conversation.

## 2020-01-08 NOTE — PLAN OF CARE
POC reviewed with pt and family at 1530. Pt trached and unable to verbalize understanding. Pt's family verbalized understanding but needs reinforcement. Pt went to OR for trach/peg today with no complications. PRN fentanyl given once for pain post-op. Questions and concerns addressed. Family at bedside. PEG to gravity until 1300 on 1/9. Will continue to monitor. See flowsheets for full assessment and VS info.
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
## 2020-01-08 NOTE — PLAN OF CARE
01/07/20 1834   Post-Acute Status   Post-Acute Authorization Placement   Post-Acute Placement Status Referrals Sent  ([facility] [place])     SW met with Pt son and Pt daughter in law at bedside. Discussed LTAC as next option after trach/peg. Provided LTAC options in [place]. They are good with referral being sent out but want to chose location still and tour. Addressed questions.    TAVIA sent referral via .    Ivette Bailey LCSW  Neurocritical Care   [facility]  [phone]

## 2020-01-08 NOTE — PROGRESS NOTES
Pt returned from OR by CRNA and RNx2. Pt being bagged and on monitor. Pt put on ventilator by RT. Pt tolerated Trach/PEG well. Will continue to monitor. NCC and Family notified of pt return.

## 2020-01-08 NOTE — HPI
HPI obtained from chart review    Mr. Gómez is a 78 yo gentleman who presented [facility] with  right MCA stroke syndrome. 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 S/P thrombectomy.  PMH  HTN, T2DM and HLD.  Admitted to neuro critical care for higher level of care.   Remains sedated and intubated.      Palliative medicine consulted for goals of care.  Scheduled for trach and PEG placement 1/8/2020
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
## 2020-01-08 NOTE — OP NOTE
DATE: 01/08/2020.    PREOPERATIVE DIAGNOSES:   1. CVA.  2. Respiratory failure.   3. Dysphagia.    POSTOPERATIVE DIAGNOSES:   1. CVA.  2. Respiratory failure.   3. Dysphagia.     PROCEDURES PERFORMED:   1. Percutaneous tracheostomy.   2. Bronchoscopy through tracheostomy.  3. Esophagogastroduodenoscopy with percutaneous endoscopic gastrostomy.    ATTENDING SURGEON: Thom Conde MD .    RESIDENT: Derek Lima MD.    ANESTHESIA: General.     INDICATIONS: Jerry Linder is a 79 y.o.male admitted to [facility] with CVA. The patient has failed attempts to wean from the ventilator. We have recommended to the family that the patient would benefit from placement of a percutaneous tracheostomy tube for the anticipated prolonged need for mechanical ventilation. We also recommend a percutaneous gastrostomy tube for the patient's dysphagia. We did obtain informed consent from the patient's family who expressed understanding of the risks and benefits and gave consent to proceed.     PROCEDURE: The patient was taken to the operating room and placed supine. After induction of general endotracheal tube anesthesia, the neck was extended, padded, and was prepped and draped in the standard fashion. Timeout was performed. Tracheal landmarks were identified and a two centimeter midline cervical incision was made. Subcutaneous tissue was bluntly dissected, and appropriate position for the tracheostomy tube was noted. Under bronchoscopic guidance, the endotracheal tube was withdrawn to above the level of tracheostomy. The trachea was cannulated with a hollow bore needle. Aspiration of air confirmed cannulation of the trachea. The guidewire was placed and confirmed within the trachea by bronchoscopy. Under direct visualization, a tracheostomy was created by serial dilation; first with the punch dilator and then the Blue Rhino dilator. A cuffed #8 Shiley tracheostomy was placed into the trachea by Seldinger technique. The cuff  of the tracheostomy tube was inflated and Bronchoscopy through the tracheostomy confirmed appropriate position with visualization of the temo. The endotracheal tube was removed and the tracheostomy tube was secured in place using Velcro tracheostomy tape and 2-0 Prolene sutures.    The patient's abdomen was prepped and draped. An upper endoscope was introduced into the oropharynx and guided down into the esophagus and stomach. The stomach was insufflated with air. The pylorus was intubated and the first and second portions of the duodenum were examined with no abnormalities noted. The endoscope was withdrawn into the stomach and identified an appropriate position for gastrostomy tube placement 2 finger-breadths below the left subcostal margin. Palpation of the anterior abdominal wall at this point was visualized endoscopically and transillumination from the endoscope was visualized through the anterior abdominal wall. A small skin incision was made and the stomach was cannulated with angiocatheter. A guidewire was placed and was snared using the endoscope. The endoscope, snare, and guidewire were all withdrawn from the patient's mouth. A 20-Polish gastrostomy tube was loaded onto the guidewire and pulled through the anterior abdominal wall via Seldinger technique. Repeat endoscopy was performed with the gastrostomy tube at the 3.5 cm gabriel at the skin. There was no blanching of the gastric mucosa, and when the tube was twisted, the button did not grab the mucosa. Gastric insufflation was evacuated, and the endoscope was removed. The esophagus was evaluated during withdrawal and was normal. The gastrostomy tube was secured in place using the supplied devices and connected to a bag for gravity drainage. The patient was then transferred to the ICU in critical, but stable condition. All needle ans sponge counts were correct at the conclusion of the case. I was present for the procedure in its entirety.    ESTIMATED  BLOOD LOSS: 5 mL     FINDINGS: A #8 Shiley tracheostomy and 20-Tajik percutaneous gastrostomy placed without apparent complication.     SPECIMEN: None.     DRAINS: None.     COMPLICATIONS: None.

## 2020-01-08 NOTE — PLAN OF CARE
TAVIA received call from Lizbeth with [facility] regarding the referral. Discussed case and referral. Addressed questions. SW to check in with Pt wife and then clear her to call the family.    SW met with Pt wife at bedside. Discussed referral to [facility] and locations. Addressed questions and confirmed that even though they still want to tour, they are good with Lizbeth calling.     TAVIA advised Lizbeth she is good to call the Pt wife. Sent additional clinicals via .     Ivette Bailey LCSW  Neurocritical Care   [facility]  [phone]

## 2020-01-08 NOTE — PROGRESS NOTES
GENERAL SURGERY     Patient seen and examined. No acute changes overnight. Vent settings unchanged. Tube feeds held since MN.   To OR today for trach and PEG.   Patients wife consented yesterday.     Luis Hutton MD  General Surgery

## 2020-01-08 NOTE — PROGRESS NOTES
[facility]  Neurocritical Care  Progress Note    Admit Date: 12/16/2019  Service Date: 01/08/2020  Length of Stay: 23    Subjective:     Chief Complaint: Embolic stroke involving right middle cerebral artery    History of Present Illness: The patient is a 79 y.o. male who  was admitted as a transfer from OSH for Right MCA stroke syndrome. Patient had an urgent thrombectomy s/p  right MCA stroke syndrome. PMH significant for HTN, T2DM and HLD. Patient stated that around 2:30 pm on 12/16/19 he was driving and felt weak. He drove home and was taken to the ER where he was evaluated for a stroke. He had outside CTA which was reported to have R ICA stenosis at bifurcation with 70% stenosis and right MCA occlusion. Patient was transferred to [facility] for possible intervention. Patient had a right femoral sheath placed and his Angio  revealed 90% R ICA stenosis and R M1 occlusion. The R ICA was treated by thrombectomy and carotid stenting with TICI 3 reperfusion.   Patient was admitted to the [facility] for higher level of care post thrombectomy.      Hospital Course: 12/17/19: Patient admitted to [facility] for higher level of care s/p thrombectomy and right carotid artery stenting with TICI 3 reperfusion  12/18/2019Recent MRI with heme transformation abg Q8, repeat scan stable, mannitol  12/19/2019 intubation repeat CTH  12/20: febrile-broaden abx coverage, SBT, add hydralazine 50 q8h, KUB and mag citrate  12/21: wean FiO2, d/c hydralazine, wean fentanyl  12/22/2019: Patient stable overnight. Lasix 20mg x 1 given today. Enteral water flushes started for hypernatremia.    12/23: SBT trial, started Plavix today, initiated Hydralazine 25 TID, lasix 40 mg X1, decrease statin to 40 mg daily (transaminitis -trending down), add psyllium, Na BID -goal 145-150, initiated sq heparin  12/24/2019: SBT failed, wean fio2 as tolerated, CXR responded to lasix, scheduled BID  12/25/2019 continue lasix. SBT today. Adjust insulin  regimen  12/26/2019: Continue diuresis, SBT again today, wean PEEP and FiO2, wean nicardipine, hold statin for transaminits   12/27/2019: No significant events   12/28 No significant events over night.. Tolerating spon. Breathing trial, increased peep. No cuff leak, started decadron 8mg q8x3 doses and reassess in am for possible extubation. Insulin gtt started while on steroids, as BG running high. CPT added to resp. Treatments. Start TF.  12/29: extubated to room air following SBT  12/30: IS q 6, d/c whaley, d/c lasix, titrate insulin gtt, d/c hydralazine, KUB  12/31: cxr, trial of modafinil,  q 6, transition insulin  1/1: increase FWF, modafinil, insulin  1/2: intubated, titrate insulin, family discussion  1/3: CT head in AM, cxr, kub, increase insulin, family updated  1/4: NAEO  1/5: NAEO  01/06/2020 NAEO. Transition from propofol to precedex  01/07/2020 NAEO  01/08/2020 plan for trach and PEG      Review of Symptoms: intubated cannot participate  Constitutional: Denies fevers, weight loss, chills, or weakness.   Eyes: Denies changes in vision.   ENT: Denies dysphagia, nasal discharge, ear pain or discharge.   Cardiovascular: Denies chest pain, palpitations, orthopnea, or claudication.   Respiratory: Denies shortness of breath, cough, hemoptysis, or wheezing.   GI: Denies nausea/vomitting, hematochezia, melena, abd pain, or changes in appetite.   : Denies dysuria, incontinence, or hematuria.   Musculoskeletal: Denies joint pain or myalgias.   Skin/breast: Denies rashes, lumps, lesions, or discharge.   Neurologic: Denies headache, dizziness, vertigo, or paresthesias.   Psychiatric: Denies changes in mood or hallucinations.   Endocrine: Denies polyuria, polydipsia, heat/cold intolerance.   Hematologic/Lymph: Denies lymphadenopathy, easy bruising or easy bleeding.   Allergic/Immunologic: Denies rash, rhinitis      Medications:  Continuous Infusions:   dexmedetomidine (PRECEDEX) infusion Stopped (01/08/20  0100)    propofol Stopped (01/06/20 1227)     Scheduled Meds:   albuterol-ipratropium  3 mL Nebulization Q4H    amLODIPine  10 mg Per NG tube Daily    chlorhexidine  15 mL Mouth/Throat BID    famotidine  20 mg Per NG tube BID    heparin (porcine)  5,000 Units Subcutaneous Q8H    insulin aspart U-100  8 Units Subcutaneous Q4H    insulin detemir U-100  18 Units Subcutaneous BID    miconazole nitrate 2%   Topical (Top) BID    piperacillin-tazobactam (ZOSYN) IVPB  4.5 g Intravenous Q8H    polyethylene glycol  17 g Per NG tube Daily    senna-docusate 8.6-50 mg  1 tablet Per NG tube BID    vancomycin (VANCOCIN) IVPB  1,000 mg Intravenous Q12H     PRN Meds:.acetaminophen, Dextrose 10% Bolus, Dextrose 10% Bolus, Dextrose 10% Bolus, Dextrose 10% Bolus, fentaNYL, glucagon (human recombinant), hydrALAZINE, insulin aspart U-100, labetalol, magnesium oxide, magnesium oxide, ondansetron, potassium chloride 10%, potassium chloride 10%, potassium chloride 10%, potassium, sodium phosphates, potassium, sodium phosphates, potassium, sodium phosphates, sodium chloride 0.9%    OBJECTIVE:   Vital Signs (Most Recent):   Temp: 99 °F (37.2 °C) (01/08/20 0705)  Pulse: 84 (01/08/20 0920)  Resp: (!) 21 (01/08/20 0920)  BP: (!) 142/65 (01/08/20 0905)  SpO2: 100 % (01/08/20 0920)    Vital Signs (24h Range):   Temp:  [98.7 °F (37.1 °C)-99.6 °F (37.6 °C)] 99 °F (37.2 °C)  Pulse:  [66-84] 84  Resp:  [2-25] 21  SpO2:  [97 %-100 %] 100 %  BP: (105-142)/(53-65) 142/65  Arterial Line BP: (116-155)/(44-60) 155/60    ICP/CPP (Last 24h):        I & O (Last 24h):     Intake/Output Summary (Last 24 hours) at 1/8/2020 1008  Last data filed at 1/8/2020 0905  Gross per 24 hour   Intake 2708.18 ml   Output 1275 ml   Net 1433.18 ml     Physical Exam: unchanged from previous day  GA: awake, comfortable, no acute distress.   HEENT: No scleral icterus or JVD. Neck supple  Pulmonary: Air entry equal to auscultation A/P/L. Wheezing no, crackles  no  Cardiac: RRR, S1 & S2 w/o rubs/murmurs/gallops.   Abdominal: soft, non-tender, bowel sounds present x 4. No appreciable hepatosplenomegaly.  Skin: No jaundice, rashes, or visible lesions.  Neuro:  --Mental Status:  Awake, follows commands, spontaneous eye opening.    --Pupils 3.5mm, PERRL.   --Corneal reflex not done in this awake patient, gag, cough intact.  Left hemiparesis  MSK:  No edema in UE and LE    Vent Data:   Vent Mode: A/C  Oxygen Concentration (%):  [40] 40  Resp Rate Total:  [20 br/min-26 br/min] 21 br/min  Vt Set:  [420 mL] 420 mL  PEEP/CPAP:  [5 cmH20] 5 cmH20  Pressure Support:  [0 cmH20] 0 cmH20  Mean Airway Pressure:  [7.6 cmH20-9.8 cmH20] 7.7 cmH20    Lines/Drains/Airway:        Airway - Non-Surgical 01/02/20 1529 Endotracheal Tube (Active)   Secured at 26 cm 1/8/2020  7:29 AM   Measured At Lips 1/8/2020  7:29 AM   Secured Location Center 1/8/2020  7:29 AM   Secured by Commercial tube lara 1/8/2020  7:29 AM   Bite Block center;secure and patent 1/8/2020  7:29 AM   Site Condition Cool;Dry 1/8/2020  7:29 AM   Status Intact;Secured;Patent 1/8/2020  7:29 AM   Site Assessment Clean;Dry;No bleeding;No drainage 1/8/2020  7:29 AM   Cuff Pressure 26 cm H2O 1/8/2020  7:29 AM             Arterial Line 01/02/20 1732 Right Radial (Active)   Site Assessment Clean;Dry;Intact;No redness;No swelling 1/8/2020  7:05 AM   Line Status Pulsatile blood flow 1/8/2020  7:05 AM   Art Line Waveform Appropriate;Square wave test performed 1/8/2020  7:05 AM   Arterial Line Interventions Zeroed and calibrated;Leveled;Flushed per protocol 1/8/2020  7:05 AM   Color/Movement/Sensation Capillary refill less than 3 sec 1/8/2020  7:05 AM   Dressing Type Transparent 1/8/2020  7:05 AM   Dressing Status Biopatch in place;Clean;Dry;Intact 1/8/2020  7:05 AM   Dressing Intervention Dressing reinforced 1/8/2020  7:05 AM   Dressing Change Due 01/09/20 1/8/2020  7:05 AM           NG/OG Tube 12/17/19 1100 nasogastric 14 Fr. Left nostril  (Active)   Placement Check placement verified by aspirate characteristics 1/8/2020  7:05 AM   Advancement advanced manually 12/28/2019  3:05 PM   Tolerance no signs/symptoms of discomfort 1/8/2020  7:05 AM   Securement secured to nostril center w/ adhesive device 1/8/2020  7:05 AM   Clamp Status/Tolerance unclamped 1/8/2020  7:05 AM   Suction Setting/Drainage Method suction at the bedside 1/3/2020  3:00 PM   Insertion Site Appearance no redness, warmth, tenderness, skin breakdown, drainage 1/8/2020  7:05 AM   Drainage None 1/6/2020  3:00 PM   Flush/Irrigation flushed w/;water;no resistance met 1/8/2020  7:05 AM   Feeding Method continuous 1/8/2020  7:05 AM   Feeding Action feeding continued 1/8/2020  7:05 AM   Current Rate (mL/hr) 0 mL/hr 1/8/2020  7:05 AM   Goal Rate (mL/hr) 55 mL/hr 1/8/2020  7:05 AM   Intake (mL) 30 mL 1/6/2020  9:00 AM   Water Bolus (mL) 400 mL 1/7/2020 11:00 PM   Rate Formula Tube Feeding (mL/hr) 0 mL/hr 1/8/2020  7:05 AM   Formula Name Glucerna 1/8/2020  7:05 AM   Intake (mL) - Formula Tube Feeding 0 1/8/2020  7:05 AM   Residual Amount (ml) 0 ml 1/8/2020  7:05 AM            Rectal Tube 01/06/20 0613 rectal tube w/ balloon (indicate number of mLs) (Active)   Balloon Inflation Volume (mL) 45 1/8/2020  7:05 AM   Reposition drainage bags for BMS & Andrade on opposite sides of bed 1/8/2020  7:05 AM   Outcome gas expelled, stool evacuated 1/8/2020  7:05 AM   Stool Color Brown 1/8/2020  7:05 AM   Insertion Site Appearance no redness, warmth, tenderness, skin breakdown, drainage 1/8/2020  7:05 AM   Flush/Irrigation flushed w/;water;no resistance met 1/8/2020  7:05 AM   Intake (mL) 45 mL 1/8/2020  7:05 AM   Rectal Tube Output 200 mL 1/8/2020  6:00 AM       Male External Urinary Catheter 01/04/20 0700 Small (Active)   Collection Container Urimeter 1/8/2020  7:05 AM   Securement Method secured to top of thigh w/ adhesive device 1/8/2020  7:05 AM   Skin no redness;no breakdown 1/8/2020  7:05 AM   Tolerance  no signs/symptoms of discomfort 1/8/2020  7:05 AM   Output (mL) 200 mL 1/8/2020  6:00 AM   Catheter Change Date 01/07/20 1/8/2020  7:05 AM   Catheter Change Time 0715 1/8/2020  7:05 AM         Nutrition/Tube Feeds (if NPO state why): tf     Labs:  ABG:   Recent Labs   Lab 01/08/20  0417   PH 7.483*   PO2 84   PCO2 40.8   HCO3 30.6*   POCSATURATED 97   BE 7     BMP:  Recent Labs   Lab 01/08/20  0109   *   K 4.0   *   CO2 27   BUN 33*   CREATININE 0.7   *   MG 2.5   PHOS 3.5     LFT:   Lab Results   Component Value Date    AST 28 01/08/2020    ALT 49 (H) 01/08/2020    ALKPHOS 142 (H) 01/08/2020    BILITOT 0.4 01/08/2020    ALBUMIN 2.2 (L) 01/08/2020    PROT 6.0 01/08/2020     CBC:   Lab Results   Component Value Date    WBC 9.93 01/08/2020    HGB 11.2 (L) 01/08/2020    HCT 36.5 (L) 01/08/2020     (H) 01/08/2020     01/08/2020     Microbiology x 7d:   Microbiology Results (last 7 days)     Procedure Component Value Units Date/Time    Blood culture [[id number]] Collected:  01/02/20 1547    Order Status:  Completed Specimen:  Blood from Peripheral, Antecubital, Right Updated:  01/07/20 1812     Blood Culture, Routine No growth after 5 days.    Blood culture [[id number]] Collected:  01/02/20 1554    Order Status:  Completed Specimen:  Blood from Peripheral, Hand, Left Updated:  01/07/20 1812     Blood Culture, Routine No growth after 5 days.    Culture, Respiratory with Gram Stain [[id number]] Collected:  01/02/20 1625    Order Status:  Completed Specimen:  Respiratory from Sputum Updated:  01/04/20 0747     Respiratory Culture Normal respiratory balaji      No S aureus or Pseudomonas isolated.     Gram Stain (Respiratory) <10 epithelial cells per low power field.     Gram Stain (Respiratory) Many WBC's     Gram Stain (Respiratory) Many Gram positive rods     Gram Stain (Respiratory) Rare Gram positive cocci     Gram Stain (Respiratory) Rare Gram negative rods        Imaging:  CXR 1/8 - tubes  in place. Left basilar atelectasis   I personally reviewed the above image.  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, right    Transaminitis    Constipation    Acute respiratory failure with hypoxia    Acute venous embolism and thrombosis of basilic vein, left    Acute spont intraparenchymal hemorrhage assoc w/ coagulopathy    Vasogenic brain edema    Midline shift of brain    Cytotoxic brain edema    Fever due to infection    Acute ischemic right MCA stroke    Decreased strength, endurance, and mobility    Essential hypertension    Type 2 diabetes mellitus with neurologic complication, without long-term current use of insulin    Mixed hyperlipidemia    Cerebrovascular accident (CVA)      Neuro:   Right MCA stroke  Precedex - more responsive today  Extensive discussion with family concerning goals of care had by me and my colleague Dr. Valencia. Family believes he will have an acceptable quality of life despite left hemiparesis and neglect.  Continue secondary stroke prevention per protocol    Pulmonary:   mech vent  CXR/ABG daily  Plan for trach and PEG    Cardiac:   SBP < 160  EF 65%    Renal:    Making urine  BUN/CRr > 20    ID:   Afebrile, normal wbc  Complete 7 day course of antibiotics - appears to have had a clinical response    Hem/Onc:   Stable hh and plt count    Endocrine:    BS stable    Fluids/Electrolytes/Nutrition/GI:   Tf  Replete lytes as needed  Lines:  Art +  CVC NA  ETT +  Andrdae NA  NG +  PEG NA    Proph:  DVT:SCD/heparin   Constipation:  Last output:bowel regimen as  needed  PUP: pepcid  VAP:peridex      Uninterrupted Critical Care/Counseling Time (not including procedures):: III    Deep Shirley MD  Neurocritical care attending    Full Code    Deep Shirley MD  Neurocritical Care  [facility]

## 2020-01-08 NOTE — BRIEF OP NOTE
[facility]  Surgery Department  Operative Note    SUMMARY     Date of Procedure: 1/8/2020     Procedure: Procedure(s) (LRB):  CREATION, TRACHEOSTOMY (N/A)  INSERTION, PEG TUBE (N/A)     Surgeon(s) and Role:     * Thom Conde MD - Primary     * Luis Hutton MD - Resident - Assisting     * Derek Lima MD - Resident - Assisting    Pre-Operative Diagnosis: Cerebrovascular accident (CVA), unspecified mechanism [I63.9]    Post-Operative Diagnosis: Post-Op Diagnosis Codes:     * Cerebrovascular accident (CVA), unspecified mechanism [I63.9]    Anesthesia: General    Technical Procedures Used:   Percutaneous trach done first,significant mucous burden in airway that was suctioned, placement confirmed with bronchoscopy.  PEG done second, no immediate complications.  Pt tolerated well.      Derek Lima MD  General Surgery, PGY-2  [phone]

## 2020-01-08 NOTE — PLAN OF CARE
Patient to have trach and peg tomorrow per General Surgery.  SW has referrals out to LTAC.       01/07/20 1953   Discharge Reassessment   Assessment Type Discharge Planning Reassessment   Provided patient/caregiver education on the expected discharge date and the discharge plan Yes   Do you have any problems affording any of your prescribed medications? No   Discharge Plan A Long-term acute care facility (LTAC)   Discharge Plan B Skilled Nursing Facility   DME Needed Upon Discharge  none   Patient choice form signed by patient/caregiver N/A   Anticipated Discharge Disposition LTAC   Can the patient answer the patient profile reliably? No, cognitively impaired   How does the patient rate their overall health at the present time?   (preet)   Describe the patient's ability to walk at the present time. Does not walk or unable to take any steps at all   How often would a person be available to care for the patient? Often   Number of comorbid conditions (as recorded on the chart) Four   Post-Acute Status   Post-Acute Authorization Placement   Post-Acute Placement Status Awaiting Internal Medical Clearance   Discharge Delays None known at this time       Leigha Walker RN, CCRN-K, [facility]  Neuro-Critical Care   [phone]

## 2020-01-08 NOTE — PROGRESS NOTES
Pharmacokinetic Assessment Follow Up: IV Vancomycin    Assessment and Plan:    Vancomycin trough drawn appropriately before fourth dose is therapeutic at 14.7 mcg/mL  Goal trough 15-20 mcg/mL  Continue current regimen of 1000 mg Q12H  Will schedule surveillance trough if therapy is extended beyond 7 days    Drug levels (last 3 results):  Recent Labs   Lab Result Units 01/06/20 0310 01/08/20  0929   Vancomycin-Trough ug/mL 20.1 14.7     Pharmacy will continue to follow and monitor vancomycin. Please call for questions regarding this assessment.    Thank you for the consult,   Danielle Piper, PharmD, [facility]  Neurocritical Care Pharmacist  [phone]         Patient brief summary:  Jerry Linder is a 79 y.o. male initiated on antimicrobial therapy with IV vancomycin for treatment of pneumonia.     Drug Allergies:   Review of patient's allergies indicates:  No Known Allergies    Actual Body Weight:   123.4 kg     Renal Function:   Estimated Creatinine Clearance: 124.9 mL/min (based on SCr of 0.7 mg/dL).    CBC (last 72 hours):  Recent Labs   Lab Result Units 01/06/20 0310 01/07/20 0136 01/08/20  0109   WBC K/uL 7.60 11.15 9.93   Hemoglobin g/dL 11.3* 11.3* 11.2*   Hematocrit % 37.5* 37.5* 36.5*   Platelets K/uL 241 217 200   Gran% % 58.0 71.4 62.2   Lymph% % 25.9 15.9* 24.9   Mono% % 9.7 9.2 8.8   Eosinophil% % 3.7 1.4 2.3   Basophil% % 0.7 0.4 0.3   Differential Method  Automated Automated Automated       Metabolic Panel (last 72 hours):  Recent Labs   Lab Result Units 01/06/20 0310 01/07/20  0136 01/08/20  0109   Sodium mmol/L 151* 146* 150*   Potassium mmol/L 4.6 4.4 4.0   Chloride mmol/L 115* 113* 114*   CO2 mmol/L 26 22* 27   Glucose mg/dL 200* 232* 175*   BUN, Bld mg/dL 35* 33* 33*   Creatinine mg/dL 0.9 0.9 0.7   Albumin g/dL 2.2* 2.2* 2.2*   Total Bilirubin mg/dL 0.4 0.5 0.4   Alkaline Phosphatase U/L 163* 158* 142*   AST U/L 40 39 28   ALT U/L 65* 58* 49*   Magnesium mg/dL 2.6 2.5 2.5   Phosphorus mg/dL 4.4 3.7 3.5        Vancomycin Administrations:  vancomycin given in the last 96 hours                   vancomycin 1.25 g in dextrose 5% 250 mL IVPB (ready to mix) (mg) 1,250 mg New Bag 01/04/20 0445     1,250 mg New Bag 01/03/20 1608     1,250 mg New Bag  0432    vancomycin 2 g in 0.9% sodium chloride 500 mL IVPB (mg) 2,000 mg New Bag 01/02/20 1633                Microbiologic Results:  Microbiology Results (last 7 days)     Procedure Component Value Units Date/Time    Blood culture [[id number]] Collected:  01/02/20 1547    Order Status:  Completed Specimen:  Blood from Peripheral, Antecubital, Right Updated:  01/07/20 1812     Blood Culture, Routine No growth after 5 days.    Blood culture [[id number]] Collected:  01/02/20 1554    Order Status:  Completed Specimen:  Blood from Peripheral, Hand, Left Updated:  01/07/20 1812     Blood Culture, Routine No growth after 5 days.    Culture, Respiratory with Gram Stain [[id number]] Collected:  01/02/20 1625    Order Status:  Completed Specimen:  Respiratory from Sputum Updated:  01/04/20 0747     Respiratory Culture Normal respiratory balaji      No S aureus or Pseudomonas isolated.     Gram Stain (Respiratory) <10 epithelial cells per low power field.     Gram Stain (Respiratory) Many WBC's     Gram Stain (Respiratory) Many Gram positive rods     Gram Stain (Respiratory) Rare Gram positive cocci     Gram Stain (Respiratory) Rare Gram negative rods

## 2020-01-09 LAB
ALBUMIN SERPL BCP-MCNC: 2.1 G/DL (ref 3.5–5.2)
ALLENS TEST: ABNORMAL
ALP SERPL-CCNC: 166 U/L (ref 55–135)
ALT SERPL W/O P-5'-P-CCNC: 47 U/L (ref 10–44)
ANION GAP SERPL CALC-SCNC: 8 MMOL/L (ref 8–16)
AST SERPL-CCNC: 32 U/L (ref 10–40)
BASOPHILS # BLD AUTO: 0.03 K/UL (ref 0–0.2)
BASOPHILS NFR BLD: 0.4 % (ref 0–1.9)
BILIRUB SERPL-MCNC: 0.6 MG/DL (ref 0.1–1)
BUN SERPL-MCNC: 24 MG/DL (ref 8–23)
CALCIUM SERPL-MCNC: 7.9 MG/DL (ref 8.7–10.5)
CHLORIDE SERPL-SCNC: 115 MMOL/L (ref 95–110)
CO2 SERPL-SCNC: 27 MMOL/L (ref 23–29)
CREAT SERPL-MCNC: 0.7 MG/DL (ref 0.5–1.4)
DELSYS: ABNORMAL
DIFFERENTIAL METHOD: ABNORMAL
EOSINOPHIL # BLD AUTO: 0.2 K/UL (ref 0–0.5)
EOSINOPHIL NFR BLD: 1.9 % (ref 0–8)
ERYTHROCYTE [DISTWIDTH] IN BLOOD BY AUTOMATED COUNT: 13.2 % (ref 11.5–14.5)
ERYTHROCYTE [SEDIMENTATION RATE] IN BLOOD BY WESTERGREN METHOD: 20 MM/H
EST. GFR  (AFRICAN AMERICAN): >60 ML/MIN/1.73 M^2
EST. GFR  (NON AFRICAN AMERICAN): >60 ML/MIN/1.73 M^2
FIO2: 40
GLUCOSE SERPL-MCNC: 150 MG/DL (ref 70–110)
HCO3 UR-SCNC: 27.2 MMOL/L (ref 24–28)
HCT VFR BLD AUTO: 37.4 % (ref 40–54)
HGB BLD-MCNC: 11.3 G/DL (ref 14–18)
IMM GRANULOCYTES # BLD AUTO: 0.08 K/UL (ref 0–0.04)
IMM GRANULOCYTES NFR BLD AUTO: 1 % (ref 0–0.5)
LYMPHOCYTES # BLD AUTO: 1.2 K/UL (ref 1–4.8)
LYMPHOCYTES NFR BLD: 15.8 % (ref 18–48)
MAGNESIUM SERPL-MCNC: 2.4 MG/DL (ref 1.6–2.6)
MCH RBC QN AUTO: 31.4 PG (ref 27–31)
MCHC RBC AUTO-ENTMCNC: 30.2 G/DL (ref 32–36)
MCV RBC AUTO: 104 FL (ref 82–98)
MODE: ABNORMAL
MONOCYTES # BLD AUTO: 0.8 K/UL (ref 0.3–1)
MONOCYTES NFR BLD: 9.9 % (ref 4–15)
NEUTROPHILS # BLD AUTO: 5.5 K/UL (ref 1.8–7.7)
NEUTROPHILS NFR BLD: 71 % (ref 38–73)
NRBC BLD-RTO: 0 /100 WBC
PCO2 BLDA: 40.8 MMHG (ref 35–45)
PEEP: 5
PH SMN: 7.43 [PH] (ref 7.35–7.45)
PHOSPHATE SERPL-MCNC: 3.3 MG/DL (ref 2.7–4.5)
PLATELET # BLD AUTO: 196 K/UL (ref 150–350)
PMV BLD AUTO: 11.9 FL (ref 9.2–12.9)
PO2 BLDA: 94 MMHG (ref 80–100)
POC BE: 3 MMOL/L
POC SATURATED O2: 97 % (ref 95–100)
POC TCO2: 28 MMOL/L (ref 23–27)
POCT GLUCOSE: 135 MG/DL (ref 70–110)
POCT GLUCOSE: 144 MG/DL (ref 70–110)
POCT GLUCOSE: 165 MG/DL (ref 70–110)
POTASSIUM SERPL-SCNC: 3.5 MMOL/L (ref 3.5–5.1)
PROT SERPL-MCNC: 6.1 G/DL (ref 6–8.4)
RBC # BLD AUTO: 3.6 M/UL (ref 4.6–6.2)
SAMPLE: ABNORMAL
SITE: ABNORMAL
SODIUM SERPL-SCNC: 150 MMOL/L (ref 136–145)
VT: 420
WBC # BLD AUTO: 7.8 K/UL (ref 3.9–12.7)

## 2020-01-09 PROCEDURE — 63600175 PHARM REV CODE 636 W HCPCS: Performed by: PSYCHIATRY & NEUROLOGY

## 2020-01-09 PROCEDURE — 63600175 PHARM REV CODE 636 W HCPCS: Performed by: NURSE PRACTITIONER

## 2020-01-09 PROCEDURE — 99900026 HC AIRWAY MAINTENANCE (STAT)

## 2020-01-09 PROCEDURE — 37799 UNLISTED PX VASCULAR SURGERY: CPT

## 2020-01-09 PROCEDURE — 25000003 PHARM REV CODE 250: Performed by: STUDENT IN AN ORGANIZED HEALTH CARE EDUCATION/TRAINING PROGRAM

## 2020-01-09 PROCEDURE — 27200966 HC CLOSED SUCTION SYSTEM

## 2020-01-09 PROCEDURE — 25000003 PHARM REV CODE 250: Performed by: NURSE PRACTITIONER

## 2020-01-09 PROCEDURE — 82803 BLOOD GASES ANY COMBINATION: CPT

## 2020-01-09 PROCEDURE — 84100 ASSAY OF PHOSPHORUS: CPT

## 2020-01-09 PROCEDURE — 94640 AIRWAY INHALATION TREATMENT: CPT

## 2020-01-09 PROCEDURE — 25000242 PHARM REV CODE 250 ALT 637 W/ HCPCS: Performed by: INTERNAL MEDICINE

## 2020-01-09 PROCEDURE — 27000221 HC OXYGEN, UP TO 24 HOURS

## 2020-01-09 PROCEDURE — 20000000 HC ICU ROOM

## 2020-01-09 PROCEDURE — 25000003 PHARM REV CODE 250: Performed by: PSYCHIATRY & NEUROLOGY

## 2020-01-09 PROCEDURE — 99233 PR SUBSEQUENT HOSPITAL CARE,LEVL III: ICD-10-PCS | Mod: GC,,, | Performed by: PSYCHIATRY & NEUROLOGY

## 2020-01-09 PROCEDURE — 99233 SBSQ HOSP IP/OBS HIGH 50: CPT | Mod: GC,,, | Performed by: PSYCHIATRY & NEUROLOGY

## 2020-01-09 PROCEDURE — 85025 COMPLETE CBC W/AUTO DIFF WBC: CPT

## 2020-01-09 PROCEDURE — 94668 MNPJ CHEST WALL SBSQ: CPT

## 2020-01-09 PROCEDURE — 83735 ASSAY OF MAGNESIUM: CPT

## 2020-01-09 PROCEDURE — 25000003 PHARM REV CODE 250: Performed by: PHYSICIAN ASSISTANT

## 2020-01-09 PROCEDURE — 99900035 HC TECH TIME PER 15 MIN (STAT)

## 2020-01-09 PROCEDURE — 94761 N-INVAS EAR/PLS OXIMETRY MLT: CPT

## 2020-01-09 PROCEDURE — 94003 VENT MGMT INPAT SUBQ DAY: CPT

## 2020-01-09 PROCEDURE — 80053 COMPREHEN METABOLIC PANEL: CPT

## 2020-01-09 RX ORDER — AMOXICILLIN 250 MG
1 CAPSULE ORAL 2 TIMES DAILY
Status: DISCONTINUED | OUTPATIENT
Start: 2020-01-09 | End: 2020-01-10 | Stop reason: HOSPADM

## 2020-01-09 RX ORDER — LANOLIN ALCOHOL/MO/W.PET/CERES
800 CREAM (GRAM) TOPICAL
Status: DISCONTINUED | OUTPATIENT
Start: 2020-01-09 | End: 2020-01-10 | Stop reason: HOSPADM

## 2020-01-09 RX ORDER — SODIUM,POTASSIUM PHOSPHATES 280-250MG
2 POWDER IN PACKET (EA) ORAL
Status: DISCONTINUED | OUTPATIENT
Start: 2020-01-09 | End: 2020-01-10 | Stop reason: HOSPADM

## 2020-01-09 RX ORDER — ACETAMINOPHEN 325 MG/1
650 TABLET ORAL EVERY 6 HOURS PRN
Status: DISCONTINUED | OUTPATIENT
Start: 2020-01-09 | End: 2020-01-10 | Stop reason: HOSPADM

## 2020-01-09 RX ORDER — POTASSIUM CHLORIDE 20 MEQ/15ML
40 SOLUTION ORAL
Status: DISCONTINUED | OUTPATIENT
Start: 2020-01-09 | End: 2020-01-10 | Stop reason: HOSPADM

## 2020-01-09 RX ORDER — POTASSIUM CHLORIDE 20 MEQ/15ML
60 SOLUTION ORAL
Status: DISCONTINUED | OUTPATIENT
Start: 2020-01-09 | End: 2020-01-10 | Stop reason: HOSPADM

## 2020-01-09 RX ORDER — AMANTADINE HYDROCHLORIDE 50 MG/5ML
100 SOLUTION ORAL 2 TIMES DAILY
Status: DISCONTINUED | OUTPATIENT
Start: 2020-01-09 | End: 2020-01-10 | Stop reason: HOSPADM

## 2020-01-09 RX ORDER — POLYETHYLENE GLYCOL 3350 17 G/17G
17 POWDER, FOR SOLUTION ORAL DAILY
Status: DISCONTINUED | OUTPATIENT
Start: 2020-01-10 | End: 2020-01-10 | Stop reason: HOSPADM

## 2020-01-09 RX ORDER — AMLODIPINE BESYLATE 10 MG/1
10 TABLET ORAL DAILY
Status: DISCONTINUED | OUTPATIENT
Start: 2020-01-10 | End: 2020-01-10 | Stop reason: HOSPADM

## 2020-01-09 RX ORDER — FAMOTIDINE 20 MG/1
20 TABLET, FILM COATED ORAL 2 TIMES DAILY
Status: DISCONTINUED | OUTPATIENT
Start: 2020-01-09 | End: 2020-01-10 | Stop reason: HOSPADM

## 2020-01-09 RX ADMIN — MICONAZOLE NITRATE: 20 OINTMENT TOPICAL at 09:01

## 2020-01-09 RX ADMIN — FENTANYL CITRATE 50 MCG: 50 INJECTION INTRAMUSCULAR; INTRAVENOUS at 02:01

## 2020-01-09 RX ADMIN — PIPERACILLIN AND TAZOBACTAM 4.5 G: 4; .5 INJECTION, POWDER, FOR SOLUTION INTRAVENOUS at 12:01

## 2020-01-09 RX ADMIN — POTASSIUM CHLORIDE 40 MEQ: 20 SOLUTION ORAL at 05:01

## 2020-01-09 RX ADMIN — SENNOSIDES AND DOCUSATE SODIUM 1 TABLET: 8.6; 5 TABLET ORAL at 08:01

## 2020-01-09 RX ADMIN — FENTANYL CITRATE 50 MCG: 50 INJECTION INTRAMUSCULAR; INTRAVENOUS at 11:01

## 2020-01-09 RX ADMIN — FENTANYL CITRATE 50 MCG: 50 INJECTION INTRAMUSCULAR; INTRAVENOUS at 12:01

## 2020-01-09 RX ADMIN — INSULIN ASPART 8 UNITS: 100 INJECTION, SOLUTION INTRAVENOUS; SUBCUTANEOUS at 05:01

## 2020-01-09 RX ADMIN — IPRATROPIUM BROMIDE AND ALBUTEROL SULFATE 3 ML: .5; 3 SOLUTION RESPIRATORY (INHALATION) at 11:01

## 2020-01-09 RX ADMIN — HEPARIN SODIUM 5000 UNITS: 5000 INJECTION, SOLUTION INTRAVENOUS; SUBCUTANEOUS at 05:01

## 2020-01-09 RX ADMIN — INSULIN DETEMIR 18 UNITS: 100 INJECTION, SOLUTION SUBCUTANEOUS at 09:01

## 2020-01-09 RX ADMIN — INSULIN ASPART 1 UNITS: 100 INJECTION, SOLUTION INTRAVENOUS; SUBCUTANEOUS at 09:01

## 2020-01-09 RX ADMIN — HEPARIN SODIUM 5000 UNITS: 5000 INJECTION, SOLUTION INTRAVENOUS; SUBCUTANEOUS at 09:01

## 2020-01-09 RX ADMIN — INSULIN ASPART 2 UNITS: 100 INJECTION, SOLUTION INTRAVENOUS; SUBCUTANEOUS at 05:01

## 2020-01-09 RX ADMIN — FENTANYL CITRATE 50 MCG: 50 INJECTION INTRAMUSCULAR; INTRAVENOUS at 06:01

## 2020-01-09 RX ADMIN — CHLORHEXIDINE GLUCONATE 0.12% ORAL RINSE 15 ML: 1.2 LIQUID ORAL at 09:01

## 2020-01-09 RX ADMIN — INSULIN ASPART 8 UNITS: 100 INJECTION, SOLUTION INTRAVENOUS; SUBCUTANEOUS at 09:01

## 2020-01-09 RX ADMIN — VANCOMYCIN HYDROCHLORIDE 1000 MG: 1 INJECTION, POWDER, LYOPHILIZED, FOR SOLUTION INTRAVENOUS at 10:01

## 2020-01-09 RX ADMIN — PIPERACILLIN AND TAZOBACTAM 4.5 G: 4; .5 INJECTION, POWDER, FOR SOLUTION INTRAVENOUS at 08:01

## 2020-01-09 RX ADMIN — HEPARIN SODIUM 5000 UNITS: 5000 INJECTION, SOLUTION INTRAVENOUS; SUBCUTANEOUS at 02:01

## 2020-01-09 RX ADMIN — FAMOTIDINE 20 MG: 20 TABLET ORAL at 08:01

## 2020-01-09 RX ADMIN — MICONAZOLE NITRATE: 20 OINTMENT TOPICAL at 08:01

## 2020-01-09 RX ADMIN — IPRATROPIUM BROMIDE AND ALBUTEROL SULFATE 3 ML: .5; 3 SOLUTION RESPIRATORY (INHALATION) at 03:01

## 2020-01-09 RX ADMIN — FAMOTIDINE 20 MG: 20 TABLET ORAL at 09:01

## 2020-01-09 RX ADMIN — AMANTADINE HYDROCHLORIDE 100 MG: 50 SOLUTION ORAL at 02:01

## 2020-01-09 RX ADMIN — CHLORHEXIDINE GLUCONATE 0.12% ORAL RINSE 15 ML: 1.2 LIQUID ORAL at 08:01

## 2020-01-09 RX ADMIN — SENNOSIDES AND DOCUSATE SODIUM 1 TABLET: 8.6; 5 TABLET ORAL at 09:01

## 2020-01-09 RX ADMIN — AMLODIPINE BESYLATE 10 MG: 10 TABLET ORAL at 08:01

## 2020-01-09 RX ADMIN — IPRATROPIUM BROMIDE AND ALBUTEROL SULFATE 3 ML: .5; 3 SOLUTION RESPIRATORY (INHALATION) at 07:01

## 2020-01-09 NOTE — PLAN OF CARE
01/09/20 1447   Post-Acute Status   Post-Acute Authorization Placement   Post-Acute Placement Status Authorization Obtained  (to [facility])     SW advised by Lizbeth with [facility] they have auth and can take the Pt tomorrow hopefully in the AM.    Ivette Bailey, WENDY  Neurocritical Care   [facility]  [id number]

## 2020-01-09 NOTE — PLAN OF CARE
POC reviewed with pt at 0500. Pt unable to verbalize understanding. Questions and concerns addressed. No acute events overnight. Pt progressing toward goals. Will continue to monitor. See flowsheets for full assessment and VS info

## 2020-01-09 NOTE — PLAN OF CARE
Patient to discharge to [facility] tomorrow pending MD HIDALGO rounds and medical stability.         01/09/20 1639   Discharge Reassessment   Assessment Type Discharge Planning Reassessment   Provided patient/caregiver education on the expected discharge date and the discharge plan Yes   Do you have any problems affording any of your prescribed medications? No   Discharge Plan A Long-term acute care facility (LTAC)   Discharge Plan B Long-term acute care facility (LTAC)   DME Needed Upon Discharge  none   Patient choice form signed by patient/caregiver N/A   Anticipated Discharge Disposition LTAC   Can the patient answer the patient profile reliably? No, cognitively impaired   Describe the patient's ability to walk at the present time. Does not walk or unable to take any steps at all   How often would a person be available to care for the patient? Often   Number of comorbid conditions (as recorded on the chart) Four   Post-Acute Status   Post-Acute Authorization Placement   Post-Acute Placement Status Authorization Obtained   Discharge Delays None known at this time       Leigha Walker RN, CCRN-K, [facility]  Neuro-Critical Care   [phone]

## 2020-01-09 NOTE — ANESTHESIA POSTPROCEDURE EVALUATION
Anesthesia Post Evaluation    Patient: Jerry Linder    Procedure(s) Performed: Procedure(s) (LRB):  CREATION, TRACHEOSTOMY (N/A)  INSERTION, PEG TUBE (N/A)    Final Anesthesia Type: general    Patient location during evaluation: ICU  Patient participation: No - Unable to Participate, Other Reason (see comments) (fresh tracheostomy)  Level of consciousness: responds to stimulation and sedated  Post-procedure vital signs: reviewed and stable  Pain management: adequate  Airway patency: patent    PONV status at discharge: No PONV  Anesthetic complications: no      Cardiovascular status: stable  Respiratory status: spontaneous ventilation and ventilator  Hydration status: euvolemic  Follow-up not needed.          Vitals Value Taken Time   /63 1/9/2020  6:01 AM   Temp 37.2 °C (99 °F) 1/9/2020  3:01 AM   Pulse 82 1/9/2020  6:30 AM   Resp 22 1/9/2020  6:30 AM   SpO2 100 % 1/9/2020  6:30 AM   Vitals shown include unvalidated device data.      No case tracking events are documented in the log.      Pain/Yared Score: Pain Rating Prior to Med Admin: 6 (1/9/2020  2:10 AM)  Pain Rating Post Med Admin: 0 (1/9/2020  2:40 AM)

## 2020-01-09 NOTE — CONSULTS
Therapy with vancomycin complete and/or consult discontinued by provider. Patient completed 7 days of therapy.  Pharmacy will sign off, please re-consult as needed.    Danielle Piper, PharmD, [facility]  Neurocritical Care Pharmacist  [phone]

## 2020-01-09 NOTE — PROGRESS NOTES
[facility]  General Surgery  Progress Note    Subjective:     History of Present Illness:  No notes on file    Post-Op Info:  Procedure(s) (LRB):  CREATION, TRACHEOSTOMY (N/A)  INSERTION, PEG TUBE (N/A)   1 Day Post-Op     Interval History: Trached and pegged yesterday. Doing ok today    Medications:  Continuous Infusions:   dexmedetomidine (PRECEDEX) infusion Stopped (01/08/20 0100)    propofol Stopped (01/06/20 1227)     Scheduled Meds:   albuterol-ipratropium  3 mL Nebulization Q4H    amLODIPine  10 mg Per NG tube Daily    chlorhexidine  15 mL Mouth/Throat BID    famotidine  20 mg Per NG tube BID    heparin (porcine)  5,000 Units Subcutaneous Q8H    insulin aspart U-100  8 Units Subcutaneous Q4H    insulin detemir U-100  18 Units Subcutaneous BID    miconazole nitrate 2%   Topical (Top) BID    piperacillin-tazobactam (ZOSYN) IVPB  4.5 g Intravenous Q8H    polyethylene glycol  17 g Per NG tube Daily    senna-docusate 8.6-50 mg  1 tablet Per NG tube BID    vancomycin (VANCOCIN) IVPB  1,000 mg Intravenous Q12H     PRN Meds:acetaminophen, Dextrose 10% Bolus, Dextrose 10% Bolus, Dextrose 10% Bolus, Dextrose 10% Bolus, fentaNYL, glucagon (human recombinant), hydrALAZINE, insulin aspart U-100, labetalol, magnesium oxide, magnesium oxide, ondansetron, potassium chloride 10%, potassium chloride 10%, potassium chloride 10%, potassium, sodium phosphates, potassium, sodium phosphates, potassium, sodium phosphates, sodium chloride 0.9%     Review of patient's allergies indicates:  No Known Allergies  Objective:     Vital Signs (Most Recent):  Temp: 99 °F (37.2 °C) (01/09/20 0301)  Pulse: 84 (01/09/20 0701)  Resp: (!) 23 (01/09/20 0701)  BP: 134/62 (01/09/20 0701)  SpO2: 100 % (01/09/20 0701) Vital Signs (24h Range):  Temp:  [97.9 °F (36.6 °C)-99.4 °F (37.4 °C)] 99 °F (37.2 °C)  Pulse:  [] 84  Resp:  [19-26] 23  SpO2:  [96 %-100 %] 100 %  BP: (131-171)/(59-85) 134/62  Arterial Line BP:  (127-174)/(44-68) 142/52     Weight: 130 kg (286 lb 9.6 oz)  Body mass index is 37.81 kg/m².    Intake/Output - Last 3 Shifts       01/07 0700 - 01/08 0659 01/08 0700 - 01/09 0659 01/09 0700 - 01/10 0659    I.V. (mL/kg) 218.9 (1.7) 610 (4.7) 5 (0)    NG/GT 2180 65     IV Piggyback 800 200     Total Intake(mL/kg) 3198.9 (24.6) 875 (6.7) 5 (0)    Urine (mL/kg/hr) 775 (0.2) 1950 (0.6)     Stool 500 600     Total Output 1275 2550     Net +1923.9 -1675 +5           Urine Occurrence  3 x     Stool Occurrence 1 x            Physical Exam   Constitutional: He appears well-developed and well-nourished. No distress.   Cardiovascular: Normal rate and regular rhythm.   Pulmonary/Chest:   Vent Mode: A/C  Oxygen Concentration (%):  (40) 40  Resp Rate Total:  (21 br/min-27 br/min) 22 br/min  Vt Set:  (420 mL) 420 mL  PEEP/CPAP:  (5 cmH20) 5 cmH20  Pressure Support:  (0 cmH20) 0 cmH20  Mean Airway Pressure:  (7.6 cmH20-9.4 cmH20) 7.6 cmH20     Abdominal:   PEG to gravity, no bleeding       Significant Labs:  CBC:   Recent Labs   Lab 01/09/20  0259   WBC 7.80   RBC 3.60*   HGB 11.3*   HCT 37.4*      *   MCH 31.4*   MCHC 30.2*     CMP:   Recent Labs   Lab 01/09/20  0259   *   CALCIUM 7.9*   ALBUMIN 2.1*   PROT 6.1   *   K 3.5   CO2 27   *   BUN 24*   CREATININE 0.7   ALKPHOS 166*   ALT 47*   AST 32   BILITOT 0.6       Significant Diagnostics:      Assessment/Plan:     * Embolic stroke involving right middle cerebral artery  80 yo male s/p trach/peg    Plan:  - Ok to use PEG  - Will sign off. Call with issues.        Bernadette De La Cruz MD  General Surgery  [facility]

## 2020-01-09 NOTE — PROGRESS NOTES
Patient's chart was reviewed by a stroke team provider.  Patient s/p PEG and trach pending re-evaluation for placement. No acute needs from a stroke perspective.  There is no new imaging to review.  No pending diagnostics to follow up on.  For other recommendations please see our previous note completed on: 1/3/2020.      There are no new recommendations at this time. Will sign-off at this time. Discussed patient with staff. Please contact stroke team for any questions or concerns.       Dipti Worthy M.D. PGY-2  [facility]  5:05 PM 1/9/2020  Pager [phone]

## 2020-01-09 NOTE — SUBJECTIVE & OBJECTIVE
Interval History: Trached and pegged yesterday. Doing ok today    Medications:  Continuous Infusions:   dexmedetomidine (PRECEDEX) infusion Stopped (01/08/20 0100)    propofol Stopped (01/06/20 1227)     Scheduled Meds:   albuterol-ipratropium  3 mL Nebulization Q4H    amLODIPine  10 mg Per NG tube Daily    chlorhexidine  15 mL Mouth/Throat BID    famotidine  20 mg Per NG tube BID    heparin (porcine)  5,000 Units Subcutaneous Q8H    insulin aspart U-100  8 Units Subcutaneous Q4H    insulin detemir U-100  18 Units Subcutaneous BID    miconazole nitrate 2%   Topical (Top) BID    piperacillin-tazobactam (ZOSYN) IVPB  4.5 g Intravenous Q8H    polyethylene glycol  17 g Per NG tube Daily    senna-docusate 8.6-50 mg  1 tablet Per NG tube BID    vancomycin (VANCOCIN) IVPB  1,000 mg Intravenous Q12H     PRN Meds:acetaminophen, Dextrose 10% Bolus, Dextrose 10% Bolus, Dextrose 10% Bolus, Dextrose 10% Bolus, fentaNYL, glucagon (human recombinant), hydrALAZINE, insulin aspart U-100, labetalol, magnesium oxide, magnesium oxide, ondansetron, potassium chloride 10%, potassium chloride 10%, potassium chloride 10%, potassium, sodium phosphates, potassium, sodium phosphates, potassium, sodium phosphates, sodium chloride 0.9%     Review of patient's allergies indicates:  No Known Allergies  Objective:     Vital Signs (Most Recent):  Temp: 99 °F (37.2 °C) (01/09/20 0301)  Pulse: 84 (01/09/20 0701)  Resp: (!) 23 (01/09/20 0701)  BP: 134/62 (01/09/20 0701)  SpO2: 100 % (01/09/20 0701) Vital Signs (24h Range):  Temp:  [97.9 °F (36.6 °C)-99.4 °F (37.4 °C)] 99 °F (37.2 °C)  Pulse:  [] 84  Resp:  [19-26] 23  SpO2:  [96 %-100 %] 100 %  BP: (131-171)/(59-85) 134/62  Arterial Line BP: (127-174)/(44-68) 142/52     Weight: 130 kg (286 lb 9.6 oz)  Body mass index is 37.81 kg/m².    Intake/Output - Last 3 Shifts       01/07 0700 - 01/08 0659 01/08 0700 - 01/09 0659 01/09 0700 - 01/10 0659    I.V. (mL/kg) 218.9 (1.7) 610 (4.7) 5  (0)    NG/GT 2180 65     IV Piggyback 800 200     Total Intake(mL/kg) 3198.9 (24.6) 875 (6.7) 5 (0)    Urine (mL/kg/hr) 775 (0.2) 1950 (0.6)     Stool 500 600     Total Output 1275 2550     Net +1923.9 -1675 +5           Urine Occurrence  3 x     Stool Occurrence 1 x            Physical Exam   Constitutional: He appears well-developed and well-nourished. No distress.   Cardiovascular: Normal rate and regular rhythm.   Pulmonary/Chest:   Vent Mode: A/C  Oxygen Concentration (%):  (40) 40  Resp Rate Total:  (21 br/min-27 br/min) 22 br/min  Vt Set:  (420 mL) 420 mL  PEEP/CPAP:  (5 cmH20) 5 cmH20  Pressure Support:  (0 cmH20) 0 cmH20  Mean Airway Pressure:  (7.6 cmH20-9.4 cmH20) 7.6 cmH20     Abdominal:   PEG to gravity, no bleeding       Significant Labs:  CBC:   Recent Labs   Lab 01/09/20  0259   WBC 7.80   RBC 3.60*   HGB 11.3*   HCT 37.4*      *   MCH 31.4*   MCHC 30.2*     CMP:   Recent Labs   Lab 01/09/20  0259   *   CALCIUM 7.9*   ALBUMIN 2.1*   PROT 6.1   *   K 3.5   CO2 27   *   BUN 24*   CREATININE 0.7   ALKPHOS 166*   ALT 47*   AST 32   BILITOT 0.6       Significant Diagnostics:

## 2020-01-09 NOTE — PLAN OF CARE
TAVIA met with Lizbeth with [facility] at [facility] on her way to meet with the family to complete the assessment. She advised they submitted for auth yesterday and it is under review. Per CM, she has spoken with Wendy with [facility] who is reviewing the auth to address questions. Lizbeth will advised this SW when they have auth so that SW can obtain medical clearance.     Ivette Bailey, WENDY  Neurocritical Care   [facility]  [id number]

## 2020-01-09 NOTE — PLAN OF CARE
POC reviewed with pt and family at 1400.   Family verbalized understanding.   Questions and concerns addressed.   See flowsheets for full assessment and VS info.     TF resumed.

## 2020-01-09 NOTE — PROGRESS NOTES
[facility]  Neurocritical Care  Progress Note    Admit Date: 12/16/2019  Service Date: 01/09/2020  Length of Stay: 24    Subjective:     Chief Complaint: Embolic stroke involving right middle cerebral artery    History of Present Illness: The patient is a 79 y.o. male who  was admitted as a transfer from OSH for Right MCA stroke syndrome. Patient had an urgent thrombectomy s/p  right MCA stroke syndrome. PMH significant for HTN, T2DM and HLD. Patient stated that around 2:30 pm on 12/16/19 he was driving and felt weak. He drove home and was taken to the ER where he was evaluated for a stroke. He had outside CTA which was reported to have R ICA stenosis at bifurcation with 70% stenosis and right MCA occlusion. Patient was transferred to [facility] for possible intervention. Patient had a right femoral sheath placed and his Angio  revealed 90% R ICA stenosis and R M1 occlusion. The R ICA was treated by thrombectomy and carotid stenting with TICI 3 reperfusion.   Patient was admitted to the [facility] for higher level of care post thrombectomy.      Hospital Course: 12/17/19: Patient admitted to [facility] for higher level of care s/p thrombectomy and right carotid artery stenting with TICI 3 reperfusion  12/18/2019Recent MRI with heme transformation abg Q8, repeat scan stable, mannitol  12/19/2019 intubation repeat CTH  12/20: febrile-broaden abx coverage, SBT, add hydralazine 50 q8h, KUB and mag citrate  12/21: wean FiO2, d/c hydralazine, wean fentanyl  12/22/2019: Patient stable overnight. Lasix 20mg x 1 given today. Enteral water flushes started for hypernatremia.    12/23: SBT trial, started Plavix today, initiated Hydralazine 25 TID, lasix 40 mg X1, decrease statin to 40 mg daily (transaminitis -trending down), add psyllium, Na BID -goal 145-150, initiated sq heparin  12/24/2019: SBT failed, wean fio2 as tolerated, CXR responded to lasix, scheduled BID  12/25/2019 continue lasix. SBT today. Adjust insulin  regimen  12/26/2019: Continue diuresis, SBT again today, wean PEEP and FiO2, wean nicardipine, hold statin for transaminits   12/27/2019: No significant events   12/28 No significant events over night.. Tolerating spon. Breathing trial, increased peep. No cuff leak, started decadron 8mg q8x3 doses and reassess in am for possible extubation. Insulin gtt started while on steroids, as BG running high. CPT added to resp. Treatments. Start TF.  12/29: extubated to room air following SBT  12/30: IS q 6, d/c whaley, d/c lasix, titrate insulin gtt, d/c hydralazine, KUB  12/31: cxr, trial of modafinil,  q 6, transition insulin  1/1: increase FWF, modafinil, insulin  1/2: intubated, titrate insulin, family discussion  1/3: CT head in AM, cxr, kub, increase insulin, family updated  1/4: NAEO  1/5: NAEO  01/06/2020 NAEO. Transition from propofol to precedex  01/07/2020 NAEO  01/08/2020 plan for trach and PEG  01/09/2020 vent weaning attempt      Review of Symptoms:  Cannot participate. trach  Constitutional: Denies fevers, weight loss, chills, or weakness.   Eyes: Denies changes in vision.   ENT: Denies dysphagia, nasal discharge, ear pain or discharge.   Cardiovascular: Denies chest pain, palpitations, orthopnea, or claudication.   Respiratory: Denies shortness of breath, cough, hemoptysis, or wheezing.   GI: Denies nausea/vomitting, hematochezia, melena, abd pain, or changes in appetite.   : Denies dysuria, incontinence, or hematuria.   Musculoskeletal: Denies joint pain or myalgias.   Skin/breast: Denies rashes, lumps, lesions, or discharge.   Neurologic: Denies headache, dizziness, vertigo, or paresthesias.   Psychiatric: Denies changes in mood or hallucinations.   Endocrine: Denies polyuria, polydipsia, heat/cold intolerance.   Hematologic/Lymph: Denies lymphadenopathy, easy bruising or easy bleeding.   Allergic/Immunologic: Denies rash, rhinitis        Medications:  Continuous Infusions:   dexmedetomidine  (PRECEDEX) infusion Stopped (01/08/20 0100)    propofol Stopped (01/06/20 1227)     Scheduled Meds:   albuterol-ipratropium  3 mL Nebulization Q4H    amLODIPine  10 mg Per NG tube Daily    chlorhexidine  15 mL Mouth/Throat BID    famotidine  20 mg Per NG tube BID    heparin (porcine)  5,000 Units Subcutaneous Q8H    insulin aspart U-100  8 Units Subcutaneous Q4H    insulin detemir U-100  18 Units Subcutaneous BID    miconazole nitrate 2%   Topical (Top) BID    piperacillin-tazobactam (ZOSYN) IVPB  4.5 g Intravenous Q8H    polyethylene glycol  17 g Per NG tube Daily    senna-docusate 8.6-50 mg  1 tablet Per NG tube BID    vancomycin (VANCOCIN) IVPB  1,000 mg Intravenous Q12H     PRN Meds:.acetaminophen, Dextrose 10% Bolus, Dextrose 10% Bolus, Dextrose 10% Bolus, Dextrose 10% Bolus, fentaNYL, glucagon (human recombinant), hydrALAZINE, insulin aspart U-100, labetalol, magnesium oxide, magnesium oxide, ondansetron, potassium chloride 10%, potassium chloride 10%, potassium chloride 10%, potassium, sodium phosphates, potassium, sodium phosphates, potassium, sodium phosphates, sodium chloride 0.9%    OBJECTIVE:   Vital Signs (Most Recent):   Temp: 99.2 °F (37.3 °C) (01/09/20 0701)  Pulse: 83 (01/09/20 0912)  Resp: (!) 21 (01/09/20 0912)  BP: (!) 146/64 (01/09/20 0800)  SpO2: 100 % (01/09/20 0912)    Vital Signs (24h Range):   Temp:  [97.9 °F (36.6 °C)-99.4 °F (37.4 °C)] 99.2 °F (37.3 °C)  Pulse:  [] 83  Resp:  [19-26] 21  SpO2:  [96 %-100 %] 100 %  BP: (131-171)/(60-85) 146/64  Arterial Line BP: (127-174)/(44-68) 141/50    ICP/CPP (Last 24h):        I & O (Last 24h):     Intake/Output Summary (Last 24 hours) at 1/9/2020 1027  Last data filed at 1/9/2020 0818  Gross per 24 hour   Intake 725 ml   Output 2550 ml   Net -1825 ml     Physical Exam: unchanged from previous day  GA: awake, comfortable, no acute distress.   HEENT: No scleral icterus or JVD. Neck supple  Pulmonary: Air entry equal to auscultation  A/P/L. Wheezing no, crackles no  Cardiac: RRR, S1 & S2 w/o rubs/murmurs/gallops.   Abdominal: soft, non-tender, bowel sounds present x 4. No appreciable hepatosplenomegaly.  Skin: No jaundice, rashes, or visible lesions.  Neuro:  --Mental Status:  Awake, follows commands, spontaneous eye opening.    --Pupils 3.5mm, PERRL.   --Corneal reflex not done in this awake patient, gag, cough intact.  Left hemiparesis  MSK:  No edema in UE and LE    Vent Data:   Vent Mode: SIMV  Oxygen Concentration (%):  [40] 40  Resp Rate Total:  [21 br/min-27 br/min] 22 br/min  Vt Set:  [420 mL] 420 mL  PEEP/CPAP:  [5 cmH20] 5 cmH20  Pressure Support:  [0 cmH20-10 cmH20] 10 cmH20  Mean Airway Pressure:  [7.6 cmH20-9.4 cmH20] 8.1 cmH20    Lines/Drains/Airway:        Surgical Airway 01/08/20 1230 Cuffed (Active)   Cuff Pressure 30 cm H2O 1/9/2020  7:39 AM   Cuff Inflation? Inflated 1/9/2020  7:39 AM   Speaking Valve Usage Not wearing 1/9/2020  7:39 AM   Status Secured 1/9/2020  7:39 AM   Site Assessment Clean;Dry;No bleeding;No drainage 1/9/2020  7:39 AM   Site Care Open to air 1/9/2020  3:23 AM   Ties Assessment Clean;Dry;Intact 1/9/2020  7:39 AM             Arterial Line 01/02/20 1732 Right Radial (Active)   Site Assessment Clean;Dry;No redness;No swelling;Intact 1/9/2020  7:01 AM   Line Status Pulsatile blood flow 1/9/2020  7:01 AM   Art Line Waveform Square wave test performed;Appropriate 1/9/2020  7:01 AM   Arterial Line Interventions Zeroed and calibrated 1/9/2020  7:01 AM   Color/Movement/Sensation Capillary refill less than 3 sec 1/9/2020  7:01 AM   Dressing Type Transparent 1/9/2020  7:01 AM   Dressing Status Biopatch in place;Clean;Dry;Intact 1/9/2020  7:01 AM   Dressing Intervention Dressing reinforced 1/9/2020  7:01 AM   Dressing Change Due 01/09/20 1/9/2020  7:01 AM           Gastrostomy/Enterostomy 01/08/20 1217 Percutaneous endoscopic gastrostomy (PEG) LUQ feeding;decompression (Active)   Securement secured to abdomen w/  adhesive device 1/9/2020  7:01 AM   Interventions Prior to Feeding patency checked;residual checked 1/9/2020  7:01 AM   Suction Setting/Drainage Method dependent drainage 1/9/2020  3:01 AM   Clamp Status/Tolerance unclamped 1/9/2020  3:01 AM   Dressing moist 1/9/2020  3:01 AM   Insertion Site clear drainage 1/9/2020  3:01 AM   Site Care device rotatated 1/9/2020  7:01 AM   Flush/Irrigation flushed w/;water 1/9/2020  7:01 AM   Intake (mL) 20 mL 1/8/2020  8:00 PM            Rectal Tube 01/06/20 0613 rectal tube w/ balloon (indicate number of mLs) (Active)   Balloon Inflation Volume (mL) 45 1/9/2020  7:01 AM   Reposition drainage bags for BMS & Andrade on opposite sides of bed 1/9/2020  7:01 AM   Outcome gas expelled, stool evacuated 1/9/2020  7:01 AM   Stool Color Brown 1/9/2020  7:01 AM   Insertion Site Appearance no redness, warmth, tenderness, skin breakdown, drainage 1/9/2020  7:01 AM   Flush/Irrigation flushed w/;water;no resistance met 1/9/2020  7:01 AM   Intake (mL) 45 mL 1/8/2020  7:05 AM   Rectal Tube Output 200 mL 1/9/2020  6:00 AM       Male External Urinary Catheter 01/04/20 0700 Small (Active)   Collection Container Urimeter 1/9/2020  7:01 AM   Securement Method secured to top of thigh w/ adhesive device 1/9/2020  7:01 AM   Skin no redness;no breakdown 1/9/2020  7:01 AM   Tolerance no signs/symptoms of discomfort 1/9/2020  7:01 AM   Output (mL) 200 mL 1/8/2020  6:00 AM   Catheter Change Date 01/08/20 1/9/2020  3:01 AM   Catheter Change Time 1900 1/9/2020  3:01 AM         Nutrition/Tube Feeds (if NPO state why): tf     Labs:  ABG:   Recent Labs   Lab 01/09/20  0409   PH 7.432   PO2 94   PCO2 40.8   HCO3 27.2   POCSATURATED 97   BE 3     BMP:  Recent Labs   Lab 01/09/20  0259   *   K 3.5   *   CO2 27   BUN 24*   CREATININE 0.7   *   MG 2.4   PHOS 3.3     LFT:   Lab Results   Component Value Date    AST 32 01/09/2020    ALT 47 (H) 01/09/2020    ALKPHOS 166 (H) 01/09/2020    BILITOT 0.6  01/09/2020    ALBUMIN 2.1 (L) 01/09/2020    PROT 6.1 01/09/2020     CBC:   Lab Results   Component Value Date    WBC 7.80 01/09/2020    HGB 11.3 (L) 01/09/2020    HCT 37.4 (L) 01/09/2020     (H) 01/09/2020     01/09/2020     Microbiology x 7d:   Microbiology Results (last 7 days)     Procedure Component Value Units Date/Time    Blood culture [[id number]] Collected:  01/02/20 1547    Order Status:  Completed Specimen:  Blood from Peripheral, Antecubital, Right Updated:  01/07/20 1812     Blood Culture, Routine No growth after 5 days.    Blood culture [[id number]] Collected:  01/02/20 1554    Order Status:  Completed Specimen:  Blood from Peripheral, Hand, Left Updated:  01/07/20 1812     Blood Culture, Routine No growth after 5 days.    Culture, Respiratory with Gram Stain [[id number]] Collected:  01/02/20 1625    Order Status:  Completed Specimen:  Respiratory from Sputum Updated:  01/04/20 0747     Respiratory Culture Normal respiratory balaji      No S aureus or Pseudomonas isolated.     Gram Stain (Respiratory) <10 epithelial cells per low power field.     Gram Stain (Respiratory) Many WBC's     Gram Stain (Respiratory) Many Gram positive rods     Gram Stain (Respiratory) Rare Gram positive cocci     Gram Stain (Respiratory) Rare Gram negative rods        Imaging:  CXR 1/9 - tubes in place. Bibasilar atelectasis   I personally reviewed the above image.  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, right    Transaminitis    Constipation    Acute respiratory failure with hypoxia    Acute  venous embolism and thrombosis of basilic vein, left    Acute spont intraparenchymal hemorrhage assoc w/ coagulopathy    Vasogenic brain edema    Midline shift of brain    Cytotoxic brain edema    Fever due to infection    Acute ischemic right MCA stroke    Decreased strength, endurance, and mobility    Essential hypertension    Type 2 diabetes mellitus with neurologic complication, without long-term current use of insulin    Mixed hyperlipidemia    Cerebrovascular accident (CVA)      Neuro:   Right MCA stroke    Continue secondary stroke prevention per protocol  Mobilize to cardiac chair  Trial amantadine 100mg @ 5am and 2pm to encourage alertness and participation with therapy    Extensive discussion with family concerning goals of care had by me and my colleague Dr. Valencia. Family believes he will have an acceptable quality of life despite left hemiparesis and neglect.    Pulmonary:   Vent weaning  CXR/ABG daily    Cardiac:   SBP < 160  EF 65%    Renal:    Making urine  BUN/CRr > 20    ID:   Afebrile, normal wbc  Complete 7 day course of antibiotics - appears to have had a clinical response    Hem/Onc:   Stable hh and plt count    Endocrine:    BS stable    Fluids/Electrolytes/Nutrition/GI:   Tf  Replete lytes as needed  Lines:  Art +  CVC NA  ETT +  Andrade NA  NG +  PEG NA    Proph:  DVT:SCD/heparin   Constipation:  Last output:bowel regimen as needed  PUP: pepcid  VAP:peridex      Uninterrupted Critical Care/Counseling Time (not including procedures):: III    Deep Shirley MD  Neurocritical care attending    Full Code    Deep Shirley MD  Neurocritical Care  [facility]

## 2020-01-10 VITALS
HEIGHT: 73 IN | OXYGEN SATURATION: 94 % | HEART RATE: 88 BPM | TEMPERATURE: 99 F | BODY MASS INDEX: 37.99 KG/M2 | SYSTOLIC BLOOD PRESSURE: 147 MMHG | WEIGHT: 286.63 LBS | DIASTOLIC BLOOD PRESSURE: 67 MMHG | RESPIRATION RATE: 31 BRPM

## 2020-01-10 LAB
ALBUMIN SERPL BCP-MCNC: 2.2 G/DL (ref 3.5–5.2)
ALLENS TEST: ABNORMAL
ALP SERPL-CCNC: 175 U/L (ref 55–135)
ALT SERPL W/O P-5'-P-CCNC: 48 U/L (ref 10–44)
ANION GAP SERPL CALC-SCNC: 7 MMOL/L (ref 8–16)
AST SERPL-CCNC: 28 U/L (ref 10–40)
BASOPHILS # BLD AUTO: 0.03 K/UL (ref 0–0.2)
BASOPHILS NFR BLD: 0.4 % (ref 0–1.9)
BILIRUB SERPL-MCNC: 0.5 MG/DL (ref 0.1–1)
BUN SERPL-MCNC: 25 MG/DL (ref 8–23)
CALCIUM SERPL-MCNC: 8.3 MG/DL (ref 8.7–10.5)
CHLORIDE SERPL-SCNC: 119 MMOL/L (ref 95–110)
CO2 SERPL-SCNC: 25 MMOL/L (ref 23–29)
CREAT SERPL-MCNC: 0.7 MG/DL (ref 0.5–1.4)
DELSYS: ABNORMAL
DIFFERENTIAL METHOD: ABNORMAL
EOSINOPHIL # BLD AUTO: 0.2 K/UL (ref 0–0.5)
EOSINOPHIL NFR BLD: 2.4 % (ref 0–8)
ERYTHROCYTE [DISTWIDTH] IN BLOOD BY AUTOMATED COUNT: 13.2 % (ref 11.5–14.5)
EST. GFR  (AFRICAN AMERICAN): >60 ML/MIN/1.73 M^2
EST. GFR  (NON AFRICAN AMERICAN): >60 ML/MIN/1.73 M^2
FIO2: 40
GLUCOSE SERPL-MCNC: 155 MG/DL (ref 70–110)
HCO3 UR-SCNC: 26.7 MMOL/L (ref 24–28)
HCT VFR BLD AUTO: 38 % (ref 40–54)
HGB BLD-MCNC: 11.7 G/DL (ref 14–18)
IMM GRANULOCYTES # BLD AUTO: 0.08 K/UL (ref 0–0.04)
IMM GRANULOCYTES NFR BLD AUTO: 1 % (ref 0–0.5)
LYMPHOCYTES # BLD AUTO: 1.8 K/UL (ref 1–4.8)
LYMPHOCYTES NFR BLD: 22 % (ref 18–48)
MAGNESIUM SERPL-MCNC: 2.7 MG/DL (ref 1.6–2.6)
MCH RBC QN AUTO: 32.1 PG (ref 27–31)
MCHC RBC AUTO-ENTMCNC: 30.8 G/DL (ref 32–36)
MCV RBC AUTO: 104 FL (ref 82–98)
MODE: ABNORMAL
MONOCYTES # BLD AUTO: 0.8 K/UL (ref 0.3–1)
MONOCYTES NFR BLD: 9.7 % (ref 4–15)
NEUTROPHILS # BLD AUTO: 5.3 K/UL (ref 1.8–7.7)
NEUTROPHILS NFR BLD: 64.5 % (ref 38–73)
NRBC BLD-RTO: 0 /100 WBC
PCO2 BLDA: 40.5 MMHG (ref 35–45)
PEEP: 5
PH SMN: 7.43 [PH] (ref 7.35–7.45)
PHOSPHATE SERPL-MCNC: 2.5 MG/DL (ref 2.7–4.5)
PLATELET # BLD AUTO: 186 K/UL (ref 150–350)
PMV BLD AUTO: 12.1 FL (ref 9.2–12.9)
PO2 BLDA: 82 MMHG (ref 80–100)
POC BE: 2 MMOL/L
POC SATURATED O2: 96 % (ref 95–100)
POC TCO2: 28 MMOL/L (ref 23–27)
POCT GLUCOSE: 135 MG/DL (ref 70–110)
POCT GLUCOSE: 166 MG/DL (ref 70–110)
POCT GLUCOSE: 216 MG/DL (ref 70–110)
POTASSIUM SERPL-SCNC: 3.5 MMOL/L (ref 3.5–5.1)
PROT SERPL-MCNC: 6.4 G/DL (ref 6–8.4)
PS: 10
RBC # BLD AUTO: 3.65 M/UL (ref 4.6–6.2)
SAMPLE: ABNORMAL
SITE: ABNORMAL
SODIUM SERPL-SCNC: 151 MMOL/L (ref 136–145)
SP02: 94
WBC # BLD AUTO: 8.22 K/UL (ref 3.9–12.7)

## 2020-01-10 PROCEDURE — 94640 AIRWAY INHALATION TREATMENT: CPT

## 2020-01-10 PROCEDURE — 94761 N-INVAS EAR/PLS OXIMETRY MLT: CPT

## 2020-01-10 PROCEDURE — 82803 BLOOD GASES ANY COMBINATION: CPT

## 2020-01-10 PROCEDURE — 25000003 PHARM REV CODE 250: Performed by: PSYCHIATRY & NEUROLOGY

## 2020-01-10 PROCEDURE — 83735 ASSAY OF MAGNESIUM: CPT

## 2020-01-10 PROCEDURE — 99900026 HC AIRWAY MAINTENANCE (STAT)

## 2020-01-10 PROCEDURE — 63600175 PHARM REV CODE 636 W HCPCS: Performed by: NURSE PRACTITIONER

## 2020-01-10 PROCEDURE — 99239 HOSP IP/OBS DSCHRG MGMT >30: CPT | Mod: ,,, | Performed by: PHYSICIAN ASSISTANT

## 2020-01-10 PROCEDURE — 27000221 HC OXYGEN, UP TO 24 HOURS

## 2020-01-10 PROCEDURE — 84100 ASSAY OF PHOSPHORUS: CPT

## 2020-01-10 PROCEDURE — 85025 COMPLETE CBC W/AUTO DIFF WBC: CPT

## 2020-01-10 PROCEDURE — 99239 PR HOSPITAL DISCHARGE DAY,>30 MIN: ICD-10-PCS | Mod: ,,, | Performed by: PHYSICIAN ASSISTANT

## 2020-01-10 PROCEDURE — 37799 UNLISTED PX VASCULAR SURGERY: CPT

## 2020-01-10 PROCEDURE — 99900035 HC TECH TIME PER 15 MIN (STAT)

## 2020-01-10 PROCEDURE — 94003 VENT MGMT INPAT SUBQ DAY: CPT

## 2020-01-10 PROCEDURE — 25000242 PHARM REV CODE 250 ALT 637 W/ HCPCS: Performed by: INTERNAL MEDICINE

## 2020-01-10 PROCEDURE — 80053 COMPREHEN METABOLIC PANEL: CPT

## 2020-01-10 RX ORDER — FAMOTIDINE 20 MG/1
20 TABLET, FILM COATED ORAL 2 TIMES DAILY
Qty: 60 TABLET | Refills: 11 | Status: SHIPPED | OUTPATIENT
Start: 2020-01-10 | End: 2021-01-01

## 2020-01-10 RX ORDER — POLYETHYLENE GLYCOL 3350 17 G/17G
17 POWDER, FOR SOLUTION ORAL DAILY
Refills: 0
Start: 2020-01-11

## 2020-01-10 RX ORDER — IPRATROPIUM BROMIDE AND ALBUTEROL SULFATE 2.5; .5 MG/3ML; MG/3ML
3 SOLUTION RESPIRATORY (INHALATION) EVERY 6 HOURS PRN
Qty: 1 BOX | Refills: 0 | Status: SHIPPED | OUTPATIENT
Start: 2020-01-10 | End: 2021-06-10

## 2020-01-10 RX ORDER — ACETAMINOPHEN 325 MG/1
650 TABLET ORAL EVERY 6 HOURS PRN
Refills: 0
Start: 2020-01-10

## 2020-01-10 RX ORDER — HEPARIN SODIUM 5000 [USP'U]/ML
5000 INJECTION, SOLUTION INTRAVENOUS; SUBCUTANEOUS EVERY 8 HOURS
Start: 2020-01-10 | End: 2021-06-14 | Stop reason: HOSPADM

## 2020-01-10 RX ORDER — IPRATROPIUM BROMIDE AND ALBUTEROL SULFATE 2.5; .5 MG/3ML; MG/3ML
3 SOLUTION RESPIRATORY (INHALATION) EVERY 6 HOURS PRN
Status: DISCONTINUED | OUTPATIENT
Start: 2020-01-10 | End: 2020-01-10 | Stop reason: HOSPADM

## 2020-01-10 RX ORDER — AMLODIPINE BESYLATE 10 MG/1
10 TABLET ORAL DAILY
Qty: 30 TABLET | Refills: 11 | Status: SHIPPED | OUTPATIENT
Start: 2020-01-11 | End: 2021-08-03

## 2020-01-10 RX ORDER — ONDANSETRON 2 MG/ML
4 INJECTION INTRAMUSCULAR; INTRAVENOUS EVERY 6 HOURS PRN
Start: 2020-01-10

## 2020-01-10 RX ORDER — AMANTADINE HYDROCHLORIDE 50 MG/5ML
100 SOLUTION ORAL 2 TIMES DAILY
Qty: 600 ML | Refills: 11 | Status: SHIPPED | OUTPATIENT
Start: 2020-01-10 | End: 2021-01-01

## 2020-01-10 RX ORDER — INSULIN ASPART 100 [IU]/ML
8 INJECTION, SOLUTION INTRAVENOUS; SUBCUTANEOUS EVERY 4 HOURS
Refills: 0
Start: 2020-01-10 | End: 2021-06-14 | Stop reason: HOSPADM

## 2020-01-10 RX ORDER — AMOXICILLIN 250 MG
1 CAPSULE ORAL 2 TIMES DAILY
Start: 2020-01-10

## 2020-01-10 RX ORDER — CHLORHEXIDINE GLUCONATE ORAL RINSE 1.2 MG/ML
15 SOLUTION DENTAL 2 TIMES DAILY
Refills: 0
Start: 2020-01-10 | End: 2020-01-24

## 2020-01-10 RX ADMIN — AMANTADINE HYDROCHLORIDE 100 MG: 50 SOLUTION ORAL at 06:01

## 2020-01-10 RX ADMIN — AMLODIPINE BESYLATE 10 MG: 10 TABLET ORAL at 09:01

## 2020-01-10 RX ADMIN — INSULIN ASPART 4 UNITS: 100 INJECTION, SOLUTION INTRAVENOUS; SUBCUTANEOUS at 09:01

## 2020-01-10 RX ADMIN — INSULIN DETEMIR 18 UNITS: 100 INJECTION, SOLUTION SUBCUTANEOUS at 09:01

## 2020-01-10 RX ADMIN — INSULIN ASPART 8 UNITS: 100 INJECTION, SOLUTION INTRAVENOUS; SUBCUTANEOUS at 09:01

## 2020-01-10 RX ADMIN — POTASSIUM CHLORIDE 40 MEQ: 20 SOLUTION ORAL at 06:01

## 2020-01-10 RX ADMIN — CHLORHEXIDINE GLUCONATE 0.12% ORAL RINSE 15 ML: 1.2 LIQUID ORAL at 09:01

## 2020-01-10 RX ADMIN — INSULIN ASPART 8 UNITS: 100 INJECTION, SOLUTION INTRAVENOUS; SUBCUTANEOUS at 03:01

## 2020-01-10 RX ADMIN — FENTANYL CITRATE 50 MCG: 50 INJECTION INTRAMUSCULAR; INTRAVENOUS at 03:01

## 2020-01-10 RX ADMIN — IPRATROPIUM BROMIDE AND ALBUTEROL SULFATE 3 ML: .5; 3 SOLUTION RESPIRATORY (INHALATION) at 07:01

## 2020-01-10 RX ADMIN — MICONAZOLE NITRATE: 20 OINTMENT TOPICAL at 09:01

## 2020-01-10 RX ADMIN — IPRATROPIUM BROMIDE AND ALBUTEROL SULFATE 3 ML: .5; 3 SOLUTION RESPIRATORY (INHALATION) at 04:01

## 2020-01-10 RX ADMIN — FAMOTIDINE 20 MG: 20 TABLET ORAL at 09:01

## 2020-01-10 RX ADMIN — HEPARIN SODIUM 5000 UNITS: 5000 INJECTION, SOLUTION INTRAVENOUS; SUBCUTANEOUS at 06:01

## 2020-01-10 RX ADMIN — INSULIN ASPART 8 UNITS: 100 INJECTION, SOLUTION INTRAVENOUS; SUBCUTANEOUS at 06:01

## 2020-01-10 NOTE — PLAN OF CARE
[facility]    FACILITY TRANSFER ORDERS      Patient Name: Jerry Linder  YOB: 1940    PCP: Jonnie Summers MD   PCP Address: [address] [place] [address] [address] / [place] [id number]  PCP Phone Number: [phone]  PCP Fax: [phone]    Encounter Date: 01/10/2020    Admit to: [facility] in [place], [place]     Vital Signs:  Routine    Diagnoses:   Active Hospital Problems    Diagnosis  POA    *Embolic stroke involving right middle cerebral artery [I63.411]  Yes    Oropharyngeal dysphagia [R13.12]  Yes    Palliative care encounter [Z51.5]  Not Applicable    Goals of care, counseling/discussion [Z71.89]  Not Applicable    Advanced care planning/counseling discussion [Z71.89]  Not Applicable    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, right [I63.231]  Yes    Transaminitis [R74.0]  Yes    Constipation [K59.00]  Yes    Acute respiratory failure with hypoxia [J96.01]  Yes    Acute venous embolism and thrombosis of basilic vein, left [I82.612]  No    Acute spont intraparenchymal hemorrhage assoc w/ coagulopathy [I61.9, D68.9]  No    Vasogenic brain edema [G93.6]  No    Midline shift of brain [G93.9]  No    Cytotoxic brain edema [G93.6]  Yes    Fever due to infection [R50.81, B99.9]  No    Acute ischemic right MCA stroke [I63.511]  Yes    Decreased strength, endurance, and mobility [R53.1, Z74.09]  Yes    Essential hypertension [I10]  Yes    Type 2 diabetes mellitus with neurologic complication, without long-term current use of insulin [E11.49]  Yes    Mixed hyperlipidemia [E78.2]  Yes    Cerebrovascular accident (CVA) [I63.9]  Yes      Resolved Hospital Problems   No resolved problems to display.       Allergies:Review of patient's allergies indicates:  No Known Allergies    Diet: tube feedings: Continuous Glucerna 1.5 at 55 mL per hour for 24 hours per day    Activities: Activity as tolerated    Nursing: Continuous pulse ox   Intake/Output q4 hours    Aspiration precautions   Assess 1) Evaluate bowel status every day. 2) Report any abdominal discomfort, pain, or distention. 3) If no bowel movement in 3 days, notify physician   Flush feeding tube with 30-50 mL water: 1) Before and after feedings. 2) After residual check. 3) After bag change. 4) After medication administration. 5) If tube becomes clogged, check for impaction in stub nose adapter and clean or replace. (use 30 mL syringe to irrigate Gastric or Jejunal tube with water).       Labs: CBC, CMP and INR Daily for 3 days then per LTAC physician discretion     CONSULTS:    Physical Therapy to evaluate and treat. , Occupational Therapy to evaluate and treat. and  to evaluate for community resources/long-range planning.    MISCELLANEOUS CARE:  PEG Care: Clean site every 24 hours. , Routine Skin for Bedridden Patients: Apply moisture barrier cream to all skin folds and wet areas in perineal area daily and after baths and all bowel movements. and Diabetes Care:   SN to perform and educate Diabetic management with blood glucose monitoring:, Fingerstick blood sugar every 6 hours if patient is unable to eat and Report CBG < 60 or > 350 to physician.    WOUND CARE ORDERS  Yes: incontinece associated dermatitis to groin apply barrier cream and miconazole BID and PRN to area. Use moisture wicking pads .     Medications: Review discharge medications with patient and family and provide education.    Mechanical Ventilation: For transport: Assist Control, Rate 20, Vt 420 cc, PEEP 5, FiO2 40%    In facility once transport complete: Spontaneous pressure support 8 PEEP 5    Current Discharge Medication List      START taking these medications    Details   acetaminophen (TYLENOL) 325 MG tablet 2 tablets (650 mg total) by Per G Tube route every 6 (six) hours as needed.  Refills: 0      albuterol-ipratropium (DUO-NEB) 2.5 mg-0.5 mg/3 mL nebulizer solution Take 3 mLs by nebulization every 6 (six) hours as needed  for Wheezing. Rescue  Qty: 1 Box, Refills: 0      amantadine HCl (SYMMETREL) 50 mg/5 mL Soln 10 mLs (100 mg total) by Per G Tube route 2 (two) times daily.  Qty: 600 mL, Refills: 11      amLODIPine (NORVASC) 10 MG tablet 1 tablet (10 mg total) by Per G Tube route once daily.  Qty: 30 tablet, Refills: 11      chlorhexidine (PERIDEX) 0.12 % solution Use as directed 15 mLs in the mouth or throat 2 (two) times daily. for 14 days  Refills: 0      famotidine (PEPCID) 20 MG tablet 1 tablet (20 mg total) by Per G Tube route 2 (two) times daily.  Qty: 60 tablet, Refills: 11      heparin sodium,porcine (HEPARIN, PORCINE,) 5,000 unit/mL injection Inject 1 mL (5,000 Units total) into the skin every 8 (eight) hours.      insulin aspart U-100 (NOVOLOG) 100 unit/mL (3 mL) InPn pen Inject 8 Units into the skin every 4 (four) hours.  Refills: 0      insulin detemir U-100 (LEVEMIR FLEXTOUCH) 100 unit/mL (3 mL) SubQ InPn pen Inject 18 Units into the skin 2 (two) times daily.  Refills: 0      miconazole nitrate 2% (MICOTIN) 2 % Oint Apply topically 2 (two) times daily.  Refills: 0      ondansetron 4 mg/2 mL Soln Inject 4 mg into the vein every 6 (six) hours as needed.      polyethylene glycol (GLYCOLAX) 17 gram PwPk 17 g by Per G Tube route once daily.  Refills: 0      senna-docusate 8.6-50 mg (PERICOLACE) 8.6-50 mg per tablet 1 tablet by Per G Tube route 2 (two) times daily.      Clopidogrel 75 mg tablet                           1 TABLET per G tube route once daily             _________________________________  Trena Reyes PA-C  01/10/2020

## 2020-01-10 NOTE — PLAN OF CARE
CM phoned NCC team to check the status of Medical Stability for LTAC placement for this patient.  Per BALWINDER Albrecht, the team has not rounded on patient yet.  Trena stated that as soon as team rounds on patient, she will let CM know if patient is medically stable for LTAC placement today.    [facility] has accepted and is following.     Leigha Walker RN, CCRN-K, [facility]  Neuro-Critical Care   [phone]

## 2020-01-10 NOTE — PLAN OF CARE
Patient to discharge to [facility] via [facility].        01/10/20 1255   Final Note   Assessment Type Final Discharge Note   Anticipated Discharge Disposition LTAC   Hospital Follow Up  Appt(s) scheduled? No   Discharge plans and expectations educations in teach back method with documentation complete? Yes   Right Care Referral Info   Post Acute Recommendation Other   Referral Type LTAC   Facility Name [facility] [place] [place]       Follow-up Information     [facility]    Contact information:  [facility] [address] [address] [phone]           Jonnie Summesr MD.    Specialty:  Family Medicine  Why:  Hospital follow up, when discharged from LTAC  Contact information:  [address]  [address]  [place] [address]  [phone]                   Leigha Walker RN, CCRN-K, [facility]  Neuro-Critical Care   [phone]

## 2020-01-10 NOTE — DISCHARGE SUMMARY
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, right [I63.231] 12/26/2019 Yes    Transaminitis [R74.0] 12/26/2019 Yes    Constipation [K59.00] 12/20/2019 Yes    Acute respiratory failure with hypoxia [J96.01] 12/19/2019 Yes    Acute venous embolism and thrombosis of basilic vein, left [I82.612] 12/19/2019 No    Acute spont intraparenchymal hemorrhage assoc w/ coagulopathy [I61.9, D68.9] 12/18/2019 No    Vasogenic brain edema [G93.6] 12/18/2019 No    Midline shift of brain [G93.9] 12/18/2019 No    Cytotoxic brain edema [G93.6] 12/18/2019 Yes    Fever due to infection [R50.81, B99.9] 12/18/2019 No    Acute ischemic right MCA stroke [I63.511]  Yes    Decreased strength, endurance, and mobility [R53.1, Z74.09] 12/17/2019 Yes    Essential hypertension [I10] 12/16/2019 Yes    Type 2 diabetes mellitus with neurologic complication, without long-term current use of insulin [E11.49] 12/16/2019 Yes    Mixed hyperlipidemia [E78.2] 12/16/2019 Yes    Cerebrovascular accident (CVA) [I63.9] 12/16/2019 Yes      Problems Resolved During this Admission:      History of Present Illness: The patient is a 79 y.o. male who  was admitted as a transfer from [facility] for Right MCA stroke syndrome. Patient had an urgent thrombectomy s/p  right MCA stroke syndrome. PMH significant for HTN, T2DM and HLD. Patient stated that around 2:30 pm on  12/16/19 he was driving and felt weak. He drove home and was taken to the ER where he was evaluated for a stroke. He had outside CTA which was reported to have R ICA stenosis at bifurcation with 70% stenosis and right MCA occlusion. Patient was transferred to [facility] for possible intervention. Patient had a right femoral sheath placed and his Angio  revealed 90% R ICA stenosis and R M1 occlusion. The R ICA was treated by thrombectomy and carotid stenting with TICI 3 reperfusion.   Patient was admitted to the [facility] for higher level of care post thrombectomy.      Hospital Course by Event: 12/17/19: Patient admitted to [facility] for higher level of care s/p thrombectomy and right carotid artery stenting with TICI 3 reperfusion  12/18/2019Recent MRI with heme transformation abg Q8, repeat scan stable, mannitol  12/19/2019 intubation repeat CTH  12/20: febrile-broaden abx coverage, SBT, add hydralazine 50 q8h, KUB and mag citrate  12/21: wean FiO2, d/c hydralazine, wean fentanyl  12/22/2019: Patient stable overnight. Lasix 20mg x 1 given today. Enteral water flushes started for hypernatremia.    12/23: SBT trial, started Plavix today, initiated Hydralazine 25 TID, lasix 40 mg X1, decrease statin to 40 mg daily (transaminitis -trending down), add psyllium, Na BID -goal 145-150, initiated sq heparin  12/24/2019: SBT failed, wean fio2 as tolerated, CXR responded to lasix, scheduled BID  12/25/2019 continue lasix. SBT today. Adjust insulin regimen  12/26/2019: Continue diuresis, SBT again today, wean PEEP and FiO2, wean nicardipine, hold statin for transaminits   12/27/2019: No significant events   12/28 No significant events over night.. Tolerating spon. Breathing trial, increased peep. No cuff leak, started decadron 8mg q8x3 doses and reassess in am for possible extubation. Insulin gtt started while on steroids, as BG running high. CPT added to resp. Treatments. Start TF.  12/29: extubated to room air following SBT  12/30: IS q 6,  d/c whaley, d/c lasix, titrate insulin gtt, d/c hydralazine, KUB  12/31: cxr, trial of modafinil,  q 6, transition insulin  1/1: increase FWF, modafinil, insulin  1/2: intubated, titrate insulin, family discussion  1/3: CT head in AM, cxr, kub, increase insulin, family updated  1/4: NAEO  1/5: NAEO  01/06/2020 NAEO. Transition from propofol to precedex  01/07/2020 NAEO  01/08/2020 plan for trach and PEG  01/09/2020 vent weaning attempt  1/10/2020 transfer to LTAC     Hospital Course by Problem:   * Embolic stroke involving right middle cerebral artery  78 y/o male admitted for R MCA stroke syndrome s/p thrombectomy TICI 3 reperfusion and ABBY stenting, now s/p hemorrhagic transformation   - Was on DAPT following stenting, discontinued for hemorrhagic transformation  - currently on clopidogrel 75 mg daily       - Neurological exam is limited by sedation but unchanged       - Most recent CTH with no interval changes, has stabilized         - On Plavix for ICA stent, held temporarily for trach and PEG but OK to restart now in LTAC     Transaminitis  Daily CMP  - Hold statin  - Increase threshold for acetaminophen administration to fever of 101      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, right  Severe R ICA stenosis noted on IR angiogram   - Stent placed   - Clopidogrel daily    Constipation  - Psyllium for 3 days completed  - Continue BR    Acute venous embolism and thrombosis of basilic vein, left  - Noted on LE US    Acute respiratory failure with hypoxia  Con current vent settings  Daily abg and cxr  S/p trach and PEG       Fever due to infection  - con abx and f/u cx    Cytotoxic brain edema  - 2/2 R MCA  stroke      Midline shift of brain  - 2/2 IPH as noted on MRI imaging: mass effect on the right lateral ventricle and approximately 5 mm right-to-left midline shift  - Continue Neuro checks Q1 hr    Goal euthermia  Goal eunatremia  Goal euvolemia  Goal euglycemia      Vasogenic brain  edema  - 2/2 IPH as noted on MRI: intraparenchymal hemorrhage within the right frontal and temporal lobes with associated vasogenic edema resulting in mass effect on the right lateral ventricle  Goal euthermia  Goal eunatremia  Goal euvolemia  Goal euglycemia      Acute spont intraparenchymal hemorrhage assoc w/ coagulopathy  See stroke       Decreased strength, endurance, and mobility  - 2/2 LS Weakness R/T R MCA   - PT/OT     Cerebrovascular accident (CVA)  - See embolic stroke above     Mixed hyperlipidemia  - Hold statin 2/2 transaminitis     Type 2 diabetes mellitus with neurologic complication, without long-term current use of insulin  - On scheduled insulin regimen upon discharge + SSI   - Sugars well controlled, glucerna tube feedings     Essential hypertension  - MAP>65 and SBP<160  - Amlodipine scheduled       Significant Results:  Imaging:  CTH 12/17: Findings consistent with acute ischemia/infarct along the right MCA distribution with hemorrhagic transformation, mass effect and midline shift again noted, note is made of mild subarachnoid hemorrhagic component otherwise when accounting for difference in patient and slice position the overall appearance is similar to the recent MRI examination without a large degree of interval change.  Continued follow-up is recommended    MRI brain 12/17: 1. Large right MCA territory acute infarction with hemorrhagic transformation.  Given history of recent thrombectomy, this may represent sequela of reperfusion injury.  2. Associated vasogenic edema resulting in mass effect on the right lateral ventricle and mild (5 mm) right to left midline shift.  No evidence of uncal herniation.  3. Multiple additional punctate foci of acute infarctions throughout the right posterior frontal lobe.    Cardiology:  Echo 12/17:   · Normal left ventricular systolic function. The estimated ejection fraction is 65%  · Indeterminate left ventricular diastolic function.  · Mild left  ventricular enlargement.  · Normal right ventricular systolic function.  · Technically difficult study, poor endocardial visualization, contrast used.  · No wall motion abnormalities.  · Indeterminate central venous pressure. Estimated PA pressure is at least 9 mmHg.         Microbiology:  Culture, Respiratory with Gram Stain [[id number]] (Abnormal) Collected: 12/18/19 1400   Order Status: Completed Specimen: Respiratory from Sputum, Induced Updated: 12/21/19 1043    Respiratory Culture No S aureus or Pseudomonas isolated.     HAEMOPHILUS INFLUENZAE   Many   Beta Lactamase negative   Normal respiratory balaji also present   Abnormal     Gram Stain (Respiratory) <10 epithelial cells per low power field.    Gram Stain (Respiratory) Few WBC's    Gram Stain (Respiratory) Moderate Gram negative rods    Gram Stain (Respiratory) Few Gram positive rods    Gram Stain (Respiratory) Few Gram positive cocci     Ttreated with augmentin     Laboratory:  Lab Results   Component Value Date    HGBA1C 7.7 (H) 12/17/2019    CHOL 136 12/17/2019    HDL 33 (L) 12/17/2019    LDLCALC 67.6 12/17/2019    TRIG 177 (H) 12/17/2019       Pending Results: None    Consultations:  IP CONSULT TO PICC TEAM (NIAS)  PHARMACY TO DOSE VANCOMYCIN CONSULT  PHARMACY TO DOSE VANCOMYCIN CONSULT  WOUND CARE CONSULT  PHARMACY TO DOSE VANCOMYCIN CONSULT  IP CONSULT TO MIDLINE TEAM  IP CONSULT TO GENERAL SURGERY  IP CONSULT TO PALLIATIVE CARE      Procedures:   Procedure(s) (LRB):  CREATION, TRACHEOSTOMY (N/A)  INSERTION, PEG TUBE (N/A) by Thom Conde MD.      Medications:   START taking these medications     Details   acetaminophen (TYLENOL) 325 MG tablet 2 tablets (650 mg total) by Per G Tube route every 6 (six) hours as needed.  Refills: 0       albuterol-ipratropium (DUO-NEB) 2.5 mg-0.5 mg/3 mL nebulizer solution Take 3 mLs by nebulization every 6 (six) hours as needed for Wheezing. Rescue  Qty: 1 Box, Refills: 0       amantadine HCl (SYMMETREL) 50 mg/5  mL Soln 10 mLs (100 mg total) by Per G Tube route 2 (two) times daily.  Qty: 600 mL, Refills: 11       amLODIPine (NORVASC) 10 MG tablet 1 tablet (10 mg total) by Per G Tube route once daily.  Qty: 30 tablet, Refills: 11       chlorhexidine (PERIDEX) 0.12 % solution Use as directed 15 mLs in the mouth or throat 2 (two) times daily. for 14 days  Refills: 0       famotidine (PEPCID) 20 MG tablet 1 tablet (20 mg total) by Per G Tube route 2 (two) times daily.  Qty: 60 tablet, Refills: 11       heparin sodium,porcine (HEPARIN, PORCINE,) 5,000 unit/mL injection Inject 1 mL (5,000 Units total) into the skin every 8 (eight) hours.       insulin aspart U-100 (NOVOLOG) 100 unit/mL (3 mL) InPn pen Inject 8 Units into the skin every 4 (four) hours.  Refills: 0       insulin detemir U-100 (LEVEMIR FLEXTOUCH) 100 unit/mL (3 mL) SubQ InPn pen Inject 18 Units into the skin 2 (two) times daily.  Refills: 0       miconazole nitrate 2% (MICOTIN) 2 % Oint Apply topically 2 (two) times daily.  Refills: 0       ondansetron 4 mg/2 mL Soln Inject 4 mg into the vein every 6 (six) hours as needed.       polyethylene glycol (GLYCOLAX) 17 gram PwPk 17 g by Per G Tube route once daily.  Refills: 0       senna-docusate 8.6-50 mg (PERICOLACE) 8.6-50 mg per tablet 1 tablet by Per G Tube route 2 (two) times daily.       Clopidogrel 75 mg tablet                           1 TABLET per G tube route once daily         Diet: TF tube feedings: Continuous Glucerna 1.5 at 55 mL per hour for 24 hours per day    Activity: As tolerated.     Disposition: Discharged to LTAC in stable condition.    Follow Up Plan:  Per LTAC     This discharge took more than 30 minutes to complete.    Trena Reyes PA-C  Neurocritical Care  [facility]

## 2020-01-10 NOTE — ASSESSMENT & PLAN NOTE
- On scheduled insulin regimen upon discharge + SSI   - Sugars well controlled, glucerna tube feedings

## 2020-01-10 NOTE — ASSESSMENT & PLAN NOTE
- 2/2 IPH as noted on MRI: intraparenchymal hemorrhage within the right frontal and temporal lobes with associated vasogenic edema resulting in mass effect on the right lateral ventricle  Goal euthermia  Goal eunatremia  Goal euvolemia  Goal euglycemia

## 2020-01-10 NOTE — ASSESSMENT & PLAN NOTE
80 y/o male admitted for R MCA stroke syndrome s/p thrombectomy TICI 3 reperfusion and ABBY stenting, now s/p hemorrhagic transformation   - Was on DAPT following stenting, discontinued for hemorrhagic transformation  - currently on clopidogrel 75 mg daily       - Neurological exam is limited by sedation but unchanged       - Most recent CTH with no interval changes, has stabilized         - On Plavix for ICA stent, held temporarily for trach and PEG but OK to restart now in LTAC

## 2020-01-10 NOTE — PLAN OF CARE
01/10/20 1235   Post-Acute Status   Post-Acute Authorization Placement   Post-Acute Placement Status Set-up Complete  (to [facility])     TAVIA advised by MAJO that per MD team, Pt is good to go today. Advised [facility] with [facility].     TAVIA advised by PA that the orders are placed. Sent via  and advised Lizbeth. She advised they are good for transport to be set up and that RN can report to: [phone]. TAVIA set up [facility] orders for transport and provided [facility] envelope to RN outside Pt room. Advised Pt family at bedside of possible transport time/ time of 1:45pm.     Ivette Bailey LCSW  Neurocritical Care   [facility]  [id number]

## 2021-01-01 ENCOUNTER — HOSPITAL ENCOUNTER (OUTPATIENT)
Dept: RADIOLOGY | Facility: HOSPITAL | Age: 81
Discharge: HOME | End: 2021-10-27
Attending: RADIOLOGY
Payer: MEDICARE

## 2021-01-01 ENCOUNTER — HOSPITAL ENCOUNTER (OUTPATIENT)
Dept: RADIOLOGY | Facility: HOSPITAL | Age: 81
Discharge: HOME | End: 2021-09-27
Attending: RADIOLOGY
Payer: MEDICARE

## 2021-01-01 ENCOUNTER — TELEPHONE (OUTPATIENT)
Dept: INTERVENTIONAL RADIOLOGY/VASCULAR | Facility: HOSPITAL | Age: 81
End: 2021-01-01
Payer: MEDICARE

## 2021-01-01 ENCOUNTER — HOSPITAL ENCOUNTER (OUTPATIENT)
Dept: RADIOLOGY | Facility: HOSPITAL | Age: 81
Discharge: HOME | End: 2021-12-23
Attending: INTERNAL MEDICINE
Payer: MEDICARE

## 2021-01-01 ENCOUNTER — HOSPITAL ENCOUNTER (OUTPATIENT)
Dept: INTERVENTIONAL RADIOLOGY/VASCULAR | Facility: HOSPITAL | Age: 81
Discharge: HOME | End: 2021-10-27
Attending: RADIOLOGY
Payer: MEDICARE

## 2021-01-01 ENCOUNTER — HOSPITAL ENCOUNTER (OUTPATIENT)
Dept: INTERVENTIONAL RADIOLOGY/VASCULAR | Facility: HOSPITAL | Age: 81
Discharge: HOME | End: 2021-09-27
Attending: RADIOLOGY
Payer: MEDICARE

## 2021-01-01 ENCOUNTER — TELEPHONE (OUTPATIENT)
Dept: HEMATOLOGY/ONCOLOGY | Facility: CLINIC | Age: 81
End: 2021-01-01
Payer: MEDICARE

## 2021-01-01 ENCOUNTER — OFFICE VISIT (OUTPATIENT)
Dept: HEMATOLOGY/ONCOLOGY | Facility: CLINIC | Age: 81
End: 2021-01-01
Payer: MEDICARE

## 2021-01-01 VITALS
BODY MASS INDEX: 31.81 KG/M2 | SYSTOLIC BLOOD PRESSURE: 177 MMHG | OXYGEN SATURATION: 95 % | HEART RATE: 104 BPM | TEMPERATURE: 98 F | RESPIRATION RATE: 24 BRPM | WEIGHT: 240 LBS | DIASTOLIC BLOOD PRESSURE: 87 MMHG | HEIGHT: 73 IN

## 2021-01-01 VITALS
OXYGEN SATURATION: 94 % | SYSTOLIC BLOOD PRESSURE: 146 MMHG | RESPIRATION RATE: 20 BRPM | DIASTOLIC BLOOD PRESSURE: 70 MMHG | HEART RATE: 93 BPM | TEMPERATURE: 97 F

## 2021-01-01 VITALS — SYSTOLIC BLOOD PRESSURE: 123 MMHG | DIASTOLIC BLOOD PRESSURE: 77 MMHG | RESPIRATION RATE: 18 BRPM | HEART RATE: 98 BPM

## 2021-01-01 DIAGNOSIS — C22.0 HCC (HEPATOCELLULAR CARCINOMA): Primary | ICD-10-CM

## 2021-01-01 DIAGNOSIS — C22.0 HCC (HEPATOCELLULAR CARCINOMA): ICD-10-CM

## 2021-01-01 DIAGNOSIS — K76.9 LIVER DISEASE, UNSPECIFIED: ICD-10-CM

## 2021-01-01 LAB
AFP SERPL-MCNC: 97 NG/ML (ref 0–8.4)
ALBUMIN SERPL BCP-MCNC: 3.2 G/DL (ref 3.5–5.2)
ALBUMIN SERPL BCP-MCNC: 3.3 G/DL (ref 3.5–5.2)
ALP SERPL-CCNC: 112 U/L (ref 55–135)
ALP SERPL-CCNC: 116 U/L (ref 55–135)
ALT SERPL W/O P-5'-P-CCNC: 27 U/L (ref 10–44)
ALT SERPL W/O P-5'-P-CCNC: 27 U/L (ref 10–44)
ANION GAP SERPL CALC-SCNC: 11 MMOL/L (ref 8–16)
ANION GAP SERPL CALC-SCNC: 12 MMOL/L (ref 8–16)
AST SERPL-CCNC: 23 U/L (ref 10–40)
AST SERPL-CCNC: 32 U/L (ref 10–40)
BASOPHILS # BLD AUTO: 0.04 K/UL (ref 0–0.2)
BASOPHILS NFR BLD: 0.6 % (ref 0–1.9)
BILIRUB SERPL-MCNC: 0.6 MG/DL (ref 0.1–1)
BILIRUB SERPL-MCNC: 0.8 MG/DL (ref 0.1–1)
BUN SERPL-MCNC: 13 MG/DL (ref 8–23)
BUN SERPL-MCNC: 16 MG/DL (ref 8–23)
CALCIUM SERPL-MCNC: 9.1 MG/DL (ref 8.7–10.5)
CALCIUM SERPL-MCNC: 9.5 MG/DL (ref 8.7–10.5)
CHLORIDE SERPL-SCNC: 103 MMOL/L (ref 95–110)
CHLORIDE SERPL-SCNC: 104 MMOL/L (ref 95–110)
CO2 SERPL-SCNC: 25 MMOL/L (ref 23–29)
CO2 SERPL-SCNC: 28 MMOL/L (ref 23–29)
CREAT SERPL-MCNC: 0.4 MG/DL (ref 0.5–1.4)
CREAT SERPL-MCNC: 0.6 MG/DL (ref 0.5–1.4)
CREAT SERPL-MCNC: 0.7 MG/DL (ref 0.5–1.4)
DIFFERENTIAL METHOD: ABNORMAL
EOSINOPHIL # BLD AUTO: 0.2 K/UL (ref 0–0.5)
EOSINOPHIL NFR BLD: 3.4 % (ref 0–8)
ERYTHROCYTE [DISTWIDTH] IN BLOOD BY AUTOMATED COUNT: 12.8 % (ref 11.5–14.5)
ERYTHROCYTE [DISTWIDTH] IN BLOOD BY AUTOMATED COUNT: 13.6 % (ref 11.5–14.5)
EST. GFR  (AFRICAN AMERICAN): >60 ML/MIN/1.73 M^2
EST. GFR  (AFRICAN AMERICAN): >60 ML/MIN/1.73 M^2
EST. GFR  (NON AFRICAN AMERICAN): >60 ML/MIN/1.73 M^2
EST. GFR  (NON AFRICAN AMERICAN): >60 ML/MIN/1.73 M^2
GLUCOSE SERPL-MCNC: 128 MG/DL (ref 70–110)
GLUCOSE SERPL-MCNC: 135 MG/DL (ref 70–110)
HCT VFR BLD AUTO: 45.1 % (ref 40–54)
HCT VFR BLD AUTO: 45.2 % (ref 40–54)
HGB BLD-MCNC: 14.8 G/DL (ref 14–18)
HGB BLD-MCNC: 15.2 G/DL (ref 14–18)
IMM GRANULOCYTES # BLD AUTO: 0.03 K/UL (ref 0–0.04)
IMM GRANULOCYTES NFR BLD AUTO: 0.4 % (ref 0–0.5)
INR PPP: 1 (ref 0.8–1.2)
INR PPP: 1 (ref 0.8–1.2)
LYMPHOCYTES # BLD AUTO: 2.7 K/UL (ref 1–4.8)
LYMPHOCYTES NFR BLD: 38.5 % (ref 18–48)
MCH RBC QN AUTO: 31.3 PG (ref 27–31)
MCH RBC QN AUTO: 32 PG (ref 27–31)
MCHC RBC AUTO-ENTMCNC: 32.7 G/DL (ref 32–36)
MCHC RBC AUTO-ENTMCNC: 33.7 G/DL (ref 32–36)
MCV RBC AUTO: 95 FL (ref 82–98)
MCV RBC AUTO: 96 FL (ref 82–98)
MONOCYTES # BLD AUTO: 0.8 K/UL (ref 0.3–1)
MONOCYTES NFR BLD: 11.3 % (ref 4–15)
NEUTROPHILS # BLD AUTO: 3.2 K/UL (ref 1.8–7.7)
NEUTROPHILS NFR BLD: 45.8 % (ref 38–73)
NRBC BLD-RTO: 0 /100 WBC
PLATELET # BLD AUTO: 202 K/UL (ref 150–450)
PLATELET # BLD AUTO: 246 K/UL (ref 150–450)
PMV BLD AUTO: 10.1 FL (ref 9.2–12.9)
PMV BLD AUTO: 9.7 FL (ref 9.2–12.9)
POCT GLUCOSE: 132 MG/DL (ref 70–110)
POTASSIUM SERPL-SCNC: 4.3 MMOL/L (ref 3.5–5.1)
POTASSIUM SERPL-SCNC: 4.7 MMOL/L (ref 3.5–5.1)
PROT SERPL-MCNC: 7.4 G/DL (ref 6–8.4)
PROT SERPL-MCNC: 7.8 G/DL (ref 6–8.4)
PROTHROMBIN TIME: 10.2 SEC (ref 9–12.5)
PROTHROMBIN TIME: 10.4 SEC (ref 9–12.5)
RBC # BLD AUTO: 4.73 M/UL (ref 4.6–6.2)
RBC # BLD AUTO: 4.75 M/UL (ref 4.6–6.2)
SAMPLE: ABNORMAL
SODIUM SERPL-SCNC: 141 MMOL/L (ref 136–145)
SODIUM SERPL-SCNC: 142 MMOL/L (ref 136–145)
WBC # BLD AUTO: 5.88 K/UL (ref 3.9–12.7)
WBC # BLD AUTO: 7.06 K/UL (ref 3.9–12.7)

## 2021-01-01 PROCEDURE — 75887 PR  PERCUT XHEPATIC PORTOGRAM: ICD-10-PCS | Mod: 26,,, | Performed by: RADIOLOGY

## 2021-01-01 PROCEDURE — 99152 MOD SED SAME PHYS/QHP 5/>YRS: CPT

## 2021-01-01 PROCEDURE — 75774 ARTERY X-RAY EACH VESSEL: CPT | Mod: TC | Performed by: RADIOLOGY

## 2021-01-01 PROCEDURE — 76937 US GUIDE VASCULAR ACCESS: CPT | Mod: TC | Performed by: RADIOLOGY

## 2021-01-01 PROCEDURE — 85025 COMPLETE CBC W/AUTO DIFF WBC: CPT | Performed by: RADIOLOGY

## 2021-01-01 PROCEDURE — G0269 OCCLUSIVE DEVICE IN VEIN ART: HCPCS | Performed by: RADIOLOGY

## 2021-01-01 PROCEDURE — 36247 INS CATH ABD/L-EXT ART 3RD: CPT | Mod: 51,LT,, | Performed by: RADIOLOGY

## 2021-01-01 PROCEDURE — 99153 MOD SED SAME PHYS/QHP EA: CPT

## 2021-01-01 PROCEDURE — 82565 ASSAY OF CREATININE: CPT | Mod: PO,59

## 2021-01-01 PROCEDURE — 75774 ARTERY X-RAY EACH VESSEL: CPT | Mod: TC,59 | Performed by: RADIOLOGY

## 2021-01-01 PROCEDURE — 79445 PR  NUCLEAR THERAPY, INTRA-ARTERIAL: ICD-10-PCS | Mod: 26,,, | Performed by: RADIOLOGY

## 2021-01-01 PROCEDURE — 80053 COMPREHEN METABOLIC PANEL: CPT | Performed by: RADIOLOGY

## 2021-01-01 PROCEDURE — 75774 PR  ANGIO EA ADDNL SELECTV VESSEL: ICD-10-PCS | Mod: 26,,, | Performed by: RADIOLOGY

## 2021-01-01 PROCEDURE — 74183 MRI ABD W/O CNTR FLWD CNTR: CPT | Mod: TC,PO

## 2021-01-01 PROCEDURE — 36248 INS CATH ABD/L-EXT ART ADDL: CPT | Mod: 59 | Performed by: RADIOLOGY

## 2021-01-01 PROCEDURE — 99152 MOD SED SAME PHYS/QHP 5/>YRS: CPT | Mod: 59 | Performed by: RADIOLOGY

## 2021-01-01 PROCEDURE — 76380 CAT SCAN FOLLOW-UP STUDY: CPT | Mod: TC,59 | Performed by: RADIOLOGY

## 2021-01-01 PROCEDURE — 78201 NM LIVER IMAGING STATIC PRE Y-90 EMBOLIZATION: ICD-10-PCS | Mod: 26,,, | Performed by: RADIOLOGY

## 2021-01-01 PROCEDURE — 78803 RP LOCLZJ TUM SPECT 1 AREA: CPT | Mod: TC

## 2021-01-01 PROCEDURE — 77290 THER RAD SIMULAJ FIELD CPLX: CPT | Mod: TC | Performed by: RADIOLOGY

## 2021-01-01 PROCEDURE — C1769 GUIDE WIRE: HCPCS

## 2021-01-01 PROCEDURE — 36247 INS CATH ABD/L-EXT ART 3RD: CPT | Mod: LT | Performed by: RADIOLOGY

## 2021-01-01 PROCEDURE — 76937 PR  US GUIDE, VASCULAR ACCESS: ICD-10-PCS | Mod: 26,,, | Performed by: RADIOLOGY

## 2021-01-01 PROCEDURE — 85610 PROTHROMBIN TIME: CPT | Performed by: RADIOLOGY

## 2021-01-01 PROCEDURE — 36248 PR PR INS CATH ABD/L-EXT ART ADDL 2ND ORD/3RD ORD/BYD: ICD-10-PCS | Mod: ,,, | Performed by: RADIOLOGY

## 2021-01-01 PROCEDURE — 76377 PR  3D RENDERING W/ IMAGE POSTPROCESS: ICD-10-PCS | Mod: 26,,, | Performed by: RADIOLOGY

## 2021-01-01 PROCEDURE — 36245 INS CATH ABD/L-EXT ART 1ST: CPT | Mod: 59 | Performed by: RADIOLOGY

## 2021-01-01 PROCEDURE — 76380 PR  CT SCAN,LIMITED/LOCALIZED F/U STUDY: ICD-10-PCS | Mod: 26,,, | Performed by: RADIOLOGY

## 2021-01-01 PROCEDURE — 79445 NUCLEAR RX INTRA-ARTERIAL: CPT | Mod: TC | Performed by: RADIOLOGY

## 2021-01-01 PROCEDURE — 25000003 PHARM REV CODE 250: Performed by: RADIOLOGY

## 2021-01-01 PROCEDURE — 36247 INS CATH ABD/L-EXT ART 3RD: CPT | Mod: RT

## 2021-01-01 PROCEDURE — 76937 US GUIDE VASCULAR ACCESS: CPT | Mod: 26,,, | Performed by: RADIOLOGY

## 2021-01-01 PROCEDURE — 77290 PR  SET RADN THERAPY FIELD COMPLEX: ICD-10-PCS | Mod: 26,,, | Performed by: RADIOLOGY

## 2021-01-01 PROCEDURE — 75774 ARTERY X-RAY EACH VESSEL: CPT | Mod: 26,,, | Performed by: RADIOLOGY

## 2021-01-01 PROCEDURE — 63600175 PHARM REV CODE 636 W HCPCS: Performed by: RADIOLOGY

## 2021-01-01 PROCEDURE — 75726 CHG ANGIO VISCERAL SELECTV/SUBSELEC: ICD-10-PCS | Mod: 26,59,, | Performed by: RADIOLOGY

## 2021-01-01 PROCEDURE — 77290 THER RAD SIMULAJ FIELD CPLX: CPT | Mod: 26,,, | Performed by: RADIOLOGY

## 2021-01-01 PROCEDURE — 78201 LIVER IMAGING STATIC ONLY: CPT | Mod: TC

## 2021-01-01 PROCEDURE — 25500020 PHARM REV CODE 255: Performed by: RADIOLOGY

## 2021-01-01 PROCEDURE — 77300 RADIATION THERAPY DOSE PLAN: CPT | Mod: 26,,, | Performed by: RADIOLOGY

## 2021-01-01 PROCEDURE — 78803 NM LIVER IMAGING SPECT: ICD-10-PCS | Mod: 26,,, | Performed by: RADIOLOGY

## 2021-01-01 PROCEDURE — 99152 MOD SED SAME PHYS/QHP 5/>YRS: CPT | Performed by: RADIOLOGY

## 2021-01-01 PROCEDURE — 76377 3D RENDER W/INTRP POSTPROCES: CPT | Mod: 26,,, | Performed by: RADIOLOGY

## 2021-01-01 PROCEDURE — 78201 NM LIVER IMAGING STATIC POST Y-90 EMBOLIZATION: ICD-10-PCS | Mod: 26,,, | Performed by: RADIOLOGY

## 2021-01-01 PROCEDURE — 76380 CAT SCAN FOLLOW-UP STUDY: CPT | Mod: 26,,, | Performed by: RADIOLOGY

## 2021-01-01 PROCEDURE — 75887 VEIN X-RAY LIVER W/O HEMODYN: CPT | Mod: TC | Performed by: RADIOLOGY

## 2021-01-01 PROCEDURE — 75726 ARTERY X-RAYS ABDOMEN: CPT | Mod: TC | Performed by: RADIOLOGY

## 2021-01-01 PROCEDURE — C1894 INTRO/SHEATH, NON-LASER: HCPCS

## 2021-01-01 PROCEDURE — 75726 CHG ANGIO VISCERAL SELECTV/SUBSELEC: ICD-10-PCS | Mod: 26,,, | Performed by: RADIOLOGY

## 2021-01-01 PROCEDURE — 77263 PR  RADIATION THERAPY PLAN COMPLEX: ICD-10-PCS | Mod: ,,, | Performed by: RADIOLOGY

## 2021-01-01 PROCEDURE — 99214 PR OFFICE/OUTPT VISIT, EST, LEVL IV, 30-39 MIN: ICD-10-PCS | Mod: S$GLB,,, | Performed by: INTERNAL MEDICINE

## 2021-01-01 PROCEDURE — 99214 OFFICE O/P EST MOD 30 MIN: CPT | Mod: S$GLB,,, | Performed by: INTERNAL MEDICINE

## 2021-01-01 PROCEDURE — 79445 NUCLEAR RX INTRA-ARTERIAL: CPT | Mod: 26,,, | Performed by: RADIOLOGY

## 2021-01-01 PROCEDURE — 36247 PR PLACE CATH SUBSUBSELECT ART,ABD/PEL: ICD-10-PCS | Mod: 51,LT,, | Performed by: RADIOLOGY

## 2021-01-01 PROCEDURE — 36248 INS CATH ABD/L-EXT ART ADDL: CPT | Mod: ,,, | Performed by: RADIOLOGY

## 2021-01-01 PROCEDURE — A9585 GADOBUTROL INJECTION: HCPCS | Mod: PO | Performed by: INTERNAL MEDICINE

## 2021-01-01 PROCEDURE — 36248 INS CATH ABD/L-EXT ART ADDL: CPT | Mod: 59,,, | Performed by: RADIOLOGY

## 2021-01-01 PROCEDURE — 77300 PR RADIATION THERAPY,DOSIMETRY PLAN: ICD-10-PCS | Mod: 26,,, | Performed by: RADIOLOGY

## 2021-01-01 PROCEDURE — 75774 ARTERY X-RAY EACH VESSEL: CPT | Mod: 26,59,, | Performed by: RADIOLOGY

## 2021-01-01 PROCEDURE — 78201 LIVER IMAGING STATIC ONLY: CPT | Mod: 59

## 2021-01-01 PROCEDURE — 78201 LIVER IMAGING STATIC ONLY: CPT | Mod: 26,,, | Performed by: RADIOLOGY

## 2021-01-01 PROCEDURE — 75726 ARTERY X-RAYS ABDOMEN: CPT | Mod: 26,59,, | Performed by: RADIOLOGY

## 2021-01-01 PROCEDURE — 76380 PR  CT SCAN,LIMITED/LOCALIZED F/U STUDY: ICD-10-PCS | Mod: 26,59,, | Performed by: RADIOLOGY

## 2021-01-01 PROCEDURE — 36247 IR EMBOLIZATION ARTERIAL COMP OTHER THAN HEMORRHAGE: ICD-10-PCS | Mod: ,,, | Performed by: RADIOLOGY

## 2021-01-01 PROCEDURE — 85027 COMPLETE CBC AUTOMATED: CPT | Performed by: RADIOLOGY

## 2021-01-01 PROCEDURE — 25500020 PHARM REV CODE 255: Mod: PO | Performed by: INTERNAL MEDICINE

## 2021-01-01 PROCEDURE — 75726 ARTERY X-RAYS ABDOMEN: CPT | Mod: 26,,, | Performed by: RADIOLOGY

## 2021-01-01 PROCEDURE — 77300 RADIATION THERAPY DOSE PLAN: CPT | Mod: TC | Performed by: RADIOLOGY

## 2021-01-01 PROCEDURE — 75887 VEIN X-RAY LIVER W/O HEMODYN: CPT | Mod: 26,,, | Performed by: RADIOLOGY

## 2021-01-01 PROCEDURE — 99153 MOD SED SAME PHYS/QHP EA: CPT | Performed by: RADIOLOGY

## 2021-01-01 PROCEDURE — 99153 MOD SED SAME PHYS/QHP EA: CPT | Mod: 59 | Performed by: RADIOLOGY

## 2021-01-01 PROCEDURE — 82105 ALPHA-FETOPROTEIN SERUM: CPT | Performed by: RADIOLOGY

## 2021-01-01 PROCEDURE — 75726 ARTERY X-RAYS ABDOMEN: CPT | Mod: TC,59 | Performed by: RADIOLOGY

## 2021-01-01 PROCEDURE — 37243 IR EMBOLIZATION COMP FOR TUMOR_ORGAN ISCHEMIA_INFARC: ICD-10-PCS | Mod: ,,, | Performed by: RADIOLOGY

## 2021-01-01 PROCEDURE — 78803 RP LOCLZJ TUM SPECT 1 AREA: CPT | Mod: 26,,, | Performed by: RADIOLOGY

## 2021-01-01 PROCEDURE — 78803 RP LOCLZJ TUM SPECT 1 AREA: CPT | Mod: TC,59

## 2021-01-01 PROCEDURE — 36248 INS CATH ABD/L-EXT ART ADDL: CPT | Mod: RT | Performed by: RADIOLOGY

## 2021-01-01 PROCEDURE — 36248 PR PR INS CATH ABD/L-EXT ART ADDL 2ND ORD/3RD ORD/BYD: ICD-10-PCS | Mod: 59,,, | Performed by: RADIOLOGY

## 2021-01-01 PROCEDURE — 76380 CAT SCAN FOLLOW-UP STUDY: CPT | Mod: 26,59,, | Performed by: RADIOLOGY

## 2021-01-01 PROCEDURE — 36415 COLL VENOUS BLD VENIPUNCTURE: CPT | Performed by: RADIOLOGY

## 2021-01-01 PROCEDURE — 37243 VASC EMBOLIZE/OCCLUDE ORGAN: CPT | Performed by: RADIOLOGY

## 2021-01-01 PROCEDURE — 77263 THER RADIOLOGY TX PLNG CPLX: CPT | Mod: ,,, | Performed by: RADIOLOGY

## 2021-01-01 PROCEDURE — 75774 PR  ANGIO EA ADDNL SELECTV VESSEL: ICD-10-PCS | Mod: 26,59,, | Performed by: RADIOLOGY

## 2021-01-01 PROCEDURE — 36245 PR INS CATH ABD/L-EXT ART 1ST ORDER: ICD-10-PCS | Mod: 59,,, | Performed by: RADIOLOGY

## 2021-01-01 PROCEDURE — 36245 INS CATH ABD/L-EXT ART 1ST: CPT | Mod: 59,,, | Performed by: RADIOLOGY

## 2021-01-01 PROCEDURE — 76377 3D RENDER W/INTRP POSTPROCES: CPT | Mod: TC,59 | Performed by: RADIOLOGY

## 2021-01-01 RX ORDER — LIDOCAINE HYDROCHLORIDE 10 MG/ML
INJECTION INFILTRATION; PERINEURAL CODE/TRAUMA/SEDATION MEDICATION
Status: COMPLETED | OUTPATIENT
Start: 2021-01-01 | End: 2021-01-01

## 2021-01-01 RX ORDER — METHYLPREDNISOLONE 4 MG/1
TABLET ORAL
Qty: 21 EACH | Refills: 0 | Status: SHIPPED | OUTPATIENT
Start: 2021-01-01 | End: 2021-01-01

## 2021-01-01 RX ORDER — MIDAZOLAM HYDROCHLORIDE 5 MG/ML
INJECTION INTRAMUSCULAR; INTRAVENOUS CODE/TRAUMA/SEDATION MEDICATION
Status: COMPLETED | OUTPATIENT
Start: 2021-01-01 | End: 2021-01-01

## 2021-01-01 RX ORDER — SODIUM CHLORIDE 0.9 % (FLUSH) 0.9 %
10 SYRINGE (ML) INJECTION
Status: DISCONTINUED | OUTPATIENT
Start: 2021-01-01 | End: 2021-01-01 | Stop reason: HOSPADM

## 2021-01-01 RX ORDER — SODIUM CHLORIDE 9 MG/ML
INJECTION, SOLUTION INTRAVENOUS CONTINUOUS
Status: CANCELLED | OUTPATIENT
Start: 2021-01-01

## 2021-01-01 RX ORDER — HEPARIN SODIUM 200 [USP'U]/100ML
INJECTION, SOLUTION INTRAVENOUS
Status: COMPLETED | OUTPATIENT
Start: 2021-01-01 | End: 2021-01-01

## 2021-01-01 RX ORDER — MIDAZOLAM HYDROCHLORIDE 1 MG/ML
INJECTION INTRAMUSCULAR; INTRAVENOUS CODE/TRAUMA/SEDATION MEDICATION
Status: COMPLETED | OUTPATIENT
Start: 2021-01-01 | End: 2021-01-01

## 2021-01-01 RX ORDER — GADOBUTROL 604.72 MG/ML
10 INJECTION INTRAVENOUS
Status: COMPLETED | OUTPATIENT
Start: 2021-01-01 | End: 2021-01-01

## 2021-01-01 RX ORDER — FENTANYL CITRATE 50 UG/ML
INJECTION, SOLUTION INTRAMUSCULAR; INTRAVENOUS CODE/TRAUMA/SEDATION MEDICATION
Status: COMPLETED | OUTPATIENT
Start: 2021-01-01 | End: 2021-01-01

## 2021-01-01 RX ADMIN — HEPARIN SODIUM 1000 UNITS/HR: 200 INJECTION, SOLUTION INTRAVENOUS at 12:10

## 2021-01-01 RX ADMIN — NITROGLYCERIN 100 MCG: 200 INJECTION, SOLUTION INTRAVENOUS at 01:10

## 2021-01-01 RX ADMIN — FENTANYL CITRATE 50 MCG: 50 INJECTION, SOLUTION INTRAMUSCULAR; INTRAVENOUS at 12:10

## 2021-01-01 RX ADMIN — IOHEXOL 125 ML: 350 INJECTION, SOLUTION INTRAVENOUS at 10:09

## 2021-01-01 RX ADMIN — FENTANYL CITRATE 25 MCG: 50 INJECTION, SOLUTION INTRAMUSCULAR; INTRAVENOUS at 09:09

## 2021-01-01 RX ADMIN — MIDAZOLAM HYDROCHLORIDE 0.5 MG: 5 INJECTION, SOLUTION INTRAMUSCULAR; INTRAVENOUS at 09:09

## 2021-01-01 RX ADMIN — MIDAZOLAM HYDROCHLORIDE 1 MG: 1 INJECTION, SOLUTION INTRAMUSCULAR; INTRAVENOUS at 12:10

## 2021-01-01 RX ADMIN — LIDOCAINE HYDROCHLORIDE 4 ML: 10 INJECTION, SOLUTION INFILTRATION; PERINEURAL at 12:10

## 2021-01-01 RX ADMIN — LIDOCAINE HYDROCHLORIDE 10 ML: 10 INJECTION, SOLUTION INFILTRATION; PERINEURAL at 09:09

## 2021-01-01 RX ADMIN — GADOBUTROL 10 ML: 604.72 INJECTION INTRAVENOUS at 11:12

## 2021-02-19 NOTE — ASSESSMENT & PLAN NOTE
Con current vent settings  Daily abg and cxr  Plans for trach/peg      Patient aware of results.  No further questions.

## 2021-05-10 ENCOUNTER — TELEPHONE (OUTPATIENT)
Dept: HEMATOLOGY/ONCOLOGY | Facility: CLINIC | Age: 81
End: 2021-05-10

## 2021-06-04 ENCOUNTER — OFFICE VISIT (OUTPATIENT)
Dept: HEMATOLOGY/ONCOLOGY | Facility: CLINIC | Age: 81
End: 2021-06-04
Payer: MEDICARE

## 2021-06-04 ENCOUNTER — LAB VISIT (OUTPATIENT)
Dept: LAB | Facility: HOSPITAL | Age: 81
End: 2021-06-04
Attending: INTERNAL MEDICINE
Payer: MEDICARE

## 2021-06-04 VITALS — SYSTOLIC BLOOD PRESSURE: 125 MMHG | DIASTOLIC BLOOD PRESSURE: 69 MMHG | HEART RATE: 96 BPM | RESPIRATION RATE: 16 BRPM

## 2021-06-04 DIAGNOSIS — C78.02 MALIGNANT NEOPLASM METASTATIC TO LEFT LUNG: ICD-10-CM

## 2021-06-04 DIAGNOSIS — C77.8 SECONDARY AND UNSPECIFIED MALIGNANT NEOPLASM OF LYMPH NODES OF MULTIPLE REGIONS: ICD-10-CM

## 2021-06-04 DIAGNOSIS — C24.9 MALIGNANT NEOPLASM OF BILIARY TRACT, UNSPECIFIED: ICD-10-CM

## 2021-06-04 DIAGNOSIS — Z51.81 ALTERATION IN ANTICOAGULATION: ICD-10-CM

## 2021-06-04 DIAGNOSIS — C80.1: ICD-10-CM

## 2021-06-04 DIAGNOSIS — C80.1 CARCINOMA OF UNKNOWN PRIMARY: ICD-10-CM

## 2021-06-04 DIAGNOSIS — D68.9 COAGULOPATHY: ICD-10-CM

## 2021-06-04 DIAGNOSIS — C80.1 CARCINOMA OF UNKNOWN PRIMARY: Primary | ICD-10-CM

## 2021-06-04 DIAGNOSIS — R97.8 OTHER ABNORMAL TUMOR MARKERS: ICD-10-CM

## 2021-06-04 DIAGNOSIS — Z79.01 ALTERATION IN ANTICOAGULATION: ICD-10-CM

## 2021-06-04 DIAGNOSIS — Z12.89 ENCOUNTER FOR SCREENING FOR MALIGNANT NEOPLASM OF OTHER SITES: ICD-10-CM

## 2021-06-04 LAB
ALBUMIN SERPL BCP-MCNC: 3.5 G/DL (ref 3.5–5.2)
ALP SERPL-CCNC: 112 U/L (ref 55–135)
ALT SERPL W/O P-5'-P-CCNC: 26 U/L (ref 10–44)
ANION GAP SERPL CALC-SCNC: 10 MMOL/L (ref 8–16)
APTT PPP: 35.6 SEC (ref 25.6–35.8)
AST SERPL-CCNC: 26 U/L (ref 10–40)
BASOPHILS # BLD AUTO: 0.03 K/UL (ref 0–0.2)
BASOPHILS NFR BLD: 0.4 % (ref 0–1.9)
BILIRUB SERPL-MCNC: 1 MG/DL (ref 0.1–1)
BUN SERPL-MCNC: 15 MG/DL (ref 8–23)
CALCIUM SERPL-MCNC: 8.4 MG/DL (ref 8.7–10.5)
CEA SERPL-MCNC: 2.3 NG/ML (ref 0–5)
CHLORIDE SERPL-SCNC: 102 MMOL/L (ref 95–110)
CO2 SERPL-SCNC: 27 MMOL/L (ref 23–29)
COMPLEXED PSA SERPL-MCNC: 1.4 NG/ML (ref 0–4)
CREAT SERPL-MCNC: 0.4 MG/DL (ref 0.5–1.4)
DIFFERENTIAL METHOD: ABNORMAL
EOSINOPHIL # BLD AUTO: 0.1 K/UL (ref 0–0.5)
EOSINOPHIL NFR BLD: 1.6 % (ref 0–8)
ERYTHROCYTE [DISTWIDTH] IN BLOOD BY AUTOMATED COUNT: 14.2 % (ref 11.5–14.5)
EST. GFR  (AFRICAN AMERICAN): >60 ML/MIN/1.73 M^2
EST. GFR  (NON AFRICAN AMERICAN): >60 ML/MIN/1.73 M^2
GLUCOSE SERPL-MCNC: 85 MG/DL (ref 70–110)
HCG INTACT+B SERPL-ACNC: <0.6 MIU/ML
HCT VFR BLD AUTO: 43.8 % (ref 40–54)
HGB BLD-MCNC: 14.3 G/DL (ref 14–18)
IMM GRANULOCYTES # BLD AUTO: 0.05 K/UL (ref 0–0.04)
IMM GRANULOCYTES NFR BLD AUTO: 0.7 % (ref 0–0.5)
INR PPP: 1
LYMPHOCYTES # BLD AUTO: 2.2 K/UL (ref 1–4.8)
LYMPHOCYTES NFR BLD: 29.5 % (ref 18–48)
MCH RBC QN AUTO: 32.5 PG (ref 27–31)
MCHC RBC AUTO-ENTMCNC: 32.6 G/DL (ref 32–36)
MCV RBC AUTO: 100 FL (ref 82–98)
MONOCYTES # BLD AUTO: 0.7 K/UL (ref 0.3–1)
MONOCYTES NFR BLD: 9.3 % (ref 4–15)
NEUTROPHILS # BLD AUTO: 4.4 K/UL (ref 1.8–7.7)
NEUTROPHILS NFR BLD: 58.5 % (ref 38–73)
NRBC BLD-RTO: 0 /100 WBC
PLATELET # BLD AUTO: 201 K/UL (ref 150–450)
PMV BLD AUTO: 9.3 FL (ref 9.2–12.9)
POTASSIUM SERPL-SCNC: 3.8 MMOL/L (ref 3.5–5.1)
PROT SERPL-MCNC: 7.4 G/DL (ref 6–8.4)
PROTHROMBIN TIME: 12.3 SEC (ref 11.8–14.3)
RBC # BLD AUTO: 4.4 M/UL (ref 4.6–6.2)
SODIUM SERPL-SCNC: 139 MMOL/L (ref 136–145)
WBC # BLD AUTO: 7.43 K/UL (ref 3.9–12.7)

## 2021-06-04 PROCEDURE — 80053 COMPREHEN METABOLIC PANEL: CPT | Performed by: INTERNAL MEDICINE

## 2021-06-04 PROCEDURE — 1126F AMNT PAIN NOTED NONE PRSNT: CPT | Mod: S$GLB,,, | Performed by: INTERNAL MEDICINE

## 2021-06-04 PROCEDURE — 84702 CHORIONIC GONADOTROPIN TEST: CPT | Performed by: INTERNAL MEDICINE

## 2021-06-04 PROCEDURE — 82378 CARCINOEMBRYONIC ANTIGEN: CPT | Performed by: INTERNAL MEDICINE

## 2021-06-04 PROCEDURE — 1159F PR MEDICATION LIST DOCUMENTED IN MEDICAL RECORD: ICD-10-PCS | Mod: S$GLB,,, | Performed by: INTERNAL MEDICINE

## 2021-06-04 PROCEDURE — 99204 PR OFFICE/OUTPT VISIT, NEW, LEVL IV, 45-59 MIN: ICD-10-PCS | Mod: S$GLB,,, | Performed by: INTERNAL MEDICINE

## 2021-06-04 PROCEDURE — 1101F PR PT FALLS ASSESS DOC 0-1 FALLS W/OUT INJ PAST YR: ICD-10-PCS | Mod: S$GLB,,, | Performed by: INTERNAL MEDICINE

## 2021-06-04 PROCEDURE — 3288F PR FALLS RISK ASSESSMENT DOCUMENTED: ICD-10-PCS | Mod: S$GLB,,, | Performed by: INTERNAL MEDICINE

## 2021-06-04 PROCEDURE — 3288F FALL RISK ASSESSMENT DOCD: CPT | Mod: S$GLB,,, | Performed by: INTERNAL MEDICINE

## 2021-06-04 PROCEDURE — 86301 IMMUNOASSAY TUMOR CA 19-9: CPT | Performed by: INTERNAL MEDICINE

## 2021-06-04 PROCEDURE — 1126F PR PAIN SEVERITY QUANTIFIED, NO PAIN PRESENT: ICD-10-PCS | Mod: S$GLB,,, | Performed by: INTERNAL MEDICINE

## 2021-06-04 PROCEDURE — 36415 COLL VENOUS BLD VENIPUNCTURE: CPT | Performed by: INTERNAL MEDICINE

## 2021-06-04 PROCEDURE — 85025 COMPLETE CBC W/AUTO DIFF WBC: CPT | Performed by: INTERNAL MEDICINE

## 2021-06-04 PROCEDURE — 85610 PROTHROMBIN TIME: CPT | Performed by: INTERNAL MEDICINE

## 2021-06-04 PROCEDURE — 99204 OFFICE O/P NEW MOD 45 MIN: CPT | Mod: S$GLB,,, | Performed by: INTERNAL MEDICINE

## 2021-06-04 PROCEDURE — 1101F PT FALLS ASSESS-DOCD LE1/YR: CPT | Mod: S$GLB,,, | Performed by: INTERNAL MEDICINE

## 2021-06-04 PROCEDURE — 85730 THROMBOPLASTIN TIME PARTIAL: CPT | Performed by: INTERNAL MEDICINE

## 2021-06-04 PROCEDURE — 1159F MED LIST DOCD IN RCRD: CPT | Mod: S$GLB,,, | Performed by: INTERNAL MEDICINE

## 2021-06-04 PROCEDURE — 84153 ASSAY OF PSA TOTAL: CPT | Performed by: INTERNAL MEDICINE

## 2021-06-04 RX ORDER — ENOXAPARIN SODIUM 100 MG/ML
40 INJECTION SUBCUTANEOUS DAILY
Qty: 10 SYRINGE | Refills: 1 | Status: SHIPPED | OUTPATIENT
Start: 2021-06-04 | End: 2021-06-14

## 2021-06-06 LAB — CANCER AG19-9 SERPL-ACNC: 14 U/ML (ref 0–35)

## 2021-06-08 ENCOUNTER — TELEPHONE (OUTPATIENT)
Dept: HEMATOLOGY/ONCOLOGY | Facility: CLINIC | Age: 81
End: 2021-06-08

## 2021-06-10 ENCOUNTER — HOSPITAL ENCOUNTER (OUTPATIENT)
Dept: RADIOLOGY | Facility: HOSPITAL | Age: 81
Discharge: HOME | End: 2021-06-10
Attending: INTERNAL MEDICINE
Payer: MEDICARE

## 2021-06-10 ENCOUNTER — HOSPITAL ENCOUNTER (EMERGENCY)
Facility: HOSPITAL | Age: 81
Discharge: HOME | End: 2021-06-10
Attending: EMERGENCY MEDICINE
Payer: MEDICARE

## 2021-06-10 VITALS
DIASTOLIC BLOOD PRESSURE: 57 MMHG | SYSTOLIC BLOOD PRESSURE: 124 MMHG | HEIGHT: 73 IN | OXYGEN SATURATION: 96 % | HEART RATE: 81 BPM | RESPIRATION RATE: 14 BRPM | BODY MASS INDEX: 30.48 KG/M2 | WEIGHT: 230 LBS | TEMPERATURE: 98 F

## 2021-06-10 DIAGNOSIS — J40 BRONCHITIS: Primary | ICD-10-CM

## 2021-06-10 DIAGNOSIS — R05.9 COUGH: ICD-10-CM

## 2021-06-10 DIAGNOSIS — Z12.89 ENCOUNTER FOR SCREENING FOR MALIGNANT NEOPLASM OF OTHER SITES: ICD-10-CM

## 2021-06-10 LAB
ALBUMIN SERPL BCP-MCNC: 3.1 G/DL (ref 3.5–5.2)
ALP SERPL-CCNC: 114 U/L (ref 55–135)
ALT SERPL W/O P-5'-P-CCNC: 27 U/L (ref 10–44)
ANION GAP SERPL CALC-SCNC: 12 MMOL/L (ref 8–16)
AST SERPL-CCNC: 25 U/L (ref 10–40)
BACTERIA #/AREA URNS HPF: NEGATIVE /HPF
BASOPHILS # BLD AUTO: 0.03 K/UL (ref 0–0.2)
BASOPHILS NFR BLD: 0.5 % (ref 0–1.9)
BILIRUB SERPL-MCNC: 0.7 MG/DL (ref 0.1–1)
BILIRUB UR QL STRIP: ABNORMAL
BNP SERPL-MCNC: 72 PG/ML (ref 0–99)
BUN SERPL-MCNC: 16 MG/DL (ref 8–23)
CALCIUM SERPL-MCNC: 8.5 MG/DL (ref 8.7–10.5)
CHLORIDE SERPL-SCNC: 103 MMOL/L (ref 95–110)
CLARITY UR: CLEAR
CO2 SERPL-SCNC: 27 MMOL/L (ref 23–29)
COLOR UR: ABNORMAL
CREAT SERPL-MCNC: <0.3 MG/DL (ref 0.5–1.4)
DIFFERENTIAL METHOD: ABNORMAL
EOSINOPHIL # BLD AUTO: 0.2 K/UL (ref 0–0.5)
EOSINOPHIL NFR BLD: 2.7 % (ref 0–8)
ERYTHROCYTE [DISTWIDTH] IN BLOOD BY AUTOMATED COUNT: 14.4 % (ref 11.5–14.5)
EST. GFR  (AFRICAN AMERICAN): >60 ML/MIN/1.73 M^2
EST. GFR  (NON AFRICAN AMERICAN): >60 ML/MIN/1.73 M^2
GLUCOSE SERPL-MCNC: 140 MG/DL (ref 70–110)
GLUCOSE UR QL STRIP: NEGATIVE
HCT VFR BLD AUTO: 42.9 % (ref 40–54)
HGB BLD-MCNC: 13.9 G/DL (ref 14–18)
HGB UR QL STRIP: NEGATIVE
HYALINE CASTS #/AREA URNS LPF: 4 /LPF
IMM GRANULOCYTES # BLD AUTO: 0.04 K/UL (ref 0–0.04)
IMM GRANULOCYTES NFR BLD AUTO: 0.6 % (ref 0–0.5)
KETONES UR QL STRIP: NEGATIVE
LACTATE SERPL-SCNC: 1.2 MMOL/L (ref 0.5–1.9)
LEUKOCYTE ESTERASE UR QL STRIP: NEGATIVE
LYMPHOCYTES # BLD AUTO: 1.9 K/UL (ref 1–4.8)
LYMPHOCYTES NFR BLD: 30.9 % (ref 18–48)
MCH RBC QN AUTO: 32.3 PG (ref 27–31)
MCHC RBC AUTO-ENTMCNC: 32.4 G/DL (ref 32–36)
MCV RBC AUTO: 100 FL (ref 82–98)
MICROSCOPIC COMMENT: ABNORMAL
MONOCYTES # BLD AUTO: 0.6 K/UL (ref 0.3–1)
MONOCYTES NFR BLD: 9.7 % (ref 4–15)
NEUTROPHILS # BLD AUTO: 3.5 K/UL (ref 1.8–7.7)
NEUTROPHILS NFR BLD: 55.6 % (ref 38–73)
NITRITE UR QL STRIP: NEGATIVE
NRBC BLD-RTO: 0 /100 WBC
PH UR STRIP: 6 [PH] (ref 5–8)
PLATELET # BLD AUTO: 193 K/UL (ref 150–450)
PMV BLD AUTO: 10 FL (ref 9.2–12.9)
POTASSIUM SERPL-SCNC: 3.9 MMOL/L (ref 3.5–5.1)
PROT SERPL-MCNC: 6.9 G/DL (ref 6–8.4)
PROT UR QL STRIP: ABNORMAL
RBC # BLD AUTO: 4.3 M/UL (ref 4.6–6.2)
RBC #/AREA URNS HPF: 2 /HPF (ref 0–4)
SODIUM SERPL-SCNC: 142 MMOL/L (ref 136–145)
SP GR UR STRIP: >1.03 (ref 1–1.03)
SQUAMOUS #/AREA URNS HPF: 1 /HPF
TROPONIN I SERPL DL<=0.01 NG/ML-MCNC: 0.03 NG/ML
URN SPEC COLLECT METH UR: ABNORMAL
UROBILINOGEN UR STRIP-ACNC: ABNORMAL EU/DL
WBC # BLD AUTO: 6.28 K/UL (ref 3.9–12.7)
WBC #/AREA URNS HPF: 1 /HPF (ref 0–5)

## 2021-06-10 PROCEDURE — 83605 ASSAY OF LACTIC ACID: CPT | Performed by: EMERGENCY MEDICINE

## 2021-06-10 PROCEDURE — 99285 EMERGENCY DEPT VISIT HI MDM: CPT | Mod: 25

## 2021-06-10 PROCEDURE — 93005 ELECTROCARDIOGRAM TRACING: CPT | Performed by: INTERNAL MEDICINE

## 2021-06-10 PROCEDURE — 80053 COMPREHEN METABOLIC PANEL: CPT | Performed by: EMERGENCY MEDICINE

## 2021-06-10 PROCEDURE — 84484 ASSAY OF TROPONIN QUANT: CPT | Performed by: EMERGENCY MEDICINE

## 2021-06-10 PROCEDURE — 81001 URINALYSIS AUTO W/SCOPE: CPT | Performed by: EMERGENCY MEDICINE

## 2021-06-10 PROCEDURE — 74183 MRI ABD W/O CNTR FLWD CNTR: CPT | Mod: TC

## 2021-06-10 PROCEDURE — 87040 BLOOD CULTURE FOR BACTERIA: CPT | Mod: 59 | Performed by: EMERGENCY MEDICINE

## 2021-06-10 PROCEDURE — A9585 GADOBUTROL INJECTION: HCPCS | Performed by: INTERNAL MEDICINE

## 2021-06-10 PROCEDURE — 25500020 PHARM REV CODE 255: Performed by: INTERNAL MEDICINE

## 2021-06-10 PROCEDURE — 83880 ASSAY OF NATRIURETIC PEPTIDE: CPT | Performed by: EMERGENCY MEDICINE

## 2021-06-10 PROCEDURE — 93010 EKG 12-LEAD: ICD-10-PCS | Mod: ,,, | Performed by: INTERNAL MEDICINE

## 2021-06-10 PROCEDURE — 93010 ELECTROCARDIOGRAM REPORT: CPT | Mod: ,,, | Performed by: INTERNAL MEDICINE

## 2021-06-10 PROCEDURE — 85025 COMPLETE CBC W/AUTO DIFF WBC: CPT | Performed by: EMERGENCY MEDICINE

## 2021-06-10 PROCEDURE — 36415 COLL VENOUS BLD VENIPUNCTURE: CPT | Performed by: EMERGENCY MEDICINE

## 2021-06-10 RX ORDER — GADOBUTROL 604.72 MG/ML
10 INJECTION INTRAVENOUS
Status: COMPLETED | OUTPATIENT
Start: 2021-06-10 | End: 2021-06-10

## 2021-06-10 RX ORDER — OMEPRAZOLE 40 MG/1
40 CAPSULE, DELAYED RELEASE ORAL EVERY MORNING
COMMUNITY
Start: 2021-05-17 | End: 2022-01-01

## 2021-06-10 RX ORDER — ATORVASTATIN CALCIUM 20 MG/1
20 TABLET, FILM COATED ORAL DAILY
COMMUNITY
Start: 2021-03-28

## 2021-06-10 RX ORDER — ALBUTEROL SULFATE 90 UG/1
2 AEROSOL, METERED RESPIRATORY (INHALATION) EVERY 4 HOURS PRN
COMMUNITY
Start: 2021-02-01 | End: 2022-01-01

## 2021-06-10 RX ORDER — FINASTERIDE 5 MG/1
5 TABLET, FILM COATED ORAL DAILY
COMMUNITY
Start: 2021-05-09

## 2021-06-10 RX ORDER — OMEPRAZOLE 20 MG/1
CAPSULE, DELAYED RELEASE ORAL
COMMUNITY
End: 2021-01-01 | Stop reason: HOSPADM

## 2021-06-10 RX ORDER — NYSTATIN 100000 [USP'U]/G
POWDER TOPICAL 2 TIMES DAILY
COMMUNITY
Start: 2020-09-14

## 2021-06-10 RX ORDER — TAMSULOSIN HYDROCHLORIDE 0.4 MG/1
1 CAPSULE ORAL NIGHTLY
COMMUNITY
Start: 2021-05-09

## 2021-06-10 RX ORDER — DOXYCYCLINE 100 MG/1
100 CAPSULE ORAL 2 TIMES DAILY
Qty: 20 CAPSULE | Refills: 0 | Status: SHIPPED | OUTPATIENT
Start: 2021-06-10 | End: 2021-06-20

## 2021-06-10 RX ORDER — ESCITALOPRAM OXALATE 10 MG/1
TABLET ORAL
COMMUNITY

## 2021-06-10 RX ORDER — DOXYCYCLINE 100 MG/1
100 CAPSULE ORAL 2 TIMES DAILY
Qty: 20 CAPSULE | Refills: 0 | Status: SHIPPED | OUTPATIENT
Start: 2021-06-10 | End: 2021-06-10 | Stop reason: SDUPTHER

## 2021-06-10 RX ORDER — CLOPIDOGREL BISULFATE 75 MG/1
75 TABLET ORAL DAILY
COMMUNITY
Start: 2021-03-18

## 2021-06-10 RX ORDER — GLIMEPIRIDE 4 MG/1
4 TABLET ORAL
COMMUNITY
Start: 2020-12-22

## 2021-06-10 RX ORDER — MIRTAZAPINE 15 MG/1
15 TABLET, FILM COATED ORAL NIGHTLY
COMMUNITY
Start: 2021-05-26

## 2021-06-10 RX ORDER — TURMERIC 400 MG
1 CAPSULE ORAL DAILY
COMMUNITY

## 2021-06-10 RX ORDER — GUAIFENESIN 100 MG/5ML
200 SOLUTION ORAL 3 TIMES DAILY PRN
COMMUNITY
End: 2021-01-01

## 2021-06-10 RX ADMIN — GADOBUTROL 10 ML: 604.72 INJECTION INTRAVENOUS at 04:06

## 2021-06-11 ENCOUNTER — OFFICE VISIT (OUTPATIENT)
Dept: HEMATOLOGY/ONCOLOGY | Facility: CLINIC | Age: 81
End: 2021-06-11
Payer: MEDICARE

## 2021-06-11 VITALS
RESPIRATION RATE: 16 BRPM | BODY MASS INDEX: 30.34 KG/M2 | HEIGHT: 73 IN | SYSTOLIC BLOOD PRESSURE: 130 MMHG | DIASTOLIC BLOOD PRESSURE: 75 MMHG | HEART RATE: 111 BPM

## 2021-06-11 DIAGNOSIS — R16.0 LIVER MASSES: Primary | ICD-10-CM

## 2021-06-11 PROCEDURE — 99214 PR OFFICE/OUTPT VISIT, EST, LEVL IV, 30-39 MIN: ICD-10-PCS | Mod: S$GLB,,, | Performed by: INTERNAL MEDICINE

## 2021-06-11 PROCEDURE — 1101F PR PT FALLS ASSESS DOC 0-1 FALLS W/OUT INJ PAST YR: ICD-10-PCS | Mod: S$GLB,,, | Performed by: INTERNAL MEDICINE

## 2021-06-11 PROCEDURE — 1159F PR MEDICATION LIST DOCUMENTED IN MEDICAL RECORD: ICD-10-PCS | Mod: S$GLB,,, | Performed by: INTERNAL MEDICINE

## 2021-06-11 PROCEDURE — 99214 OFFICE O/P EST MOD 30 MIN: CPT | Mod: S$GLB,,, | Performed by: INTERNAL MEDICINE

## 2021-06-11 PROCEDURE — 1101F PT FALLS ASSESS-DOCD LE1/YR: CPT | Mod: S$GLB,,, | Performed by: INTERNAL MEDICINE

## 2021-06-11 PROCEDURE — 3288F PR FALLS RISK ASSESSMENT DOCUMENTED: ICD-10-PCS | Mod: S$GLB,,, | Performed by: INTERNAL MEDICINE

## 2021-06-11 PROCEDURE — 3288F FALL RISK ASSESSMENT DOCD: CPT | Mod: S$GLB,,, | Performed by: INTERNAL MEDICINE

## 2021-06-11 PROCEDURE — 1126F AMNT PAIN NOTED NONE PRSNT: CPT | Mod: S$GLB,,, | Performed by: INTERNAL MEDICINE

## 2021-06-11 PROCEDURE — 1159F MED LIST DOCD IN RCRD: CPT | Mod: S$GLB,,, | Performed by: INTERNAL MEDICINE

## 2021-06-11 PROCEDURE — 1126F PR PAIN SEVERITY QUANTIFIED, NO PAIN PRESENT: ICD-10-PCS | Mod: S$GLB,,, | Performed by: INTERNAL MEDICINE

## 2021-06-14 ENCOUNTER — TELEPHONE (OUTPATIENT)
Dept: RADIOLOGY | Facility: HOSPITAL | Age: 81
End: 2021-06-14

## 2021-06-14 LAB
BACTERIA BLD CULT: ABNORMAL

## 2021-06-15 ENCOUNTER — HOSPITAL ENCOUNTER (OUTPATIENT)
Dept: RADIOLOGY | Facility: HOSPITAL | Age: 81
Discharge: HOME | End: 2021-06-15
Attending: INTERNAL MEDICINE
Payer: MEDICARE

## 2021-06-15 VITALS
RESPIRATION RATE: 16 BRPM | OXYGEN SATURATION: 97 % | DIASTOLIC BLOOD PRESSURE: 72 MMHG | HEART RATE: 80 BPM | TEMPERATURE: 98 F | HEIGHT: 73 IN | SYSTOLIC BLOOD PRESSURE: 136 MMHG | WEIGHT: 242.5 LBS | BODY MASS INDEX: 32.14 KG/M2

## 2021-06-15 DIAGNOSIS — R16.0 LIVER MASSES: ICD-10-CM

## 2021-06-15 LAB
APTT PPP: 36.3 SEC (ref 25.6–35.8)
BACTERIA BLD CULT: NORMAL
BASOPHILS # BLD AUTO: 0.04 K/UL (ref 0–0.2)
BASOPHILS NFR BLD: 0.7 % (ref 0–1.9)
DIFFERENTIAL METHOD: ABNORMAL
EOSINOPHIL # BLD AUTO: 0.3 K/UL (ref 0–0.5)
EOSINOPHIL NFR BLD: 4.8 % (ref 0–8)
ERYTHROCYTE [DISTWIDTH] IN BLOOD BY AUTOMATED COUNT: 13.7 % (ref 11.5–14.5)
HCT VFR BLD AUTO: 43 % (ref 40–54)
HGB BLD-MCNC: 14.3 G/DL (ref 14–18)
IMM GRANULOCYTES # BLD AUTO: 0.06 K/UL (ref 0–0.04)
IMM GRANULOCYTES NFR BLD AUTO: 1 % (ref 0–0.5)
INR PPP: 1.2
LYMPHOCYTES # BLD AUTO: 2.3 K/UL (ref 1–4.8)
LYMPHOCYTES NFR BLD: 39.4 % (ref 18–48)
MCH RBC QN AUTO: 32.4 PG (ref 27–31)
MCHC RBC AUTO-ENTMCNC: 33.3 G/DL (ref 32–36)
MCV RBC AUTO: 97 FL (ref 82–98)
MONOCYTES # BLD AUTO: 0.6 K/UL (ref 0.3–1)
MONOCYTES NFR BLD: 9.6 % (ref 4–15)
NEUTROPHILS # BLD AUTO: 2.6 K/UL (ref 1.8–7.7)
NEUTROPHILS NFR BLD: 44.5 % (ref 38–73)
NRBC BLD-RTO: 0 /100 WBC
PLATELET # BLD AUTO: 238 K/UL (ref 150–450)
PMV BLD AUTO: 9.8 FL (ref 9.2–12.9)
PROTHROMBIN TIME: 14.5 SEC (ref 11.8–14.3)
RBC # BLD AUTO: 4.42 M/UL (ref 4.6–6.2)
SARS-COV-2 RDRP RESP QL NAA+PROBE: NEGATIVE
WBC # BLD AUTO: 5.81 K/UL (ref 3.9–12.7)

## 2021-06-15 PROCEDURE — 85025 COMPLETE CBC W/AUTO DIFF WBC: CPT | Performed by: RADIOLOGY

## 2021-06-15 PROCEDURE — 99153 MOD SED SAME PHYS/QHP EA: CPT

## 2021-06-15 PROCEDURE — 85610 PROTHROMBIN TIME: CPT | Performed by: RADIOLOGY

## 2021-06-15 PROCEDURE — 88313 SPECIAL STAINS GROUP 2: CPT | Mod: TC

## 2021-06-15 PROCEDURE — 77012 CT SCAN FOR NEEDLE BIOPSY: CPT | Mod: TC

## 2021-06-15 PROCEDURE — 99152 MOD SED SAME PHYS/QHP 5/>YRS: CPT

## 2021-06-15 PROCEDURE — U0002 COVID-19 LAB TEST NON-CDC: HCPCS | Performed by: RADIOLOGY

## 2021-06-15 PROCEDURE — 85730 THROMBOPLASTIN TIME PARTIAL: CPT | Performed by: RADIOLOGY

## 2021-06-15 PROCEDURE — 88307 TISSUE EXAM BY PATHOLOGIST: CPT | Mod: TC

## 2021-06-15 PROCEDURE — 63600175 PHARM REV CODE 636 W HCPCS: Performed by: RADIOLOGY

## 2021-06-15 PROCEDURE — 25000003 PHARM REV CODE 250: Performed by: RADIOLOGY

## 2021-06-15 RX ORDER — MIDAZOLAM HYDROCHLORIDE 1 MG/ML
INJECTION INTRAMUSCULAR; INTRAVENOUS CODE/TRAUMA/SEDATION MEDICATION
Status: COMPLETED | OUTPATIENT
Start: 2021-06-15 | End: 2021-06-15

## 2021-06-15 RX ORDER — FENTANYL CITRATE 50 UG/ML
INJECTION, SOLUTION INTRAMUSCULAR; INTRAVENOUS CODE/TRAUMA/SEDATION MEDICATION
Status: COMPLETED | OUTPATIENT
Start: 2021-06-15 | End: 2021-06-15

## 2021-06-15 RX ORDER — SODIUM CHLORIDE 9 MG/ML
INJECTION, SOLUTION INTRAVENOUS
Status: COMPLETED | OUTPATIENT
Start: 2021-06-15 | End: 2021-06-15

## 2021-06-15 RX ADMIN — FENTANYL CITRATE 50 MCG: 50 INJECTION INTRAMUSCULAR; INTRAVENOUS at 11:06

## 2021-06-15 RX ADMIN — FENTANYL CITRATE 25 MCG: 50 INJECTION INTRAMUSCULAR; INTRAVENOUS at 12:06

## 2021-06-15 RX ADMIN — SODIUM CHLORIDE 500 ML/HR: 0.9 INJECTION, SOLUTION INTRAVENOUS at 11:06

## 2021-06-15 RX ADMIN — MIDAZOLAM HYDROCHLORIDE 1 MG: 1 INJECTION, SOLUTION INTRAMUSCULAR; INTRAVENOUS at 11:06

## 2021-06-16 ENCOUNTER — TELEPHONE (OUTPATIENT)
Dept: HEMATOLOGY/ONCOLOGY | Facility: CLINIC | Age: 81
End: 2021-06-16

## 2021-06-24 ENCOUNTER — OFFICE VISIT (OUTPATIENT)
Dept: HEMATOLOGY/ONCOLOGY | Facility: CLINIC | Age: 81
End: 2021-06-24
Payer: MEDICARE

## 2021-06-24 VITALS — HEART RATE: 86 BPM | DIASTOLIC BLOOD PRESSURE: 69 MMHG | SYSTOLIC BLOOD PRESSURE: 130 MMHG | TEMPERATURE: 98 F

## 2021-06-24 DIAGNOSIS — C22.0 HEPATOCELLULAR CARCINOMA: Primary | ICD-10-CM

## 2021-06-24 PROCEDURE — 1159F MED LIST DOCD IN RCRD: CPT | Mod: S$GLB,,, | Performed by: INTERNAL MEDICINE

## 2021-06-24 PROCEDURE — 1101F PT FALLS ASSESS-DOCD LE1/YR: CPT | Mod: S$GLB,,, | Performed by: INTERNAL MEDICINE

## 2021-06-24 PROCEDURE — 1126F AMNT PAIN NOTED NONE PRSNT: CPT | Mod: S$GLB,,, | Performed by: INTERNAL MEDICINE

## 2021-06-24 PROCEDURE — 99214 OFFICE O/P EST MOD 30 MIN: CPT | Mod: S$GLB,,, | Performed by: INTERNAL MEDICINE

## 2021-06-24 PROCEDURE — 3288F PR FALLS RISK ASSESSMENT DOCUMENTED: ICD-10-PCS | Mod: S$GLB,,, | Performed by: INTERNAL MEDICINE

## 2021-06-24 PROCEDURE — 1159F PR MEDICATION LIST DOCUMENTED IN MEDICAL RECORD: ICD-10-PCS | Mod: S$GLB,,, | Performed by: INTERNAL MEDICINE

## 2021-06-24 PROCEDURE — 1101F PR PT FALLS ASSESS DOC 0-1 FALLS W/OUT INJ PAST YR: ICD-10-PCS | Mod: S$GLB,,, | Performed by: INTERNAL MEDICINE

## 2021-06-24 PROCEDURE — 99214 PR OFFICE/OUTPT VISIT, EST, LEVL IV, 30-39 MIN: ICD-10-PCS | Mod: S$GLB,,, | Performed by: INTERNAL MEDICINE

## 2021-06-24 PROCEDURE — 3288F FALL RISK ASSESSMENT DOCD: CPT | Mod: S$GLB,,, | Performed by: INTERNAL MEDICINE

## 2021-06-24 PROCEDURE — 1126F PR PAIN SEVERITY QUANTIFIED, NO PAIN PRESENT: ICD-10-PCS | Mod: S$GLB,,, | Performed by: INTERNAL MEDICINE

## 2021-06-26 ENCOUNTER — TELEPHONE (OUTPATIENT)
Dept: HEMATOLOGY/ONCOLOGY | Facility: CLINIC | Age: 81
End: 2021-06-26

## 2021-06-28 ENCOUNTER — TELEPHONE (OUTPATIENT)
Dept: NEUROLOGY | Facility: HOSPITAL | Age: 81
End: 2021-06-28

## 2021-07-06 ENCOUNTER — DOCUMENT SCAN (OUTPATIENT)
Dept: HOME HEALTH SERVICES | Facility: HOSPITAL | Age: 81
End: 2021-07-06

## 2021-07-23 ENCOUNTER — OFFICE VISIT (OUTPATIENT)
Dept: NEUROLOGY | Facility: HOSPITAL | Age: 81
End: 2021-07-23
Attending: SURGERY
Payer: MEDICARE

## 2021-07-23 VITALS
HEIGHT: 73 IN | BODY MASS INDEX: 31.14 KG/M2 | HEART RATE: 96 BPM | DIASTOLIC BLOOD PRESSURE: 65 MMHG | WEIGHT: 235 LBS | SYSTOLIC BLOOD PRESSURE: 118 MMHG

## 2021-07-23 DIAGNOSIS — C22.0 HCC (HEPATOCELLULAR CARCINOMA): Primary | ICD-10-CM

## 2021-07-23 DIAGNOSIS — C22.0 HEPATOCELLULAR CARCINOMA: ICD-10-CM

## 2021-07-23 DIAGNOSIS — I63.9 CEREBROVASCULAR ACCIDENT (CVA), UNSPECIFIED MECHANISM: ICD-10-CM

## 2021-07-23 DIAGNOSIS — I63.231 STENOSIS OF INTERNAL CAROTID ARTERY WITH CEREBRAL INFARCTION, RIGHT: ICD-10-CM

## 2021-07-23 PROCEDURE — 99214 OFFICE O/P EST MOD 30 MIN: CPT | Performed by: SURGERY

## 2021-07-26 ENCOUNTER — OFFICE VISIT (OUTPATIENT)
Dept: HEMATOLOGY/ONCOLOGY | Facility: CLINIC | Age: 81
End: 2021-07-26
Payer: MEDICARE

## 2021-07-26 VITALS — RESPIRATION RATE: 16 BRPM

## 2021-07-26 DIAGNOSIS — C22.0 HCC (HEPATOCELLULAR CARCINOMA): Primary | ICD-10-CM

## 2021-07-26 PROCEDURE — 1159F PR MEDICATION LIST DOCUMENTED IN MEDICAL RECORD: ICD-10-PCS | Mod: S$GLB,,, | Performed by: INTERNAL MEDICINE

## 2021-07-26 PROCEDURE — 99214 PR OFFICE/OUTPT VISIT, EST, LEVL IV, 30-39 MIN: ICD-10-PCS | Mod: S$GLB,,, | Performed by: INTERNAL MEDICINE

## 2021-07-26 PROCEDURE — 99214 OFFICE O/P EST MOD 30 MIN: CPT | Mod: S$GLB,,, | Performed by: INTERNAL MEDICINE

## 2021-07-26 PROCEDURE — 1159F MED LIST DOCD IN RCRD: CPT | Mod: S$GLB,,, | Performed by: INTERNAL MEDICINE

## 2021-07-26 PROCEDURE — 1126F PR PAIN SEVERITY QUANTIFIED, NO PAIN PRESENT: ICD-10-PCS | Mod: S$GLB,,, | Performed by: INTERNAL MEDICINE

## 2021-07-26 PROCEDURE — 1126F AMNT PAIN NOTED NONE PRSNT: CPT | Mod: S$GLB,,, | Performed by: INTERNAL MEDICINE

## 2021-08-03 ENCOUNTER — OFFICE VISIT (OUTPATIENT)
Dept: NEUROLOGY | Facility: HOSPITAL | Age: 81
End: 2021-08-03
Attending: RADIOLOGY
Payer: MEDICARE

## 2021-08-03 VITALS
WEIGHT: 235 LBS | TEMPERATURE: 98 F | DIASTOLIC BLOOD PRESSURE: 75 MMHG | SYSTOLIC BLOOD PRESSURE: 129 MMHG | BODY MASS INDEX: 31.14 KG/M2 | HEART RATE: 96 BPM | HEIGHT: 73 IN

## 2021-08-03 DIAGNOSIS — C22.0 HCC (HEPATOCELLULAR CARCINOMA): ICD-10-CM

## 2021-08-03 PROCEDURE — 3074F PR MOST RECENT SYSTOLIC BLOOD PRESSURE < 130 MM HG: ICD-10-PCS | Mod: ,,, | Performed by: RADIOLOGY

## 2021-08-03 PROCEDURE — 1159F MED LIST DOCD IN RCRD: CPT | Mod: ,,, | Performed by: RADIOLOGY

## 2021-08-03 PROCEDURE — 3288F FALL RISK ASSESSMENT DOCD: CPT | Mod: ,,, | Performed by: RADIOLOGY

## 2021-08-03 PROCEDURE — 1101F PR PT FALLS ASSESS DOC 0-1 FALLS W/OUT INJ PAST YR: ICD-10-PCS | Mod: ,,, | Performed by: RADIOLOGY

## 2021-08-03 PROCEDURE — 1101F PT FALLS ASSESS-DOCD LE1/YR: CPT | Mod: ,,, | Performed by: RADIOLOGY

## 2021-08-03 PROCEDURE — 3078F PR MOST RECENT DIASTOLIC BLOOD PRESSURE < 80 MM HG: ICD-10-PCS | Mod: ,,, | Performed by: RADIOLOGY

## 2021-08-03 PROCEDURE — 1126F AMNT PAIN NOTED NONE PRSNT: CPT | Mod: ,,, | Performed by: RADIOLOGY

## 2021-08-03 PROCEDURE — 3288F PR FALLS RISK ASSESSMENT DOCUMENTED: ICD-10-PCS | Mod: ,,, | Performed by: RADIOLOGY

## 2021-08-03 PROCEDURE — 1126F PR PAIN SEVERITY QUANTIFIED, NO PAIN PRESENT: ICD-10-PCS | Mod: ,,, | Performed by: RADIOLOGY

## 2021-08-03 PROCEDURE — 3078F DIAST BP <80 MM HG: CPT | Mod: ,,, | Performed by: RADIOLOGY

## 2021-08-03 PROCEDURE — 1160F RVW MEDS BY RX/DR IN RCRD: CPT | Mod: ,,, | Performed by: RADIOLOGY

## 2021-08-03 PROCEDURE — 99205 PR OFFICE/OUTPT VISIT, NEW, LEVL V, 60-74 MIN: ICD-10-PCS | Mod: ,,, | Performed by: RADIOLOGY

## 2021-08-03 PROCEDURE — 1159F PR MEDICATION LIST DOCUMENTED IN MEDICAL RECORD: ICD-10-PCS | Mod: ,,, | Performed by: RADIOLOGY

## 2021-08-03 PROCEDURE — 99205 OFFICE O/P NEW HI 60 MIN: CPT | Mod: ,,, | Performed by: RADIOLOGY

## 2021-08-03 PROCEDURE — 99215 OFFICE O/P EST HI 40 MIN: CPT | Performed by: RADIOLOGY

## 2021-08-03 PROCEDURE — 1160F PR REVIEW ALL MEDS BY PRESCRIBER/CLIN PHARMACIST DOCUMENTED: ICD-10-PCS | Mod: ,,, | Performed by: RADIOLOGY

## 2021-08-03 PROCEDURE — 3074F SYST BP LT 130 MM HG: CPT | Mod: ,,, | Performed by: RADIOLOGY

## 2021-08-03 RX ORDER — MV-MN/C/THEANINE/HERB NO.310 1000-200MG
POWDER IN PACKET (EA) ORAL
COMMUNITY

## 2021-08-11 ENCOUNTER — TELEPHONE (OUTPATIENT)
Dept: INTERVENTIONAL RADIOLOGY/VASCULAR | Facility: CLINIC | Age: 81
End: 2021-08-11

## 2021-08-11 DIAGNOSIS — C22.0 HCC (HEPATOCELLULAR CARCINOMA): Primary | ICD-10-CM

## 2021-08-19 ENCOUNTER — TELEPHONE (OUTPATIENT)
Dept: HEMATOLOGY/ONCOLOGY | Facility: CLINIC | Age: 81
End: 2021-08-19

## 2021-09-24 ENCOUNTER — TELEPHONE (OUTPATIENT)
Dept: INTERVENTIONAL RADIOLOGY/VASCULAR | Facility: HOSPITAL | Age: 81
End: 2021-09-24

## 2022-01-01 ENCOUNTER — OFFICE VISIT (OUTPATIENT)
Dept: HEMATOLOGY/ONCOLOGY | Facility: CLINIC | Age: 82
End: 2022-01-01
Payer: MEDICARE

## 2022-01-01 ENCOUNTER — TELEPHONE (OUTPATIENT)
Dept: HEMATOLOGY/ONCOLOGY | Facility: CLINIC | Age: 82
End: 2022-01-01

## 2022-01-01 ENCOUNTER — PATIENT MESSAGE (OUTPATIENT)
Dept: HEMATOLOGY/ONCOLOGY | Facility: CLINIC | Age: 82
End: 2022-01-01

## 2022-01-01 ENCOUNTER — TELEPHONE (OUTPATIENT)
Dept: HEMATOLOGY/ONCOLOGY | Facility: HOSPITAL | Age: 82
End: 2022-01-01

## 2022-01-01 VITALS
DIASTOLIC BLOOD PRESSURE: 90 MMHG | HEART RATE: 88 BPM | SYSTOLIC BLOOD PRESSURE: 156 MMHG | RESPIRATION RATE: 16 BRPM | TEMPERATURE: 99 F

## 2022-01-01 VITALS
DIASTOLIC BLOOD PRESSURE: 70 MMHG | RESPIRATION RATE: 18 BRPM | HEART RATE: 101 BPM | SYSTOLIC BLOOD PRESSURE: 109 MMHG | HEIGHT: 73 IN | TEMPERATURE: 98 F | BODY MASS INDEX: 31.66 KG/M2

## 2022-01-01 DIAGNOSIS — C22.0 HCC (HEPATOCELLULAR CARCINOMA): Primary | ICD-10-CM

## 2022-01-01 DIAGNOSIS — R17 JAUNDICE: ICD-10-CM

## 2022-01-01 DIAGNOSIS — C22.0 HEPATOCELLULAR CARCINOMA: Primary | ICD-10-CM

## 2022-01-01 PROCEDURE — 3288F PR FALLS RISK ASSESSMENT DOCUMENTED: ICD-10-PCS | Mod: S$GLB,,, | Performed by: INTERNAL MEDICINE

## 2022-01-01 PROCEDURE — 99214 OFFICE O/P EST MOD 30 MIN: CPT | Mod: S$GLB,,, | Performed by: INTERNAL MEDICINE

## 2022-01-01 PROCEDURE — 3077F SYST BP >= 140 MM HG: CPT | Mod: S$GLB,,, | Performed by: INTERNAL MEDICINE

## 2022-01-01 PROCEDURE — 3074F SYST BP LT 130 MM HG: CPT | Mod: CPTII,S$GLB,, | Performed by: INTERNAL MEDICINE

## 2022-01-01 PROCEDURE — 3074F PR MOST RECENT SYSTOLIC BLOOD PRESSURE < 130 MM HG: ICD-10-PCS | Mod: CPTII,S$GLB,, | Performed by: INTERNAL MEDICINE

## 2022-01-01 PROCEDURE — 1101F PR PT FALLS ASSESS DOC 0-1 FALLS W/OUT INJ PAST YR: ICD-10-PCS | Mod: S$GLB,,, | Performed by: INTERNAL MEDICINE

## 2022-01-01 PROCEDURE — 99214 PR OFFICE/OUTPT VISIT, EST, LEVL IV, 30-39 MIN: ICD-10-PCS | Mod: S$GLB,,, | Performed by: INTERNAL MEDICINE

## 2022-01-01 PROCEDURE — 1101F PT FALLS ASSESS-DOCD LE1/YR: CPT | Mod: S$GLB,,, | Performed by: INTERNAL MEDICINE

## 2022-01-01 PROCEDURE — 3288F FALL RISK ASSESSMENT DOCD: CPT | Mod: CPTII,S$GLB,, | Performed by: INTERNAL MEDICINE

## 2022-01-01 PROCEDURE — 3078F PR MOST RECENT DIASTOLIC BLOOD PRESSURE < 80 MM HG: ICD-10-PCS | Mod: CPTII,S$GLB,, | Performed by: INTERNAL MEDICINE

## 2022-01-01 PROCEDURE — 1126F AMNT PAIN NOTED NONE PRSNT: CPT | Mod: CPTII,S$GLB,, | Performed by: INTERNAL MEDICINE

## 2022-01-01 PROCEDURE — 1126F PR PAIN SEVERITY QUANTIFIED, NO PAIN PRESENT: ICD-10-PCS | Mod: CPTII,S$GLB,, | Performed by: INTERNAL MEDICINE

## 2022-01-01 PROCEDURE — 3078F DIAST BP <80 MM HG: CPT | Mod: CPTII,S$GLB,, | Performed by: INTERNAL MEDICINE

## 2022-01-01 PROCEDURE — 3077F PR MOST RECENT SYSTOLIC BLOOD PRESSURE >= 140 MM HG: ICD-10-PCS | Mod: S$GLB,,, | Performed by: INTERNAL MEDICINE

## 2022-01-01 PROCEDURE — 3288F PR FALLS RISK ASSESSMENT DOCUMENTED: ICD-10-PCS | Mod: CPTII,S$GLB,, | Performed by: INTERNAL MEDICINE

## 2022-01-01 PROCEDURE — 1159F MED LIST DOCD IN RCRD: CPT | Mod: CPTII,S$GLB,, | Performed by: INTERNAL MEDICINE

## 2022-01-01 PROCEDURE — 1101F PR PT FALLS ASSESS DOC 0-1 FALLS W/OUT INJ PAST YR: ICD-10-PCS | Mod: CPTII,S$GLB,, | Performed by: INTERNAL MEDICINE

## 2022-01-01 PROCEDURE — 3080F DIAST BP >= 90 MM HG: CPT | Mod: S$GLB,,, | Performed by: INTERNAL MEDICINE

## 2022-01-01 PROCEDURE — 1159F PR MEDICATION LIST DOCUMENTED IN MEDICAL RECORD: ICD-10-PCS | Mod: CPTII,S$GLB,, | Performed by: INTERNAL MEDICINE

## 2022-01-01 PROCEDURE — 1126F PR PAIN SEVERITY QUANTIFIED, NO PAIN PRESENT: ICD-10-PCS | Mod: S$GLB,,, | Performed by: INTERNAL MEDICINE

## 2022-01-01 PROCEDURE — 3080F PR MOST RECENT DIASTOLIC BLOOD PRESSURE >= 90 MM HG: ICD-10-PCS | Mod: S$GLB,,, | Performed by: INTERNAL MEDICINE

## 2022-01-01 PROCEDURE — 3288F FALL RISK ASSESSMENT DOCD: CPT | Mod: S$GLB,,, | Performed by: INTERNAL MEDICINE

## 2022-01-01 PROCEDURE — 1126F AMNT PAIN NOTED NONE PRSNT: CPT | Mod: S$GLB,,, | Performed by: INTERNAL MEDICINE

## 2022-01-01 PROCEDURE — 1101F PT FALLS ASSESS-DOCD LE1/YR: CPT | Mod: CPTII,S$GLB,, | Performed by: INTERNAL MEDICINE

## 2022-01-01 RX ORDER — DIPHENHYDRAMINE HYDROCHLORIDE 50 MG/ML
50 INJECTION INTRAMUSCULAR; INTRAVENOUS ONCE AS NEEDED
Status: CANCELLED | OUTPATIENT
Start: 2022-01-01

## 2022-01-01 RX ORDER — HEPARIN 100 UNIT/ML
500 SYRINGE INTRAVENOUS
Status: CANCELLED | OUTPATIENT
Start: 2022-01-01

## 2022-01-01 RX ORDER — METHYLPREDNISOLONE SOD SUCC 125 MG
125 VIAL (EA) INJECTION ONCE AS NEEDED
Status: CANCELLED | OUTPATIENT
Start: 2022-01-01

## 2022-01-01 RX ORDER — SODIUM CHLORIDE 0.9 % (FLUSH) 0.9 %
10 SYRINGE (ML) INJECTION
Status: CANCELLED | OUTPATIENT
Start: 2022-01-01

## 2022-01-01 RX ORDER — EPINEPHRINE 0.3 MG/.3ML
0.3 INJECTION SUBCUTANEOUS ONCE AS NEEDED
Status: CANCELLED | OUTPATIENT
Start: 2022-01-01

## 2022-01-01 RX ORDER — CYANOCOBALAMIN 1000 UG/ML
1000 INJECTION, SOLUTION INTRAMUSCULAR; SUBCUTANEOUS
Status: CANCELLED
Start: 2022-01-01

## 2022-01-22 NOTE — PROGRESS NOTES
"[facility] in office Hematology Oncology Subsequent encounter note    1/18/22    Subjective:      Patient ID:   Rodney Linder  81 y.o. male  1940  [place], Diego Paula Schexnayder, Boudreaux, Marsala      Chief Complaint:   Liver masses    HPI:  81 y.o. male with history of pneumonia x2.  He has been found to have hilar and mediastinal lymphadenopathy and liver masses on CT scan studies.  Working diagnosis is Hepatocellular Ca.    He had history of CVA 2 years ago.  He does have considerable left-sided weakness after his CVA.  He requires much support from his family and transferring him and in his care.  The CVA was felt to be due to a embolic stroke involving right middle cerebral artery.  He has history of right internal carotid artery stenosis.    Other history includes diabetes, hypertension., high cholesterol.  Appetite is good and he denies weight loss.  He has history of type 2 diabetes, increased LFTs.  Hx of gross hematuria with clots, BPH dx, S/P  procedure.    He is status post tracheostomy creation and cerebral angiogram around December 2019 and January 2020.       He smoked 2 packs per day times 30 years but has not smoked in the last 30 years.  He does not drink alcohol with regularity.  He denies allergies to medications.    He does have history of GERD symptoms and chronic diarrhea.  With caren BRIZUELA".  Last colonoscopy was 12 years ago, he does have COPD.  In January 2020 he had an episode of Clostridium difficile.    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    His medicines include Plavix.  He took his last dose of Plavix yesterday.  Will start him on a daily dose of Lovenox 40 mg over the next 10 days in anticipation of mediastinoscopy with lymph node biopsy or percutaneous needle biopsy of the liver or colonoscopy with evaluation of the colon.    MRI showed 9 cm mass, 2 smaller masses.  Discussed with radiologist, for percutaneous Bx of " liver mass.  Last dose of Lovenox on day prior to procedure date.    Bx of liver Hepatocellular Carcinoma.    He saw Dr. Walker, not felt to be a candidate for liver transplant, or metastatectomy surgery with multiple masses.    Referred to Dr. Holden, I spoke with Dr. Holden and reviewed MRI.  Likely could give Y 90 to large lesion and do ablation procedure on smaller Liver lesions.  Y90 procedure done 10/27/21.  #1.  But repeat Alpha feto protein from 10/27/21 was 97!  On Day 1 of Rx.  The previous Alpha feto protein was 45,000.    Hgb 10.1, and Alpha feto protein stable to slightly increased 97 to 127.    MRI 21 slight increase in liver metastases.    ROS:   GEN: normal without any fever, night sweats or weight loss  HEENT: See HPI  CV: See HPI  PULM: See HPI  GI: See HPI  : normal with no hematuria, dysuria  BREAST: normal with no mass, discharge, pain  SKIN: normal with no rash, erythema, bruising, or swelling       Past Surgical History:   Procedure Laterality Date    CEREBRAL ANGIOGRAM N/A 2019    Procedure: ANGIOGRAM-CEREBRAL;  Surgeon: Jairo Martínez MD;  Location: [facility] OR [address] [place] [address];  Service: Radiology;  Laterality: N/A;    TRACHEOSTOMY N/A 2020    Procedure: CREATION, TRACHEOSTOMY;  Surgeon: Thom Conde MD;  Location: [facility] OR [address] [place] [address];  Service: General;  Laterality: N/A;  PERC trach (blue rhino kit), have bronchoscopy in room        Review of patient's allergies indicates:  No Known Allergies  Social History     Socioeconomic History    Marital status:    Tobacco Use    Smoking status: Former Smoker     Types: Cigarettes     Quit date: 6/15/1990     Years since quittin.6    Smokeless tobacco: Never Used   Substance and Sexual Activity    Alcohol use: Not Currently    Drug use: Never    Sexual activity: Not Currently         Current Outpatient Medications:     acetaminophen (TYLENOL) 325 MG tablet, 2 tablets (650 mg total) by Per G Tube route every 6 (six)  hours as needed., Disp: , Rfl: 0    albuterol (PROVENTIL/VENTOLIN HFA) 90 mcg/actuation inhaler, Inhale 2 puffs into the lungs every 4 (four) hours as needed. , Disp: , Rfl:     albuterol-ipratropium (DUO-NEB) 2.5 mg-0.5 mg/3 mL nebulizer solution, Take 3 mLs by nebulization every 6 (six) hours as needed for Wheezing. Rescue, Disp: 1 Box, Rfl: 0    amLODIPine (NORVASC) 10 MG tablet, 1 tablet (10 mg total) by Per G Tube route once daily., Disp: 30 tablet, Rfl: 11    atorvastatin (LIPITOR) 20 MG tablet, Take 20 mg by mouth once daily. , Disp: , Rfl:     B6-folic-B12-coffee-phosphatid (NEURIVA PLUS BRAIN PERFORMANCE) 1.7 mg-400 mcg- 2.4 mcg Cap, Take by mouth., Disp: , Rfl:     clopidogreL (PLAVIX) 75 mg tablet, Take 75 mg by mouth once daily. , Disp: , Rfl:     dextromethorphan-guaiFENesin  mg (MUCINEX DM)  mg per 12 hr tablet, Take 1 tablet by mouth every 12 (twelve) hours., Disp: , Rfl:     enoxaparin (LOVENOX) 40 mg/0.4 mL Syrg, Inject 0.4 mLs (40 mg total) into the skin once daily. for 10 days (Patient not taking: Reported on 8/3/2021), Disp: 10 Syringe, Rfl: 1    EScitalopram oxalate (LEXAPRO) 10 MG tablet, escitalopram 10 mg tablet  TAKE 1 TABLET BY MOUTH ONCE DAILY, Disp: , Rfl:     finasteride (PROSCAR) 5 mg tablet, Take 5 mg by mouth once daily., Disp: , Rfl:     glimepiride (AMARYL) 4 MG tablet, Take 4 mg by mouth daily with breakfast. , Disp: , Rfl:     Lacto.acidophilus-Bif.animalis 32 billion cell Cap, Take 1 capsule by mouth once daily. , Disp: , Rfl:     linaCLOtide (LINZESS) 145 mcg Cap capsule, Take 145 mcg by mouth., Disp: , Rfl:     miconazole nitrate 2% (MICOTIN) 2 % Oint, Apply topically 2 (two) times daily., Disp: , Rfl: 0    mirtazapine (REMERON) 15 MG tablet, Take 15 mg by mouth nightly., Disp: , Rfl:     nystatin (MYCOSTATIN) powder, Apply topically 2 (two) times daily. , Disp: , Rfl:     omeprazole (PRILOSEC) 40 MG capsule, Take 40 mg by mouth every morning. ,  Disp: , Rfl:     ondansetron 4 mg/2 mL Soln, Inject 4 mg into the vein every 6 (six) hours as needed., Disp: , Rfl:     polyethylene glycol (GLYCOLAX) 17 gram PwPk, 17 g by Per G Tube route once daily., Disp: , Rfl: 0    senna-docusate 8.6-50 mg (PERICOLACE) 8.6-50 mg per tablet, 1 tablet by Per G Tube route 2 (two) times daily., Disp: , Rfl:     tamsulosin (FLOMAX) 0.4 mg Cap, Take 1 capsule by mouth nightly., Disp: , Rfl:     turmeric 400 mg Cap, Take 1 capsule by mouth once daily., Disp: , Rfl:           Objective:   Vitals:  Blood pressure (!) 156/90, pulse 88, temperature 98.8 °F (37.1 °C), resp. rate 16.    Physical Examination:   GEN: no apparent distress, comfortable  HEAD: atraumatic and normocephalic  EYES: no pallor, no icterus  ENT:  no pharyngeal erythema, external ears WNL; no nasal discharge  NECK: no masses, thyroid normal, trachea midline, no LAD/LN's, supple  CV: RRR with no murmur; normal pulse; normal S1 and S2; no pedal edema  CHEST: Normal respiratory effort; CTAB; normal breath sounds; no wheeze or crackles  ABDOM: nontender and nondistended; soft;  no rebound/guarding, L/S NP  MUSC/Skeletal:  He wears a splint at the left arm for support  EXTREM: no clubbing, cyanosis, inflammation   SKIN: no rashes, lesions, ulcers, petechiae   : no cvat  NEURO:  He has weakness but not complete paralysis after left arm and leg  PSYCH: normal mood, affect and behavior  LYMPH: normal cervical, supraclavicular, axillary LN's      Labs:     Tumor markers negative.   Alpha feto protein increased at 45,000. To  97.  To 127.    Assessment:   (1) 81 y.o. male status post right CVA with residual impairment of left arm and leg.    (2) Multifocal Hepatocellular Carcinoma.     (3) alpha feto protein 45,000, recently 97. To 127.    (4)Referred  to Dr. Holden for consideration of  intra hepatic Y 90 infusion and ablation procedure.  Y90 procedure done 10/27/21.    Options of observation for now.    Vs Systemic Rx  trial.    Or refer back to Dr. Holden for further Y 90 or TACE.

## 2022-04-29 NOTE — TELEPHONE ENCOUNTER
----- Message from Camila Forman sent at 4/27/2022  2:59 PM CDT -----  Regarding: Ultrasound order and reschedule appt  Vargas, patient's son, called in stating that patient is suppose to have an ultrasound of his liver before his appt on Monday, May 2, 2022 so appt will probably have to be moved out. Patient sees Dr Chris. He stated that patient cannot have an MRI because he is a stroke patient. He wants to have orders sent to [facility]. He can be reached at [phone].    Thanks so much,  Camila

## 2022-04-29 NOTE — TELEPHONE ENCOUNTER
Faxed order for US to [facility].  Appt for Monday has been cancelled due to not having US completed.  Pt son stated he would call us back once scheduled so that he can be placed on schedule to see Errol.

## 2022-06-17 NOTE — TELEPHONE ENCOUNTER
Orders placed in Epic for ferrlicet and B 12 in epic..    Get auth    Get date and time    Check lab in 4 months.    RTC 4 months.

## 2022-06-17 NOTE — PROGRESS NOTES
"[facility] in office Hematology Oncology Subsequent encounter note    6/16/22    Subjective:      Patient ID:   Rodney Linder  82 y.o. male  1940  Diego Paula Schexnayder, Boudreaux, [place], [place]      Chief Complaint:   Liver masses    HPI:  82 y.o. male with history of pneumonia x2.  He has been found to have hilar and mediastinal lymphadenopathy and liver masses on CT scan studies.  Working diagnosis is Hepatocellular Ca.    S/P intrahepatic Rx per Dr. Holden, 1/2022.  Doing well, appetite good, weight stable, denies pain.    He had history of CVA 2 years ago.  He does have considerable left-sided weakness after his CVA.  He requires much support from his family and transferring him and in his care.  The CVA was felt to be due to a embolic stroke involving right middle cerebral artery.  He has history of right internal carotid artery stenosis.    Other history includes diabetes, hypertension., high cholesterol.  Appetite is good and he denies weight loss.  He has history of type 2 diabetes, increased LFTs.  Hx of gross hematuria with clots, BPH dx, S/P  procedure.    He is status post tracheostomy creation and cerebral angiogram around December 2019 and January 2020.       He smoked 2 packs per day times 30 years but has not smoked in the last 30 years.  He does not drink alcohol with regularity.  He denies allergies to medications.    He does have history of GERD symptoms and chronic diarrhea.  With caren BRIZUELA".  Last colonoscopy was 12 years ago, he does have COPD.  In January 2020 he had an episode of Clostridium difficile.    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    His medicines include Plavix.  He took his last dose of Plavix yesterday.  Will start him on a daily dose of Lovenox 40 mg over the next 10 days in anticipation of mediastinoscopy with lymph node biopsy or percutaneous needle biopsy of the liver or colonoscopy with evaluation of the " colon.    MRI showed 9 cm mass, 2 smaller masses.  Discussed with radiologist, for percutaneous Bx of liver mass.  Last dose of Lovenox on day prior to procedure date.    Bx of liver Hepatocellular Carcinoma.    He saw Dr. Walker, not felt to be a candidate for liver transplant, or metastatectomy surgery with multiple masses.    Referred to Dr. Holden, I spoke with Dr. Holden and reviewed MRI.  Likely could give Y 90 to large lesion and do ablation procedure on smaller Liver lesions.  Y90 procedure done 10/27/21.  #1.  But repeat Alpha feto protein from 10/27/21 was 97!  On Day 1 of Rx.  The previous Alpha feto protein was 45,000.    Hgb 10.1, and Alpha feto protein stable to slightly increased 97 to 127.    MRI 12/23/21 slight increase in liver metastases.  Post treatment U/S of liver done now.  U/S is stable to improved.  Pre Rx mass 10 x 8.4 x 8 cm.  Post treatment mass is 10 x 6.3 cm   Liver 20 to 18 cm.    Observe for now.  Recheck U/S and RTC 4 months.    ROS:   GEN: normal without any fever, night sweats or weight loss  HEENT: See HPI  CV: See HPI  PULM: See HPI  GI: See HPI  : normal with no hematuria, dysuria  BREAST: normal with no mass, discharge, pain  SKIN: normal with no rash, erythema, bruising, or swelling       Past Surgical History:   Procedure Laterality Date    CEREBRAL ANGIOGRAM N/A 12/16/2019    Procedure: ANGIOGRAM-CEREBRAL;  Surgeon: Jairo Martínez MD;  Location: [facility] OR [address] [place] [address];  Service: Radiology;  Laterality: N/A;    TRACHEOSTOMY N/A 1/8/2020    Procedure: CREATION, TRACHEOSTOMY;  Surgeon: Thom Conde MD;  Location: [facility] OR [address] [place] [address];  Service: General;  Laterality: N/A;  PERC trach (blue rhino kit), have bronchoscopy in room        Review of patient's allergies indicates:  No Known Allergies  Social History     Socioeconomic History    Marital status:    Tobacco Use    Smoking status: Former Smoker     Types: Cigarettes     Quit date: 6/15/1990     Years since  quittin.0    Smokeless tobacco: Never Used   Substance and Sexual Activity    Alcohol use: Not Currently    Drug use: Never    Sexual activity: Not Currently         Current Outpatient Medications:     acetaminophen (TYLENOL) 325 MG tablet, 2 tablets (650 mg total) by Per G Tube route every 6 (six) hours as needed., Disp: , Rfl: 0    atorvastatin (LIPITOR) 20 MG tablet, Take 20 mg by mouth once daily. , Disp: , Rfl:     B6-folic-B12-coffee-phosphatid (NEURIVA PLUS BRAIN PERFORMANCE) 1.7 mg-400 mcg- 2.4 mcg Cap, Take by mouth., Disp: , Rfl:     clopidogreL (PLAVIX) 75 mg tablet, Take 75 mg by mouth once daily. , Disp: , Rfl:     dextromethorphan-guaiFENesin  mg (MUCINEX DM)  mg per 12 hr tablet, Take 1 tablet by mouth every 12 (twelve) hours., Disp: , Rfl:     finasteride (PROSCAR) 5 mg tablet, Take 5 mg by mouth once daily., Disp: , Rfl:     glimepiride (AMARYL) 4 MG tablet, Take 4 mg by mouth daily with breakfast. , Disp: , Rfl:     Lacto.acidophilus-Bif.animalis 32 billion cell Cap, Take 1 capsule by mouth once daily. , Disp: , Rfl:     linaCLOtide (LINZESS) 145 mcg Cap capsule, Take 145 mcg by mouth., Disp: , Rfl:     miconazole nitrate 2% (MICOTIN) 2 % Oint, Apply topically 2 (two) times daily., Disp: , Rfl: 0    mirtazapine (REMERON) 15 MG tablet, Take 15 mg by mouth nightly., Disp: , Rfl:     nystatin (MYCOSTATIN) powder, Apply topically 2 (two) times daily. , Disp: , Rfl:     polyethylene glycol (GLYCOLAX) 17 gram PwPk, 17 g by Per G Tube route once daily., Disp: , Rfl: 0    senna-docusate 8.6-50 mg (PERICOLACE) 8.6-50 mg per tablet, 1 tablet by Per G Tube route 2 (two) times daily., Disp: , Rfl:     tamsulosin (FLOMAX) 0.4 mg Cap, Take 1 capsule by mouth nightly., Disp: , Rfl:     turmeric 400 mg Cap, Take 1 capsule by mouth once daily., Disp: , Rfl:     albuterol-ipratropium (DUO-NEB) 2.5 mg-0.5 mg/3 mL nebulizer solution, Take 3 mLs by nebulization every 6 (six) hours  "as needed for Wheezing. Rescue, Disp: 1 Box, Rfl: 0    amLODIPine (NORVASC) 10 MG tablet, 1 tablet (10 mg total) by Per G Tube route once daily., Disp: 30 tablet, Rfl: 11    enoxaparin (LOVENOX) 40 mg/0.4 mL Syrg, Inject 0.4 mLs (40 mg total) into the skin once daily. for 10 days (Patient not taking: Reported on 8/3/2021), Disp: 10 Syringe, Rfl: 1    EScitalopram oxalate (LEXAPRO) 10 MG tablet, escitalopram 10 mg tablet  TAKE 1 TABLET BY MOUTH ONCE DAILY, Disp: , Rfl:     omeprazole (PRILOSEC) 40 MG capsule, Take 40 mg by mouth every morning. , Disp: , Rfl:     ondansetron 4 mg/2 mL Soln, Inject 4 mg into the vein every 6 (six) hours as needed., Disp: , Rfl:           Objective:   Vitals:  Blood pressure 109/70, pulse 101, temperature 98.2 °F (36.8 °C), resp. rate 18, height 6' 1" (1.854 m).    Physical Examination:   GEN: no apparent distress, comfortable  HEAD: atraumatic and normocephalic  EYES: no pallor, no icterus  ENT:  no pharyngeal erythema, external ears WNL; no nasal discharge  NECK: no masses, thyroid normal, trachea midline, no LAD/LN's, supple  CV: RRR with no murmur; normal pulse; normal S1 and S2; no pedal edema  CHEST: Normal respiratory effort; CTAB; normal breath sounds; no wheeze or crackles  ABDOM: nontender and nondistended; soft;  no rebound/guarding, L/S NP  MUSC/Skeletal:  He wears a splint at the left arm for support  EXTREM: no clubbing, cyanosis, inflammation   SKIN: no rashes, lesions, ulcers, petechiae   : no cvat  NEURO:  He has weakness but not complete paralysis after left arm and leg  PSYCH: normal mood, affect and behavior  LYMPH: normal cervical, supraclavicular, axillary LN's      Labs:     Tumor markers negative.   Alpha feto protein increased at 45,000. To  97.  To 127.    Assessment:   (1) 82 y.o. male status post right CVA with residual impairment of left arm and leg.    (2) Multifocal Hepatocellular Carcinoma.     (3) alpha feto protein 45,000, recently 97. To " 127.    (4)Referred  to Dr. Holden for consideration of  intra hepatic Y 90 infusion and ablation procedure.  Y90 procedure done 10/27/21.    (5)Slight improvement in liver masses after intrahepatic Rx.    Observe for now.  Check U/S and RTC 4 months.

## 2022-06-22 NOTE — TELEPHONE ENCOUNTER
----- Message from Faith Mayo sent at 6/21/2022  2:01 PM CDT -----  The patient's son called and said Alicia called him to schedule him for iron infusions. He said he thinks they have the wrong patient because he has not had any labs done since December and that Dr. Chris did not mention to him that he needed iron. He told them everything was stable. Please check on this and call him back at [phone].

## 2022-07-19 NOTE — TELEPHONE ENCOUNTER
----- Message from Shelbi Short sent at 7/19/2022  3:04 PM CDT -----  Regarding: yellow spots  Pt's son is calling to see if anyone has received the message he sent last week. Please see the message in his chart from 7/13. Pt's son is concerned and would like to speak to the nurse asap.    # [phone] Stephen

## 2022-08-04 NOTE — TELEPHONE ENCOUNTER
----- Message from Faith Mayo sent at 8/4/2022  8:49 AM CDT -----  The patient's son called and said he would like a call back to discuss the results of his tests. He is concerned about the patient's liver. # [phone] [place]

## 2022-08-18 NOTE — TELEPHONE ENCOUNTER
He has [facility] HHA.    He has failure to thrive.  He has hepatocellular Ca.    See if [facility] has a hospice?  If so, ask them to discuss hospice with  Pt's son and DIL.    RTC 1 month, Can cancel.

## 2022-08-18 NOTE — PROGRESS NOTES
"[facility] in office Hematology Oncology Subsequent encounter note    8/12/22    Subjective:      Patient ID:   Rodney Linder  82 y.o. male  1940  [place], [place], [place], [place]      Chief Complaint:   Liver masses    HPI:  82 y.o. male with history of pneumonia x2.  He has been found to have hilar and mediastinal lymphadenopathy and liver masses on CT scan studies.  Working diagnosis is Hepatocellular Ca.    S/P intrahepatic Rx per Dr. Holden, 1/2022.  Doing well, appetite good, weight stable, denies pain.    He had history of CVA 2 years ago.  He does have considerable left-sided weakness after his CVA.  He requires much support from his family and transferring him and in his care.  The CVA was felt to be due to a embolic stroke involving right middle cerebral artery.  He has history of right internal carotid artery stenosis.    Other history includes diabetes, hypertension., high cholesterol.  Appetite is good and he denies weight loss.  He has history of type 2 diabetes, increased LFTs.  Hx of gross hematuria with clots, BPH dx, S/P  procedure.    He is status post tracheostomy creation and cerebral angiogram around December 2019 and January 2020.       He smoked 2 packs per day times 30 years but has not smoked in the last 30 years.  He does not drink alcohol with regularity.  He denies allergies to medications.    He does have history of GERD symptoms and chronic diarrhea.  With caren BRIZUELA".  Last colonoscopy was 12 years ago, he does have COPD.  In January 2020 he had an episode of Clostridium difficile.    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    His medicines include Plavix.  He took his last dose of Plavix yesterday.  Will start him on a daily dose of Lovenox 40 mg over the next 10 days in anticipation of mediastinoscopy with lymph node biopsy or percutaneous needle biopsy of the liver or colonoscopy with evaluation of the " colon.    MRI showed 9 cm mass, 2 smaller masses.  Discussed with radiologist, for percutaneous Bx of liver mass.  Last dose of Lovenox on day prior to procedure date.    Bx of liver Hepatocellular Carcinoma.    He saw Dr. Walker, not felt to be a candidate for liver transplant, or metastatectomy surgery with multiple masses.    Referred to Dr. Holden, I spoke with Dr. Holden and reviewed MRI.  Likely could give Y 90 to large lesion and do ablation procedure on smaller Liver lesions.  Y90 procedure done 10/27/21.  #1.  But repeat Alpha feto protein from 10/27/21 was 97!  On Day 1 of Rx.  The previous Alpha feto protein was 45,000.    Hgb 10.1, and Alpha feto protein stable to slightly increased 97 to 127.    MRI 12/23/21 slight increase in liver metastases.  Post treatment U/S of liver done now.  U/S is stable to improved.  Pre Rx mass 10 x 8.4 x 8 cm.  Post treatment mass is 10 x 6.3 cm   Liver 20 to 18 cm.    Pt's son and DIL came in today.  Pt has failure to thrive, basically bed bound.  Alpha feto protein markedly increased.  U/S extensive dx, progressive dx.    For comfort care, transition HHA to hospice for him.  RTC 1 month, can cancel.    ROS:   GEN: normal without any fever, night sweats or weight loss  HEENT: See HPI  CV: See HPI  PULM: See HPI  GI: See HPI  : normal with no hematuria, dysuria  BREAST: normal with no mass, discharge, pain  SKIN: normal with no rash, erythema, bruising, or swelling       Past Surgical History:   Procedure Laterality Date    CEREBRAL ANGIOGRAM N/A 12/16/2019    Procedure: ANGIOGRAM-CEREBRAL;  Surgeon: Jairo Martínez MD;  Location: [facility] OR [address] [place] [address];  Service: Radiology;  Laterality: N/A;    TRACHEOSTOMY N/A 1/8/2020    Procedure: CREATION, TRACHEOSTOMY;  Surgeon: Thom Conde MD;  Location: [facility] OR [address] [place] [address];  Service: General;  Laterality: N/A;  PERC trach (blue rhino kit), have bronchoscopy in room        Review of patient's allergies indicates:  No Known  Allergies  Social History     Socioeconomic History    Marital status:    Tobacco Use    Smoking status: Former Smoker     Types: Cigarettes     Quit date: 6/15/1990     Years since quittin.1    Smokeless tobacco: Never Used   Substance and Sexual Activity    Alcohol use: Not Currently    Drug use: Never    Sexual activity: Not Currently         Current Outpatient Medications:     acetaminophen (TYLENOL) 325 MG tablet, 2 tablets (650 mg total) by Per G Tube route every 6 (six) hours as needed., Disp: , Rfl: 0    albuterol-ipratropium (DUO-NEB) 2.5 mg-0.5 mg/3 mL nebulizer solution, Take 3 mLs by nebulization every 6 (six) hours as needed for Wheezing. Rescue, Disp: 1 Box, Rfl: 0    amLODIPine (NORVASC) 10 MG tablet, 1 tablet (10 mg total) by Per G Tube route once daily., Disp: 30 tablet, Rfl: 11    atorvastatin (LIPITOR) 20 MG tablet, Take 20 mg by mouth once daily. , Disp: , Rfl:     B6-folic-B12-coffee-phosphatid (NEURIVA PLUS BRAIN PERFORMANCE) 1.7 mg-400 mcg- 2.4 mcg Cap, Take by mouth., Disp: , Rfl:     clopidogreL (PLAVIX) 75 mg tablet, Take 75 mg by mouth once daily. , Disp: , Rfl:     dextromethorphan-guaiFENesin  mg (MUCINEX DM)  mg per 12 hr tablet, Take 1 tablet by mouth every 12 (twelve) hours., Disp: , Rfl:     enoxaparin (LOVENOX) 40 mg/0.4 mL Syrg, Inject 0.4 mLs (40 mg total) into the skin once daily. for 10 days (Patient not taking: Reported on 8/3/2021), Disp: 10 Syringe, Rfl: 1    EScitalopram oxalate (LEXAPRO) 10 MG tablet, escitalopram 10 mg tablet  TAKE 1 TABLET BY MOUTH ONCE DAILY, Disp: , Rfl:     finasteride (PROSCAR) 5 mg tablet, Take 5 mg by mouth once daily., Disp: , Rfl:     glimepiride (AMARYL) 4 MG tablet, Take 4 mg by mouth daily with breakfast. , Disp: , Rfl:     Lacto.acidophilus-Bif.animalis 32 billion cell Cap, Take 1 capsule by mouth once daily. , Disp: , Rfl:     linaCLOtide (LINZESS) 145 mcg Cap capsule, Take 145 mcg by mouth., Disp: ,  Rfl:     miconazole nitrate 2% (MICOTIN) 2 % Oint, Apply topically 2 (two) times daily., Disp: , Rfl: 0    mirtazapine (REMERON) 15 MG tablet, Take 15 mg by mouth nightly., Disp: , Rfl:     nystatin (MYCOSTATIN) powder, Apply topically 2 (two) times daily. , Disp: , Rfl:     omeprazole (PRILOSEC) 40 MG capsule, Take 40 mg by mouth every morning. , Disp: , Rfl:     ondansetron 4 mg/2 mL Soln, Inject 4 mg into the vein every 6 (six) hours as needed., Disp: , Rfl:     polyethylene glycol (GLYCOLAX) 17 gram PwPk, 17 g by Per G Tube route once daily., Disp: , Rfl: 0    senna-docusate 8.6-50 mg (PERICOLACE) 8.6-50 mg per tablet, 1 tablet by Per G Tube route 2 (two) times daily., Disp: , Rfl:     tamsulosin (FLOMAX) 0.4 mg Cap, Take 1 capsule by mouth nightly., Disp: , Rfl:     turmeric 400 mg Cap, Take 1 capsule by mouth once daily., Disp: , Rfl:           Objective:   Vitals:  There were no vitals taken for this visit.    Physical Examination:   GEN: no apparent distress, comfortable  HEAD: atraumatic and normocephalic  EYES: no pallor, no icterus  ENT:  no pharyngeal erythema, external ears WNL; no nasal discharge  NECK: no masses, thyroid normal, trachea midline, no LAD/LN's, supple  CV: RRR with no murmur; normal pulse; normal S1 and S2; no pedal edema  CHEST: Normal respiratory effort; CTAB; normal breath sounds; no wheeze or crackles  ABDOM: nontender and nondistended; soft;  no rebound/guarding, L/S NP  MUSC/Skeletal:  He wears a splint at the left arm for support  EXTREM: no clubbing, cyanosis, inflammation   SKIN: no rashes, lesions, ulcers, petechiae   : no cvat  NEURO:  He has weakness but not complete paralysis after left arm and leg  PSYCH: normal mood, affect and behavior  LYMPH: normal cervical, supraclavicular, axillary LN's        Assessment:   (1) 82 y.o. male status post right CVA with residual impairment of left arm and leg.    (2) Multifocal Hepatocellular Carcinoma.     (3) Failure to  thrive, progressive dx.  For comfort care, hospice referral.    RTC 1 month, can cancel.

## 2023-07-23 NOTE — PROCEDURES
"Jerry Linder is a 79 y.o. male patient.    Temp: (!) 101.6 °F (38.7 °C) (20)  Pulse: 100 (20)  Resp: (!) 5 (20)  BP: (!) 116/57 (20)  SpO2: 96 % (20)  Weight: 123.4 kg (272 lb) (19 1115)  Height: 6' 1" (185.4 cm) (19 0828)       Arterial Line  Date/Time: 2020 5:46 PM  Performed by: Marlin Yeung PA-C  Authorized by: Marlin Yeung PA-C   Consent Done: Yes  Consent: Written consent obtained.  Risks and benefits: risks, benefits and alternatives were discussed  Consent given by: spouse  Patient identity confirmed: , MRN and name  Time out: Immediately prior to procedure a "time out" was called to verify the correct patient, procedure, equipment, support staff and site/side marked as required.  Preparation: Patient was prepped and draped in the usual sterile fashion.  Indications: multiple ABGs and respiratory failure  Location: right radial  Connor's test normal: yes  Needle gauge: 20  Seldinger technique: Seldinger technique used  Number of attempts: 1  Complications: No  Post-procedure: dressing applied  Post-procedure CMS: unchanged          Marlin Yeung  2020  "
"Jerry Linder is a 79 y.o. male patient.    Temp: 100.1 °F (37.8 °C) (12/19/19 1232)  Pulse: 99 (12/19/19 1402)  Resp: 16 (12/19/19 1402)  BP: (!) 149/67 (12/19/19 1402)  SpO2: 97 % (12/19/19 1402)  Weight: 123.4 kg (272 lb) (12/17/19 1115)  Height: 6' 1" (185.4 cm) (12/17/19 1115)       Central Line  Date/Time: 12/19/2019 2:57 PM  Location procedure was performed: [facility]  Performed by: Alvino Cline MD  Pre-operative Diagnosis: CVA  Post-operative diagnosis: CVA  Consent Done: Yes  Time out: Immediately prior to procedure a "time out" was called to verify the correct patient, procedure, equipment, support staff and site/side marked as required.  Indications: vascular access and hemodynamic monitoring  Anesthesia: local infiltration    Anesthesia:  Local Anesthetic: lidocaine 1% without epinephrine  Anesthetic total: 3 mL  Preparation: skin prepped with ChloraPrep  Skin prep agent dried: skin prep agent completely dried prior to procedure  Sterile barriers: all five maximum sterile barriers used - cap, mask, sterile gown, sterile gloves, and large sterile sheet  Hand hygiene: hand hygiene performed prior to central venous catheter insertion  Location details: left subclavian  Catheter type: triple lumen  Catheter size: 7 Fr  Catheter Length: 20cm    Ultrasound guidance: no  Manometry: Yes  Number of attempts: 1  Assessment: placement verified by x-ray,  no pneumothorax on x-ray and successful placement  Complications: none  Estimated blood loss (mL): 2  Post-procedure: line sutured,  chlorhexidine patch,  sterile dressing applied and blood return through all ports  Complications: No          Alvino Cline  12/19/2019  "
"Jerry Linder is a 79 y.o. male patient.    Temp: 99 °F (37.2 °C) (19 1515)  Pulse: 94 (19 1805)  Resp: (!) 30 (19 180)  BP: 131/60 (19 1800)  SpO2: 97 % (19 1805)  Weight: 123.4 kg (272 lb) (19 1115)  Height: 6' 1" (185.4 cm) (19 1115)       Arterial Line  Date/Time: 2019 7:42 PM  Location procedure was performed: [facility]  Performed by: Samy Galindo NP  Authorized by: Samy Galindo NP   Consent Done: Yes  Consent: Verbal consent obtained. Written consent obtained.  Risks and benefits: risks, benefits and alternatives were discussed  Consent given by: spouse  Patient understanding: patient states understanding of the procedure being performed  Patient consent: the patient's understanding of the procedure matches consent given  Procedure consent: procedure consent matches procedure scheduled  Relevant documents: relevant documents present and verified  Site marked: the operative site was marked  Required items: required blood products, implants, devices, and special equipment available  Patient identity confirmed: , MRN and name  Time out: Immediately prior to procedure a "time out" was called to verify the correct patient, procedure, equipment, support staff and site/side marked as required.  Preparation: Patient was prepped and draped in the usual sterile fashion.  Indications: multiple ABGs and hemodynamic monitoring  Location: right radial  Patient sedated: no  Connor's test normal: yes  Needle gauge: 20  Number of attempts: 1  Complications: No  Specimens: No  Implants: No  Post-procedure: dressing applied  Post-procedure CMS: normal and unchanged  Patient tolerance: Patient tolerated the procedure well with no immediate complications          Samy Galindo  2019  "
.Radiology Post-Procedure Note    Pre Op Diagnosis: right MCA stroke    Post Op Diagnosis: same    Procedure: Cerebral angiogram    Procedure performed by: Jairo Martínez MD    Written Informed Consent Obtained: Yes    Specimen Removed: YES thrombus from right MCA    Estimated Blood Loss: 70cc    Procedure report:     An 8F sheath was placed into the right femoral artery and a Neuron max catheter was placed in the right CCA.  Angio showed 90% ABBY stenosis and R M1 occlusion.  The ABBY was treated with PTA 5mm balloon followed by carotid stenting.  The Right M1 occlusion was treated using aspiration thrombectomy using a Jet 7 Flex catheter.  Preliminary interpretation: TICI 3 reperfusion with 1 pass.  Please see Imaging report for full details.    A right femoral artery angiogram was performed, the sheath removed and hemostasis achieved using Angioseal.  No hematoma was present at the time of hemostasis.    The patient tolerated the procedure well.     Groin puncture at 1808  Carotid stent placed at 1838  1st pass reperfusion was at 1851 with TICI flow  Hemostasis time was 1900      Jairo Martínez MD  Attending Radiologist  Interventional Neuroradiology  
Car

## 2024-07-30 NOTE — ASSESSMENT & PLAN NOTE
- 80 y/o male admitted for R MCA stroke syndrome POD 0 s/p Thrombectomy TICI 3 reperfusion   - keep SBP <140   - Neurochecks q1hr    - Follow Echo   - Follow Hemoglobin A1C   - Aspirin daily   - Plavix 75mg daily   - on cardene   - restart home Lisinopril     Is This A New Presentation, Or A Follow-Up?: Skin Lesion What Type Of Note Output Would You Prefer (Optional)?: Standard Output How Severe Is Your Skin Lesion?: moderate Has Your Skin Lesion Been Treated?: not been treated

## (undated) DEVICE — SEE MEDLINE ITEM 152622

## (undated) DEVICE — TRACH TUBE CUFF FLEX DISP 8.5

## (undated) DEVICE — SEE MEDLINE ITEM 146313

## (undated) DEVICE — SPONGE IV DRAIN 4X4 STERILE

## (undated) DEVICE — NDL N SERIES MICRO-DISSECTION

## (undated) DEVICE — SUT PROLENE 0-36 V-7

## (undated) DEVICE — SUT 3-0 12-18IN SILK

## (undated) DEVICE — SUT SILK 2-0 SH 18IN BLACK

## (undated) DEVICE — KIT PEG PULL STANDARD 20F

## (undated) DEVICE — GOWN SURGICAL X-LARGE

## (undated) DEVICE — URINARY DRAINAGE BAG

## (undated) DEVICE — SEE MEDLINE ITEM 157117

## (undated) DEVICE — TRAY MINOR GEN SURG

## (undated) DEVICE — ELECTRODE REM PLYHSV RETURN 9

## (undated) DEVICE — INTRODUCER TRACH TRAY BLU RHIN

## (undated) DEVICE — DRAPE THYROID WITH ARMBOARD

## (undated) DEVICE — SEE MEDLINE ITEM 146417

## (undated) DEVICE — LUBRICANT SURGILUBE 2 OZ

## (undated) DEVICE — SEE MEDLINE ITEM 152487

## (undated) DEVICE — SUT PROLENE 2-0 30 SH

## (undated) DEVICE — BLADE SURG CARBON STEEL SZ11

## (undated) DEVICE — CHLORAPREP 10.5 ML APPLICATOR